# Patient Record
Sex: FEMALE | Race: BLACK OR AFRICAN AMERICAN | Employment: OTHER | URBAN - METROPOLITAN AREA
[De-identification: names, ages, dates, MRNs, and addresses within clinical notes are randomized per-mention and may not be internally consistent; named-entity substitution may affect disease eponyms.]

---

## 2017-01-19 ENCOUNTER — COMMUNICATION - HEALTHEAST (OUTPATIENT)
Dept: INTERNAL MEDICINE | Facility: CLINIC | Age: 77
End: 2017-01-19

## 2017-01-19 DIAGNOSIS — K21.9 GASTROESOPHAGEAL REFLUX DISEASE WITHOUT ESOPHAGITIS: ICD-10-CM

## 2017-01-26 ENCOUNTER — COMMUNICATION - HEALTHEAST (OUTPATIENT)
Dept: INTERNAL MEDICINE | Facility: CLINIC | Age: 77
End: 2017-01-26

## 2017-01-30 ENCOUNTER — OFFICE VISIT - HEALTHEAST (OUTPATIENT)
Dept: INTERNAL MEDICINE | Facility: CLINIC | Age: 77
End: 2017-01-30

## 2017-01-30 DIAGNOSIS — R10.9 ABDOMINAL PAIN: ICD-10-CM

## 2017-01-30 ASSESSMENT — MIFFLIN-ST. JEOR: SCORE: 1082.1

## 2017-02-09 ENCOUNTER — COMMUNICATION - HEALTHEAST (OUTPATIENT)
Dept: INTERNAL MEDICINE | Facility: CLINIC | Age: 77
End: 2017-02-09

## 2017-02-09 DIAGNOSIS — E78.5 HYPERLIPEMIA: ICD-10-CM

## 2017-02-16 ENCOUNTER — RECORDS - HEALTHEAST (OUTPATIENT)
Dept: ADMINISTRATIVE | Facility: OTHER | Age: 77
End: 2017-02-16

## 2017-03-01 ENCOUNTER — AMBULATORY - HEALTHEAST (OUTPATIENT)
Dept: NEUROLOGY | Facility: CLINIC | Age: 77
End: 2017-03-01

## 2017-03-01 DIAGNOSIS — G20.A1 PARKINSON DISEASE (H): ICD-10-CM

## 2017-03-16 ENCOUNTER — HOSPITAL ENCOUNTER (OUTPATIENT)
Dept: PHYSICAL THERAPY | Age: 77
Setting detail: THERAPIES SERIES
Discharge: STILL ADMITTED OR ADMINISTRATIVE | End: 2017-03-16
Attending: PHYSICAL THERAPIST

## 2017-03-16 DIAGNOSIS — R68.89 DECREASED ACTIVITY TOLERANCE: ICD-10-CM

## 2017-03-16 DIAGNOSIS — R25.8 BRADYKINESIA: ICD-10-CM

## 2017-03-16 DIAGNOSIS — R29.3 POSTURAL INSTABILITY: ICD-10-CM

## 2017-04-11 ENCOUNTER — HOSPITAL ENCOUNTER (OUTPATIENT)
Dept: PHYSICAL THERAPY | Age: 77
Setting detail: THERAPIES SERIES
Discharge: STILL ADMITTED OR ADMINISTRATIVE | End: 2017-04-11
Attending: PHYSICAL THERAPIST

## 2017-04-11 DIAGNOSIS — R29.3 POSTURAL INSTABILITY: ICD-10-CM

## 2017-04-11 DIAGNOSIS — R25.8 BRADYKINESIA: ICD-10-CM

## 2017-04-11 DIAGNOSIS — R68.89 DECREASED ACTIVITY TOLERANCE: ICD-10-CM

## 2017-04-18 ENCOUNTER — HOSPITAL ENCOUNTER (OUTPATIENT)
Dept: PHYSICAL THERAPY | Age: 77
Setting detail: THERAPIES SERIES
Discharge: STILL ADMITTED OR ADMINISTRATIVE | End: 2017-04-18
Attending: PHYSICAL THERAPIST

## 2017-04-18 DIAGNOSIS — R68.89 DECREASED ACTIVITY TOLERANCE: ICD-10-CM

## 2017-04-18 DIAGNOSIS — R25.8 BRADYKINESIA: ICD-10-CM

## 2017-04-18 DIAGNOSIS — R29.3 POSTURAL INSTABILITY: ICD-10-CM

## 2017-04-21 ENCOUNTER — HOSPITAL ENCOUNTER (OUTPATIENT)
Dept: PHYSICAL THERAPY | Age: 77
Setting detail: THERAPIES SERIES
Discharge: STILL ADMITTED OR ADMINISTRATIVE | End: 2017-04-21
Attending: PHYSICAL THERAPIST

## 2017-04-21 DIAGNOSIS — R25.8 BRADYKINESIA: ICD-10-CM

## 2017-04-21 DIAGNOSIS — R68.89 DECREASED ACTIVITY TOLERANCE: ICD-10-CM

## 2017-04-21 DIAGNOSIS — R29.3 POSTURAL INSTABILITY: ICD-10-CM

## 2017-04-25 ENCOUNTER — HOSPITAL ENCOUNTER (OUTPATIENT)
Dept: PHYSICAL THERAPY | Age: 77
Setting detail: THERAPIES SERIES
Discharge: STILL ADMITTED OR ADMINISTRATIVE | End: 2017-04-25
Attending: PHYSICAL THERAPIST

## 2017-04-25 DIAGNOSIS — R29.3 POSTURAL INSTABILITY: ICD-10-CM

## 2017-04-25 DIAGNOSIS — R68.89 DECREASED ACTIVITY TOLERANCE: ICD-10-CM

## 2017-04-25 DIAGNOSIS — R25.8 BRADYKINESIA: ICD-10-CM

## 2017-04-28 ENCOUNTER — HOSPITAL ENCOUNTER (OUTPATIENT)
Dept: PHYSICAL THERAPY | Age: 77
Setting detail: THERAPIES SERIES
Discharge: STILL ADMITTED OR ADMINISTRATIVE | End: 2017-04-28
Attending: PHYSICAL THERAPIST

## 2017-04-28 DIAGNOSIS — R25.8 BRADYKINESIA: ICD-10-CM

## 2017-04-28 DIAGNOSIS — R26.81 GAIT INSTABILITY: ICD-10-CM

## 2017-04-28 DIAGNOSIS — R68.89 DECREASED ACTIVITY TOLERANCE: ICD-10-CM

## 2017-04-28 DIAGNOSIS — R29.3 POSTURAL INSTABILITY: ICD-10-CM

## 2017-05-02 ENCOUNTER — HOSPITAL ENCOUNTER (OUTPATIENT)
Dept: PHYSICAL THERAPY | Age: 77
Setting detail: THERAPIES SERIES
Discharge: STILL ADMITTED OR ADMINISTRATIVE | End: 2017-05-02
Attending: PHYSICAL THERAPIST

## 2017-05-02 DIAGNOSIS — R26.81 GAIT INSTABILITY: ICD-10-CM

## 2017-05-02 DIAGNOSIS — R29.3 POSTURAL INSTABILITY: ICD-10-CM

## 2017-05-02 DIAGNOSIS — R68.89 DECREASED ACTIVITY TOLERANCE: ICD-10-CM

## 2017-05-02 DIAGNOSIS — R25.8 BRADYKINESIA: ICD-10-CM

## 2017-05-05 ENCOUNTER — HOSPITAL ENCOUNTER (OUTPATIENT)
Dept: PHYSICAL THERAPY | Age: 77
Setting detail: THERAPIES SERIES
Discharge: STILL ADMITTED OR ADMINISTRATIVE | End: 2017-05-05
Attending: PHYSICAL THERAPIST

## 2017-05-05 DIAGNOSIS — R29.3 POSTURAL INSTABILITY: ICD-10-CM

## 2017-05-05 DIAGNOSIS — R25.8 BRADYKINESIA: ICD-10-CM

## 2017-05-05 DIAGNOSIS — R26.81 GAIT INSTABILITY: ICD-10-CM

## 2017-05-08 ENCOUNTER — COMMUNICATION - HEALTHEAST (OUTPATIENT)
Dept: INTERNAL MEDICINE | Facility: CLINIC | Age: 77
End: 2017-05-08

## 2017-05-08 DIAGNOSIS — I10 UNSPECIFIED ESSENTIAL HYPERTENSION: ICD-10-CM

## 2017-05-09 ENCOUNTER — HOSPITAL ENCOUNTER (OUTPATIENT)
Dept: PHYSICAL THERAPY | Age: 77
Setting detail: THERAPIES SERIES
Discharge: STILL ADMITTED OR ADMINISTRATIVE | End: 2017-05-09
Attending: PHYSICAL THERAPIST

## 2017-05-09 DIAGNOSIS — R25.8 BRADYKINESIA: ICD-10-CM

## 2017-05-09 DIAGNOSIS — R26.81 GAIT INSTABILITY: ICD-10-CM

## 2017-05-09 DIAGNOSIS — R29.3 POSTURAL INSTABILITY: ICD-10-CM

## 2017-05-12 ENCOUNTER — HOSPITAL ENCOUNTER (OUTPATIENT)
Dept: PHYSICAL THERAPY | Age: 77
Setting detail: THERAPIES SERIES
Discharge: STILL ADMITTED OR ADMINISTRATIVE | End: 2017-05-12
Attending: PHYSICAL THERAPIST

## 2017-05-12 DIAGNOSIS — R25.8 BRADYKINESIA: ICD-10-CM

## 2017-05-12 DIAGNOSIS — R68.89 DECREASED ACTIVITY TOLERANCE: ICD-10-CM

## 2017-05-12 DIAGNOSIS — R26.81 GAIT INSTABILITY: ICD-10-CM

## 2017-05-12 DIAGNOSIS — R29.3 POSTURAL INSTABILITY: ICD-10-CM

## 2017-05-16 ENCOUNTER — HOSPITAL ENCOUNTER (OUTPATIENT)
Dept: PHYSICAL THERAPY | Age: 77
Setting detail: THERAPIES SERIES
Discharge: STILL ADMITTED OR ADMINISTRATIVE | End: 2017-05-16
Attending: PHYSICAL THERAPIST

## 2017-05-16 DIAGNOSIS — R25.8 BRADYKINESIA: ICD-10-CM

## 2017-05-16 DIAGNOSIS — R68.89 DECREASED ACTIVITY TOLERANCE: ICD-10-CM

## 2017-05-16 DIAGNOSIS — R29.3 POSTURAL INSTABILITY: ICD-10-CM

## 2017-05-16 DIAGNOSIS — R26.81 GAIT INSTABILITY: ICD-10-CM

## 2017-05-19 ENCOUNTER — HOSPITAL ENCOUNTER (OUTPATIENT)
Dept: PHYSICAL THERAPY | Age: 77
Setting detail: THERAPIES SERIES
Discharge: STILL ADMITTED OR ADMINISTRATIVE | End: 2017-05-19
Attending: PHYSICAL THERAPIST

## 2017-05-19 DIAGNOSIS — R25.8 BRADYKINESIA: ICD-10-CM

## 2017-05-19 DIAGNOSIS — R68.89 DECREASED ACTIVITY TOLERANCE: ICD-10-CM

## 2017-05-19 DIAGNOSIS — R29.3 POSTURAL INSTABILITY: ICD-10-CM

## 2017-05-19 DIAGNOSIS — R26.81 GAIT INSTABILITY: ICD-10-CM

## 2017-05-23 ENCOUNTER — HOSPITAL ENCOUNTER (OUTPATIENT)
Dept: PHYSICAL THERAPY | Age: 77
Setting detail: THERAPIES SERIES
Discharge: STILL ADMITTED OR ADMINISTRATIVE | End: 2017-05-23
Attending: PHYSICAL THERAPIST

## 2017-05-23 DIAGNOSIS — R26.81 GAIT INSTABILITY: ICD-10-CM

## 2017-05-23 DIAGNOSIS — R25.8 BRADYKINESIA: ICD-10-CM

## 2017-05-23 DIAGNOSIS — R68.89 DECREASED ACTIVITY TOLERANCE: ICD-10-CM

## 2017-05-23 DIAGNOSIS — R29.3 POSTURAL INSTABILITY: ICD-10-CM

## 2017-05-25 ENCOUNTER — HOSPITAL ENCOUNTER (OUTPATIENT)
Dept: PHYSICAL THERAPY | Age: 77
Setting detail: THERAPIES SERIES
Discharge: STILL ADMITTED OR ADMINISTRATIVE | End: 2017-05-25
Attending: PHYSICAL THERAPIST

## 2017-05-25 ENCOUNTER — AMBULATORY - HEALTHEAST (OUTPATIENT)
Dept: INTERNAL MEDICINE | Facility: CLINIC | Age: 77
End: 2017-05-25

## 2017-05-25 ENCOUNTER — RECORDS - HEALTHEAST (OUTPATIENT)
Dept: ADMINISTRATIVE | Facility: OTHER | Age: 77
End: 2017-05-25

## 2017-05-25 DIAGNOSIS — R25.8 BRADYKINESIA: ICD-10-CM

## 2017-05-25 DIAGNOSIS — R26.81 GAIT INSTABILITY: ICD-10-CM

## 2017-05-25 DIAGNOSIS — R68.89 DECREASED ACTIVITY TOLERANCE: ICD-10-CM

## 2017-05-25 DIAGNOSIS — R29.3 POSTURAL INSTABILITY: ICD-10-CM

## 2017-05-30 ENCOUNTER — RECORDS - HEALTHEAST (OUTPATIENT)
Dept: ADMINISTRATIVE | Facility: OTHER | Age: 77
End: 2017-05-30

## 2017-06-01 ENCOUNTER — RECORDS - HEALTHEAST (OUTPATIENT)
Dept: ADMINISTRATIVE | Facility: OTHER | Age: 77
End: 2017-06-01

## 2017-06-02 ENCOUNTER — HOSPITAL ENCOUNTER (OUTPATIENT)
Dept: PHYSICAL THERAPY | Age: 77
Setting detail: THERAPIES SERIES
Discharge: STILL ADMITTED OR ADMINISTRATIVE | End: 2017-06-02
Attending: PHYSICAL THERAPIST

## 2017-06-02 DIAGNOSIS — R26.81 GAIT INSTABILITY: ICD-10-CM

## 2017-06-02 DIAGNOSIS — R68.89 DECREASED ACTIVITY TOLERANCE: ICD-10-CM

## 2017-06-02 DIAGNOSIS — R29.3 POSTURAL INSTABILITY: ICD-10-CM

## 2017-06-02 DIAGNOSIS — R25.8 BRADYKINESIA: ICD-10-CM

## 2017-06-13 ENCOUNTER — OFFICE VISIT - HEALTHEAST (OUTPATIENT)
Dept: INTERNAL MEDICINE | Facility: CLINIC | Age: 77
End: 2017-06-13

## 2017-06-13 DIAGNOSIS — K21.9 GASTROESOPHAGEAL REFLUX DISEASE WITHOUT ESOPHAGITIS: ICD-10-CM

## 2017-06-13 DIAGNOSIS — E78.5 HLD (HYPERLIPIDEMIA): ICD-10-CM

## 2017-06-13 DIAGNOSIS — G20.A1 PARALYSIS AGITANS (H): ICD-10-CM

## 2017-06-13 DIAGNOSIS — I10 HTN (HYPERTENSION): ICD-10-CM

## 2017-06-16 ENCOUNTER — AMBULATORY - HEALTHEAST (OUTPATIENT)
Dept: LAB | Facility: CLINIC | Age: 77
End: 2017-06-16

## 2017-06-16 DIAGNOSIS — I10 HTN (HYPERTENSION): ICD-10-CM

## 2017-06-16 DIAGNOSIS — E78.5 HLD (HYPERLIPIDEMIA): ICD-10-CM

## 2017-06-16 LAB
ALT SERPL W P-5'-P-CCNC: <6 U/L (ref 0–45)
CHOLEST SERPL-MCNC: 168 MG/DL
FASTING STATUS PATIENT QL REPORTED: YES
HDLC SERPL-MCNC: 76 MG/DL
LDLC SERPL CALC-MCNC: 82 MG/DL
TRIGL SERPL-MCNC: 50 MG/DL

## 2017-06-26 ENCOUNTER — COMMUNICATION - HEALTHEAST (OUTPATIENT)
Dept: INTERNAL MEDICINE | Facility: CLINIC | Age: 77
End: 2017-06-26

## 2017-06-29 ENCOUNTER — COMMUNICATION - HEALTHEAST (OUTPATIENT)
Dept: INTERNAL MEDICINE | Facility: CLINIC | Age: 77
End: 2017-06-29

## 2017-08-10 ENCOUNTER — RECORDS - HEALTHEAST (OUTPATIENT)
Dept: ADMINISTRATIVE | Facility: OTHER | Age: 77
End: 2017-08-10

## 2017-08-10 ENCOUNTER — TRANSFERRED RECORDS (OUTPATIENT)
Dept: HEALTH INFORMATION MANAGEMENT | Facility: CLINIC | Age: 77
End: 2017-08-10

## 2017-10-22 ENCOUNTER — RECORDS - HEALTHEAST (OUTPATIENT)
Dept: ADMINISTRATIVE | Facility: OTHER | Age: 77
End: 2017-10-22

## 2017-10-30 ENCOUNTER — COMMUNICATION - HEALTHEAST (OUTPATIENT)
Dept: INTERNAL MEDICINE | Facility: CLINIC | Age: 77
End: 2017-10-30

## 2017-10-30 DIAGNOSIS — E78.5 HYPERLIPEMIA: ICD-10-CM

## 2017-12-01 ENCOUNTER — AMBULATORY - HEALTHEAST (OUTPATIENT)
Dept: NEUROLOGY | Facility: CLINIC | Age: 77
End: 2017-12-01

## 2017-12-01 DIAGNOSIS — G20.A1 PARKINSON DISEASE (H): ICD-10-CM

## 2017-12-04 ENCOUNTER — HOSPITAL ENCOUNTER (OUTPATIENT)
Dept: PHYSICAL THERAPY | Age: 77
Setting detail: THERAPIES SERIES
Discharge: STILL ADMITTED OR ADMINISTRATIVE | End: 2017-12-04
Attending: PHYSICAL THERAPIST

## 2017-12-04 DIAGNOSIS — R25.8 BRADYKINESIA: ICD-10-CM

## 2017-12-04 DIAGNOSIS — R29.3 POSTURAL INSTABILITY: ICD-10-CM

## 2017-12-05 ENCOUNTER — COMMUNICATION - HEALTHEAST (OUTPATIENT)
Dept: INTERNAL MEDICINE | Facility: CLINIC | Age: 77
End: 2017-12-05

## 2017-12-06 ENCOUNTER — AMBULATORY - HEALTHEAST (OUTPATIENT)
Dept: NEUROLOGY | Facility: CLINIC | Age: 77
End: 2017-12-06

## 2017-12-06 DIAGNOSIS — G20.A1 PARKINSON'S DISEASE (H): ICD-10-CM

## 2017-12-12 ENCOUNTER — HOSPITAL ENCOUNTER (OUTPATIENT)
Dept: PHYSICAL THERAPY | Age: 77
Setting detail: THERAPIES SERIES
Discharge: STILL ADMITTED OR ADMINISTRATIVE | End: 2017-12-12
Attending: PHYSICAL THERAPIST

## 2017-12-12 DIAGNOSIS — R26.81 GAIT INSTABILITY: ICD-10-CM

## 2017-12-12 DIAGNOSIS — R29.3 POSTURAL INSTABILITY: ICD-10-CM

## 2017-12-12 DIAGNOSIS — R25.8 BRADYKINESIA: ICD-10-CM

## 2017-12-15 ENCOUNTER — HOSPITAL ENCOUNTER (OUTPATIENT)
Dept: PHYSICAL THERAPY | Age: 77
Setting detail: THERAPIES SERIES
Discharge: STILL ADMITTED OR ADMINISTRATIVE | End: 2017-12-15
Attending: PHYSICAL THERAPIST

## 2017-12-15 DIAGNOSIS — R29.3 POSTURAL INSTABILITY: ICD-10-CM

## 2017-12-15 DIAGNOSIS — R25.8 BRADYKINESIA: ICD-10-CM

## 2017-12-19 ENCOUNTER — HOSPITAL ENCOUNTER (OUTPATIENT)
Dept: PHYSICAL THERAPY | Age: 77
Setting detail: THERAPIES SERIES
Discharge: STILL ADMITTED OR ADMINISTRATIVE | End: 2017-12-19
Attending: PHYSICAL THERAPIST

## 2017-12-19 DIAGNOSIS — R26.81 GAIT INSTABILITY: ICD-10-CM

## 2017-12-19 DIAGNOSIS — R25.8 BRADYKINESIA: ICD-10-CM

## 2017-12-19 DIAGNOSIS — R29.3 POSTURAL INSTABILITY: ICD-10-CM

## 2017-12-21 ENCOUNTER — HOSPITAL ENCOUNTER (OUTPATIENT)
Dept: PHYSICAL THERAPY | Age: 77
Setting detail: THERAPIES SERIES
Discharge: STILL ADMITTED OR ADMINISTRATIVE | End: 2017-12-21
Attending: PHYSICAL THERAPIST

## 2017-12-21 DIAGNOSIS — R25.8 BRADYKINESIA: ICD-10-CM

## 2017-12-21 DIAGNOSIS — R29.3 POSTURAL INSTABILITY: ICD-10-CM

## 2017-12-21 DIAGNOSIS — R26.81 GAIT INSTABILITY: ICD-10-CM

## 2017-12-26 ENCOUNTER — HOSPITAL ENCOUNTER (OUTPATIENT)
Dept: PHYSICAL THERAPY | Age: 77
Setting detail: THERAPIES SERIES
Discharge: STILL ADMITTED OR ADMINISTRATIVE | End: 2017-12-26
Attending: PHYSICAL THERAPIST

## 2017-12-26 DIAGNOSIS — R29.3 POSTURAL INSTABILITY: ICD-10-CM

## 2017-12-26 DIAGNOSIS — R25.8 BRADYKINESIA: ICD-10-CM

## 2017-12-26 DIAGNOSIS — R26.81 GAIT INSTABILITY: ICD-10-CM

## 2017-12-28 ENCOUNTER — HOSPITAL ENCOUNTER (OUTPATIENT)
Dept: PHYSICAL THERAPY | Age: 77
Setting detail: THERAPIES SERIES
Discharge: STILL ADMITTED OR ADMINISTRATIVE | End: 2017-12-28
Attending: PHYSICAL THERAPIST

## 2017-12-28 DIAGNOSIS — R29.3 POSTURAL INSTABILITY: ICD-10-CM

## 2017-12-28 DIAGNOSIS — R25.8 BRADYKINESIA: ICD-10-CM

## 2017-12-28 DIAGNOSIS — R26.81 GAIT INSTABILITY: ICD-10-CM

## 2018-01-01 ENCOUNTER — OFFICE VISIT (OUTPATIENT)
Dept: NEUROPSYCHOLOGY | Facility: CLINIC | Age: 78
End: 2018-01-01
Payer: COMMERCIAL

## 2018-01-01 ENCOUNTER — HOSPITAL ENCOUNTER (INPATIENT)
Facility: CLINIC | Age: 78
LOS: 1 days | Discharge: HOME | DRG: 027 | End: 2018-08-08
Attending: NEUROLOGICAL SURGERY | Admitting: NEUROLOGICAL SURGERY
Payer: MEDICARE

## 2018-01-01 ENCOUNTER — AMBULATORY - HEALTHEAST (OUTPATIENT)
Dept: INTERNAL MEDICINE | Facility: CLINIC | Age: 78
End: 2018-01-01

## 2018-01-01 ENCOUNTER — ALLIED HEALTH/NURSE VISIT (OUTPATIENT)
Dept: SURGERY | Facility: CLINIC | Age: 78
End: 2018-01-01
Payer: COMMERCIAL

## 2018-01-01 ENCOUNTER — APPOINTMENT (OUTPATIENT)
Dept: LAB | Facility: CLINIC | Age: 78
End: 2018-01-01

## 2018-01-01 ENCOUNTER — OFFICE VISIT (OUTPATIENT)
Dept: NEUROLOGY | Facility: CLINIC | Age: 78
End: 2018-01-01
Payer: COMMERCIAL

## 2018-01-01 ENCOUNTER — ANESTHESIA EVENT (OUTPATIENT)
Dept: SURGERY | Facility: CLINIC | Age: 78
End: 2018-01-01
Payer: MEDICARE

## 2018-01-01 ENCOUNTER — TELEPHONE (OUTPATIENT)
Dept: NEUROLOGY | Facility: CLINIC | Age: 78
End: 2018-01-01

## 2018-01-01 ENCOUNTER — ANESTHESIA EVENT (OUTPATIENT)
Dept: SURGERY | Facility: CLINIC | Age: 78
DRG: 026 | End: 2018-01-01
Payer: MEDICARE

## 2018-01-01 ENCOUNTER — COMMUNICATION - HEALTHEAST (OUTPATIENT)
Dept: INTERNAL MEDICINE | Facility: CLINIC | Age: 78
End: 2018-01-01

## 2018-01-01 ENCOUNTER — RECORDS - HEALTHEAST (OUTPATIENT)
Dept: ADMINISTRATIVE | Facility: OTHER | Age: 78
End: 2018-01-01

## 2018-01-01 ENCOUNTER — HOSPITAL ENCOUNTER (INPATIENT)
Facility: CLINIC | Age: 78
LOS: 1 days | Discharge: HOME | DRG: 026 | End: 2018-09-19
Attending: NEUROLOGICAL SURGERY | Admitting: NEUROLOGICAL SURGERY
Payer: MEDICARE

## 2018-01-01 ENCOUNTER — OFFICE VISIT (OUTPATIENT)
Dept: NEUROSURGERY | Facility: CLINIC | Age: 78
End: 2018-01-01
Payer: COMMERCIAL

## 2018-01-01 ENCOUNTER — OFFICE VISIT - HEALTHEAST (OUTPATIENT)
Dept: FAMILY MEDICINE | Facility: CLINIC | Age: 78
End: 2018-01-01

## 2018-01-01 ENCOUNTER — HOSPITAL ENCOUNTER (OUTPATIENT)
Dept: CT IMAGING | Facility: CLINIC | Age: 78
DRG: 026 | End: 2018-09-18
Attending: NEUROLOGICAL SURGERY | Admitting: NEUROLOGICAL SURGERY
Payer: MEDICARE

## 2018-01-01 ENCOUNTER — TELEPHONE (OUTPATIENT)
Dept: NEUROSURGERY | Facility: CLINIC | Age: 78
End: 2018-01-01

## 2018-01-01 ENCOUNTER — DOCUMENTATION ONLY (OUTPATIENT)
Dept: NEUROSURGERY | Facility: CLINIC | Age: 78
End: 2018-01-01

## 2018-01-01 ENCOUNTER — APPOINTMENT (OUTPATIENT)
Dept: ULTRASOUND IMAGING | Facility: CLINIC | Age: 78
DRG: 690 | End: 2018-01-01
Payer: MEDICARE

## 2018-01-01 ENCOUNTER — APPOINTMENT (OUTPATIENT)
Dept: CT IMAGING | Facility: CLINIC | Age: 78
DRG: 027 | End: 2018-01-01
Attending: STUDENT IN AN ORGANIZED HEALTH CARE EDUCATION/TRAINING PROGRAM
Payer: MEDICARE

## 2018-01-01 ENCOUNTER — HOSPITAL ENCOUNTER (OUTPATIENT)
Facility: CLINIC | Age: 78
Discharge: HOME | End: 2018-08-21
Attending: NEUROLOGICAL SURGERY | Admitting: NEUROLOGICAL SURGERY
Payer: MEDICARE

## 2018-01-01 ENCOUNTER — HOSPITAL ENCOUNTER (OUTPATIENT)
Dept: PHYSICAL THERAPY | Age: 78
Setting detail: THERAPIES SERIES
Discharge: STILL ADMITTED OR ADMINISTRATIVE | End: 2018-07-17
Attending: PSYCHIATRY & NEUROLOGY

## 2018-01-01 ENCOUNTER — CARE COORDINATION (OUTPATIENT)
Dept: NEUROSURGERY | Facility: CLINIC | Age: 78
End: 2018-01-01

## 2018-01-01 ENCOUNTER — RADIANT APPOINTMENT (OUTPATIENT)
Dept: CT IMAGING | Facility: CLINIC | Age: 78
End: 2018-01-01
Attending: NURSE PRACTITIONER
Payer: COMMERCIAL

## 2018-01-01 ENCOUNTER — OFFICE VISIT - HEALTHEAST (OUTPATIENT)
Dept: INTERNAL MEDICINE | Facility: CLINIC | Age: 78
End: 2018-01-01

## 2018-01-01 ENCOUNTER — ALLIED HEALTH/NURSE VISIT (OUTPATIENT)
Dept: NEUROLOGY | Facility: CLINIC | Age: 78
End: 2018-01-01
Payer: COMMERCIAL

## 2018-01-01 ENCOUNTER — DOCUMENTATION ONLY (OUTPATIENT)
Dept: NEUROLOGY | Facility: CLINIC | Age: 78
End: 2018-01-01

## 2018-01-01 ENCOUNTER — HOSPITAL ENCOUNTER (INPATIENT)
Facility: CLINIC | Age: 78
LOS: 2 days | Discharge: HOME HEALTH | DRG: 690 | End: 2018-10-04
Attending: EMERGENCY MEDICINE | Admitting: INTERNAL MEDICINE
Payer: MEDICARE

## 2018-01-01 ENCOUNTER — TRANSFERRED RECORDS (OUTPATIENT)
Dept: HEALTH INFORMATION MANAGEMENT | Facility: CLINIC | Age: 78
End: 2018-01-01

## 2018-01-01 ENCOUNTER — PRE VISIT (OUTPATIENT)
Dept: NEUROLOGY | Facility: CLINIC | Age: 78
End: 2018-01-01

## 2018-01-01 ENCOUNTER — ANESTHESIA (OUTPATIENT)
Dept: SURGERY | Facility: CLINIC | Age: 78
DRG: 026 | End: 2018-01-01
Payer: MEDICARE

## 2018-01-01 ENCOUNTER — APPOINTMENT (OUTPATIENT)
Dept: OCCUPATIONAL THERAPY | Facility: CLINIC | Age: 78
DRG: 690 | End: 2018-01-01
Payer: MEDICARE

## 2018-01-01 ENCOUNTER — APPOINTMENT (OUTPATIENT)
Dept: GENERAL RADIOLOGY | Facility: CLINIC | Age: 78
DRG: 027 | End: 2018-01-01
Attending: STUDENT IN AN ORGANIZED HEALTH CARE EDUCATION/TRAINING PROGRAM
Payer: MEDICARE

## 2018-01-01 ENCOUNTER — PATIENT OUTREACH (OUTPATIENT)
Dept: NEUROSURGERY | Facility: CLINIC | Age: 78
End: 2018-01-01

## 2018-01-01 ENCOUNTER — APPOINTMENT (OUTPATIENT)
Dept: INTERVENTIONAL RADIOLOGY/VASCULAR | Facility: CLINIC | Age: 78
DRG: 026 | End: 2018-01-01
Attending: NEUROLOGICAL SURGERY
Payer: MEDICARE

## 2018-01-01 ENCOUNTER — ANESTHESIA EVENT (OUTPATIENT)
Dept: SURGERY | Facility: CLINIC | Age: 78
DRG: 027 | End: 2018-01-01
Payer: MEDICARE

## 2018-01-01 ENCOUNTER — APPOINTMENT (OUTPATIENT)
Dept: GENERAL RADIOLOGY | Facility: CLINIC | Age: 78
DRG: 027 | End: 2018-01-01
Attending: NEUROLOGICAL SURGERY
Payer: MEDICARE

## 2018-01-01 ENCOUNTER — APPOINTMENT (OUTPATIENT)
Dept: PHYSICAL THERAPY | Facility: CLINIC | Age: 78
DRG: 690 | End: 2018-01-01
Payer: MEDICARE

## 2018-01-01 ENCOUNTER — APPOINTMENT (OUTPATIENT)
Dept: GENERAL RADIOLOGY | Facility: CLINIC | Age: 78
DRG: 026 | End: 2018-01-01
Attending: STUDENT IN AN ORGANIZED HEALTH CARE EDUCATION/TRAINING PROGRAM
Payer: MEDICARE

## 2018-01-01 ENCOUNTER — AMBULATORY - HEALTHEAST (OUTPATIENT)
Dept: NEUROLOGY | Facility: CLINIC | Age: 78
End: 2018-01-01

## 2018-01-01 ENCOUNTER — OFFICE VISIT (OUTPATIENT)
Dept: SURGERY | Facility: CLINIC | Age: 78
End: 2018-01-01
Payer: COMMERCIAL

## 2018-01-01 ENCOUNTER — HOSPITAL ENCOUNTER (OUTPATIENT)
Dept: PHYSICAL THERAPY | Age: 78
Setting detail: THERAPIES SERIES
Discharge: STILL ADMITTED OR ADMINISTRATIVE | End: 2018-01-16
Attending: PHYSICAL THERAPIST

## 2018-01-01 ENCOUNTER — HOSPITAL ENCOUNTER (OUTPATIENT)
Dept: CT IMAGING | Facility: CLINIC | Age: 78
DRG: 027 | End: 2018-08-07
Attending: NEUROLOGICAL SURGERY | Admitting: NEUROLOGICAL SURGERY
Payer: MEDICARE

## 2018-01-01 ENCOUNTER — APPOINTMENT (OUTPATIENT)
Dept: CT IMAGING | Facility: CLINIC | Age: 78
DRG: 026 | End: 2018-01-01
Attending: STUDENT IN AN ORGANIZED HEALTH CARE EDUCATION/TRAINING PROGRAM
Payer: MEDICARE

## 2018-01-01 ENCOUNTER — COMMUNICATION - HEALTHEAST (OUTPATIENT)
Dept: SCHEDULING | Facility: CLINIC | Age: 78
End: 2018-01-01

## 2018-01-01 ENCOUNTER — AMBULATORY - HEALTHEAST (OUTPATIENT)
Dept: FAMILY MEDICINE | Facility: CLINIC | Age: 78
End: 2018-01-01

## 2018-01-01 ENCOUNTER — MEDICAL CORRESPONDENCE (OUTPATIENT)
Dept: HEALTH INFORMATION MANAGEMENT | Facility: CLINIC | Age: 78
End: 2018-01-01

## 2018-01-01 ENCOUNTER — HOSPITAL ENCOUNTER (OUTPATIENT)
Dept: PHYSICAL THERAPY | Age: 78
Setting detail: THERAPIES SERIES
Discharge: STILL ADMITTED OR ADMINISTRATIVE | End: 2018-01-09
Attending: PHYSICAL THERAPIST

## 2018-01-01 ENCOUNTER — CARE COORDINATION (OUTPATIENT)
Dept: CARE COORDINATION | Facility: CLINIC | Age: 78
End: 2018-01-01

## 2018-01-01 ENCOUNTER — ANESTHESIA (OUTPATIENT)
Dept: SURGERY | Facility: CLINIC | Age: 78
DRG: 027 | End: 2018-01-01
Payer: MEDICARE

## 2018-01-01 ENCOUNTER — APPOINTMENT (OUTPATIENT)
Dept: SURGERY | Facility: CLINIC | Age: 78
End: 2018-01-01
Payer: COMMERCIAL

## 2018-01-01 ENCOUNTER — RADIANT APPOINTMENT (OUTPATIENT)
Dept: MRI IMAGING | Facility: CLINIC | Age: 78
End: 2018-01-01
Attending: PSYCHIATRY & NEUROLOGY
Payer: COMMERCIAL

## 2018-01-01 ENCOUNTER — ANESTHESIA (OUTPATIENT)
Dept: SURGERY | Facility: CLINIC | Age: 78
End: 2018-01-01
Payer: MEDICARE

## 2018-01-01 VITALS
RESPIRATION RATE: 18 BRPM | DIASTOLIC BLOOD PRESSURE: 86 MMHG | SYSTOLIC BLOOD PRESSURE: 146 MMHG | BODY MASS INDEX: 24.33 KG/M2 | OXYGEN SATURATION: 97 % | TEMPERATURE: 97.5 F | HEIGHT: 63 IN | WEIGHT: 137.3 LBS | HEART RATE: 70 BPM

## 2018-01-01 VITALS
OXYGEN SATURATION: 98 % | HEIGHT: 63 IN | HEART RATE: 71 BPM | BODY MASS INDEX: 23.77 KG/M2 | DIASTOLIC BLOOD PRESSURE: 75 MMHG | SYSTOLIC BLOOD PRESSURE: 130 MMHG

## 2018-01-01 VITALS
TEMPERATURE: 96.5 F | DIASTOLIC BLOOD PRESSURE: 88 MMHG | BODY MASS INDEX: 24.17 KG/M2 | RESPIRATION RATE: 16 BRPM | OXYGEN SATURATION: 99 % | SYSTOLIC BLOOD PRESSURE: 155 MMHG | HEIGHT: 63 IN

## 2018-01-01 VITALS
BODY MASS INDEX: 24.14 KG/M2 | SYSTOLIC BLOOD PRESSURE: 116 MMHG | DIASTOLIC BLOOD PRESSURE: 66 MMHG | WEIGHT: 136.24 LBS | TEMPERATURE: 98 F | HEIGHT: 63 IN | OXYGEN SATURATION: 96 % | RESPIRATION RATE: 18 BRPM

## 2018-01-01 VITALS
SYSTOLIC BLOOD PRESSURE: 114 MMHG | BODY MASS INDEX: 23.94 KG/M2 | HEIGHT: 63 IN | WEIGHT: 135.1 LBS | HEART RATE: 68 BPM | DIASTOLIC BLOOD PRESSURE: 81 MMHG

## 2018-01-01 VITALS
HEIGHT: 63 IN | HEART RATE: 57 BPM | DIASTOLIC BLOOD PRESSURE: 66 MMHG | SYSTOLIC BLOOD PRESSURE: 100 MMHG | OXYGEN SATURATION: 98 % | WEIGHT: 158.9 LBS | BODY MASS INDEX: 28.16 KG/M2

## 2018-01-01 VITALS
HEART RATE: 67 BPM | WEIGHT: 134.2 LBS | SYSTOLIC BLOOD PRESSURE: 136 MMHG | DIASTOLIC BLOOD PRESSURE: 81 MMHG | BODY MASS INDEX: 23.78 KG/M2 | HEIGHT: 63 IN

## 2018-01-01 VITALS
BODY MASS INDEX: 24.56 KG/M2 | SYSTOLIC BLOOD PRESSURE: 149 MMHG | HEART RATE: 66 BPM | DIASTOLIC BLOOD PRESSURE: 84 MMHG | HEIGHT: 63 IN | WEIGHT: 138.6 LBS

## 2018-01-01 VITALS
TEMPERATURE: 97.6 F | RESPIRATION RATE: 24 BRPM | BODY MASS INDEX: 27.82 KG/M2 | WEIGHT: 157 LBS | HEIGHT: 63 IN | OXYGEN SATURATION: 99 % | HEART RATE: 66 BPM

## 2018-01-01 VITALS
SYSTOLIC BLOOD PRESSURE: 134 MMHG | DIASTOLIC BLOOD PRESSURE: 72 MMHG | HEART RATE: 77 BPM | HEIGHT: 63 IN | TEMPERATURE: 97.6 F

## 2018-01-01 VITALS — DIASTOLIC BLOOD PRESSURE: 65 MMHG | OXYGEN SATURATION: 97 % | HEART RATE: 65 BPM | SYSTOLIC BLOOD PRESSURE: 114 MMHG

## 2018-01-01 VITALS
TEMPERATURE: 98.3 F | SYSTOLIC BLOOD PRESSURE: 125 MMHG | HEIGHT: 63 IN | WEIGHT: 136.47 LBS | HEART RATE: 59 BPM | RESPIRATION RATE: 16 BRPM | DIASTOLIC BLOOD PRESSURE: 76 MMHG | BODY MASS INDEX: 24.18 KG/M2 | OXYGEN SATURATION: 97 %

## 2018-01-01 VITALS
OXYGEN SATURATION: 98 % | DIASTOLIC BLOOD PRESSURE: 75 MMHG | TEMPERATURE: 97.6 F | BODY MASS INDEX: 24.54 KG/M2 | HEART RATE: 58 BPM | SYSTOLIC BLOOD PRESSURE: 132 MMHG | WEIGHT: 138.5 LBS | HEIGHT: 63 IN | RESPIRATION RATE: 20 BRPM

## 2018-01-01 VITALS
WEIGHT: 150.6 LBS | DIASTOLIC BLOOD PRESSURE: 69 MMHG | BODY MASS INDEX: 26.68 KG/M2 | HEART RATE: 62 BPM | HEIGHT: 63 IN | SYSTOLIC BLOOD PRESSURE: 114 MMHG

## 2018-01-01 VITALS
OXYGEN SATURATION: 99 % | RESPIRATION RATE: 24 BRPM | BODY MASS INDEX: 26.68 KG/M2 | WEIGHT: 150.6 LBS | SYSTOLIC BLOOD PRESSURE: 110 MMHG | TEMPERATURE: 98.2 F | HEART RATE: 62 BPM | HEIGHT: 63 IN | DIASTOLIC BLOOD PRESSURE: 51 MMHG

## 2018-01-01 VITALS
BODY MASS INDEX: 24.45 KG/M2 | HEART RATE: 58 BPM | HEIGHT: 63 IN | DIASTOLIC BLOOD PRESSURE: 75 MMHG | WEIGHT: 138 LBS | SYSTOLIC BLOOD PRESSURE: 132 MMHG | TEMPERATURE: 97.6 F

## 2018-01-01 VITALS
WEIGHT: 132.94 LBS | HEIGHT: 63 IN | TEMPERATURE: 98.1 F | DIASTOLIC BLOOD PRESSURE: 86 MMHG | OXYGEN SATURATION: 96 % | SYSTOLIC BLOOD PRESSURE: 124 MMHG | BODY MASS INDEX: 23.55 KG/M2 | RESPIRATION RATE: 16 BRPM

## 2018-01-01 DIAGNOSIS — M25.531 RIGHT WRIST PAIN: ICD-10-CM

## 2018-01-01 DIAGNOSIS — G20.A1 PARKINSON DISEASE (H): ICD-10-CM

## 2018-01-01 DIAGNOSIS — B97.89 VIRAL SINUSITIS: ICD-10-CM

## 2018-01-01 DIAGNOSIS — G20.A1 PARKINSON'S DISEASE (H): ICD-10-CM

## 2018-01-01 DIAGNOSIS — E78.5 HYPERLIPEMIA: ICD-10-CM

## 2018-01-01 DIAGNOSIS — G20.A1 PARKINSON'S DISEASE (H): Primary | ICD-10-CM

## 2018-01-01 DIAGNOSIS — R25.8 BRADYKINESIA: ICD-10-CM

## 2018-01-01 DIAGNOSIS — F32.A DEPRESSION, UNSPECIFIED DEPRESSION TYPE: Primary | ICD-10-CM

## 2018-01-01 DIAGNOSIS — E78.5 HYPERLIPIDEMIA LDL GOAL <100: ICD-10-CM

## 2018-01-01 DIAGNOSIS — I10 BENIGN ESSENTIAL HYPERTENSION: ICD-10-CM

## 2018-01-01 DIAGNOSIS — Z51.89 VISIT FOR WOUND CHECK: ICD-10-CM

## 2018-01-01 DIAGNOSIS — E78.5 HLD (HYPERLIPIDEMIA): ICD-10-CM

## 2018-01-01 DIAGNOSIS — G20.A1 PARKINSON DISEASE (H): Primary | ICD-10-CM

## 2018-01-01 DIAGNOSIS — G20.A1 PARALYSIS AGITANS (H): Primary | ICD-10-CM

## 2018-01-01 DIAGNOSIS — N39.3 FEMALE STRESS INCONTINENCE: ICD-10-CM

## 2018-01-01 DIAGNOSIS — F09 MENTAL DISORDER DUE TO GENERAL MEDICAL CONDITION: ICD-10-CM

## 2018-01-01 DIAGNOSIS — Z96.89 STATUS POST DEEP BRAIN STIMULATOR PLACEMENT: ICD-10-CM

## 2018-01-01 DIAGNOSIS — K21.9 ESOPHAGEAL REFLUX: ICD-10-CM

## 2018-01-01 DIAGNOSIS — G20.A1 PARALYSIS AGITANS (H): ICD-10-CM

## 2018-01-01 DIAGNOSIS — R11.0 NAUSEA: ICD-10-CM

## 2018-01-01 DIAGNOSIS — I10 ESSENTIAL HYPERTENSION: ICD-10-CM

## 2018-01-01 DIAGNOSIS — K21.00 GASTROESOPHAGEAL REFLUX DISEASE WITH ESOPHAGITIS: ICD-10-CM

## 2018-01-01 DIAGNOSIS — N30.00 ACUTE CYSTITIS WITHOUT HEMATURIA: ICD-10-CM

## 2018-01-01 DIAGNOSIS — R29.3 POSTURAL INSTABILITY: ICD-10-CM

## 2018-01-01 DIAGNOSIS — N34.2 INFECTIVE URETHRITIS: ICD-10-CM

## 2018-01-01 DIAGNOSIS — R30.0 DYSURIA: ICD-10-CM

## 2018-01-01 DIAGNOSIS — H69.93 EUSTACHIAN TUBE DISORDER, BILATERAL: ICD-10-CM

## 2018-01-01 DIAGNOSIS — F32.A DEPRESSION: ICD-10-CM

## 2018-01-01 DIAGNOSIS — M19.039 ARTHRITIS OF SCAPHO-TRAPEZIUM-TRAPEZOID JOINT: ICD-10-CM

## 2018-01-01 DIAGNOSIS — R51.9 FRONTAL HEADACHE: ICD-10-CM

## 2018-01-01 DIAGNOSIS — Z09 HOSPITAL DISCHARGE FOLLOW-UP: ICD-10-CM

## 2018-01-01 DIAGNOSIS — N12 PYELONEPHRITIS: ICD-10-CM

## 2018-01-01 DIAGNOSIS — E55.9 HYPOVITAMINOSIS D: ICD-10-CM

## 2018-01-01 DIAGNOSIS — R82.998 DARK BROWN URINE: ICD-10-CM

## 2018-01-01 DIAGNOSIS — Z96.89 S/P DEEP BRAIN STIMULATOR PLACEMENT: ICD-10-CM

## 2018-01-01 DIAGNOSIS — I10 HTN (HYPERTENSION): ICD-10-CM

## 2018-01-01 DIAGNOSIS — K59.00 CONSTIPATION, UNSPECIFIED CONSTIPATION TYPE: ICD-10-CM

## 2018-01-01 DIAGNOSIS — J32.9 VIRAL SINUSITIS: ICD-10-CM

## 2018-01-01 DIAGNOSIS — Z01.818 PRE-OP EXAM: ICD-10-CM

## 2018-01-01 DIAGNOSIS — R26.81 GAIT INSTABILITY: ICD-10-CM

## 2018-01-01 DIAGNOSIS — Z12.4 CERVICAL CANCER SCREENING: ICD-10-CM

## 2018-01-01 DIAGNOSIS — R35.0 URINARY FREQUENCY: ICD-10-CM

## 2018-01-01 DIAGNOSIS — R30.0 BURNING WITH URINATION: ICD-10-CM

## 2018-01-01 DIAGNOSIS — Z96.89 S/P DEEP BRAIN STIMULATOR PLACEMENT: Primary | ICD-10-CM

## 2018-01-01 DIAGNOSIS — M79.673 FOOT PAIN: ICD-10-CM

## 2018-01-01 DIAGNOSIS — R52 PAIN AND TENDERNESS: Primary | ICD-10-CM

## 2018-01-01 DIAGNOSIS — R51.9 RIGHT SIDED FACIAL PAIN: ICD-10-CM

## 2018-01-01 DIAGNOSIS — R53.1 WEAKNESS: ICD-10-CM

## 2018-01-01 DIAGNOSIS — Z87.440 HISTORY OF UTI: ICD-10-CM

## 2018-01-01 LAB
ABO + RH BLD: NORMAL
ALBUMIN SERPL-MCNC: 2.7 G/DL (ref 3.4–5)
ALBUMIN SERPL-MCNC: 2.8 G/DL (ref 3.4–5)
ALBUMIN SERPL-MCNC: 3.8 G/DL (ref 3.5–5)
ALBUMIN SERPL-MCNC: 4.1 G/DL (ref 3.5–5)
ALBUMIN SERPL-MCNC: 4.1 G/DL (ref 3.5–5)
ALBUMIN UR-MCNC: 30 MG/DL
ALBUMIN UR-MCNC: ABNORMAL MG/DL
ALBUMIN UR-MCNC: NEGATIVE MG/DL
ALP SERPL-CCNC: 118 U/L (ref 40–150)
ALP SERPL-CCNC: 76 U/L (ref 40–150)
ALP SERPL-CCNC: 79 U/L (ref 45–120)
ALP SERPL-CCNC: 86 U/L (ref 45–120)
ALP SERPL-CCNC: 95 U/L (ref 45–120)
ALT SERPL W P-5'-P-CCNC: 15 U/L (ref 0–50)
ALT SERPL W P-5'-P-CCNC: 7 U/L (ref 0–50)
ALT SERPL W P-5'-P-CCNC: <9 U/L (ref 0–45)
AMORPH CRY #/AREA URNS HPF: ABNORMAL /HPF
ANION GAP SERPL CALCULATED.3IONS-SCNC: 10 MMOL/L (ref 5–18)
ANION GAP SERPL CALCULATED.3IONS-SCNC: 10 MMOL/L (ref 5–18)
ANION GAP SERPL CALCULATED.3IONS-SCNC: 12 MMOL/L (ref 5–18)
ANION GAP SERPL CALCULATED.3IONS-SCNC: 13 MMOL/L (ref 5–18)
ANION GAP SERPL CALCULATED.3IONS-SCNC: 5 MMOL/L (ref 3–14)
ANION GAP SERPL CALCULATED.3IONS-SCNC: 6 MMOL/L (ref 3–14)
ANION GAP SERPL CALCULATED.3IONS-SCNC: 7 MMOL/L (ref 3–14)
ANION GAP SERPL CALCULATED.3IONS-SCNC: 7 MMOL/L (ref 3–14)
ANION GAP SERPL CALCULATED.3IONS-SCNC: 8 MMOL/L (ref 3–14)
ANION GAP SERPL CALCULATED.3IONS-SCNC: 8 MMOL/L (ref 3–14)
APPEARANCE UR: ABNORMAL
APPEARANCE UR: CLEAR
APTT PPP: 31 SEC (ref 22–37)
AST SERPL W P-5'-P-CCNC: 13 U/L (ref 0–40)
AST SERPL W P-5'-P-CCNC: 14 U/L (ref 0–40)
AST SERPL W P-5'-P-CCNC: 16 U/L (ref 0–45)
AST SERPL W P-5'-P-CCNC: 26 U/L (ref 0–40)
AST SERPL W P-5'-P-CCNC: 34 U/L (ref 0–45)
ATRIAL RATE - MUSE: 65 BPM
BACTERIA #/AREA URNS HPF: ABNORMAL /HPF
BACTERIA #/AREA URNS HPF: ABNORMAL HPF
BACTERIA SPEC CULT: ABNORMAL
BACTERIA SPEC CULT: NO GROWTH
BACTERIA SPEC CULT: NORMAL
BASOPHILS # BLD AUTO: 0 10E9/L (ref 0–0.2)
BASOPHILS # BLD AUTO: 0 THOU/UL (ref 0–0.2)
BASOPHILS # BLD AUTO: 0.1 THOU/UL (ref 0–0.2)
BASOPHILS NFR BLD AUTO: 0 % (ref 0–2)
BASOPHILS NFR BLD AUTO: 0.1 %
BASOPHILS NFR BLD AUTO: 1 % (ref 0–2)
BILIRUB SERPL-MCNC: 0.7 MG/DL (ref 0.2–1.3)
BILIRUB SERPL-MCNC: 0.8 MG/DL (ref 0–1)
BILIRUB SERPL-MCNC: 0.9 MG/DL (ref 0–1)
BILIRUB SERPL-MCNC: 1.1 MG/DL (ref 0–1)
BILIRUB SERPL-MCNC: 1.3 MG/DL (ref 0.2–1.3)
BILIRUB UR QL STRIP: ABNORMAL
BILIRUB UR QL STRIP: NEGATIVE
BKR LAB AP ABNORMAL BLEEDING: NO
BKR LAB AP BIRTH CONTROL/HORMONES: NORMAL
BKR LAB AP CERVICAL APPEARANCE: NORMAL
BKR LAB AP GYN ADEQUACY: NORMAL
BKR LAB AP GYN INTERPRETATION: NORMAL
BKR LAB AP HPV REFLEX: NORMAL
BKR LAB AP LMP: NORMAL
BKR LAB AP PATIENT STATUS: NORMAL
BKR LAB AP PREVIOUS ABNORMAL: NORMAL
BKR LAB AP PREVIOUS NORMAL: NORMAL
BLD GP AB SCN SERPL QL: NORMAL
BLD GP AB SCN SERPL QL: NORMAL
BLOOD BANK CMNT PATIENT-IMP: NORMAL
BUN SERPL-MCNC: 10 MG/DL (ref 8–28)
BUN SERPL-MCNC: 12 MG/DL (ref 7–30)
BUN SERPL-MCNC: 13 MG/DL (ref 7–30)
BUN SERPL-MCNC: 13 MG/DL (ref 8–28)
BUN SERPL-MCNC: 14 MG/DL (ref 7–30)
BUN SERPL-MCNC: 15 MG/DL (ref 7–30)
BUN SERPL-MCNC: 15 MG/DL (ref 8–28)
BUN SERPL-MCNC: 17 MG/DL (ref 8–28)
BUN SERPL-MCNC: 18 MG/DL (ref 7–30)
BUN SERPL-MCNC: 8 MG/DL (ref 7–30)
C REACTIVE PROTEIN LHE: 0.5 MG/DL (ref 0–0.8)
CALCIUM SERPL-MCNC: 8.3 MG/DL (ref 8.5–10.1)
CALCIUM SERPL-MCNC: 8.3 MG/DL (ref 8.5–10.1)
CALCIUM SERPL-MCNC: 8.6 MG/DL (ref 8.5–10.1)
CALCIUM SERPL-MCNC: 8.8 MG/DL (ref 8.5–10.1)
CALCIUM SERPL-MCNC: 8.8 MG/DL (ref 8.5–10.1)
CALCIUM SERPL-MCNC: 9 MG/DL (ref 8.5–10.1)
CALCIUM SERPL-MCNC: 9.2 MG/DL (ref 8.5–10.5)
CALCIUM SERPL-MCNC: 9.3 MG/DL (ref 8.5–10.5)
CALCIUM SERPL-MCNC: 9.4 MG/DL (ref 8.5–10.5)
CALCIUM SERPL-MCNC: 9.6 MG/DL (ref 8.5–10.5)
CHLORIDE BLD-SCNC: 100 MMOL/L (ref 98–107)
CHLORIDE BLD-SCNC: 102 MMOL/L (ref 98–107)
CHLORIDE BLD-SCNC: 104 MMOL/L (ref 98–107)
CHLORIDE BLD-SCNC: 105 MMOL/L (ref 98–107)
CHLORIDE SERPL-SCNC: 101 MMOL/L (ref 94–109)
CHLORIDE SERPL-SCNC: 103 MMOL/L (ref 94–109)
CHLORIDE SERPL-SCNC: 105 MMOL/L (ref 94–109)
CHLORIDE SERPL-SCNC: 105 MMOL/L (ref 94–109)
CHLORIDE SERPL-SCNC: 106 MMOL/L (ref 94–109)
CHLORIDE SERPL-SCNC: 106 MMOL/L (ref 94–109)
CHOLEST SERPL-MCNC: 168 MG/DL
CK SERPL-CCNC: 218 U/L (ref 30–225)
CO2 SERPL-SCNC: 26 MMOL/L (ref 20–32)
CO2 SERPL-SCNC: 26 MMOL/L (ref 20–32)
CO2 SERPL-SCNC: 26 MMOL/L (ref 22–31)
CO2 SERPL-SCNC: 26 MMOL/L (ref 22–31)
CO2 SERPL-SCNC: 27 MMOL/L (ref 22–31)
CO2 SERPL-SCNC: 27 MMOL/L (ref 22–31)
CO2 SERPL-SCNC: 28 MMOL/L (ref 20–32)
CO2 SERPL-SCNC: 29 MMOL/L (ref 20–32)
CO2 SERPL-SCNC: 29 MMOL/L (ref 20–32)
CO2 SERPL-SCNC: 31 MMOL/L (ref 20–32)
COLOR UR AUTO: YELLOW
CREAT SERPL-MCNC: 0.63 MG/DL (ref 0.52–1.04)
CREAT SERPL-MCNC: 0.64 MG/DL (ref 0.52–1.04)
CREAT SERPL-MCNC: 0.71 MG/DL (ref 0.52–1.04)
CREAT SERPL-MCNC: 0.8 MG/DL (ref 0.6–1.1)
CREAT SERPL-MCNC: 0.87 MG/DL (ref 0.52–1.04)
CREAT SERPL-MCNC: 0.88 MG/DL (ref 0.52–1.04)
CREAT SERPL-MCNC: 0.89 MG/DL (ref 0.6–1.1)
CREAT SERPL-MCNC: 0.91 MG/DL (ref 0.6–1.1)
CREAT SERPL-MCNC: 0.93 MG/DL (ref 0.6–1.1)
CREAT SERPL-MCNC: 1.01 MG/DL (ref 0.52–1.04)
CRP SERPL-MCNC: 180 MG/L (ref 0–8)
CRP SERPL-MCNC: 190 MG/L (ref 0–8)
DIASTOLIC BLOOD PRESSURE - MUSE: NORMAL MMHG
DIFFERENTIAL METHOD BLD: ABNORMAL
EOSINOPHIL # BLD AUTO: 0 10E9/L (ref 0–0.7)
EOSINOPHIL # BLD AUTO: 0.1 THOU/UL (ref 0–0.4)
EOSINOPHIL # BLD AUTO: 0.1 THOU/UL (ref 0–0.4)
EOSINOPHIL NFR BLD AUTO: 0 %
EOSINOPHIL NFR BLD AUTO: 1 % (ref 0–6)
EOSINOPHIL NFR BLD AUTO: 2 % (ref 0–6)
ERYTHROCYTE [DISTWIDTH] IN BLOOD BY AUTOMATED COUNT: 12 % (ref 11–14.5)
ERYTHROCYTE [DISTWIDTH] IN BLOOD BY AUTOMATED COUNT: 12.9 % (ref 11–14.5)
ERYTHROCYTE [DISTWIDTH] IN BLOOD BY AUTOMATED COUNT: 13.6 % (ref 10–15)
ERYTHROCYTE [DISTWIDTH] IN BLOOD BY AUTOMATED COUNT: 14 % (ref 10–15)
ERYTHROCYTE [DISTWIDTH] IN BLOOD BY AUTOMATED COUNT: 14 % (ref 10–15)
ERYTHROCYTE [DISTWIDTH] IN BLOOD BY AUTOMATED COUNT: 14.1 % (ref 10–15)
ERYTHROCYTE [DISTWIDTH] IN BLOOD BY AUTOMATED COUNT: 14.2 % (ref 10–15)
ERYTHROCYTE [DISTWIDTH] IN BLOOD BY AUTOMATED COUNT: 14.2 % (ref 10–15)
ERYTHROCYTE [DISTWIDTH] IN BLOOD BY AUTOMATED COUNT: 14.4 % (ref 10–15)
ERYTHROCYTE [SEDIMENTATION RATE] IN BLOOD BY WESTERGREN METHOD: 19 MM/H (ref 0–30)
ERYTHROCYTE [SEDIMENTATION RATE] IN BLOOD BY WESTERGREN METHOD: 7 MM/HR (ref 0–20)
FASTING STATUS PATIENT QL REPORTED: NO
GFR SERPL CREATININE-BSD FRML MDRD: 53 ML/MIN/1.7M2
GFR SERPL CREATININE-BSD FRML MDRD: 58 ML/MIN/1.73M2
GFR SERPL CREATININE-BSD FRML MDRD: 60 ML/MIN/1.73M2
GFR SERPL CREATININE-BSD FRML MDRD: 62 ML/MIN/1.7M2
GFR SERPL CREATININE-BSD FRML MDRD: 63 ML/MIN/1.7M2
GFR SERPL CREATININE-BSD FRML MDRD: 80 ML/MIN/1.7M2
GFR SERPL CREATININE-BSD FRML MDRD: 89 ML/MIN/1.7M2
GFR SERPL CREATININE-BSD FRML MDRD: >60 ML/MIN/1.73M2
GFR SERPL CREATININE-BSD FRML MDRD: >60 ML/MIN/1.73M2
GFR SERPL CREATININE-BSD FRML MDRD: >90 ML/MIN/1.7M2
GLUCOSE BLD-MCNC: 76 MG/DL (ref 70–125)
GLUCOSE BLD-MCNC: 78 MG/DL (ref 70–125)
GLUCOSE BLD-MCNC: 81 MG/DL (ref 70–125)
GLUCOSE BLD-MCNC: 89 MG/DL (ref 70–125)
GLUCOSE BLDC GLUCOMTR-MCNC: 83 MG/DL (ref 70–99)
GLUCOSE BLDC GLUCOMTR-MCNC: 85 MG/DL (ref 70–99)
GLUCOSE BLDC GLUCOMTR-MCNC: 90 MG/DL (ref 70–99)
GLUCOSE SERPL-MCNC: 100 MG/DL (ref 70–99)
GLUCOSE SERPL-MCNC: 102 MG/DL (ref 70–99)
GLUCOSE SERPL-MCNC: 105 MG/DL (ref 70–99)
GLUCOSE SERPL-MCNC: 108 MG/DL (ref 70–99)
GLUCOSE SERPL-MCNC: 111 MG/DL (ref 70–99)
GLUCOSE SERPL-MCNC: 93 MG/DL (ref 70–99)
GLUCOSE UR STRIP-MCNC: NEGATIVE MG/DL
HCT VFR BLD AUTO: 37.1 % (ref 35–47)
HCT VFR BLD AUTO: 37.3 % (ref 35–47)
HCT VFR BLD AUTO: 37.5 % (ref 35–47)
HCT VFR BLD AUTO: 39.4 % (ref 35–47)
HCT VFR BLD AUTO: 39.7 % (ref 35–47)
HCT VFR BLD AUTO: 39.8 % (ref 35–47)
HCT VFR BLD AUTO: 40.5 % (ref 35–47)
HCT VFR BLD AUTO: 42.3 % (ref 35–47)
HCT VFR BLD AUTO: 44.3 % (ref 35–47)
HDLC SERPL-MCNC: 74 MG/DL
HGB BLD-MCNC: 11.9 G/DL (ref 11.7–15.7)
HGB BLD-MCNC: 12 G/DL (ref 11.7–15.7)
HGB BLD-MCNC: 12.7 G/DL (ref 11.7–15.7)
HGB BLD-MCNC: 12.7 G/DL (ref 11.7–15.7)
HGB BLD-MCNC: 12.8 G/DL (ref 12–16)
HGB BLD-MCNC: 13.1 G/DL (ref 11.7–15.7)
HGB BLD-MCNC: 13.5 G/DL (ref 11.7–15.7)
HGB BLD-MCNC: 13.6 G/DL (ref 12–16)
HGB BLD-MCNC: 13.7 G/DL (ref 12–16)
HGB BLD-MCNC: 14.2 G/DL (ref 11.7–15.7)
HGB UR QL STRIP: ABNORMAL
HGB UR QL STRIP: NEGATIVE
HIGH RISK?: NO
HPV SOURCE: NORMAL
HUMAN PAPILLOMA VIRUS 16 DNA: NEGATIVE
HUMAN PAPILLOMA VIRUS 18 DNA: NEGATIVE
HUMAN PAPILLOMA VIRUS FINAL DIAGNOSIS: NORMAL
HUMAN PAPILLOMA VIRUS OTHER HR: NEGATIVE
HYALINE CASTS #/AREA URNS LPF: ABNORMAL LPF
HYALINE CASTS #/AREA URNS LPF: ABNORMAL LPF
IMM GRANULOCYTES # BLD: 0 10E9/L (ref 0–0.4)
IMM GRANULOCYTES NFR BLD: 0.3 %
INR PPP: 1 (ref 0.86–1.14)
INR PPP: 1.03 (ref 0.86–1.14)
INR PPP: 1.21 (ref 0.86–1.14)
INTERPRETATION ECG - MUSE: NORMAL
KETONES UR STRIP-MCNC: 40 MG/DL
KETONES UR STRIP-MCNC: ABNORMAL MG/DL
KETONES UR STRIP-MCNC: NEGATIVE MG/DL
LACTATE BLD-SCNC: 0.8 MMOL/L (ref 0.7–2)
LACTATE BLD-SCNC: 1 MMOL/L (ref 0.7–2)
LDLC SERPL CALC-MCNC: 74 MG/DL
LEUKOCYTE ESTERASE UR QL STRIP: ABNORMAL
LEUKOCYTE ESTERASE UR QL STRIP: NEGATIVE
LYMPHOCYTES # BLD AUTO: 0.7 10E9/L (ref 0.8–5.3)
LYMPHOCYTES # BLD AUTO: 1.5 THOU/UL (ref 0.8–4.4)
LYMPHOCYTES # BLD AUTO: 1.8 THOU/UL (ref 0.8–4.4)
LYMPHOCYTES NFR BLD AUTO: 29 % (ref 20–40)
LYMPHOCYTES NFR BLD AUTO: 32 % (ref 20–40)
LYMPHOCYTES NFR BLD AUTO: 5.7 %
Lab: ABNORMAL
Lab: ABNORMAL
Lab: NORMAL
MAGNESIUM SERPL-MCNC: 2.1 MG/DL (ref 1.6–2.3)
MAGNESIUM SERPL-MCNC: 2.3 MG/DL (ref 1.6–2.3)
MCH RBC QN AUTO: 30 PG (ref 26.5–33)
MCH RBC QN AUTO: 30.5 PG (ref 26.5–33)
MCH RBC QN AUTO: 30.6 PG (ref 26.5–33)
MCH RBC QN AUTO: 30.6 PG (ref 26.5–33)
MCH RBC QN AUTO: 30.7 PG (ref 26.5–33)
MCH RBC QN AUTO: 30.7 PG (ref 26.5–33)
MCH RBC QN AUTO: 30.7 PG (ref 27–34)
MCH RBC QN AUTO: 30.8 PG (ref 26.5–33)
MCH RBC QN AUTO: 30.9 PG (ref 27–34)
MCHC RBC AUTO-ENTMCNC: 31.7 G/DL (ref 31.5–36.5)
MCHC RBC AUTO-ENTMCNC: 31.9 G/DL (ref 31.5–36.5)
MCHC RBC AUTO-ENTMCNC: 31.9 G/DL (ref 31.5–36.5)
MCHC RBC AUTO-ENTMCNC: 32.1 G/DL (ref 31.5–36.5)
MCHC RBC AUTO-ENTMCNC: 32.2 G/DL (ref 31.5–36.5)
MCHC RBC AUTO-ENTMCNC: 32.2 G/DL (ref 31.5–36.5)
MCHC RBC AUTO-ENTMCNC: 32.3 G/DL (ref 31.5–36.5)
MCHC RBC AUTO-ENTMCNC: 34.4 G/DL (ref 32–36)
MCHC RBC AUTO-ENTMCNC: 34.5 G/DL (ref 32–36)
MCV RBC AUTO: 89 FL (ref 80–100)
MCV RBC AUTO: 90 FL (ref 80–100)
MCV RBC AUTO: 93 FL (ref 78–100)
MCV RBC AUTO: 95 FL (ref 78–100)
MCV RBC AUTO: 95 FL (ref 78–100)
MCV RBC AUTO: 96 FL (ref 78–100)
MONOCYTES # BLD AUTO: 0.4 THOU/UL (ref 0–0.9)
MONOCYTES # BLD AUTO: 0.6 THOU/UL (ref 0–0.9)
MONOCYTES # BLD AUTO: 0.8 10E9/L (ref 0–1.3)
MONOCYTES NFR BLD AUTO: 5.9 %
MONOCYTES NFR BLD AUTO: 9 % (ref 2–10)
MONOCYTES NFR BLD AUTO: 9 % (ref 2–10)
MRSA DNA SPEC QL NAA+PROBE: NEGATIVE
MUCOUS THREADS #/AREA URNS LPF: ABNORMAL LPF
MUCOUS THREADS #/AREA URNS LPF: PRESENT /LPF
NEUTROPHILS # BLD AUTO: 11.4 10E9/L (ref 1.6–8.3)
NEUTROPHILS # BLD AUTO: 2.7 THOU/UL (ref 2–7.7)
NEUTROPHILS # BLD AUTO: 3.8 THOU/UL (ref 2–7.7)
NEUTROPHILS NFR BLD AUTO: 58 % (ref 50–70)
NEUTROPHILS NFR BLD AUTO: 60 % (ref 50–70)
NEUTROPHILS NFR BLD AUTO: 88 %
NITRATE UR QL: NEGATIVE
NITRATE UR QL: POSITIVE
NRBC # BLD AUTO: 0 10*3/UL
NRBC BLD AUTO-RTO: 0 /100
P AXIS - MUSE: 52 DEGREES
PATH REPORT.COMMENTS IMP SPEC: NORMAL
PH UR STRIP: 6 PH (ref 5–7)
PH UR STRIP: 6 PH (ref 5–7)
PH UR STRIP: 6 [PH] (ref 5–8)
PH UR STRIP: 6.5 [PH] (ref 5–8)
PH UR STRIP: 6.5 [PH] (ref 5–8)
PH UR STRIP: 7 [PH] (ref 5–8)
PHOSPHATE SERPL-MCNC: 2.8 MG/DL (ref 2.5–4.5)
PHOSPHATE SERPL-MCNC: 2.8 MG/DL (ref 2.5–4.5)
PLATELET # BLD AUTO: 116 10E9/L (ref 150–450)
PLATELET # BLD AUTO: 120 10E9/L (ref 150–450)
PLATELET # BLD AUTO: 120 THOU/UL (ref 140–440)
PLATELET # BLD AUTO: 121 10E9/L (ref 150–450)
PLATELET # BLD AUTO: 121 10E9/L (ref 150–450)
PLATELET # BLD AUTO: 127 10E9/L (ref 150–450)
PLATELET # BLD AUTO: 129 10E9/L (ref 150–450)
PLATELET # BLD AUTO: 140 10E9/L (ref 150–450)
PLATELET # BLD AUTO: 150 THOU/UL (ref 140–440)
PMV BLD AUTO: 10.5 FL (ref 7–10)
PMV BLD AUTO: 9.5 FL (ref 7–10)
POTASSIUM BLD-SCNC: 3.7 MMOL/L (ref 3.5–5)
POTASSIUM BLD-SCNC: 3.8 MMOL/L (ref 3.5–5)
POTASSIUM BLD-SCNC: 4.2 MMOL/L (ref 3.5–5)
POTASSIUM BLD-SCNC: 4.4 MMOL/L (ref 3.5–5)
POTASSIUM SERPL-SCNC: 3.3 MMOL/L (ref 3.4–5.3)
POTASSIUM SERPL-SCNC: 3.4 MMOL/L (ref 3.4–5.3)
POTASSIUM SERPL-SCNC: 3.5 MMOL/L (ref 3.4–5.3)
POTASSIUM SERPL-SCNC: 3.6 MMOL/L (ref 3.4–5.3)
POTASSIUM SERPL-SCNC: 3.7 MMOL/L (ref 3.4–5.3)
POTASSIUM SERPL-SCNC: 4.3 MMOL/L (ref 3.4–5.3)
PR INTERVAL - MUSE: 140 MS
PROT SERPL-MCNC: 6.3 G/DL (ref 6.8–8.8)
PROT SERPL-MCNC: 6.3 G/DL (ref 6–8)
PROT SERPL-MCNC: 6.4 G/DL (ref 6.8–8.8)
PROT SERPL-MCNC: 6.4 G/DL (ref 6–8)
PROT SERPL-MCNC: 6.5 G/DL (ref 6–8)
QRS DURATION - MUSE: 80 MS
QT - MUSE: 422 MS
QTC - MUSE: 438 MS
R AXIS - MUSE: 66 DEGREES
RBC # BLD AUTO: 3.9 10E12/L (ref 3.8–5.2)
RBC # BLD AUTO: 4 10E12/L (ref 3.8–5.2)
RBC # BLD AUTO: 4.14 10E12/L (ref 3.8–5.2)
RBC # BLD AUTO: 4.15 10E12/L (ref 3.8–5.2)
RBC # BLD AUTO: 4.16 MILL/UL (ref 3.8–5.4)
RBC # BLD AUTO: 4.28 10E12/L (ref 3.8–5.2)
RBC # BLD AUTO: 4.4 10E12/L (ref 3.8–5.2)
RBC # BLD AUTO: 4.42 MILL/UL (ref 3.8–5.4)
RBC # BLD AUTO: 4.61 10E12/L (ref 3.8–5.2)
RBC #/AREA URNS AUTO: 4 /HPF (ref 0–2)
RBC #/AREA URNS AUTO: 6 /HPF (ref 0–2)
RBC #/AREA URNS AUTO: ABNORMAL HPF
RESULT FLAG (HE HISTORICAL CONVERSION): NORMAL
SODIUM SERPL-SCNC: 137 MMOL/L (ref 133–144)
SODIUM SERPL-SCNC: 138 MMOL/L (ref 133–144)
SODIUM SERPL-SCNC: 139 MMOL/L (ref 133–144)
SODIUM SERPL-SCNC: 139 MMOL/L (ref 136–145)
SODIUM SERPL-SCNC: 141 MMOL/L (ref 133–144)
SODIUM SERPL-SCNC: 141 MMOL/L (ref 133–144)
SODIUM SERPL-SCNC: 141 MMOL/L (ref 136–145)
SODIUM SERPL-SCNC: 142 MMOL/L (ref 133–144)
SOURCE: ABNORMAL
SOURCE: ABNORMAL
SP GR UR STRIP: 1.01 (ref 1–1.03)
SP GR UR STRIP: 1.02 (ref 1–1.03)
SP GR UR STRIP: 1.02 (ref 1–1.03)
SPECIMEN DESCRIPTION: NORMAL
SPECIMEN EXP DATE BLD: NORMAL
SPECIMEN EXP DATE BLD: NORMAL
SPECIMEN SOURCE: ABNORMAL
SPECIMEN SOURCE: ABNORMAL
SPECIMEN SOURCE: NORMAL
SQUAMOUS #/AREA URNS AUTO: ABNORMAL LPF
SYSTOLIC BLOOD PRESSURE - MUSE: NORMAL MMHG
T AXIS - MUSE: 87 DEGREES
TRIGL SERPL-MCNC: 101 MG/DL
UROBILINOGEN UR STRIP-ACNC: ABNORMAL
UROBILINOGEN UR STRIP-MCNC: NORMAL MG/DL (ref 0–2)
UROBILINOGEN UR STRIP-MCNC: NORMAL MG/DL (ref 0–2)
VENTRICULAR RATE- MUSE: 65 BPM
WBC # BLD AUTO: 13 10E9/L (ref 4–11)
WBC # BLD AUTO: 4.8 10E9/L (ref 4–11)
WBC # BLD AUTO: 5.1 10E9/L (ref 4–11)
WBC # BLD AUTO: 5.3 10E9/L (ref 4–11)
WBC # BLD AUTO: 6.8 10E9/L (ref 4–11)
WBC # BLD AUTO: 7.3 10E9/L (ref 4–11)
WBC # BLD AUTO: 8.6 10E9/L (ref 4–11)
WBC #/AREA URNS AUTO: 98 /HPF (ref 0–5)
WBC #/AREA URNS AUTO: ABNORMAL /HPF (ref 0–5)
WBC #/AREA URNS AUTO: ABNORMAL HPF
WBC CLUMPS #/AREA URNS HPF: PRESENT /HPF
WBC: 4.6 THOU/UL (ref 4–11)
WBC: 6.3 THOU/UL (ref 4–11)

## 2018-01-01 PROCEDURE — 84100 ASSAY OF PHOSPHORUS: CPT | Performed by: INTERNAL MEDICINE

## 2018-01-01 PROCEDURE — 80053 COMPREHEN METABOLIC PANEL: CPT | Performed by: EMERGENCY MEDICINE

## 2018-01-01 PROCEDURE — A9270 NON-COVERED ITEM OR SERVICE: HCPCS | Mod: GY | Performed by: STUDENT IN AN ORGANIZED HEALTH CARE EDUCATION/TRAINING PROGRAM

## 2018-01-01 PROCEDURE — 25000132 ZZH RX MED GY IP 250 OP 250 PS 637: Mod: GY | Performed by: NURSE ANESTHETIST, CERTIFIED REGISTERED

## 2018-01-01 PROCEDURE — 27810169 ZZH OR IMPLANT GENERAL: Performed by: NEUROLOGICAL SURGERY

## 2018-01-01 PROCEDURE — 82962 GLUCOSE BLOOD TEST: CPT

## 2018-01-01 PROCEDURE — 25000128 H RX IP 250 OP 636: Performed by: NURSE ANESTHETIST, CERTIFIED REGISTERED

## 2018-01-01 PROCEDURE — 12000008 ZZH R&B INTERMEDIATE UMMC

## 2018-01-01 PROCEDURE — 40000170 ZZH STATISTIC PRE-PROCEDURE ASSESSMENT II: Performed by: NEUROLOGICAL SURGERY

## 2018-01-01 PROCEDURE — 27210794 ZZH OR GENERAL SUPPLY STERILE: Performed by: NEUROLOGICAL SURGERY

## 2018-01-01 PROCEDURE — 25000125 ZZHC RX 250: Performed by: NEUROLOGICAL SURGERY

## 2018-01-01 PROCEDURE — 25000128 H RX IP 250 OP 636: Performed by: INTERNAL MEDICINE

## 2018-01-01 PROCEDURE — 40000193 ZZH STATISTIC PT WARD VISIT: Performed by: PHYSICAL THERAPIST

## 2018-01-01 PROCEDURE — C1778 LEAD, NEUROSTIMULATOR: HCPCS | Performed by: NEUROLOGICAL SURGERY

## 2018-01-01 PROCEDURE — 25000128 H RX IP 250 OP 636: Performed by: NEUROLOGICAL SURGERY

## 2018-01-01 PROCEDURE — 71000015 ZZH RECOVERY PHASE 1 LEVEL 2 EA ADDTL HR: Performed by: NEUROLOGICAL SURGERY

## 2018-01-01 PROCEDURE — 87800 DETECT AGNT MULT DNA DIREC: CPT | Performed by: EMERGENCY MEDICINE

## 2018-01-01 PROCEDURE — 70450 CT HEAD/BRAIN W/O DYE: CPT

## 2018-01-01 PROCEDURE — 36415 COLL VENOUS BLD VENIPUNCTURE: CPT | Performed by: INTERNAL MEDICINE

## 2018-01-01 PROCEDURE — 25000128 H RX IP 250 OP 636: Performed by: ANESTHESIOLOGY

## 2018-01-01 PROCEDURE — A9270 NON-COVERED ITEM OR SERVICE: HCPCS | Mod: GY | Performed by: INTERNAL MEDICINE

## 2018-01-01 PROCEDURE — 25000128 H RX IP 250 OP 636: Performed by: STUDENT IN AN ORGANIZED HEALTH CARE EDUCATION/TRAINING PROGRAM

## 2018-01-01 PROCEDURE — 37000008 ZZH ANESTHESIA TECHNICAL FEE, 1ST 30 MIN: Performed by: NEUROLOGICAL SURGERY

## 2018-01-01 PROCEDURE — 40000277 XR SURGERY CARM FLUORO LESS THAN 5 MIN W STILLS: Mod: TC

## 2018-01-01 PROCEDURE — 97530 THERAPEUTIC ACTIVITIES: CPT | Mod: GP | Performed by: PHYSICAL THERAPIST

## 2018-01-01 PROCEDURE — 99239 HOSP IP/OBS DSCHRG MGMT >30: CPT | Mod: GC | Performed by: INTERNAL MEDICINE

## 2018-01-01 PROCEDURE — 36415 COLL VENOUS BLD VENIPUNCTURE: CPT | Performed by: STUDENT IN AN ORGANIZED HEALTH CARE EDUCATION/TRAINING PROGRAM

## 2018-01-01 PROCEDURE — 36000076 ZZH SURGERY LEVEL 6 EA 15 ADDTL MIN - UMMC: Performed by: NEUROLOGICAL SURGERY

## 2018-01-01 PROCEDURE — 71000014 ZZH RECOVERY PHASE 1 LEVEL 2 FIRST HR: Performed by: NEUROLOGICAL SURGERY

## 2018-01-01 PROCEDURE — 97116 GAIT TRAINING THERAPY: CPT | Mod: GP | Performed by: PHYSICAL THERAPIST

## 2018-01-01 PROCEDURE — 80053 COMPREHEN METABOLIC PANEL: CPT | Performed by: INTERNAL MEDICINE

## 2018-01-01 PROCEDURE — 25000565 ZZH ISOFLURANE, EA 15 MIN: Performed by: NEUROLOGICAL SURGERY

## 2018-01-01 PROCEDURE — 25000132 ZZH RX MED GY IP 250 OP 250 PS 637: Mod: GY | Performed by: NEUROLOGICAL SURGERY

## 2018-01-01 PROCEDURE — 25000132 ZZH RX MED GY IP 250 OP 250 PS 637: Mod: GY | Performed by: STUDENT IN AN ORGANIZED HEALTH CARE EDUCATION/TRAINING PROGRAM

## 2018-01-01 PROCEDURE — 85027 COMPLETE CBC AUTOMATED: CPT | Performed by: INTERNAL MEDICINE

## 2018-01-01 PROCEDURE — 25000125 ZZHC RX 250: Performed by: NURSE ANESTHETIST, CERTIFIED REGISTERED

## 2018-01-01 PROCEDURE — 85027 COMPLETE CBC AUTOMATED: CPT | Performed by: STUDENT IN AN ORGANIZED HEALTH CARE EDUCATION/TRAINING PROGRAM

## 2018-01-01 PROCEDURE — 97162 PT EVAL MOD COMPLEX 30 MIN: CPT | Mod: GP | Performed by: PHYSICAL THERAPIST

## 2018-01-01 PROCEDURE — 87186 SC STD MICRODIL/AGAR DIL: CPT | Performed by: EMERGENCY MEDICINE

## 2018-01-01 PROCEDURE — 25000132 ZZH RX MED GY IP 250 OP 250 PS 637: Mod: GY | Performed by: INTERNAL MEDICINE

## 2018-01-01 PROCEDURE — 36000074 ZZH SURGERY LEVEL 6 1ST 30 MIN - UMMC: Performed by: NEUROLOGICAL SURGERY

## 2018-01-01 PROCEDURE — 36415 COLL VENOUS BLD VENIPUNCTURE: CPT | Performed by: NEUROLOGICAL SURGERY

## 2018-01-01 PROCEDURE — 84132 ASSAY OF SERUM POTASSIUM: CPT | Performed by: INTERNAL MEDICINE

## 2018-01-01 PROCEDURE — 86140 C-REACTIVE PROTEIN: CPT | Performed by: INTERNAL MEDICINE

## 2018-01-01 PROCEDURE — 40000986 XR SKULL PORT 1/3 VW

## 2018-01-01 PROCEDURE — 82550 ASSAY OF CK (CPK): CPT | Performed by: EMERGENCY MEDICINE

## 2018-01-01 PROCEDURE — 84132 ASSAY OF SERUM POTASSIUM: CPT | Performed by: NEUROLOGICAL SURGERY

## 2018-01-01 PROCEDURE — 99285 EMERGENCY DEPT VISIT HI MDM: CPT | Performed by: EMERGENCY MEDICINE

## 2018-01-01 PROCEDURE — A9270 NON-COVERED ITEM OR SERVICE: HCPCS | Mod: GY | Performed by: EMERGENCY MEDICINE

## 2018-01-01 PROCEDURE — 40000556 ZZH STATISTIC PERIPHERAL IV START W US GUIDANCE

## 2018-01-01 PROCEDURE — 87088 URINE BACTERIA CULTURE: CPT | Performed by: NURSE PRACTITIONER

## 2018-01-01 PROCEDURE — 85652 RBC SED RATE AUTOMATED: CPT | Performed by: EMERGENCY MEDICINE

## 2018-01-01 PROCEDURE — 84100 ASSAY OF PHOSPHORUS: CPT | Performed by: STUDENT IN AN ORGANIZED HEALTH CARE EDUCATION/TRAINING PROGRAM

## 2018-01-01 PROCEDURE — 00000146 ZZHCL STATISTIC GLUCOSE BY METER IP

## 2018-01-01 PROCEDURE — 86850 RBC ANTIBODY SCREEN: CPT | Performed by: ANESTHESIOLOGY

## 2018-01-01 PROCEDURE — 71000016 ZZH RECOVERY PHASE 1 LEVEL 3 FIRST HR: Performed by: NEUROLOGICAL SURGERY

## 2018-01-01 PROCEDURE — 40000141 ZZH STATISTIC PERIPHERAL IV START W/O US GUIDANCE

## 2018-01-01 PROCEDURE — 25000128 H RX IP 250 OP 636

## 2018-01-01 PROCEDURE — 87040 BLOOD CULTURE FOR BACTERIA: CPT | Performed by: INTERNAL MEDICINE

## 2018-01-01 PROCEDURE — 27210995 ZZH RX 272: Performed by: NEUROLOGICAL SURGERY

## 2018-01-01 PROCEDURE — 70450 CT HEAD/BRAIN W/O DYE: CPT | Mod: 77

## 2018-01-01 PROCEDURE — 85610 PROTHROMBIN TIME: CPT | Performed by: EMERGENCY MEDICINE

## 2018-01-01 PROCEDURE — 70450 CT HEAD/BRAIN W/O DYE: CPT | Mod: 76

## 2018-01-01 PROCEDURE — 80048 BASIC METABOLIC PNL TOTAL CA: CPT | Performed by: STUDENT IN AN ORGANIZED HEALTH CARE EDUCATION/TRAINING PROGRAM

## 2018-01-01 PROCEDURE — 00H03MZ INSERTION OF NEUROSTIMULATOR LEAD INTO BRAIN, PERCUTANEOUS APPROACH: ICD-10-PCS | Performed by: NEUROLOGICAL SURGERY

## 2018-01-01 PROCEDURE — 99233 SBSQ HOSP IP/OBS HIGH 50: CPT | Mod: GC | Performed by: INTERNAL MEDICINE

## 2018-01-01 PROCEDURE — 25000132 ZZH RX MED GY IP 250 OP 250 PS 637: Mod: GY | Performed by: EMERGENCY MEDICINE

## 2018-01-01 PROCEDURE — 81001 URINALYSIS AUTO W/SCOPE: CPT | Performed by: NURSE PRACTITIONER

## 2018-01-01 PROCEDURE — 8E09XBG COMPUTER ASSISTED PROCEDURE OF HEAD AND NECK REGION, WITH COMPUTERIZED TOMOGRAPHY: ICD-10-PCS | Performed by: NEUROLOGICAL SURGERY

## 2018-01-01 PROCEDURE — A9270 NON-COVERED ITEM OR SERVICE: HCPCS | Mod: GY | Performed by: NEUROLOGICAL SURGERY

## 2018-01-01 PROCEDURE — 87641 MR-STAPH DNA AMP PROBE: CPT | Performed by: NEUROLOGICAL SURGERY

## 2018-01-01 PROCEDURE — 83605 ASSAY OF LACTIC ACID: CPT | Performed by: INTERNAL MEDICINE

## 2018-01-01 PROCEDURE — C1767 GENERATOR, NEURO NON-RECHARG: HCPCS | Performed by: NEUROLOGICAL SURGERY

## 2018-01-01 PROCEDURE — 36000057 ZZH SURGERY LEVEL 3 1ST 30 MIN - UMMC: Performed by: NEUROLOGICAL SURGERY

## 2018-01-01 PROCEDURE — 87040 BLOOD CULTURE FOR BACTERIA: CPT | Performed by: EMERGENCY MEDICINE

## 2018-01-01 PROCEDURE — 87086 URINE CULTURE/COLONY COUNT: CPT | Performed by: EMERGENCY MEDICINE

## 2018-01-01 PROCEDURE — 86900 BLOOD TYPING SEROLOGIC ABO: CPT | Performed by: ANESTHESIOLOGY

## 2018-01-01 PROCEDURE — 20000004 ZZH R&B ICU UMMC

## 2018-01-01 PROCEDURE — 76770 US EXAM ABDO BACK WALL COMP: CPT

## 2018-01-01 PROCEDURE — 83735 ASSAY OF MAGNESIUM: CPT | Performed by: STUDENT IN AN ORGANIZED HEALTH CARE EDUCATION/TRAINING PROGRAM

## 2018-01-01 PROCEDURE — 83735 ASSAY OF MAGNESIUM: CPT | Performed by: INTERNAL MEDICINE

## 2018-01-01 PROCEDURE — 85610 PROTHROMBIN TIME: CPT | Performed by: NEUROLOGICAL SURGERY

## 2018-01-01 PROCEDURE — 71000027 ZZH RECOVERY PHASE 2 EACH 15 MINS: Performed by: NEUROLOGICAL SURGERY

## 2018-01-01 PROCEDURE — 40000277 XR SURGERY CARM FLUORO LESS THAN 5 MIN W STILLS

## 2018-01-01 PROCEDURE — 37000009 ZZH ANESTHESIA TECHNICAL FEE, EACH ADDTL 15 MIN: Performed by: NEUROLOGICAL SURGERY

## 2018-01-01 PROCEDURE — 40000133 ZZH STATISTIC OT WARD VISIT: Performed by: OCCUPATIONAL THERAPIST

## 2018-01-01 PROCEDURE — 27211024 ZZHC OR SUPPLY OTHER OPNP: Performed by: NEUROLOGICAL SURGERY

## 2018-01-01 PROCEDURE — 90662 IIV NO PRSV INCREASED AG IM: CPT | Performed by: NEUROLOGICAL SURGERY

## 2018-01-01 PROCEDURE — 86901 BLOOD TYPING SEROLOGIC RH(D): CPT | Performed by: ANESTHESIOLOGY

## 2018-01-01 PROCEDURE — 25000125 ZZHC RX 250: Performed by: STUDENT IN AN ORGANIZED HEALTH CARE EDUCATION/TRAINING PROGRAM

## 2018-01-01 PROCEDURE — 85730 THROMBOPLASTIN TIME PARTIAL: CPT | Performed by: NEUROLOGICAL SURGERY

## 2018-01-01 PROCEDURE — 86140 C-REACTIVE PROTEIN: CPT | Performed by: EMERGENCY MEDICINE

## 2018-01-01 PROCEDURE — 97535 SELF CARE MNGMENT TRAINING: CPT | Mod: GO | Performed by: OCCUPATIONAL THERAPIST

## 2018-01-01 PROCEDURE — 97110 THERAPEUTIC EXERCISES: CPT | Mod: GP | Performed by: PHYSICAL THERAPIST

## 2018-01-01 PROCEDURE — C1883 ADAPT/EXT, PACING/NEURO LEAD: HCPCS | Performed by: NEUROLOGICAL SURGERY

## 2018-01-01 PROCEDURE — 97165 OT EVAL LOW COMPLEX 30 MIN: CPT | Mod: GO | Performed by: OCCUPATIONAL THERAPIST

## 2018-01-01 PROCEDURE — 87186 SC STD MICRODIL/AGAR DIL: CPT | Performed by: NURSE PRACTITIONER

## 2018-01-01 PROCEDURE — 80048 BASIC METABOLIC PNL TOTAL CA: CPT | Performed by: NEUROLOGICAL SURGERY

## 2018-01-01 PROCEDURE — A9270 NON-COVERED ITEM OR SERVICE: HCPCS | Mod: GY | Performed by: NURSE ANESTHETIST, CERTIFIED REGISTERED

## 2018-01-01 PROCEDURE — 99285 EMERGENCY DEPT VISIT HI MDM: CPT | Mod: 25 | Performed by: EMERGENCY MEDICINE

## 2018-01-01 PROCEDURE — 70250 X-RAY EXAM OF SKULL: CPT

## 2018-01-01 PROCEDURE — 71000017 ZZH RECOVERY PHASE 1 LEVEL 3 EA ADDTL HR: Performed by: NEUROLOGICAL SURGERY

## 2018-01-01 PROCEDURE — 99223 1ST HOSP IP/OBS HIGH 75: CPT | Mod: AI | Performed by: INTERNAL MEDICINE

## 2018-01-01 PROCEDURE — 85025 COMPLETE CBC W/AUTO DIFF WBC: CPT | Performed by: EMERGENCY MEDICINE

## 2018-01-01 PROCEDURE — C9399 UNCLASSIFIED DRUGS OR BIOLOG: HCPCS | Performed by: NURSE ANESTHETIST, CERTIFIED REGISTERED

## 2018-01-01 PROCEDURE — 81001 URINALYSIS AUTO W/SCOPE: CPT | Performed by: EMERGENCY MEDICINE

## 2018-01-01 PROCEDURE — 80048 BASIC METABOLIC PNL TOTAL CA: CPT | Performed by: INTERNAL MEDICINE

## 2018-01-01 PROCEDURE — 36000059 ZZH SURGERY LEVEL 3 EA 15 ADDTL MIN UMMC: Performed by: NEUROLOGICAL SURGERY

## 2018-01-01 PROCEDURE — 87086 URINE CULTURE/COLONY COUNT: CPT | Performed by: NURSE PRACTITIONER

## 2018-01-01 PROCEDURE — 87640 STAPH A DNA AMP PROBE: CPT | Performed by: NEUROLOGICAL SURGERY

## 2018-01-01 PROCEDURE — 83605 ASSAY OF LACTIC ACID: CPT | Performed by: EMERGENCY MEDICINE

## 2018-01-01 PROCEDURE — 85027 COMPLETE CBC AUTOMATED: CPT | Performed by: NEUROLOGICAL SURGERY

## 2018-01-01 PROCEDURE — 87077 CULTURE AEROBIC IDENTIFY: CPT | Performed by: EMERGENCY MEDICINE

## 2018-01-01 DEVICE — IMP BUR HOLE COVER GUARDIAN 6010ANS: Type: IMPLANTABLE DEVICE | Site: CRANIAL | Status: FUNCTIONAL

## 2018-01-01 DEVICE — KIT DBS LEAD DBS 8CH 40CM 1-3,3-1 SPACE 0.5 BLACK 6172ANS: Type: IMPLANTABLE DEVICE | Site: CRANIAL | Status: FUNCTIONAL

## 2018-01-01 DEVICE — GENERATOR DBS SYSTEM INFINITY 7 IPG 6662ANS: Type: IMPLANTABLE DEVICE | Site: CHEST  WALL | Status: FUNCTIONAL

## 2018-01-01 DEVICE — LEAD EXTENSION W/EXTEND TECHNOLOGY 50CM 6371ANS: Type: IMPLANTABLE DEVICE | Site: NECK | Status: FUNCTIONAL

## 2018-01-01 DEVICE — IMP BUR HOLE COVER GUARDIAN 6010ANS: Type: IMPLANTABLE DEVICE | Site: SKULL | Status: FUNCTIONAL

## 2018-01-01 RX ORDER — ONDANSETRON 2 MG/ML
4 INJECTION INTRAMUSCULAR; INTRAVENOUS EVERY 30 MIN PRN
Status: DISCONTINUED | OUTPATIENT
Start: 2018-01-01 | End: 2018-01-01 | Stop reason: HOSPADM

## 2018-01-01 RX ORDER — CARBIDOPA AND LEVODOPA 25; 100 MG/1; MG/1
TABLET ORAL
Qty: 360 TABLET | Refills: 3 | Status: SHIPPED | OUTPATIENT
Start: 2018-01-01 | End: 2018-01-01

## 2018-01-01 RX ORDER — ONDANSETRON 4 MG/1
4 TABLET, ORALLY DISINTEGRATING ORAL EVERY 6 HOURS PRN
Status: DISCONTINUED | OUTPATIENT
Start: 2018-01-01 | End: 2018-01-01 | Stop reason: HOSPADM

## 2018-01-01 RX ORDER — HYDROMORPHONE HYDROCHLORIDE 1 MG/ML
.3-.5 INJECTION, SOLUTION INTRAMUSCULAR; INTRAVENOUS; SUBCUTANEOUS EVERY 10 MIN PRN
Status: DISCONTINUED | OUTPATIENT
Start: 2018-01-01 | End: 2018-01-01 | Stop reason: HOSPADM

## 2018-01-01 RX ORDER — OXYCODONE HYDROCHLORIDE 5 MG/1
5-10 TABLET ORAL EVERY 4 HOURS PRN
Status: DISCONTINUED | OUTPATIENT
Start: 2018-01-01 | End: 2018-01-01 | Stop reason: HOSPADM

## 2018-01-01 RX ORDER — OXYCODONE HYDROCHLORIDE 5 MG/1
5-10 TABLET ORAL EVERY 4 HOURS PRN
Qty: 30 TABLET | Refills: 0 | Status: SHIPPED | OUTPATIENT
Start: 2018-01-01 | End: 2018-01-01

## 2018-01-01 RX ORDER — PROPOFOL 10 MG/ML
INJECTION, EMULSION INTRAVENOUS PRN
Status: DISCONTINUED | OUTPATIENT
Start: 2018-01-01 | End: 2018-01-01

## 2018-01-01 RX ORDER — CARBIDOPA AND LEVODOPA 25; 100 MG/1; MG/1
TABLET ORAL
Qty: 360 TABLET | Refills: 3 | Status: CANCELLED | OUTPATIENT
Start: 2018-01-01

## 2018-01-01 RX ORDER — HEPARIN SODIUM 5000 [USP'U]/.5ML
5000 INJECTION, SOLUTION INTRAVENOUS; SUBCUTANEOUS EVERY 12 HOURS
Status: CANCELLED | OUTPATIENT
Start: 2018-01-01

## 2018-01-01 RX ORDER — ESCITALOPRAM OXALATE 10 MG/1
10 TABLET ORAL EVERY MORNING
Status: DISCONTINUED | OUTPATIENT
Start: 2018-01-01 | End: 2018-01-01 | Stop reason: HOSPADM

## 2018-01-01 RX ORDER — CEFTRIAXONE 1 G/1
1 INJECTION, POWDER, FOR SOLUTION INTRAMUSCULAR; INTRAVENOUS EVERY 24 HOURS
Status: DISCONTINUED | OUTPATIENT
Start: 2018-01-01 | End: 2018-01-01

## 2018-01-01 RX ORDER — PANTOPRAZOLE SODIUM 20 MG/1
20 TABLET, DELAYED RELEASE ORAL EVERY MORNING
COMMUNITY
Start: 2018-01-01 | End: 2018-01-01

## 2018-01-01 RX ORDER — CARBIDOPA AND LEVODOPA 25; 100 MG/1; MG/1
TABLET ORAL
Qty: 100 TABLET | Refills: 3 | Status: SHIPPED | OUTPATIENT
Start: 2018-01-01 | End: 2018-01-01

## 2018-01-01 RX ORDER — ONDANSETRON 2 MG/ML
INJECTION INTRAMUSCULAR; INTRAVENOUS PRN
Status: DISCONTINUED | OUTPATIENT
Start: 2018-01-01 | End: 2018-01-01

## 2018-01-01 RX ORDER — SODIUM CHLORIDE 9 MG/ML
INJECTION, SOLUTION INTRAVENOUS CONTINUOUS
Status: ACTIVE | OUTPATIENT
Start: 2018-01-01 | End: 2018-01-01

## 2018-01-01 RX ORDER — AMLODIPINE BESYLATE 5 MG/1
5 TABLET ORAL EVERY MORNING
Status: DISCONTINUED | OUTPATIENT
Start: 2018-01-01 | End: 2018-01-01 | Stop reason: HOSPADM

## 2018-01-01 RX ORDER — AMOXICILLIN 250 MG
1 CAPSULE ORAL DAILY PRN
Status: DISCONTINUED | OUTPATIENT
Start: 2018-01-01 | End: 2018-01-01 | Stop reason: HOSPADM

## 2018-01-01 RX ORDER — HYDRALAZINE HYDROCHLORIDE 20 MG/ML
10-20 INJECTION INTRAMUSCULAR; INTRAVENOUS EVERY 30 MIN PRN
Status: DISCONTINUED | OUTPATIENT
Start: 2018-01-01 | End: 2018-01-01 | Stop reason: HOSPADM

## 2018-01-01 RX ORDER — ATORVASTATIN CALCIUM 20 MG/1
20 TABLET, FILM COATED ORAL EVERY MORNING
Status: DISCONTINUED | OUTPATIENT
Start: 2018-01-01 | End: 2018-01-01 | Stop reason: HOSPADM

## 2018-01-01 RX ORDER — CARBIDOPA AND LEVODOPA 25; 100 MG/1; MG/1
TABLET ORAL
Qty: 360 TABLET | Refills: 3 | Status: CANCELLED | DISCHARGE
Start: 2018-01-01

## 2018-01-01 RX ORDER — FENTANYL CITRATE 50 UG/ML
INJECTION, SOLUTION INTRAMUSCULAR; INTRAVENOUS PRN
Status: DISCONTINUED | OUTPATIENT
Start: 2018-01-01 | End: 2018-01-01

## 2018-01-01 RX ORDER — AMOXICILLIN 250 MG
1 CAPSULE ORAL
Status: DISCONTINUED | OUTPATIENT
Start: 2018-01-01 | End: 2018-01-01 | Stop reason: HOSPADM

## 2018-01-01 RX ORDER — HYDRALAZINE HYDROCHLORIDE 20 MG/ML
INJECTION INTRAMUSCULAR; INTRAVENOUS PRN
Status: DISCONTINUED | OUTPATIENT
Start: 2018-01-01 | End: 2018-01-01

## 2018-01-01 RX ORDER — LIDOCAINE HYDROCHLORIDE 20 MG/ML
INJECTION, SOLUTION INFILTRATION; PERINEURAL PRN
Status: DISCONTINUED | OUTPATIENT
Start: 2018-01-01 | End: 2018-01-01

## 2018-01-01 RX ORDER — HYDROMORPHONE HYDROCHLORIDE 1 MG/ML
INJECTION, SOLUTION INTRAMUSCULAR; INTRAVENOUS; SUBCUTANEOUS
Status: COMPLETED
Start: 2018-01-01 | End: 2018-01-01

## 2018-01-01 RX ORDER — ACETAMINOPHEN 325 MG/1
650 TABLET ORAL EVERY 4 HOURS PRN
Status: DISCONTINUED | OUTPATIENT
Start: 2018-01-01 | End: 2018-01-01 | Stop reason: HOSPADM

## 2018-01-01 RX ORDER — CIPROFLOXACIN 500 MG/1
500 TABLET, FILM COATED ORAL EVERY 12 HOURS
Qty: 9 TABLET | Refills: 0 | Status: SHIPPED | OUTPATIENT
Start: 2018-01-01 | End: 2018-01-01

## 2018-01-01 RX ORDER — CARBIDOPA AND LEVODOPA 25; 100 MG/1; MG/1
TABLET ORAL
Qty: 100 TABLET | Refills: 3 | COMMUNITY
Start: 2018-01-01 | End: 2018-01-01

## 2018-01-01 RX ORDER — AMLODIPINE BESYLATE 5 MG/1
5 TABLET ORAL EVERY MORNING
Qty: 30 TABLET | Status: CANCELLED | DISCHARGE
Start: 2018-01-01

## 2018-01-01 RX ORDER — LIDOCAINE HYDROCHLORIDE AND EPINEPHRINE 10; 10 MG/ML; UG/ML
INJECTION, SOLUTION INFILTRATION; PERINEURAL PRN
Status: DISCONTINUED | OUTPATIENT
Start: 2018-01-01 | End: 2018-01-01 | Stop reason: HOSPADM

## 2018-01-01 RX ORDER — PROCHLORPERAZINE MALEATE 5 MG
5 TABLET ORAL EVERY 6 HOURS PRN
Status: DISCONTINUED | OUTPATIENT
Start: 2018-01-01 | End: 2018-01-01 | Stop reason: HOSPADM

## 2018-01-01 RX ORDER — NALOXONE HYDROCHLORIDE 0.4 MG/ML
.1-.4 INJECTION, SOLUTION INTRAMUSCULAR; INTRAVENOUS; SUBCUTANEOUS
Status: DISCONTINUED | OUTPATIENT
Start: 2018-01-01 | End: 2018-01-01 | Stop reason: HOSPADM

## 2018-01-01 RX ORDER — SODIUM CHLORIDE 9 MG/ML
INJECTION, SOLUTION INTRAVENOUS
Status: COMPLETED
Start: 2018-01-01 | End: 2018-01-01

## 2018-01-01 RX ORDER — CLINDAMYCIN PHOSPHATE 900 MG/50ML
900 INJECTION, SOLUTION INTRAVENOUS
Status: COMPLETED | OUTPATIENT
Start: 2018-01-01 | End: 2018-01-01

## 2018-01-01 RX ORDER — AMOXICILLIN 250 MG
2 CAPSULE ORAL 2 TIMES DAILY
Status: DISCONTINUED | OUTPATIENT
Start: 2018-01-01 | End: 2018-01-01 | Stop reason: HOSPADM

## 2018-01-01 RX ORDER — POTASSIUM CHLORIDE 1.5 G/1.58G
20-40 POWDER, FOR SOLUTION ORAL
Status: DISCONTINUED | OUTPATIENT
Start: 2018-01-01 | End: 2018-01-01 | Stop reason: HOSPADM

## 2018-01-01 RX ORDER — MAGNESIUM HYDROXIDE 1200 MG/15ML
LIQUID ORAL PRN
Status: DISCONTINUED | OUTPATIENT
Start: 2018-01-01 | End: 2018-01-01 | Stop reason: HOSPADM

## 2018-01-01 RX ORDER — PANTOPRAZOLE SODIUM 20 MG/1
20 TABLET, DELAYED RELEASE ORAL EVERY MORNING
Status: DISCONTINUED | OUTPATIENT
Start: 2018-01-01 | End: 2018-01-01 | Stop reason: HOSPADM

## 2018-01-01 RX ORDER — PROPOFOL 10 MG/ML
INJECTION, EMULSION INTRAVENOUS CONTINUOUS PRN
Status: DISCONTINUED | OUTPATIENT
Start: 2018-01-01 | End: 2018-01-01

## 2018-01-01 RX ORDER — POTASSIUM CHLORIDE 750 MG/1
20-40 TABLET, EXTENDED RELEASE ORAL
Status: DISCONTINUED | OUTPATIENT
Start: 2018-01-01 | End: 2018-01-01 | Stop reason: HOSPADM

## 2018-01-01 RX ORDER — OXYCODONE HYDROCHLORIDE 5 MG/1
5 TABLET ORAL EVERY 6 HOURS PRN
Qty: 24 TABLET | Refills: 0 | Status: ON HOLD | OUTPATIENT
Start: 2018-01-01 | End: 2018-01-01

## 2018-01-01 RX ORDER — CARBIDOPA 25 MG/1
25 TABLET ORAL PRN
Qty: 30 TABLET | Refills: 3 | Status: SHIPPED | OUTPATIENT
Start: 2018-01-01 | End: 2018-01-01

## 2018-01-01 RX ORDER — ACETAMINOPHEN 325 MG/1
975 TABLET ORAL EVERY 8 HOURS
Status: DISCONTINUED | OUTPATIENT
Start: 2018-01-01 | End: 2018-01-01 | Stop reason: HOSPADM

## 2018-01-01 RX ORDER — ONDANSETRON 4 MG/1
4 TABLET, ORALLY DISINTEGRATING ORAL EVERY 30 MIN PRN
Status: DISCONTINUED | OUTPATIENT
Start: 2018-01-01 | End: 2018-01-01 | Stop reason: HOSPADM

## 2018-01-01 RX ORDER — ONDANSETRON 2 MG/ML
4 INJECTION INTRAMUSCULAR; INTRAVENOUS EVERY 6 HOURS PRN
Status: DISCONTINUED | OUTPATIENT
Start: 2018-01-01 | End: 2018-01-01 | Stop reason: HOSPADM

## 2018-01-01 RX ORDER — CLINDAMYCIN PHOSPHATE 900 MG/50ML
900 INJECTION, SOLUTION INTRAVENOUS EVERY 8 HOURS
Status: COMPLETED | OUTPATIENT
Start: 2018-01-01 | End: 2018-01-01

## 2018-01-01 RX ORDER — HYDRALAZINE HYDROCHLORIDE 20 MG/ML
2.5-5 INJECTION INTRAMUSCULAR; INTRAVENOUS EVERY 10 MIN PRN
Status: DISCONTINUED | OUTPATIENT
Start: 2018-01-01 | End: 2018-01-01 | Stop reason: HOSPADM

## 2018-01-01 RX ORDER — SODIUM CHLORIDE, SODIUM LACTATE, POTASSIUM CHLORIDE, CALCIUM CHLORIDE 600; 310; 30; 20 MG/100ML; MG/100ML; MG/100ML; MG/100ML
INJECTION, SOLUTION INTRAVENOUS CONTINUOUS
Status: DISCONTINUED | OUTPATIENT
Start: 2018-01-01 | End: 2018-01-01 | Stop reason: HOSPADM

## 2018-01-01 RX ORDER — PANTOPRAZOLE SODIUM 20 MG/1
20 TABLET, DELAYED RELEASE ORAL
COMMUNITY
Start: 2018-01-01

## 2018-01-01 RX ORDER — TRAMADOL HYDROCHLORIDE 50 MG/1
50 TABLET ORAL ONCE
Status: COMPLETED | OUTPATIENT
Start: 2018-01-01 | End: 2018-01-01

## 2018-01-01 RX ORDER — PANTOPRAZOLE SODIUM 40 MG/1
40 TABLET, DELAYED RELEASE ORAL DAILY
COMMUNITY
Start: 2017-05-25 | End: 2018-01-01 | Stop reason: ALTCHOICE

## 2018-01-01 RX ORDER — SULFAMETHOXAZOLE/TRIMETHOPRIM 800-160 MG
1 TABLET ORAL 2 TIMES DAILY
Status: DISCONTINUED | OUTPATIENT
Start: 2018-01-01 | End: 2018-01-01 | Stop reason: HOSPADM

## 2018-01-01 RX ORDER — CARBIDOPA AND LEVODOPA 25; 100 MG/1; MG/1
1 TABLET ORAL
Status: DISCONTINUED | OUTPATIENT
Start: 2018-01-01 | End: 2018-01-01 | Stop reason: HOSPADM

## 2018-01-01 RX ORDER — ONDANSETRON 4 MG/1
4-8 TABLET, ORALLY DISINTEGRATING ORAL EVERY 6 HOURS PRN
Status: DISCONTINUED | OUTPATIENT
Start: 2018-01-01 | End: 2018-01-01 | Stop reason: HOSPADM

## 2018-01-01 RX ORDER — CIPROFLOXACIN 500 MG/1
500 TABLET, FILM COATED ORAL EVERY 12 HOURS SCHEDULED
Status: DISCONTINUED | OUTPATIENT
Start: 2018-01-01 | End: 2018-01-01 | Stop reason: HOSPADM

## 2018-01-01 RX ORDER — AMOXICILLIN 250 MG
CAPSULE ORAL
Refills: 0 | COMMUNITY
Start: 2018-01-01

## 2018-01-01 RX ORDER — ATORVASTATIN CALCIUM 20 MG/1
20 TABLET, FILM COATED ORAL EVERY MORNING
Qty: 30 TABLET | Status: CANCELLED | DISCHARGE
Start: 2018-01-01

## 2018-01-01 RX ORDER — FENTANYL CITRATE 50 UG/ML
25-50 INJECTION, SOLUTION INTRAMUSCULAR; INTRAVENOUS EVERY 5 MIN PRN
Status: DISCONTINUED | OUTPATIENT
Start: 2018-01-01 | End: 2018-01-01 | Stop reason: HOSPADM

## 2018-01-01 RX ORDER — CARBIDOPA AND LEVODOPA 25; 100 MG/1; MG/1
1 TABLET ORAL
Qty: 360 TABLET | Refills: 3 | COMMUNITY
Start: 2018-01-01

## 2018-01-01 RX ORDER — HYDRALAZINE HYDROCHLORIDE 20 MG/ML
5 INJECTION INTRAMUSCULAR; INTRAVENOUS EVERY 10 MIN PRN
Status: DISCONTINUED | OUTPATIENT
Start: 2018-01-01 | End: 2018-01-01 | Stop reason: HOSPADM

## 2018-01-01 RX ORDER — CARBIDOPA AND LEVODOPA 25; 100 MG/1; MG/1
2 TABLET ORAL ONCE
Qty: 1.5 TABLET | Refills: 0 | Status: SHIPPED | OUTPATIENT
Start: 2018-01-01 | End: 2018-01-01

## 2018-01-01 RX ORDER — ATORVASTATIN CALCIUM 20 MG/1
20 TABLET, FILM COATED ORAL EVERY MORNING
COMMUNITY
Start: 2017-10-31

## 2018-01-01 RX ORDER — CARBIDOPA AND LEVODOPA 25; 100 MG/1; MG/1
TABLET ORAL
Qty: 360 TABLET | Refills: 11 | Status: SHIPPED | OUTPATIENT
Start: 2018-01-01 | End: 2018-01-01

## 2018-01-01 RX ORDER — FEXOFENADINE HCL 180 MG/1
180 TABLET ORAL PRN
COMMUNITY
Start: 2008-11-06 | End: 2018-01-01

## 2018-01-01 RX ORDER — MEPERIDINE HYDROCHLORIDE 50 MG/ML
12.5 INJECTION INTRAMUSCULAR; INTRAVENOUS; SUBCUTANEOUS
Status: DISCONTINUED | OUTPATIENT
Start: 2018-01-01 | End: 2018-01-01 | Stop reason: HOSPADM

## 2018-01-01 RX ORDER — LIDOCAINE 50 MG/G
1 PATCH TOPICAL EVERY 24 HOURS
Qty: 30 PATCH | Refills: 0 | Status: SHIPPED | OUTPATIENT
Start: 2018-01-01 | End: 2019-01-16

## 2018-01-01 RX ORDER — CLINDAMYCIN PHOSPHATE 900 MG/50ML
900 INJECTION, SOLUTION INTRAVENOUS SEE ADMIN INSTRUCTIONS
Status: DISCONTINUED | OUTPATIENT
Start: 2018-01-01 | End: 2018-01-01 | Stop reason: HOSPADM

## 2018-01-01 RX ORDER — LABETALOL HYDROCHLORIDE 5 MG/ML
10-40 INJECTION, SOLUTION INTRAVENOUS EVERY 10 MIN PRN
Status: DISCONTINUED | OUTPATIENT
Start: 2018-01-01 | End: 2018-01-01 | Stop reason: HOSPADM

## 2018-01-01 RX ORDER — LIDOCAINE 40 MG/G
CREAM TOPICAL
Status: DISCONTINUED | OUTPATIENT
Start: 2018-01-01 | End: 2018-01-01 | Stop reason: HOSPADM

## 2018-01-01 RX ORDER — ONDANSETRON 4 MG/1
TABLET, ORALLY DISINTEGRATING ORAL EVERY 8 HOURS PRN
COMMUNITY
End: 2018-01-01

## 2018-01-01 RX ORDER — LEVOFLOXACIN 750 MG/1
750 TABLET, FILM COATED ORAL
Status: DISCONTINUED | OUTPATIENT
Start: 2018-01-01 | End: 2018-01-01

## 2018-01-01 RX ORDER — POTASSIUM CHLORIDE 7.45 MG/ML
10 INJECTION INTRAVENOUS
Status: DISCONTINUED | OUTPATIENT
Start: 2018-01-01 | End: 2018-01-01 | Stop reason: HOSPADM

## 2018-01-01 RX ORDER — MAGNESIUM SULFATE HEPTAHYDRATE 40 MG/ML
4 INJECTION, SOLUTION INTRAVENOUS EVERY 4 HOURS PRN
Status: DISCONTINUED | OUTPATIENT
Start: 2018-01-01 | End: 2018-01-01 | Stop reason: HOSPADM

## 2018-01-01 RX ORDER — SULFAMETHOXAZOLE/TRIMETHOPRIM 800-160 MG
1 TABLET ORAL 2 TIMES DAILY
Qty: 6 TABLET | Refills: 0 | Status: SHIPPED | OUTPATIENT
Start: 2018-01-01 | End: 2018-01-01

## 2018-01-01 RX ORDER — BACITRACIN 500 [USP'U]/G
OINTMENT OPHTHALMIC PRN
Status: DISCONTINUED | OUTPATIENT
Start: 2018-01-01 | End: 2018-01-01 | Stop reason: HOSPADM

## 2018-01-01 RX ORDER — POTASSIUM CL/LIDO/0.9 % NACL 10MEQ/0.1L
10 INTRAVENOUS SOLUTION, PIGGYBACK (ML) INTRAVENOUS
Status: DISCONTINUED | OUTPATIENT
Start: 2018-01-01 | End: 2018-01-01 | Stop reason: RX

## 2018-01-01 RX ORDER — CARBIDOPA AND LEVODOPA 25; 100 MG/1; MG/1
1.25 TABLET ORAL
Status: DISCONTINUED | OUTPATIENT
Start: 2018-01-01 | End: 2018-01-01

## 2018-01-01 RX ORDER — FENTANYL CITRATE 50 UG/ML
25-50 INJECTION, SOLUTION INTRAMUSCULAR; INTRAVENOUS
Status: CANCELLED | OUTPATIENT
Start: 2018-01-01

## 2018-01-01 RX ORDER — POTASSIUM CHLORIDE 29.8 MG/ML
20 INJECTION INTRAVENOUS
Status: DISCONTINUED | OUTPATIENT
Start: 2018-01-01 | End: 2018-01-01 | Stop reason: HOSPADM

## 2018-01-01 RX ORDER — POLYETHYLENE GLYCOL 3350 17 G/17G
17 POWDER, FOR SOLUTION ORAL DAILY
Status: DISCONTINUED | OUTPATIENT
Start: 2018-01-01 | End: 2018-01-01 | Stop reason: HOSPADM

## 2018-01-01 RX ORDER — ONDANSETRON 2 MG/ML
4-8 INJECTION INTRAMUSCULAR; INTRAVENOUS EVERY 6 HOURS PRN
Status: DISCONTINUED | OUTPATIENT
Start: 2018-01-01 | End: 2018-01-01 | Stop reason: HOSPADM

## 2018-01-01 RX ORDER — EPHEDRINE SULFATE 50 MG/ML
INJECTION, SOLUTION INTRAMUSCULAR; INTRAVENOUS; SUBCUTANEOUS PRN
Status: DISCONTINUED | OUTPATIENT
Start: 2018-01-01 | End: 2018-01-01

## 2018-01-01 RX ORDER — ESCITALOPRAM OXALATE 10 MG/1
10 TABLET ORAL EVERY MORNING
COMMUNITY
Start: 2015-12-04 | End: 2018-01-01

## 2018-01-01 RX ORDER — CARBIDOPA AND LEVODOPA 50; 200 MG/1; MG/1
1 TABLET, EXTENDED RELEASE ORAL
COMMUNITY
End: 2018-01-01 | Stop reason: DRUGHIGH

## 2018-01-01 RX ORDER — OXYCODONE HYDROCHLORIDE 5 MG/1
5-10 TABLET ORAL EVERY 4 HOURS PRN
Qty: 30 TABLET | Refills: 0 | Status: ON HOLD | OUTPATIENT
Start: 2018-01-01 | End: 2018-01-01

## 2018-01-01 RX ORDER — CARBIDOPA AND LEVODOPA 25; 100 MG/1; MG/1
TABLET, EXTENDED RELEASE ORAL
Qty: 300 TABLET | Refills: 3 | Status: SHIPPED | OUTPATIENT
Start: 2018-01-01 | End: 2018-01-01 | Stop reason: DRUGHIGH

## 2018-01-01 RX ORDER — FENTANYL CITRATE 50 UG/ML
25-50 INJECTION, SOLUTION INTRAMUSCULAR; INTRAVENOUS
Status: DISCONTINUED | OUTPATIENT
Start: 2018-01-01 | End: 2018-01-01 | Stop reason: HOSPADM

## 2018-01-01 RX ORDER — HYDROMORPHONE HCL/0.9% NACL/PF 0.2MG/0.2
0.2 SYRINGE (ML) INTRAVENOUS
Status: ACTIVE | OUTPATIENT
Start: 2018-01-01 | End: 2018-01-01

## 2018-01-01 RX ORDER — HYDROMORPHONE HYDROCHLORIDE 1 MG/ML
0.3 INJECTION, SOLUTION INTRAMUSCULAR; INTRAVENOUS; SUBCUTANEOUS ONCE
Status: COMPLETED | OUTPATIENT
Start: 2018-01-01 | End: 2018-01-01

## 2018-01-01 RX ORDER — MAGNESIUM SULFATE HEPTAHYDRATE 40 MG/ML
2 INJECTION, SOLUTION INTRAVENOUS DAILY PRN
Status: DISCONTINUED | OUTPATIENT
Start: 2018-01-01 | End: 2018-01-01 | Stop reason: HOSPADM

## 2018-01-01 RX ORDER — SODIUM CHLORIDE, SODIUM LACTATE, POTASSIUM CHLORIDE, CALCIUM CHLORIDE 600; 310; 30; 20 MG/100ML; MG/100ML; MG/100ML; MG/100ML
INJECTION, SOLUTION INTRAVENOUS CONTINUOUS PRN
Status: DISCONTINUED | OUTPATIENT
Start: 2018-01-01 | End: 2018-01-01

## 2018-01-01 RX ORDER — SULFAMETHOXAZOLE/TRIMETHOPRIM 800-160 MG
1 TABLET ORAL
COMMUNITY
Start: 2018-01-01 | End: 2018-01-01

## 2018-01-01 RX ORDER — AMLODIPINE BESYLATE 5 MG/1
5 TABLET ORAL EVERY MORNING
COMMUNITY
Start: 2017-05-10

## 2018-01-01 RX ORDER — SULFAMETHOXAZOLE/TRIMETHOPRIM 800-160 MG
1 TABLET ORAL 2 TIMES DAILY
Qty: 6 TABLET | Refills: 0 | Status: ON HOLD | OUTPATIENT
Start: 2018-01-01 | End: 2018-01-01

## 2018-01-01 RX ORDER — LABETALOL HYDROCHLORIDE 5 MG/ML
5 INJECTION, SOLUTION INTRAVENOUS EVERY 10 MIN PRN
Status: DISCONTINUED | OUTPATIENT
Start: 2018-01-01 | End: 2018-01-01 | Stop reason: HOSPADM

## 2018-01-01 RX ORDER — AMOXICILLIN 250 MG
1 CAPSULE ORAL 2 TIMES DAILY
Status: DISCONTINUED | OUTPATIENT
Start: 2018-01-01 | End: 2018-01-01 | Stop reason: HOSPADM

## 2018-01-01 RX ORDER — PANTOPRAZOLE SODIUM 20 MG/1
20 TABLET, DELAYED RELEASE ORAL
Status: DISCONTINUED | OUTPATIENT
Start: 2018-01-01 | End: 2018-01-01 | Stop reason: HOSPADM

## 2018-01-01 RX ORDER — ESCITALOPRAM OXALATE 10 MG/1
10 TABLET ORAL EVERY MORNING
Qty: 30 TABLET | Status: CANCELLED | DISCHARGE
Start: 2018-01-01

## 2018-01-01 RX ORDER — LABETALOL HYDROCHLORIDE 5 MG/ML
10 INJECTION, SOLUTION INTRAVENOUS
Status: DISCONTINUED | OUTPATIENT
Start: 2018-01-01 | End: 2018-01-01 | Stop reason: HOSPADM

## 2018-01-01 RX ORDER — PANTOPRAZOLE SODIUM 20 MG/1
20 TABLET, DELAYED RELEASE ORAL DAILY
Qty: 30 TABLET | Status: CANCELLED | DISCHARGE
Start: 2018-01-01

## 2018-01-01 RX ORDER — DEXMEDETOMIDINE HYDROCHLORIDE 4 UG/ML
0.2-1.2 INJECTION, SOLUTION INTRAVENOUS CONTINUOUS
Status: DISCONTINUED | OUTPATIENT
Start: 2018-01-01 | End: 2018-01-01 | Stop reason: HOSPADM

## 2018-01-01 RX ORDER — CETIRIZINE HYDROCHLORIDE 10 MG/1
10 TABLET ORAL DAILY
COMMUNITY
End: 2018-01-01

## 2018-01-01 RX ORDER — ACETAMINOPHEN 650 MG/1
650 SUPPOSITORY RECTAL EVERY 4 HOURS PRN
Status: DISCONTINUED | OUTPATIENT
Start: 2018-01-01 | End: 2018-01-01 | Stop reason: HOSPADM

## 2018-01-01 RX ORDER — ESCITALOPRAM OXALATE 10 MG/1
15 TABLET ORAL EVERY MORNING
COMMUNITY
Start: 2018-01-01

## 2018-01-01 RX ORDER — POTASSIUM CL/LIDO/0.9 % NACL 10MEQ/0.1L
10 INTRAVENOUS SOLUTION, PIGGYBACK (ML) INTRAVENOUS
Status: DISCONTINUED | OUTPATIENT
Start: 2018-01-01 | End: 2018-01-01 | Stop reason: HOSPADM

## 2018-01-01 RX ORDER — AMOXICILLIN 250 MG
1-2 CAPSULE ORAL 2 TIMES DAILY PRN
Qty: 100 TABLET | Refills: 0 | Status: CANCELLED | DISCHARGE
Start: 2018-01-01

## 2018-01-01 RX ORDER — AMOXICILLIN 250 MG
2 CAPSULE ORAL 2 TIMES DAILY
Qty: 30 TABLET | Refills: 0 | Status: SHIPPED | OUTPATIENT
Start: 2018-01-01 | End: 2018-01-01

## 2018-01-01 RX ADMIN — PROPOFOL 100 MCG/KG/MIN: 10 INJECTION, EMULSION INTRAVENOUS at 08:29

## 2018-01-01 RX ADMIN — FENTANYL CITRATE 50 MCG: 50 INJECTION, SOLUTION INTRAMUSCULAR; INTRAVENOUS at 15:15

## 2018-01-01 RX ADMIN — FENTANYL CITRATE 50 MCG: 50 INJECTION, SOLUTION INTRAMUSCULAR; INTRAVENOUS at 15:29

## 2018-01-01 RX ADMIN — Medication 5 QUARTER-TAB: at 11:59

## 2018-01-01 RX ADMIN — POTASSIUM CHLORIDE 10 MEQ: 10 INJECTION, SOLUTION INTRAVENOUS at 08:19

## 2018-01-01 RX ADMIN — ACETAMINOPHEN 650 MG: 325 TABLET, FILM COATED ORAL at 11:36

## 2018-01-01 RX ADMIN — LIDOCAINE HYDROCHLORIDE 100 MG: 20 INJECTION, SOLUTION INFILTRATION; PERINEURAL at 18:03

## 2018-01-01 RX ADMIN — Medication 5 QUARTER-TAB: at 14:36

## 2018-01-01 RX ADMIN — POTASSIUM CHLORIDE 10 MEQ: 10 INJECTION, SOLUTION INTRAVENOUS at 07:17

## 2018-01-01 RX ADMIN — SODIUM CHLORIDE, POTASSIUM CHLORIDE, SODIUM LACTATE AND CALCIUM CHLORIDE 1000 ML: 600; 310; 30; 20 INJECTION, SOLUTION INTRAVENOUS at 10:01

## 2018-01-01 RX ADMIN — ESCITALOPRAM OXALATE 10 MG: 10 TABLET ORAL at 08:33

## 2018-01-01 RX ADMIN — CARBIDOPA AND LEVODOPA 1 TABLET: 25; 100 TABLET ORAL at 12:15

## 2018-01-01 RX ADMIN — INFLUENZA A VIRUS A/MICHIGAN/45/2015 X-275 (H1N1) ANTIGEN (FORMALDEHYDE INACTIVATED), INFLUENZA A VIRUS A/SINGAPORE/INFIMH-16-0019/2016 IVR-186 (H3N2) ANTIGEN (FORMALDEHYDE INACTIVATED), AND INFLUENZA B VIRUS B/MARYLAND/15/2016 BX-69A (A B/COLORADO/6/2017-LIKE VIRUS) ANTIGEN (FORMALDEHYDE INACTIVATED) 0.5 ML: 60; 60; 60 INJECTION, SUSPENSION INTRAMUSCULAR at 10:26

## 2018-01-01 RX ADMIN — PROPOFOL 100 MG: 10 INJECTION, EMULSION INTRAVENOUS at 18:03

## 2018-01-01 RX ADMIN — ACETAMINOPHEN 650 MG: 325 TABLET, FILM COATED ORAL at 20:14

## 2018-01-01 RX ADMIN — ACETAMINOPHEN 650 MG: 325 TABLET, FILM COATED ORAL at 04:02

## 2018-01-01 RX ADMIN — CLINDAMYCIN PHOSPHATE 900 MG: 18 INJECTION, SOLUTION INTRAVENOUS at 18:00

## 2018-01-01 RX ADMIN — PROPOFOL 20 MG: 10 INJECTION, EMULSION INTRAVENOUS at 18:52

## 2018-01-01 RX ADMIN — FENTANYL CITRATE 50 MCG: 50 INJECTION, SOLUTION INTRAMUSCULAR; INTRAVENOUS at 15:49

## 2018-01-01 RX ADMIN — ESCITALOPRAM OXALATE 10 MG: 10 TABLET ORAL at 08:54

## 2018-01-01 RX ADMIN — Medication 3 HALF-TAB: at 16:24

## 2018-01-01 RX ADMIN — PROPOFOL 10 MG: 10 INJECTION, EMULSION INTRAVENOUS at 08:30

## 2018-01-01 RX ADMIN — Medication 5 QUARTER-TAB: at 21:34

## 2018-01-01 RX ADMIN — HYDRALAZINE HYDROCHLORIDE 2.5 MG: 20 INJECTION INTRAMUSCULAR; INTRAVENOUS at 12:05

## 2018-01-01 RX ADMIN — PANTOPRAZOLE SODIUM 20 MG: 20 TABLET, DELAYED RELEASE ORAL at 08:44

## 2018-01-01 RX ADMIN — Medication 5 QUARTER-TAB: at 08:35

## 2018-01-01 RX ADMIN — POTASSIUM CHLORIDE 10 MEQ: 10 INJECTION, SOLUTION INTRAVENOUS at 06:27

## 2018-01-01 RX ADMIN — ACETAMINOPHEN 650 MG: 325 TABLET, FILM COATED ORAL at 04:49

## 2018-01-01 RX ADMIN — ATORVASTATIN CALCIUM 20 MG: 20 TABLET, FILM COATED ORAL at 07:39

## 2018-01-01 RX ADMIN — HYDRALAZINE HYDROCHLORIDE 2.5 MG: 20 INJECTION INTRAMUSCULAR; INTRAVENOUS at 12:58

## 2018-01-01 RX ADMIN — ACETAMINOPHEN 975 MG: 325 TABLET, FILM COATED ORAL at 19:46

## 2018-01-01 RX ADMIN — FENTANYL CITRATE 25 MCG: 50 INJECTION, SOLUTION INTRAMUSCULAR; INTRAVENOUS at 14:04

## 2018-01-01 RX ADMIN — PROPOFOL 20 MG: 10 INJECTION, EMULSION INTRAVENOUS at 08:40

## 2018-01-01 RX ADMIN — CLINDAMYCIN PHOSPHATE 900 MG: 18 INJECTION, SOLUTION INTRAVENOUS at 10:06

## 2018-01-01 RX ADMIN — Medication 5 QUARTER-TAB: at 17:21

## 2018-01-01 RX ADMIN — Medication 5 QUARTER-TAB: at 19:25

## 2018-01-01 RX ADMIN — Medication 1 QUARTER-TAB: at 21:57

## 2018-01-01 RX ADMIN — OXYCODONE HYDROCHLORIDE 10 MG: 5 TABLET ORAL at 04:23

## 2018-01-01 RX ADMIN — ESCITALOPRAM OXALATE 10 MG: 10 TABLET ORAL at 07:39

## 2018-01-01 RX ADMIN — POTASSIUM CHLORIDE 40 MEQ: 750 TABLET, EXTENDED RELEASE ORAL at 04:59

## 2018-01-01 RX ADMIN — PROPOFOL 10 MG: 10 INJECTION, EMULSION INTRAVENOUS at 10:37

## 2018-01-01 RX ADMIN — AMLODIPINE BESYLATE 5 MG: 5 TABLET ORAL at 07:39

## 2018-01-01 RX ADMIN — DEXMEDETOMIDINE HYDROCHLORIDE 0.5 MCG/KG/HR: 100 INJECTION, SOLUTION INTRAVENOUS at 09:15

## 2018-01-01 RX ADMIN — ATORVASTATIN CALCIUM 20 MG: 20 TABLET, FILM COATED ORAL at 08:34

## 2018-01-01 RX ADMIN — Medication 1 QUARTER-TAB: at 10:27

## 2018-01-01 RX ADMIN — CARBIDOPA AND LEVODOPA 1 TABLET: 25; 100 TABLET ORAL at 22:16

## 2018-01-01 RX ADMIN — Medication 500 ML: at 06:03

## 2018-01-01 RX ADMIN — POTASSIUM CHLORIDE 40 MEQ: 1.5 POWDER, FOR SOLUTION ORAL at 10:00

## 2018-01-01 RX ADMIN — Medication 0.3 MG: at 20:13

## 2018-01-01 RX ADMIN — PROPOFOL 20 MG: 10 INJECTION, EMULSION INTRAVENOUS at 08:39

## 2018-01-01 RX ADMIN — PROPOFOL 10 MG: 10 INJECTION, EMULSION INTRAVENOUS at 10:39

## 2018-01-01 RX ADMIN — ONDANSETRON 4 MG: 2 INJECTION INTRAMUSCULAR; INTRAVENOUS at 18:52

## 2018-01-01 RX ADMIN — AMLODIPINE BESYLATE 5 MG: 5 TABLET ORAL at 08:54

## 2018-01-01 RX ADMIN — HYDRALAZINE HYDROCHLORIDE 10 MG: 20 INJECTION INTRAMUSCULAR; INTRAVENOUS at 17:46

## 2018-01-01 RX ADMIN — Medication 1 QUARTER-TAB: at 08:54

## 2018-01-01 RX ADMIN — ACETAMINOPHEN 650 MG: 325 TABLET, FILM COATED ORAL at 08:35

## 2018-01-01 RX ADMIN — FENTANYL CITRATE 25 MCG: 50 INJECTION, SOLUTION INTRAMUSCULAR; INTRAVENOUS at 14:00

## 2018-01-01 RX ADMIN — CIPROFLOXACIN HYDROCHLORIDE 500 MG: 500 TABLET, FILM COATED ORAL at 20:05

## 2018-01-01 RX ADMIN — ACETAMINOPHEN 975 MG: 325 TABLET, FILM COATED ORAL at 12:02

## 2018-01-01 RX ADMIN — CARBIDOPA AND LEVODOPA 1 TABLET: 25; 100 TABLET ORAL at 17:49

## 2018-01-01 RX ADMIN — CARBIDOPA AND LEVODOPA 1 TABLET: 25; 100 TABLET ORAL at 04:09

## 2018-01-01 RX ADMIN — PROPOFOL 10 MG: 10 INJECTION, EMULSION INTRAVENOUS at 08:39

## 2018-01-01 RX ADMIN — SODIUM CHLORIDE, POTASSIUM CHLORIDE, SODIUM LACTATE AND CALCIUM CHLORIDE: 600; 310; 30; 20 INJECTION, SOLUTION INTRAVENOUS at 17:50

## 2018-01-01 RX ADMIN — Medication 0.3 MG: at 18:51

## 2018-01-01 RX ADMIN — CEFTRIAXONE 1 G: 1 INJECTION, POWDER, FOR SOLUTION INTRAMUSCULAR; INTRAVENOUS at 06:41

## 2018-01-01 RX ADMIN — Medication 5 QUARTER-TAB: at 12:08

## 2018-01-01 RX ADMIN — Medication 1 QUARTER-TAB: at 06:28

## 2018-01-01 RX ADMIN — Medication 5 QUARTER-TAB: at 10:34

## 2018-01-01 RX ADMIN — ACETAMINOPHEN 975 MG: 325 TABLET, FILM COATED ORAL at 04:20

## 2018-01-01 RX ADMIN — ACETAMINOPHEN 975 MG: 325 TABLET, FILM COATED ORAL at 00:03

## 2018-01-01 RX ADMIN — CARBIDOPA AND LEVODOPA 1 TABLET: 25; 100 TABLET ORAL at 12:03

## 2018-01-01 RX ADMIN — HYDROMORPHONE HYDROCHLORIDE 0.3 MG: 1 INJECTION, SOLUTION INTRAMUSCULAR; INTRAVENOUS; SUBCUTANEOUS at 18:51

## 2018-01-01 RX ADMIN — Medication 5 QUARTER-TAB: at 14:18

## 2018-01-01 RX ADMIN — Medication 5 QUARTER-TAB: at 17:57

## 2018-01-01 RX ADMIN — PROPOFOL 20 MG: 10 INJECTION, EMULSION INTRAVENOUS at 08:42

## 2018-01-01 RX ADMIN — Medication 5 QUARTER-TAB: at 15:59

## 2018-01-01 RX ADMIN — POTASSIUM CHLORIDE 10 MEQ: 10 INJECTION, SOLUTION INTRAVENOUS at 05:07

## 2018-01-01 RX ADMIN — CLINDAMYCIN PHOSPHATE 900 MG: 18 INJECTION, SOLUTION INTRAVENOUS at 10:00

## 2018-01-01 RX ADMIN — ESCITALOPRAM OXALATE 10 MG: 10 TABLET ORAL at 08:35

## 2018-01-01 RX ADMIN — HYDRALAZINE HYDROCHLORIDE 20 MG: 20 INJECTION INTRAMUSCULAR; INTRAVENOUS at 00:05

## 2018-01-01 RX ADMIN — POLYETHYLENE GLYCOL 3350 17 G: 17 POWDER, FOR SOLUTION ORAL at 16:36

## 2018-01-01 RX ADMIN — PANTOPRAZOLE SODIUM 20 MG: 20 TABLET, DELAYED RELEASE ORAL at 08:33

## 2018-01-01 RX ADMIN — CLINDAMYCIN IN 5 PERCENT DEXTROSE 900 MG: 18 INJECTION, SOLUTION INTRAVENOUS at 01:25

## 2018-01-01 RX ADMIN — FENTANYL CITRATE 25 MCG: 50 INJECTION, SOLUTION INTRAMUSCULAR; INTRAVENOUS at 14:12

## 2018-01-01 RX ADMIN — FENTANYL CITRATE 25 MCG: 50 INJECTION, SOLUTION INTRAMUSCULAR; INTRAVENOUS at 18:52

## 2018-01-01 RX ADMIN — OXYCODONE HYDROCHLORIDE 5 MG: 5 TABLET ORAL at 22:16

## 2018-01-01 RX ADMIN — SODIUM CHLORIDE: 9 INJECTION, SOLUTION INTRAVENOUS at 15:50

## 2018-01-01 RX ADMIN — OXYCODONE HYDROCHLORIDE 5 MG: 5 TABLET ORAL at 01:55

## 2018-01-01 RX ADMIN — ACETAMINOPHEN 975 MG: 325 TABLET, FILM COATED ORAL at 07:39

## 2018-01-01 RX ADMIN — PROPOFOL 15 MG: 10 INJECTION, EMULSION INTRAVENOUS at 09:22

## 2018-01-01 RX ADMIN — CARBIDOPA AND LEVODOPA 1 TABLET: 25; 100 TABLET ORAL at 06:02

## 2018-01-01 RX ADMIN — POLYETHYLENE GLYCOL 3350 17 G: 17 POWDER, FOR SOLUTION ORAL at 07:40

## 2018-01-01 RX ADMIN — CARBIDOPA AND LEVODOPA 1 TABLET: 25; 100 TABLET ORAL at 13:56

## 2018-01-01 RX ADMIN — PANTOPRAZOLE SODIUM 20 MG: 20 TABLET, DELAYED RELEASE ORAL at 08:35

## 2018-01-01 RX ADMIN — Medication 5 MG: at 18:23

## 2018-01-01 RX ADMIN — FENTANYL CITRATE 25 MCG: 50 INJECTION, SOLUTION INTRAMUSCULAR; INTRAVENOUS at 17:22

## 2018-01-01 RX ADMIN — ATORVASTATIN CALCIUM 20 MG: 20 TABLET, FILM COATED ORAL at 08:33

## 2018-01-01 RX ADMIN — SODIUM CHLORIDE, POTASSIUM CHLORIDE, SODIUM LACTATE AND CALCIUM CHLORIDE: 600; 310; 30; 20 INJECTION, SOLUTION INTRAVENOUS at 08:24

## 2018-01-01 RX ADMIN — Medication 5 QUARTER-TAB: at 11:36

## 2018-01-01 RX ADMIN — ACETAMINOPHEN 975 MG: 325 TABLET, FILM COATED ORAL at 16:33

## 2018-01-01 RX ADMIN — CARBIDOPA AND LEVODOPA 1 TABLET: 25; 100 TABLET ORAL at 02:00

## 2018-01-01 RX ADMIN — CARBIDOPA AND LEVODOPA 1 TABLET: 25; 100 TABLET ORAL at 10:07

## 2018-01-01 RX ADMIN — CEFTRIAXONE 1 G: 1 INJECTION, POWDER, FOR SOLUTION INTRAMUSCULAR; INTRAVENOUS at 05:05

## 2018-01-01 RX ADMIN — CARBIDOPA AND LEVODOPA 1 TABLET: 25; 100 TABLET ORAL at 00:03

## 2018-01-01 RX ADMIN — FENTANYL CITRATE 25 MCG: 50 INJECTION INTRAMUSCULAR; INTRAVENOUS at 14:49

## 2018-01-01 RX ADMIN — POTASSIUM CHLORIDE 20 MEQ: 1.5 POWDER, FOR SOLUTION ORAL at 14:18

## 2018-01-01 RX ADMIN — PROPOFOL 20 MG: 10 INJECTION, EMULSION INTRAVENOUS at 09:26

## 2018-01-01 RX ADMIN — CARBIDOPA AND LEVODOPA 1 TABLET: 25; 100 TABLET ORAL at 20:56

## 2018-01-01 RX ADMIN — Medication 5 QUARTER-TAB: at 22:35

## 2018-01-01 RX ADMIN — HYDRALAZINE HYDROCHLORIDE 10 MG: 20 INJECTION INTRAMUSCULAR; INTRAVENOUS at 01:17

## 2018-01-01 RX ADMIN — PROPOFOL 20 MG: 10 INJECTION, EMULSION INTRAVENOUS at 08:36

## 2018-01-01 RX ADMIN — AMLODIPINE BESYLATE 5 MG: 5 TABLET ORAL at 08:44

## 2018-01-01 RX ADMIN — Medication 5 QUARTER-TAB: at 20:05

## 2018-01-01 RX ADMIN — ACETAMINOPHEN 650 MG: 325 TABLET, FILM COATED ORAL at 17:11

## 2018-01-01 RX ADMIN — ROCURONIUM BROMIDE 30 MG: 10 INJECTION INTRAVENOUS at 18:03

## 2018-01-01 RX ADMIN — ONDANSETRON 8 MG: 2 INJECTION INTRAMUSCULAR; INTRAVENOUS at 02:35

## 2018-01-01 RX ADMIN — OXYCODONE HYDROCHLORIDE 5 MG: 5 TABLET ORAL at 20:32

## 2018-01-01 RX ADMIN — SULFAMETHOXAZOLE AND TRIMETHOPRIM 1 TABLET: 800; 160 TABLET ORAL at 14:16

## 2018-01-01 RX ADMIN — ROCURONIUM BROMIDE 20 MG: 10 INJECTION INTRAVENOUS at 18:32

## 2018-01-01 RX ADMIN — ESCITALOPRAM OXALATE 10 MG: 10 TABLET ORAL at 08:44

## 2018-01-01 RX ADMIN — PROPOFOL 20 MG: 10 INJECTION, EMULSION INTRAVENOUS at 08:44

## 2018-01-01 RX ADMIN — Medication 5 QUARTER-TAB: at 16:28

## 2018-01-01 RX ADMIN — POTASSIUM CHLORIDE 20 MEQ: 750 TABLET, EXTENDED RELEASE ORAL at 07:02

## 2018-01-01 RX ADMIN — PROPOFOL 15 MG: 10 INJECTION, EMULSION INTRAVENOUS at 09:20

## 2018-01-01 RX ADMIN — FENTANYL CITRATE 25 MCG: 50 INJECTION, SOLUTION INTRAMUSCULAR; INTRAVENOUS at 15:56

## 2018-01-01 RX ADMIN — FENTANYL CITRATE 50 MCG: 50 INJECTION, SOLUTION INTRAMUSCULAR; INTRAVENOUS at 16:39

## 2018-01-01 RX ADMIN — ACETAMINOPHEN 650 MG: 325 TABLET, FILM COATED ORAL at 06:41

## 2018-01-01 RX ADMIN — POTASSIUM CHLORIDE 20 MEQ: 1.5 POWDER, FOR SOLUTION ORAL at 12:08

## 2018-01-01 RX ADMIN — Medication 5 QUARTER-TAB: at 17:54

## 2018-01-01 RX ADMIN — SUGAMMADEX 120 MG: 100 INJECTION, SOLUTION INTRAVENOUS at 19:35

## 2018-01-01 RX ADMIN — CIPROFLOXACIN HYDROCHLORIDE 500 MG: 500 TABLET, FILM COATED ORAL at 08:33

## 2018-01-01 RX ADMIN — FENTANYL CITRATE 50 MCG: 50 INJECTION, SOLUTION INTRAMUSCULAR; INTRAVENOUS at 18:03

## 2018-01-01 RX ADMIN — SODIUM CHLORIDE, POTASSIUM CHLORIDE, SODIUM LACTATE AND CALCIUM CHLORIDE: 600; 310; 30; 20 INJECTION, SOLUTION INTRAVENOUS at 14:35

## 2018-01-01 RX ADMIN — AMLODIPINE BESYLATE 5 MG: 5 TABLET ORAL at 08:34

## 2018-01-01 RX ADMIN — OXYCODONE HYDROCHLORIDE 5 MG: 5 TABLET ORAL at 19:45

## 2018-01-01 RX ADMIN — HYDRALAZINE HYDROCHLORIDE 5 MG: 20 INJECTION INTRAMUSCULAR; INTRAVENOUS at 15:35

## 2018-01-01 RX ADMIN — TRAMADOL HYDROCHLORIDE 50 MG: 50 TABLET, COATED ORAL at 21:34

## 2018-01-01 RX ADMIN — FENTANYL CITRATE 25 MCG: 50 INJECTION, SOLUTION INTRAMUSCULAR; INTRAVENOUS at 19:16

## 2018-01-01 RX ADMIN — PANTOPRAZOLE SODIUM 20 MG: 20 TABLET, DELAYED RELEASE ORAL at 08:53

## 2018-01-01 RX ADMIN — Medication 1 QUARTER-TAB: at 12:02

## 2018-01-01 RX ADMIN — SENNOSIDES AND DOCUSATE SODIUM 1 TABLET: 8.6; 5 TABLET ORAL at 08:54

## 2018-01-01 RX ADMIN — DEXMEDETOMIDINE HYDROCHLORIDE 0.5 MCG/KG/HR: 4 INJECTION, SOLUTION INTRAVENOUS at 09:34

## 2018-01-01 RX ADMIN — Medication 5 QUARTER-TAB: at 08:29

## 2018-01-01 RX ADMIN — SENNOSIDES AND DOCUSATE SODIUM 1 TABLET: 8.6; 5 TABLET ORAL at 19:45

## 2018-01-01 RX ADMIN — PROPOFOL 30 MG: 10 INJECTION, EMULSION INTRAVENOUS at 09:19

## 2018-01-01 RX ADMIN — CLINDAMYCIN IN 5 PERCENT DEXTROSE 900 MG: 18 INJECTION, SOLUTION INTRAVENOUS at 09:35

## 2018-01-01 RX ADMIN — ATORVASTATIN CALCIUM 20 MG: 20 TABLET, FILM COATED ORAL at 08:44

## 2018-01-01 RX ADMIN — ATORVASTATIN CALCIUM 20 MG: 20 TABLET, FILM COATED ORAL at 08:54

## 2018-01-01 RX ADMIN — CARBIDOPA AND LEVODOPA 1 TABLET: 25; 100 TABLET ORAL at 16:32

## 2018-01-01 RX ADMIN — CARBIDOPA AND LEVODOPA 1 TABLET: 25; 100 TABLET ORAL at 06:28

## 2018-01-01 RX ADMIN — Medication: at 06:07

## 2018-01-01 RX ADMIN — Medication 1 QUARTER-TAB: at 13:56

## 2018-01-01 RX ADMIN — CARBIDOPA AND LEVODOPA 1 TABLET: 25; 100 TABLET ORAL at 20:32

## 2018-01-01 RX ADMIN — ONDANSETRON 4 MG: 2 INJECTION INTRAMUSCULAR; INTRAVENOUS at 08:44

## 2018-01-01 RX ADMIN — CARBIDOPA AND LEVODOPA 1 TABLET: 25; 100 TABLET ORAL at 08:54

## 2018-01-01 RX ADMIN — PROPOFOL 20 MG: 10 INJECTION, EMULSION INTRAVENOUS at 08:33

## 2018-01-01 RX ADMIN — CARBIDOPA AND LEVODOPA 1 TABLET: 25; 100 TABLET ORAL at 07:39

## 2018-01-01 RX ADMIN — SENNOSIDES AND DOCUSATE SODIUM 2 TABLET: 8.6; 5 TABLET ORAL at 07:40

## 2018-01-01 RX ADMIN — PANTOPRAZOLE SODIUM 20 MG: 20 TABLET, DELAYED RELEASE ORAL at 07:39

## 2018-01-01 RX ADMIN — CLINDAMYCIN IN 5 PERCENT DEXTROSE 900 MG: 18 INJECTION, SOLUTION INTRAVENOUS at 16:25

## 2018-01-01 RX ADMIN — PROPOFOL 30 MG: 10 INJECTION, EMULSION INTRAVENOUS at 08:38

## 2018-01-01 RX ADMIN — Medication 5 QUARTER-TAB: at 13:33

## 2018-01-01 RX ADMIN — CARBIDOPA AND LEVODOPA 1 TABLET: 25; 100 TABLET ORAL at 10:27

## 2018-01-01 RX ADMIN — VITAMIN D, TAB 1000IU (100/BT) 2000 UNITS: 25 TAB at 08:54

## 2018-01-01 RX ADMIN — SODIUM CHLORIDE 500 ML: 9 INJECTION, SOLUTION INTRAVENOUS at 06:03

## 2018-01-01 RX ADMIN — SODIUM CHLORIDE, POTASSIUM CHLORIDE, SODIUM LACTATE AND CALCIUM CHLORIDE: 600; 310; 30; 20 INJECTION, SOLUTION INTRAVENOUS at 08:20

## 2018-01-01 RX ADMIN — AMLODIPINE BESYLATE 5 MG: 5 TABLET ORAL at 08:33

## 2018-01-01 RX ADMIN — SODIUM CHLORIDE: 9 INJECTION, SOLUTION INTRAVENOUS at 14:51

## 2018-01-01 RX ADMIN — LIDOCAINE HYDROCHLORIDE 20 MG: 20 INJECTION, SOLUTION INFILTRATION; PERINEURAL at 08:30

## 2018-01-01 RX ADMIN — Medication 5 QUARTER-TAB: at 08:44

## 2018-01-01 ASSESSMENT — UNIFIED PARKINSONS DISEASE RATING SCALE (UPDRS)
AMPLITUDE_RUE: MILD > 1 CM BUT < 3 CM IN MAXIMAL AMPLITUDE.
TOETAPPING_RIGHT: MILD: ANY OF THE FOLLOWING: A) 3 TO 5 INTERRUPTIONS DURING TAPPING B) MILD SLOWING C) THE AMPLITUDE DECREMENTS MIDWAY IN THE 10-MOVEMENT SEQUENCE
PARKINSONS_MEDS: OFF
POSTURE: 2 MILD.  DEFINITE FLEXION, SCOLIOSIS OR LEANING OT ONE SIDE, BUT CAN CORRECT POSTURE TO NORMAL WHEN ASKED.
AMPLITUDE_LLE: NORMAL: NO TREMOR.
AMPLITUDE_RLE: SLIGHT: < 1 CM IN MAXIMAL AMPLITUDE.
TOTAL_SCORE_LEFT: 22
CONSTANCY_TREMOR_ATREST: SLIGHT: TREMOR AT REST IS PRESENT  25% OF THE ENTIRE EXAMINATION PERIOD.
RIGIDITY_NECK: SLIGHT: RIGIDITY ONLY DETECTED WITH ACTIVATION MANEUVER.
TOTAL_SCORE: 74
FREEZING_GAIT: NORMAL
TOETAPPING_LEFT: SLIGHT: ANY OF THE FOLLOWING: A) THE REGULAR RHYTHM IS BROKEN WITH ONE WITH ONE OR TWO INTERRUPTIONS OR HESITATIONS OF THE MOVEMENT B) SLIGHT SLOWING C) THE AMPLITUDE DECREMENTS NEAR THE END OF THE 10 MOVEMENTS.
HANDMOVEMENTS_RIGHT: SLIGHT: ANY OF THE FOLLOWING: A) THE REGULAR RHYTHM IS BROKEN WITH ONE WITH ONE OR TWO INTERRUPTIONS OR HESITATIONS OF THE MOVEMENT B) SLIGHT SLOWING C) THE AMPLITUDE DECREMENTS NEAR THE END OF THE 10 MOVEMENTS.
AXIAL_SCORE: 12
TOETAPPING_LEFT: SLIGHT: ANY OF THE FOLLOWING: A) THE REGULAR RHYTHM IS BROKEN WITH ONE WITH ONE OR TWO INTERRUPTIONS OR HESITATIONS OF THE MOVEMENT B) SLIGHT SLOWING C) THE AMPLITUDE DECREMENTS NEAR THE END OF THE 10 MOVEMENTS.
LEG_AGILITY_RIGHT: SLIGHT: ANY OF THE FOLLOWING: A) THE REGULAR RHYTHM IS BROKEN WITH ONE WITH ONE OR TWO INTERRUPTIONS OR HESITATIONS OF THE MOVEMENT B) SLIGHT SLOWING C) THE AMPLITUDE DECREMENTS NEAR THE END OF THE 10 MOVEMENTS.
GAIT: MILD: INDEPENDENT WALKING BUT WITH SUBSTANTIAL GAIT IMPAIRMENT.
AMPLITUDE_RLE: NORMAL: NO TREMOR.
FINGER_TAPPING_RIGHT: MILD: ANY OF THE FOLLOWING: A) 3 TO 5 INTERRUPTIONS DURING TAPPING B) MILD SLOWING C) THE AMPLITUDE DECREMENTS MIDWAY IN THE 10-MOVEMENT SEQUENCE
GAIT: NORMAL
RIGIDITY_RUE: NORMAL
RIGIDITY_LUE: SLIGHT: RIGIDITY ONLY DETECTED WITH ACTIVATION MANEUVER.
SPONTANEITY_OF_MOVEMENT: 0: NORMAL.  NO PROBLEMS.
PRONATION_SUPINATION_LEFT: SLIGHT: ANY OF THE FOLLOWING: A) THE REGULAR RHYTHM IS BROKEN WITH ONE WITH ONE OR TWO INTERRUPTIONS OR HESITATIONS OF THE MOVEMENT B) SLIGHT SLOWING C) THE AMPLITUDE DECREMENTS NEAR THE END OF THE 10 MOVEMENTS.
AMPLITUDE_LIP_JAW: SLIGHT: < 1 CM IN MAXIMAL AMPLITUDE.
LEG_AGILITY_RIGHT: NORMAL
RIGIDITY_LLE: NORMAL
LEG_AGILITY_RIGHT: NORMAL
AMPLITUDE_LUE: MILD > 1 CM BUT < 3 CM IN MAXIMAL AMPLITUDE.
FREEZING_GAIT: NORMAL
FACIAL_EXPRESSION: MILD: IN ADDITION TO DECREASED EYE-BLINK FREQUENCY, MASKED FACIES PRESENT IN THE LOWER FACE AS WELL, NAMELY FEWER MOVEMENTS AROUND THE MOUTH, SUCH AS LESS SPONTANEOUS SMILING, BUT LIPS NOT PARTED.
TOTAL_SCORE_LEFT: 13
RIGIDITY_NECK: SLIGHT: RIGIDITY ONLY DETECTED WITH ACTIVATION MANEUVER.
SPEECH: SLIGHT: LOSS OF MODULATION, DICTION OR VOLUME, BUT STILL ALL WORDS EASY TO UNDERSTAND.
FINGER_TAPPING_RIGHT: SLIGHT: ANY OF THE FOLLOWING: A) THE REGULAR RHYTHM IS BROKEN WITH ONE WITH ONE OR TWO INTERRUPTIONS OR HESITATIONS OF THE MOVEMENT B) SLIGHT SLOWING C) THE AMPLITUDE DECREMENTS NEAR THE END OF THE 10 MOVEMENTS.
ARISING_CHAIR: SLIGHT: ARISING IS SLOWER THAN NORMAL, OR MAY NEED MORE THAN ONE ATTEMPT, OR MAY NEED TO MOVE FORWARD IN THE CHAIR TO ARISE.  NO NEED TO USE THE ARMS OF THE CHAIR.
LEG_AGILITY_RIGHT: MILD: ANY OF THE FOLLOWING: A) 3 TO 5 INTERRUPTIONS DURING TAPPING B) MILD SLOWING C) THE AMPLITUDE DECREMENTS MIDWAY IN THE 10-MOVEMENT SEQUENCE
AMPLITUDE_RUE: MILD > 1 CM BUT < 3 CM IN MAXIMAL AMPLITUDE.
RIGIDITY_RUE: NORMAL
HANDMOVEMENTS_RIGHT: MILD: ANY OF THE FOLLOWING: A) 3 TO 5 INTERRUPTIONS DURING TAPPING B) MILD SLOWING C) THE AMPLITUDE DECREMENTS MIDWAY IN THE 10-MOVEMENT SEQUENCE
FINGER_TAPPING_RIGHT: MILD: ANY OF THE FOLLOWING: A) 3 TO 5 INTERRUPTIONS DURING TAPPING B) MILD SLOWING C) THE AMPLITUDE DECREMENTS MIDWAY IN THE 10-MOVEMENT SEQUENCE
CONSTANCY_TREMOR_ATREST: SLIGHT: TREMOR AT REST IS PRESENT  25% OF THE ENTIRE EXAMINATION PERIOD.
HANDMOVEMENTS_LEFT: MODERATE: ANY OF THE FOLLOWING:  A) MORE THAN 5 INTERRUPTIONS  OR AT LEAST ONE LONGER ARREST (FREEZE) IN ONGOING MOVEMENT  B) MODERATE SLOWING C) THE AMPLITUDE DECREMENTS STARTING AFTER THE FIRST MOVEMENT.
AMPLITUDE_LUE: NORMAL: NO TREMOR.
GAIT: MODERATE: REQUIRES AN ASSISTANCE DEVICE FOR SAFE WALKING (WALKING STICK, WALKER) BUT NOT A PERSON.
SPEECH: MILD: LOSS OF MODULATION, DICTION OR VOLUME, WITH A FEW WORDS UNCLEAR, BUT THE OVERALL SENTENCES EASY TO FOLLOW.
LEG_AGILITY_RIGHT: NORMAL
TOTAL_SCORE: 24
FACIAL_EXPRESSION: MILD: IN ADDITION TO DECREASED EYE-BLINK FREQUENCY, MASKED FACIES PRESENT IN THE LOWER FACE AS WELL, NAMELY FEWER MOVEMENTS AROUND THE MOUTH, SUCH AS LESS SPONTANEOUS SMILING, BUT LIPS NOT PARTED.
LEG_AGILITY_RIGHT: NORMAL
GAIT: MODERATE: REQUIRES AN ASSISTANCE DEVICE FOR SAFE WALKING (WALKING STICK, WALKER) BUT NOT A PERSON.
AMPLITUDE_RUE: MODERATE: 3 - 10 CM IN MAXIMAL AMPLITUDE.
HANDMOVEMENTS_RIGHT: MILD: ANY OF THE FOLLOWING: A) 3 TO 5 INTERRUPTIONS DURING TAPPING B) MILD SLOWING C) THE AMPLITUDE DECREMENTS MIDWAY IN THE 10-MOVEMENT SEQUENCE
LEG_AGILITY_RIGHT: NORMAL
TOETAPPING_RIGHT: NORMAL
RIGIDITY_NECK: SLIGHT: RIGIDITY ONLY DETECTED WITH ACTIVATION MANEUVER.
FACIAL_EXPRESSION: SLIGHT: MINIMAL MASKED FACIES MANIFESTED ONLY BY DECREASED FREQUENCY OF BLINKING.
PRONATION_SUPINATION_RIGHT: SLIGHT: ANY OF THE FOLLOWING: A) THE REGULAR RHYTHM IS BROKEN WITH ONE WITH ONE OR TWO INTERRUPTIONS OR HESITATIONS OF THE MOVEMENT B) SLIGHT SLOWING C) THE AMPLITUDE DECREMENTS NEAR THE END OF THE 10 MOVEMENTS.
TOETAPPING_LEFT: SLIGHT: ANY OF THE FOLLOWING: A) THE REGULAR RHYTHM IS BROKEN WITH ONE WITH ONE OR TWO INTERRUPTIONS OR HESITATIONS OF THE MOVEMENT B) SLIGHT SLOWING C) THE AMPLITUDE DECREMENTS NEAR THE END OF THE 10 MOVEMENTS.
PARKINSONS_MEDS: OFF
CONSTANCY_TREMOR_ATREST: SEVERE: TREMOR AT REST IS PRESENT > 75% OF THE ENTIRE EXAMINATION PERIOD.
SPEECH: SLIGHT: LOSS OF MODULATION, DICTION OR VOLUME, BUT STILL ALL WORDS EASY TO UNDERSTAND.
AMPLITUDE_RUE: NORMAL: NO TREMOR.
AMPLITUDE_RUE: NORMAL: NO TREMOR.
TOTAL_SCORE: 10
FREEZING_GAIT: SLIGHT:  FREEZES ON STARTING, TURNING, OR WALKING THROUGH DOOWAY WITH A SINGLE HALT DURING ANY OF THESE EVENTS, BUT THEN CONTINUES SMOOTHLY WITHOUT FREEZING DURING STRAIGHT WALKING.
FINGER_TAPPING_LEFT: MODERATE: ANY OF THE FOLLOWING:  A) MORE THAN 5 INTERRUPTIONS  OR AT LEAST ONE LONGER ARREST (FREEZE) IN ONGOING MOVEMENT  B) MODERATE SLOWING C) THE AMPLITUDE DECREMENTS STARTING AFTER THE FIRST MOVEMENT.
RIGIDITY_RLE: NORMAL
FACIAL_EXPRESSION: SLIGHT: MINIMAL MASKED FACIES MANIFESTED ONLY BY DECREASED FREQUENCY OF BLINKING.
HANDMOVEMENTS_RIGHT: SLIGHT: ANY OF THE FOLLOWING: A) THE REGULAR RHYTHM IS BROKEN WITH ONE WITH ONE OR TWO INTERRUPTIONS OR HESITATIONS OF THE MOVEMENT B) SLIGHT SLOWING C) THE AMPLITUDE DECREMENTS NEAR THE END OF THE 10 MOVEMENTS.
ARISING_CHAIR: SLIGHT: ARISING IS SLOWER THAN NORMAL, OR MAY NEED MORE THAN ONE ATTEMPT, OR MAY NEED TO MOVE FORWARD IN THE CHAIR TO ARISE.  NO NEED TO USE THE ARMS OF THE CHAIR.
AMPLITUDE_RLE: NORMAL: NO TREMOR.
RIGIDITY_RUE: MODERATE: RIGIDITY DETECTED WITHOUT THE ACTIVATION MANEUVER. FULL RANGE OF MOTION IS ACHIEVED WITH EFFORT.
TOTAL_SCORE_LEFT: 26
POSTURE: 2 MILD.  DEFINITE FLEXION, SCOLIOSIS OR LEANING OT ONE SIDE, BUT CAN CORRECT POSTURE TO NORMAL WHEN ASKED.
PRONATION_SUPINATION_LEFT: NORMAL
RIGIDITY_NECK: SLIGHT: RIGIDITY ONLY DETECTED WITH ACTIVATION MANEUVER.
SPEECH: MILD: LOSS OF MODULATION, DICTION OR VOLUME, WITH A FEW WORDS UNCLEAR, BUT THE OVERALL SENTENCES EASY TO FOLLOW.
FACIAL_EXPRESSION: MILD: IN ADDITION TO DECREASED EYE-BLINK FREQUENCY, MASKED FACIES PRESENT IN THE LOWER FACE AS WELL, NAMELY FEWER MOVEMENTS AROUND THE MOUTH, SUCH AS LESS SPONTANEOUS SMILING, BUT LIPS NOT PARTED.
RIGIDITY_RUE: NORMAL
RIGIDITY_RLE: NORMAL
AMPLITUDE_RUE: SLIGHT: < 1 CM IN MAXIMAL AMPLITUDE.
PRONATION_SUPINATION_RIGHT: NORMAL
POSTURAL_STABILITY: MODERATE: STANDS SAFELY, BUT WITH ABSENCE OF POSTURAL RESPONSE,  FALLS IF NOT CAUGHT BY EXAMINER.
TOETAPPING_RIGHT: SLIGHT: ANY OF THE FOLLOWING: A) THE REGULAR RHYTHM IS BROKEN WITH ONE WITH ONE OR TWO INTERRUPTIONS OR HESITATIONS OF THE MOVEMENT B) SLIGHT SLOWING C) THE AMPLITUDE DECREMENTS NEAR THE END OF THE 10 MOVEMENTS.
FREEZING_GAIT: NORMAL
FACIAL_EXPRESSION: MILD: IN ADDITION TO DECREASED EYE-BLINK FREQUENCY, MASKED FACIES PRESENT IN THE LOWER FACE AS WELL, NAMELY FEWER MOVEMENTS AROUND THE MOUTH, SUCH AS LESS SPONTANEOUS SMILING, BUT LIPS NOT PARTED.
PRONATION_SUPINATION_LEFT: NORMAL
HANDMOVEMENTS_LEFT: SLIGHT: ANY OF THE FOLLOWING: A) THE REGULAR RHYTHM IS BROKEN WITH ONE WITH ONE OR TWO INTERRUPTIONS OR HESITATIONS OF THE MOVEMENT B) SLIGHT SLOWING C) THE AMPLITUDE DECREMENTS NEAR THE END OF THE 10 MOVEMENTS.
SPONTANEITY_OF_MOVEMENT: 1: SLIGHT: SLIGHT GLOBAL SLOWNESS AND POVERTY OF SPONTANEOUS MOVEMENTS.
LEG_AGILITY_LEFT: MODERATE: ANY OF THE FOLLOWING:  A) MORE THAN 5 INTERRUPTIONS  OR AT LEAST ONE LONGER ARREST (FREEZE) IN ONGOING MOVEMENT  B) MODERATE SLOWING C) THE AMPLITUDE DECREMENTS STARTING AFTER THE FIRST MOVEMENT.
AMPLITUDE_RUE: MILD > 1 CM BUT < 3 CM IN MAXIMAL AMPLITUDE.
GAIT: NORMAL
PRONATION_SUPINATION_LEFT: SLIGHT: ANY OF THE FOLLOWING: A) THE REGULAR RHYTHM IS BROKEN WITH ONE WITH ONE OR TWO INTERRUPTIONS OR HESITATIONS OF THE MOVEMENT B) SLIGHT SLOWING C) THE AMPLITUDE DECREMENTS NEAR THE END OF THE 10 MOVEMENTS.
PRONATION_SUPINATION_LEFT: MODERATE: ANY OF THE FOLLOWING:  A) MORE THAN 5 INTERRUPTIONS  OR AT LEAST ONE LONGER ARREST (FREEZE) IN ONGOING MOVEMENT  B) MODERATE SLOWING C) THE AMPLITUDE DECREMENTS STARTING AFTER THE FIRST MOVEMENT.
AMPLITUDE_LIP_JAW: NORMAL: NO TREMOR.
FREEZING_GAIT: SLIGHT:  FREEZES ON STARTING, TURNING, OR WALKING THROUGH DOOWAY WITH A SINGLE HALT DURING ANY OF THESE EVENTS, BUT THEN CONTINUES SMOOTHLY WITHOUT FREEZING DURING STRAIGHT WALKING.
RIGIDITY_RUE: NORMAL
TOETAPPING_LEFT: MILD: ANY OF THE FOLLOWING: A) 3 TO 5 INTERRUPTIONS DURING TAPPING B) MILD SLOWING C) THE AMPLITUDE DECREMENTS MIDWAY IN THE 10-MOVEMENT SEQUENCE
POSTURAL_STABILITY: MODERATE: STANDS SAFELY, BUT WITH ABSENCE OF POSTURAL RESPONSE,  FALLS IF NOT CAUGHT BY EXAMINER.
AMPLITUDE_RLE: SLIGHT: < 1 CM IN MAXIMAL AMPLITUDE.
RIGIDITY_NECK: SLIGHT: RIGIDITY ONLY DETECTED WITH ACTIVATION MANEUVER.
SPEECH: SLIGHT: LOSS OF MODULATION, DICTION OR VOLUME, BUT STILL ALL WORDS EASY TO UNDERSTAND.
LEG_AGILITY_LEFT: MILD: ANY OF THE FOLLOWING: A) 3 TO 5 INTERRUPTIONS DURING TAPPING B) MILD SLOWING C) THE AMPLITUDE DECREMENTS MIDWAY IN THE 10-MOVEMENT SEQUENCE
FINGER_TAPPING_RIGHT: SLIGHT: ANY OF THE FOLLOWING: A) THE REGULAR RHYTHM IS BROKEN WITH ONE WITH ONE OR TWO INTERRUPTIONS OR HESITATIONS OF THE MOVEMENT B) SLIGHT SLOWING C) THE AMPLITUDE DECREMENTS NEAR THE END OF THE 10 MOVEMENTS.
POSTURE: 2 MILD.  DEFINITE FLEXION, SCOLIOSIS OR LEANING OT ONE SIDE, BUT CAN CORRECT POSTURE TO NORMAL WHEN ASKED.
FACIAL_EXPRESSION: SLIGHT: MINIMAL MASKED FACIES MANIFESTED ONLY BY DECREASED FREQUENCY OF BLINKING.
PARKINSONS_MEDS: OFF
RIGIDITY_RUE: SLIGHT: RIGIDITY ONLY DETECTED WITH ACTIVATION MANEUVER.
ARISING_CHAIR: NORMAL: ABLE TO ARISE QUICKLY WITHOUT HESITATION.
RIGIDITY_RUE: NORMAL
AMPLITUDE_LUE: MODERATE: 3 - 10 CM IN MAXIMAL AMPLITUDE.
PRONATION_SUPINATION_LEFT: MODERATE: ANY OF THE FOLLOWING:  A) MORE THAN 5 INTERRUPTIONS  OR AT LEAST ONE LONGER ARREST (FREEZE) IN ONGOING MOVEMENT  B) MODERATE SLOWING C) THE AMPLITUDE DECREMENTS STARTING AFTER THE FIRST MOVEMENT.
LEG_AGILITY_LEFT: MILD: ANY OF THE FOLLOWING: A) 3 TO 5 INTERRUPTIONS DURING TAPPING B) MILD SLOWING C) THE AMPLITUDE DECREMENTS MIDWAY IN THE 10-MOVEMENT SEQUENCE
RIGIDITY_RLE: NORMAL
PRONATION_SUPINATION_LEFT: NORMAL
TOTAL_SCORE: 61
TOTAL_SCORE_LEFT: 3
FREEZING_GAIT: MILD: FREEZES ON STARTING, TURNING, OR WALKING THROUGH DOORWAY WITH MORE THAN ONE HALT DURING ANY OF THESE ACTIVITIES, BUT CONTINUES SMOOTHLY WITHOUT FREEZING DURING STRAIGHT WALKING.
LEG_AGILITY_RIGHT: NORMAL
RIGIDITY_LLE: MILD: RIGIDITY DETECTED WITHOUT THE ACTIVATION MANEUVER.  FULL RANGE OF MOTION IS EASILY ACHIEVED.
TOTAL_SCORE_LEFT: 12
CONSTANCY_TREMOR_ATREST: SLIGHT: TREMOR AT REST IS PRESENT  25% OF THE ENTIRE EXAMINATION PERIOD.
FINGER_TAPPING_RIGHT: SLIGHT: ANY OF THE FOLLOWING: A) THE REGULAR RHYTHM IS BROKEN WITH ONE WITH ONE OR TWO INTERRUPTIONS OR HESITATIONS OF THE MOVEMENT B) SLIGHT SLOWING C) THE AMPLITUDE DECREMENTS NEAR THE END OF THE 10 MOVEMENTS.
TOETAPPING_LEFT: MILD: ANY OF THE FOLLOWING: A) 3 TO 5 INTERRUPTIONS DURING TAPPING B) MILD SLOWING C) THE AMPLITUDE DECREMENTS MIDWAY IN THE 10-MOVEMENT SEQUENCE
RIGIDITY_RLE: NORMAL
TOTAL_SCORE: 32
AMPLITUDE_LLE: NORMAL: NO TREMOR.
PRONATION_SUPINATION_RIGHT: SLIGHT: ANY OF THE FOLLOWING: A) THE REGULAR RHYTHM IS BROKEN WITH ONE WITH ONE OR TWO INTERRUPTIONS OR HESITATIONS OF THE MOVEMENT B) SLIGHT SLOWING C) THE AMPLITUDE DECREMENTS NEAR THE END OF THE 10 MOVEMENTS.
TOTAL_SCORE: 6
FINGER_TAPPING_LEFT: MODERATE: ANY OF THE FOLLOWING:  A) MORE THAN 5 INTERRUPTIONS  OR AT LEAST ONE LONGER ARREST (FREEZE) IN ONGOING MOVEMENT  B) MODERATE SLOWING C) THE AMPLITUDE DECREMENTS STARTING AFTER THE FIRST MOVEMENT.
SPONTANEITY_OF_MOVEMENT: 2: MILD: MILD GLOBAL SLOWNESS AND POVERTY OF SPONTANEOUS MOVEMENTS.
TOETAPPING_LEFT: NORMAL
LEG_AGILITY_LEFT: MODERATE: ANY OF THE FOLLOWING:  A) MORE THAN 5 INTERRUPTIONS  OR AT LEAST ONE LONGER ARREST (FREEZE) IN ONGOING MOVEMENT  B) MODERATE SLOWING C) THE AMPLITUDE DECREMENTS STARTING AFTER THE FIRST MOVEMENT.
POSTURE: 3 MODERATE.  STOOPED POSTURE, SCOLIOSIS, OR LEANING TO ONE SIDE THAT CANNOT BE CORRECTED VOLITIONALLY TO A NORMAL POSTURE BY THE PATIENT.
LEG_AGILITY_LEFT: SLIGHT: ANY OF THE FOLLOWING: A) THE REGULAR RHYTHM IS BROKEN WITH ONE WITH ONE OR TWO INTERRUPTIONS OR HESITATIONS OF THE MOVEMENT B) SLIGHT SLOWING C) THE AMPLITUDE DECREMENTS NEAR THE END OF THE 10 MOVEMENTS.
ARISING_CHAIR: NORMAL: ABLE TO ARISE QUICKLY WITHOUT HESITATION.
POSTURE: 2 MILD.  DEFINITE FLEXION, SCOLIOSIS OR LEANING OT ONE SIDE, BUT CAN CORRECT POSTURE TO NORMAL WHEN ASKED.
RIGIDITY_NECK: MILD: RIGIDITY DETECTED WITHOUT THE ACTIVATION MANEUVER.  FULL RANGE OF MOTION IS EASILY ACHIEVED.
AMPLITUDE_LUE: SLIGHT: < 1 CM IN MAXIMAL AMPLITUDE.
RIGIDITY_LLE: SLIGHT: RIGIDITY ONLY DETECTED WITH ACTIVATION MANEUVER.
LEG_AGILITY_LEFT: NORMAL
AMPLITUDE_RLE: SLIGHT: < 1 CM IN MAXIMAL AMPLITUDE.
TOETAPPING_LEFT: NORMAL
CONSTANCY_TREMOR_ATREST: SEVERE: TREMOR AT REST IS PRESENT > 75% OF THE ENTIRE EXAMINATION PERIOD.
TOTAL_SCORE: 7
SPONTANEITY_OF_MOVEMENT: 2: MILD: MILD GLOBAL SLOWNESS AND POVERTY OF SPONTANEOUS MOVEMENTS.
TOETAPPING_LEFT: MILD: ANY OF THE FOLLOWING: A) 3 TO 5 INTERRUPTIONS DURING TAPPING B) MILD SLOWING C) THE AMPLITUDE DECREMENTS MIDWAY IN THE 10-MOVEMENT SEQUENCE
FREEZING_GAIT: NORMAL
RIGIDITY_NECK: MILD: RIGIDITY DETECTED WITHOUT THE ACTIVATION MANEUVER.  FULL RANGE OF MOTION IS EASILY ACHIEVED.
FREEZING_GAIT: SLIGHT:  FREEZES ON STARTING, TURNING, OR WALKING THROUGH DOOWAY WITH A SINGLE HALT DURING ANY OF THESE EVENTS, BUT THEN CONTINUES SMOOTHLY WITHOUT FREEZING DURING STRAIGHT WALKING.
FACIAL_EXPRESSION: SLIGHT: MINIMAL MASKED FACIES MANIFESTED ONLY BY DECREASED FREQUENCY OF BLINKING.
RIGIDITY_LUE: MODERATE: RIGIDITY DETECTED WITHOUT THE ACTIVATION MANEUVER. FULL RANGE OF MOTION IS ACHIEVED WITH EFFORT.
PRONATION_SUPINATION_RIGHT: NORMAL
POSTURAL_STABILITY: MODERATE: STANDS SAFELY, BUT WITH ABSENCE OF POSTURAL RESPONSE,  FALLS IF NOT CAUGHT BY EXAMINER.
RIGIDITY_LLE: NORMAL
HANDMOVEMENTS_LEFT: MILD: ANY OF THE FOLLOWING: A) 3 TO 5 INTERRUPTIONS DURING TAPPING B) MILD SLOWING C) THE AMPLITUDE DECREMENTS MIDWAY IN THE 10-MOVEMENT SEQUENCE
HANDMOVEMENTS_LEFT: SEVERE: CANNOT OR CAN ONLY BARELY PERFORM THE TASK BECAUSE OF SLOWING, INTERRUPTIONS, OR DECREMENTS.
RIGIDITY_RLE: NORMAL
FINGER_TAPPING_RIGHT: SLIGHT: ANY OF THE FOLLOWING: A) THE REGULAR RHYTHM IS BROKEN WITH ONE WITH ONE OR TWO INTERRUPTIONS OR HESITATIONS OF THE MOVEMENT B) SLIGHT SLOWING C) THE AMPLITUDE DECREMENTS NEAR THE END OF THE 10 MOVEMENTS.
RIGIDITY_LUE: MILD: RIGIDITY DETECTED WITHOUT THE ACTIVATION MANEUVER.  FULL RANGE OF MOTION IS EASILY ACHIEVED.
TOTAL_SCORE_LEFT: 24
FACIAL_EXPRESSION: NORMAL.
SPEECH: SLIGHT: LOSS OF MODULATION, DICTION OR VOLUME, BUT STILL ALL WORDS EASY TO UNDERSTAND.
CONSTANCY_TREMOR_ATREST: SEVERE: TREMOR AT REST IS PRESENT > 75% OF THE ENTIRE EXAMINATION PERIOD.
AMPLITUDE_LIP_JAW: NORMAL: NO TREMOR.
AXIAL_SCORE: 19
PRONATION_SUPINATION_LEFT: SLIGHT: ANY OF THE FOLLOWING: A) THE REGULAR RHYTHM IS BROKEN WITH ONE WITH ONE OR TWO INTERRUPTIONS OR HESITATIONS OF THE MOVEMENT B) SLIGHT SLOWING C) THE AMPLITUDE DECREMENTS NEAR THE END OF THE 10 MOVEMENTS.
PRONATION_SUPINATION_RIGHT: SLIGHT: ANY OF THE FOLLOWING: A) THE REGULAR RHYTHM IS BROKEN WITH ONE WITH ONE OR TWO INTERRUPTIONS OR HESITATIONS OF THE MOVEMENT B) SLIGHT SLOWING C) THE AMPLITUDE DECREMENTS NEAR THE END OF THE 10 MOVEMENTS.
AMPLITUDE_RLE: NORMAL: NO TREMOR.
CONSTANCY_TREMOR_ATREST: NORMAL: NO TREMOR.
AMPLITUDE_LLE: SLIGHT: < 1 CM IN MAXIMAL AMPLITUDE.
FINGER_TAPPING_LEFT: MILD: ANY OF THE FOLLOWING: A) 3 TO 5 INTERRUPTIONS DURING TAPPING B) MILD SLOWING C) THE AMPLITUDE DECREMENTS MIDWAY IN THE 10-MOVEMENT SEQUENCE
AMPLITUDE_LUE: SLIGHT: < 1 CM IN MAXIMAL AMPLITUDE.
AXIAL_SCORE: 7
POSTURE: 0 NORMAL, NO PROBLEMS
PRONATION_SUPINATION_RIGHT: SLIGHT: ANY OF THE FOLLOWING: A) THE REGULAR RHYTHM IS BROKEN WITH ONE WITH ONE OR TWO INTERRUPTIONS OR HESITATIONS OF THE MOVEMENT B) SLIGHT SLOWING C) THE AMPLITUDE DECREMENTS NEAR THE END OF THE 10 MOVEMENTS.
LEG_AGILITY_RIGHT: NORMAL
PARKINSONS_MEDS: ON
PRONATION_SUPINATION_RIGHT: SLIGHT: ANY OF THE FOLLOWING: A) THE REGULAR RHYTHM IS BROKEN WITH ONE WITH ONE OR TWO INTERRUPTIONS OR HESITATIONS OF THE MOVEMENT B) SLIGHT SLOWING C) THE AMPLITUDE DECREMENTS NEAR THE END OF THE 10 MOVEMENTS.
TOTAL_SCORE: 4
AMPLITUDE_LIP_JAW: NORMAL: NO TREMOR.
ARISING_CHAIR: MODERATE: NEEDS TO PUSH OFF, BUT TENDS TO FALL BACK, OR MAY HAVE TO TRY MORE THAN ONE TIME USING ARMS OF CHAIR, BUT CAN GET UP WITHOUT HELP.
POSTURAL_STABILITY: MODERATE: STANDS SAFELY, BUT WITH ABSENCE OF POSTURAL RESPONSE,  FALLS IF NOT CAUGHT BY EXAMINER.
CONSTANCY_TREMOR_ATREST: MODERATE: TREMOR AT REST IS PRESENT 51-75% OF THE ENTIRE EXAMINATION PERIOD.
GAIT: MILD: INDEPENDENT WALKING BUT WITH SUBSTANTIAL GAIT IMPAIRMENT.
FINGER_TAPPING_LEFT: MODERATE: ANY OF THE FOLLOWING:  A) MORE THAN 5 INTERRUPTIONS  OR AT LEAST ONE LONGER ARREST (FREEZE) IN ONGOING MOVEMENT  B) MODERATE SLOWING C) THE AMPLITUDE DECREMENTS STARTING AFTER THE FIRST MOVEMENT.
LEG_AGILITY_LEFT: SLIGHT: ANY OF THE FOLLOWING: A) THE REGULAR RHYTHM IS BROKEN WITH ONE WITH ONE OR TWO INTERRUPTIONS OR HESITATIONS OF THE MOVEMENT B) SLIGHT SLOWING C) THE AMPLITUDE DECREMENTS NEAR THE END OF THE 10 MOVEMENTS.
HANDMOVEMENTS_LEFT: SLIGHT: ANY OF THE FOLLOWING: A) THE REGULAR RHYTHM IS BROKEN WITH ONE WITH ONE OR TWO INTERRUPTIONS OR HESITATIONS OF THE MOVEMENT B) SLIGHT SLOWING C) THE AMPLITUDE DECREMENTS NEAR THE END OF THE 10 MOVEMENTS.
RIGIDITY_RLE: NORMAL
AXIAL_SCORE: 1
RIGIDITY_NECK: NORMAL
POSTURAL_STABILITY: MODERATE: STANDS SAFELY, BUT WITH ABSENCE OF POSTURAL RESPONSE,  FALLS IF NOT CAUGHT BY EXAMINER.
PARKINSONS_MEDS: OFF
LEG_AGILITY_LEFT: NORMAL
CONSTANCY_TREMOR_ATREST: SLIGHT: TREMOR AT REST IS PRESENT  25% OF THE ENTIRE EXAMINATION PERIOD.
AMPLITUDE_LLE: NORMAL: NO TREMOR.
AMPLITUDE_LIP_JAW: NORMAL: NO TREMOR.
TOTAL_SCORE_LEFT: 20
RIGIDITY_LUE: NORMAL
AMPLITUDE_RLE: SLIGHT: < 1 CM IN MAXIMAL AMPLITUDE.
ARISING_CHAIR: NORMAL: ABLE TO ARISE QUICKLY WITHOUT HESITATION.
LEG_AGILITY_LEFT: NORMAL
SPONTANEITY_OF_MOVEMENT: 2: MILD: MILD GLOBAL SLOWNESS AND POVERTY OF SPONTANEOUS MOVEMENTS.
HANDMOVEMENTS_LEFT: MILD: ANY OF THE FOLLOWING: A) 3 TO 5 INTERRUPTIONS DURING TAPPING B) MILD SLOWING C) THE AMPLITUDE DECREMENTS MIDWAY IN THE 10-MOVEMENT SEQUENCE
AMPLITUDE_RUE: SLIGHT: < 1 CM IN MAXIMAL AMPLITUDE.
HANDMOVEMENTS_RIGHT: SLIGHT: ANY OF THE FOLLOWING: A) THE REGULAR RHYTHM IS BROKEN WITH ONE WITH ONE OR TWO INTERRUPTIONS OR HESITATIONS OF THE MOVEMENT B) SLIGHT SLOWING C) THE AMPLITUDE DECREMENTS NEAR THE END OF THE 10 MOVEMENTS.
AMPLITUDE_LIP_JAW: NORMAL: NO TREMOR.
FINGER_TAPPING_RIGHT: SLIGHT: ANY OF THE FOLLOWING: A) THE REGULAR RHYTHM IS BROKEN WITH ONE WITH ONE OR TWO INTERRUPTIONS OR HESITATIONS OF THE MOVEMENT B) SLIGHT SLOWING C) THE AMPLITUDE DECREMENTS NEAR THE END OF THE 10 MOVEMENTS.
AMPLITUDE_LLE: NORMAL: NO TREMOR.
POSTURAL_STABILITY: MODERATE: STANDS SAFELY, BUT WITH ABSENCE OF POSTURAL RESPONSE,  FALLS IF NOT CAUGHT BY EXAMINER.
HANDMOVEMENTS_RIGHT: SLIGHT: ANY OF THE FOLLOWING: A) THE REGULAR RHYTHM IS BROKEN WITH ONE WITH ONE OR TWO INTERRUPTIONS OR HESITATIONS OF THE MOVEMENT B) SLIGHT SLOWING C) THE AMPLITUDE DECREMENTS NEAR THE END OF THE 10 MOVEMENTS.
AXIAL_SCORE: 16
FINGER_TAPPING_LEFT: MILD: ANY OF THE FOLLOWING: A) 3 TO 5 INTERRUPTIONS DURING TAPPING B) MILD SLOWING C) THE AMPLITUDE DECREMENTS MIDWAY IN THE 10-MOVEMENT SEQUENCE
TOTAL_SCORE: 25
AMPLITUDE_LLE: NORMAL: NO TREMOR.
AMPLITUDE_LIP_JAW: NORMAL: NO TREMOR.
TOTAL_SCORE: 3
FACIAL_EXPRESSION: SLIGHT: MINIMAL MASKED FACIES MANIFESTED ONLY BY DECREASED FREQUENCY OF BLINKING.
GAIT: MILD: INDEPENDENT WALKING BUT WITH SUBSTANTIAL GAIT IMPAIRMENT.
AMPLITUDE_LLE: NORMAL: NO TREMOR.
RIGIDITY_LLE: NORMAL
AMPLITUDE_RUE: NORMAL: NO TREMOR.
FINGER_TAPPING_LEFT: MILD: ANY OF THE FOLLOWING: A) 3 TO 5 INTERRUPTIONS DURING TAPPING B) MILD SLOWING C) THE AMPLITUDE DECREMENTS MIDWAY IN THE 10-MOVEMENT SEQUENCE
CONSTANCY_TREMOR_ATREST: SEVERE: TREMOR AT REST IS PRESENT > 75% OF THE ENTIRE EXAMINATION PERIOD.
FINGER_TAPPING_RIGHT: SLIGHT: ANY OF THE FOLLOWING: A) THE REGULAR RHYTHM IS BROKEN WITH ONE WITH ONE OR TWO INTERRUPTIONS OR HESITATIONS OF THE MOVEMENT B) SLIGHT SLOWING C) THE AMPLITUDE DECREMENTS NEAR THE END OF THE 10 MOVEMENTS.
AXIAL_SCORE: 15
PRONATION_SUPINATION_LEFT: MILD: ANY OF THE FOLLOWING: A) 3 TO 5 INTERRUPTIONS DURING TAPPING B) MILD SLOWING C) THE AMPLITUDE DECREMENTS MIDWAY IN THE 10-MOVEMENT SEQUENCE
AMPLITUDE_RLE: SLIGHT: < 1 CM IN MAXIMAL AMPLITUDE.
SPEECH: MILD: LOSS OF MODULATION, DICTION OR VOLUME, WITH A FEW WORDS UNCLEAR, BUT THE OVERALL SENTENCES EASY TO FOLLOW.
HANDMOVEMENTS_RIGHT: SLIGHT: ANY OF THE FOLLOWING: A) THE REGULAR RHYTHM IS BROKEN WITH ONE WITH ONE OR TWO INTERRUPTIONS OR HESITATIONS OF THE MOVEMENT B) SLIGHT SLOWING C) THE AMPLITUDE DECREMENTS NEAR THE END OF THE 10 MOVEMENTS.
TOETAPPING_RIGHT: NORMAL
TOETAPPING_RIGHT: NORMAL
POSTURAL_STABILITY: MODERATE: STANDS SAFELY, BUT WITH ABSENCE OF POSTURAL RESPONSE,  FALLS IF NOT CAUGHT BY EXAMINER.
PARKINSONS_MEDS: OFF
SPONTANEITY_OF_MOVEMENT: 1: SLIGHT: SLIGHT GLOBAL SLOWNESS AND POVERTY OF SPONTANEOUS MOVEMENTS.
TOETAPPING_RIGHT: SLIGHT: ANY OF THE FOLLOWING: A) THE REGULAR RHYTHM IS BROKEN WITH ONE WITH ONE OR TWO INTERRUPTIONS OR HESITATIONS OF THE MOVEMENT B) SLIGHT SLOWING C) THE AMPLITUDE DECREMENTS NEAR THE END OF THE 10 MOVEMENTS.
PRONATION_SUPINATION_LEFT: SLIGHT: ANY OF THE FOLLOWING: A) THE REGULAR RHYTHM IS BROKEN WITH ONE WITH ONE OR TWO INTERRUPTIONS OR HESITATIONS OF THE MOVEMENT B) SLIGHT SLOWING C) THE AMPLITUDE DECREMENTS NEAR THE END OF THE 10 MOVEMENTS.
RIGIDITY_RUE: SLIGHT: RIGIDITY ONLY DETECTED WITH ACTIVATION MANEUVER.
FINGER_TAPPING_LEFT: SEVERE: CANNOT OR CAN ONLY BARELY PERFORM THE TASK BECAUSE OF SLOWING, INTERRUPTIONS, OR DECREMENTS.
TOETAPPING_RIGHT: MILD: ANY OF THE FOLLOWING: A) 3 TO 5 INTERRUPTIONS DURING TAPPING B) MILD SLOWING C) THE AMPLITUDE DECREMENTS MIDWAY IN THE 10-MOVEMENT SEQUENCE
POSTURAL_STABILITY: MODERATE: STANDS SAFELY, BUT WITH ABSENCE OF POSTURAL RESPONSE,  FALLS IF NOT CAUGHT BY EXAMINER.
FINGER_TAPPING_RIGHT: MODERATE: ANY OF THE FOLLOWING:  A) MORE THAN 5 INTERRUPTIONS OR AT LEAST ONE LONGER ARREST (FREEZE) IN ONGOING MOVEMENT  B) MODERATE SLOWING C) THE AMPLITUDE DECREMENTS STARTING AFTER THE FIRST MOVEMENT.
AMPLITUDE_LUE: MILD > 1 CM BUT < 3 CM IN MAXIMAL AMPLITUDE.
RIGIDITY_LUE: SLIGHT: RIGIDITY ONLY DETECTED WITH ACTIVATION MANEUVER.
LEG_AGILITY_LEFT: SLIGHT: ANY OF THE FOLLOWING: A) THE REGULAR RHYTHM IS BROKEN WITH ONE WITH ONE OR TWO INTERRUPTIONS OR HESITATIONS OF THE MOVEMENT B) SLIGHT SLOWING C) THE AMPLITUDE DECREMENTS NEAR THE END OF THE 10 MOVEMENTS.
FREEZING_GAIT: NORMAL
AMPLITUDE_LLE: SLIGHT: < 1 CM IN MAXIMAL AMPLITUDE.
TOTAL_SCORE_LEFT: 9
RIGIDITY_LUE: SLIGHT: RIGIDITY ONLY DETECTED WITH ACTIVATION MANEUVER.
HANDMOVEMENTS_LEFT: SLIGHT: ANY OF THE FOLLOWING: A) THE REGULAR RHYTHM IS BROKEN WITH ONE WITH ONE OR TWO INTERRUPTIONS OR HESITATIONS OF THE MOVEMENT B) SLIGHT SLOWING C) THE AMPLITUDE DECREMENTS NEAR THE END OF THE 10 MOVEMENTS.
TOTAL_SCORE: 3
TOTAL_SCORE_LEFT: 5
HANDMOVEMENTS_LEFT: MODERATE: ANY OF THE FOLLOWING:  A) MORE THAN 5 INTERRUPTIONS  OR AT LEAST ONE LONGER ARREST (FREEZE) IN ONGOING MOVEMENT  B) MODERATE SLOWING C) THE AMPLITUDE DECREMENTS STARTING AFTER THE FIRST MOVEMENT.
TOTAL_SCORE: 48
RIGIDITY_NECK: MILD: RIGIDITY DETECTED WITHOUT THE ACTIVATION MANEUVER.  FULL RANGE OF MOTION IS EASILY ACHIEVED.
RIGIDITY_NECK: NORMAL
ARISING_CHAIR: SLIGHT: ARISING IS SLOWER THAN NORMAL, OR MAY NEED MORE THAN ONE ATTEMPT, OR MAY NEED TO MOVE FORWARD IN THE CHAIR TO ARISE.  NO NEED TO USE THE ARMS OF THE CHAIR.
AMPLITUDE_LUE: MILD > 1 CM BUT < 3 CM IN MAXIMAL AMPLITUDE.
AXIAL_SCORE: 18
RIGIDITY_LLE: NORMAL
TOETAPPING_RIGHT: MILD: ANY OF THE FOLLOWING: A) 3 TO 5 INTERRUPTIONS DURING TAPPING B) MILD SLOWING C) THE AMPLITUDE DECREMENTS MIDWAY IN THE 10-MOVEMENT SEQUENCE
PRONATION_SUPINATION_LEFT: MODERATE: ANY OF THE FOLLOWING:  A) MORE THAN 5 INTERRUPTIONS  OR AT LEAST ONE LONGER ARREST (FREEZE) IN ONGOING MOVEMENT  B) MODERATE SLOWING C) THE AMPLITUDE DECREMENTS STARTING AFTER THE FIRST MOVEMENT.
GAIT: MILD: INDEPENDENT WALKING BUT WITH SUBSTANTIAL GAIT IMPAIRMENT.
POSTURE: 2 MILD.  DEFINITE FLEXION, SCOLIOSIS OR LEANING OT ONE SIDE, BUT CAN CORRECT POSTURE TO NORMAL WHEN ASKED.
TOETAPPING_LEFT: MILD: ANY OF THE FOLLOWING: A) 3 TO 5 INTERRUPTIONS DURING TAPPING B) MILD SLOWING C) THE AMPLITUDE DECREMENTS MIDWAY IN THE 10-MOVEMENT SEQUENCE
TOETAPPING_RIGHT: MILD: ANY OF THE FOLLOWING: A) 3 TO 5 INTERRUPTIONS DURING TAPPING B) MILD SLOWING C) THE AMPLITUDE DECREMENTS MIDWAY IN THE 10-MOVEMENT SEQUENCE
CONSTANCY_TREMOR_ATREST: MILD: TREMOR AT REST IS PRESENT 25-50% OF THE ENTIRE EXAMINATION PERIOD.
PRONATION_SUPINATION_RIGHT: MODERATE: ANY OF THE FOLLOWING:  A) MORE THAN 5 INTERRUPTIONS  OR AT LEAST ONE LONGER ARREST (FREEZE) IN ONGOING MOVEMENT  B) MODERATE SLOWING C) THE AMPLITUDE DECREMENTS STARTING AFTER THE FIRST MOVEMENT.
HANDMOVEMENTS_LEFT: MILD: ANY OF THE FOLLOWING: A) 3 TO 5 INTERRUPTIONS DURING TAPPING B) MILD SLOWING C) THE AMPLITUDE DECREMENTS MIDWAY IN THE 10-MOVEMENT SEQUENCE
RIGIDITY_RUE: SLIGHT: RIGIDITY ONLY DETECTED WITH ACTIVATION MANEUVER.
SPONTANEITY_OF_MOVEMENT: 1: SLIGHT: SLIGHT GLOBAL SLOWNESS AND POVERTY OF SPONTANEOUS MOVEMENTS.
AXIAL_SCORE: 12
RIGIDITY_RLE: MILD: RIGIDITY DETECTED WITHOUT THE ACTIVATION MANEUVER.  FULL RANGE OF MOTION IS EASILY ACHIEVED.
FREEZING_GAIT: NORMAL
ARISING_CHAIR: SLIGHT: ARISING IS SLOWER THAN NORMAL, OR MAY NEED MORE THAN ONE ATTEMPT, OR MAY NEED TO MOVE FORWARD IN THE CHAIR TO ARISE.  NO NEED TO USE THE ARMS OF THE CHAIR.
AMPLITUDE_LIP_JAW: NORMAL: NO TREMOR.
SPEECH: NORMAL
TOTAL_SCORE: 10
ARISING_CHAIR: SLIGHT: ARISING IS SLOWER THAN NORMAL, OR MAY NEED MORE THAN ONE ATTEMPT, OR MAY NEED TO MOVE FORWARD IN THE CHAIR TO ARISE.  NO NEED TO USE THE ARMS OF THE CHAIR.
SPONTANEITY_OF_MOVEMENT: 2: MILD: MILD GLOBAL SLOWNESS AND POVERTY OF SPONTANEOUS MOVEMENTS.
TOTAL_SCORE: 15
PRONATION_SUPINATION_RIGHT: SLIGHT: ANY OF THE FOLLOWING: A) THE REGULAR RHYTHM IS BROKEN WITH ONE WITH ONE OR TWO INTERRUPTIONS OR HESITATIONS OF THE MOVEMENT B) SLIGHT SLOWING C) THE AMPLITUDE DECREMENTS NEAR THE END OF THE 10 MOVEMENTS.
AMPLITUDE_LLE: NORMAL: NO TREMOR.
PARKINSONS_MEDS: ON
LEG_AGILITY_RIGHT: NORMAL
TOTAL_SCORE_LEFT: 11
FACIAL_EXPRESSION: SLIGHT: MINIMAL MASKED FACIES MANIFESTED ONLY BY DECREASED FREQUENCY OF BLINKING.
HANDMOVEMENTS_LEFT: SLIGHT: ANY OF THE FOLLOWING: A) THE REGULAR RHYTHM IS BROKEN WITH ONE WITH ONE OR TWO INTERRUPTIONS OR HESITATIONS OF THE MOVEMENT B) SLIGHT SLOWING C) THE AMPLITUDE DECREMENTS NEAR THE END OF THE 10 MOVEMENTS.
POSTURAL_STABILITY: MILD:  MORE THAN 5 STEPS, BUT SUBJECT RECOVERS UNAIDED.
FACIAL_EXPRESSION: MILD: IN ADDITION TO DECREASED EYE-BLINK FREQUENCY, MASKED FACIES PRESENT IN THE LOWER FACE AS WELL, NAMELY FEWER MOVEMENTS AROUND THE MOUTH, SUCH AS LESS SPONTANEOUS SMILING, BUT LIPS NOT PARTED.
RIGIDITY_LUE: SLIGHT: RIGIDITY ONLY DETECTED WITH ACTIVATION MANEUVER.
AMPLITUDE_RLE: NORMAL: NO TREMOR.
AMPLITUDE_LUE: MILD > 1 CM BUT < 3 CM IN MAXIMAL AMPLITUDE.
AMPLITUDE_RUE: MILD > 1 CM BUT < 3 CM IN MAXIMAL AMPLITUDE.
PARKINSONS_MEDS: ON
TOTAL_SCORE: 42
PRONATION_SUPINATION_RIGHT: NORMAL
HANDMOVEMENTS_RIGHT: MODERATE: ANY OF THE FOLLOWING:  A) MORE THAN 5 INTERRUPTIONS  OR AT LEAST ONE LONGER ARREST (FREEZE) IN ONGOING MOVEMENT  B) MODERATE SLOWING C) THE AMPLITUDE DECREMENTS STARTING AFTER THE FIRST MOVEMENT.
HANDMOVEMENTS_LEFT: MODERATE: ANY OF THE FOLLOWING:  A) MORE THAN 5 INTERRUPTIONS  OR AT LEAST ONE LONGER ARREST (FREEZE) IN ONGOING MOVEMENT  B) MODERATE SLOWING C) THE AMPLITUDE DECREMENTS STARTING AFTER THE FIRST MOVEMENT.
AMPLITUDE_LUE: MILD > 1 CM BUT < 3 CM IN MAXIMAL AMPLITUDE.
POSTURAL_STABILITY: NORMAL:  RECOVERS WITH ONE OR TWO STEPS.
HANDMOVEMENTS_LEFT: SLIGHT: ANY OF THE FOLLOWING: A) THE REGULAR RHYTHM IS BROKEN WITH ONE WITH ONE OR TWO INTERRUPTIONS OR HESITATIONS OF THE MOVEMENT B) SLIGHT SLOWING C) THE AMPLITUDE DECREMENTS NEAR THE END OF THE 10 MOVEMENTS.
PARKINSONS_MEDS: OFF
AXIAL_SCORE: 14
RIGIDITY_LLE: MILD: RIGIDITY DETECTED WITHOUT THE ACTIVATION MANEUVER.  FULL RANGE OF MOTION IS EASILY ACHIEVED.
RIGIDITY_RLE: NORMAL
RIGIDITY_RLE: SLIGHT: RIGIDITY ONLY DETECTED WITH ACTIVATION MANEUVER.
FINGER_TAPPING_LEFT: MILD: ANY OF THE FOLLOWING: A) 3 TO 5 INTERRUPTIONS DURING TAPPING B) MILD SLOWING C) THE AMPLITUDE DECREMENTS MIDWAY IN THE 10-MOVEMENT SEQUENCE
SPONTANEITY_OF_MOVEMENT: 1: SLIGHT: SLIGHT GLOBAL SLOWNESS AND POVERTY OF SPONTANEOUS MOVEMENTS.
AMPLITUDE_LLE: NORMAL: NO TREMOR.
AMPLITUDE_RLE: SLIGHT: < 1 CM IN MAXIMAL AMPLITUDE.
RIGIDITY_LLE: NORMAL
ARISING_CHAIR: SLIGHT: ARISING IS SLOWER THAN NORMAL, OR MAY NEED MORE THAN ONE ATTEMPT, OR MAY NEED TO MOVE FORWARD IN THE CHAIR TO ARISE.  NO NEED TO USE THE ARMS OF THE CHAIR.
SPEECH: MILD: LOSS OF MODULATION, DICTION OR VOLUME, WITH A FEW WORDS UNCLEAR, BUT THE OVERALL SENTENCES EASY TO FOLLOW.
RIGIDITY_LUE: MILD: RIGIDITY DETECTED WITHOUT THE ACTIVATION MANEUVER.  FULL RANGE OF MOTION IS EASILY ACHIEVED.
RIGIDITY_NECK: SLIGHT: RIGIDITY ONLY DETECTED WITH ACTIVATION MANEUVER.
RIGIDITY_LUE: NORMAL
HANDMOVEMENTS_RIGHT: SLIGHT: ANY OF THE FOLLOWING: A) THE REGULAR RHYTHM IS BROKEN WITH ONE WITH ONE OR TWO INTERRUPTIONS OR HESITATIONS OF THE MOVEMENT B) SLIGHT SLOWING C) THE AMPLITUDE DECREMENTS NEAR THE END OF THE 10 MOVEMENTS.
RIGIDITY_RLE: NORMAL
PARKINSONS_MEDS: OFF
TOETAPPING_RIGHT: MILD: ANY OF THE FOLLOWING: A) 3 TO 5 INTERRUPTIONS DURING TAPPING B) MILD SLOWING C) THE AMPLITUDE DECREMENTS MIDWAY IN THE 10-MOVEMENT SEQUENCE
POSTURAL_STABILITY: MODERATE: STANDS SAFELY, BUT WITH ABSENCE OF POSTURAL RESPONSE,  FALLS IF NOT CAUGHT BY EXAMINER.
ARISING_CHAIR: SLIGHT: ARISING IS SLOWER THAN NORMAL, OR MAY NEED MORE THAN ONE ATTEMPT, OR MAY NEED TO MOVE FORWARD IN THE CHAIR TO ARISE.  NO NEED TO USE THE ARMS OF THE CHAIR.
TOETAPPING_RIGHT: SLIGHT: ANY OF THE FOLLOWING: A) THE REGULAR RHYTHM IS BROKEN WITH ONE WITH ONE OR TWO INTERRUPTIONS OR HESITATIONS OF THE MOVEMENT B) SLIGHT SLOWING C) THE AMPLITUDE DECREMENTS NEAR THE END OF THE 10 MOVEMENTS.
AXIAL_SCORE: 15
POSTURE: 2 MILD.  DEFINITE FLEXION, SCOLIOSIS OR LEANING OT ONE SIDE, BUT CAN CORRECT POSTURE TO NORMAL WHEN ASKED.
POSTURE: 2 MILD.  DEFINITE FLEXION, SCOLIOSIS OR LEANING OT ONE SIDE, BUT CAN CORRECT POSTURE TO NORMAL WHEN ASKED.
AMPLITUDE_LIP_JAW: NORMAL: NO TREMOR.
HANDMOVEMENTS_RIGHT: SLIGHT: ANY OF THE FOLLOWING: A) THE REGULAR RHYTHM IS BROKEN WITH ONE WITH ONE OR TWO INTERRUPTIONS OR HESITATIONS OF THE MOVEMENT B) SLIGHT SLOWING C) THE AMPLITUDE DECREMENTS NEAR THE END OF THE 10 MOVEMENTS.
PARKINSONS_MEDS: ON
GAIT: MODERATE: REQUIRES AN ASSISTANCE DEVICE FOR SAFE WALKING (WALKING STICK, WALKER) BUT NOT A PERSON.
AMPLITUDE_LIP_JAW: NORMAL: NO TREMOR.
TOTAL_SCORE: 8
PRONATION_SUPINATION_LEFT: MODERATE: ANY OF THE FOLLOWING:  A) MORE THAN 5 INTERRUPTIONS  OR AT LEAST ONE LONGER ARREST (FREEZE) IN ONGOING MOVEMENT  B) MODERATE SLOWING C) THE AMPLITUDE DECREMENTS STARTING AFTER THE FIRST MOVEMENT.
RIGIDITY_RUE: NORMAL
AMPLITUDE_RLE: NORMAL: NO TREMOR.
PRONATION_SUPINATION_RIGHT: MILD: ANY OF THE FOLLOWING: A) 3 TO 5 INTERRUPTIONS DURING TAPPING B) MILD SLOWING C) THE AMPLITUDE DECREMENTS MIDWAY IN THE 10-MOVEMENT SEQUENCE
RIGIDITY_LLE: SLIGHT: RIGIDITY ONLY DETECTED WITH ACTIVATION MANEUVER.
CONSTANCY_TREMOR_ATREST: NORMAL: NO TREMOR.
AMPLITUDE_LIP_JAW: SLIGHT: < 1 CM IN MAXIMAL AMPLITUDE.
TOETAPPING_LEFT: NORMAL
LEG_AGILITY_RIGHT: NORMAL
AMPLITUDE_RUE: MILD > 1 CM BUT < 3 CM IN MAXIMAL AMPLITUDE.
FINGER_TAPPING_LEFT: MILD: ANY OF THE FOLLOWING: A) 3 TO 5 INTERRUPTIONS DURING TAPPING B) MILD SLOWING C) THE AMPLITUDE DECREMENTS MIDWAY IN THE 10-MOVEMENT SEQUENCE
AMPLITUDE_LIP_JAW: NORMAL: NO TREMOR.
TOTAL_SCORE_LEFT: 16
GAIT: MILD: INDEPENDENT WALKING BUT WITH SUBSTANTIAL GAIT IMPAIRMENT.
LEG_AGILITY_LEFT: MODERATE: ANY OF THE FOLLOWING:  A) MORE THAN 5 INTERRUPTIONS  OR AT LEAST ONE LONGER ARREST (FREEZE) IN ONGOING MOVEMENT  B) MODERATE SLOWING C) THE AMPLITUDE DECREMENTS STARTING AFTER THE FIRST MOVEMENT.
CONSTANCY_TREMOR_ATREST: MODERATE: TREMOR AT REST IS PRESENT 51-75% OF THE ENTIRE EXAMINATION PERIOD.
RIGIDITY_LUE: NORMAL
SPONTANEITY_OF_MOVEMENT: 1: SLIGHT: SLIGHT GLOBAL SLOWNESS AND POVERTY OF SPONTANEOUS MOVEMENTS.
TOTAL_SCORE: 11
POSTURAL_STABILITY: MODERATE: STANDS SAFELY, BUT WITH ABSENCE OF POSTURAL RESPONSE,  FALLS IF NOT CAUGHT BY EXAMINER.
SPEECH: MILD: LOSS OF MODULATION, DICTION OR VOLUME, WITH A FEW WORDS UNCLEAR, BUT THE OVERALL SENTENCES EASY TO FOLLOW.
TOTAL_SCORE: 14
RIGIDITY_NECK: SLIGHT: RIGIDITY ONLY DETECTED WITH ACTIVATION MANEUVER.
AMPLITUDE_LLE: NORMAL: NO TREMOR.
TOTAL_SCORE: 47
FINGER_TAPPING_LEFT: MILD: ANY OF THE FOLLOWING: A) 3 TO 5 INTERRUPTIONS DURING TAPPING B) MILD SLOWING C) THE AMPLITUDE DECREMENTS MIDWAY IN THE 10-MOVEMENT SEQUENCE
POSTURAL_STABILITY: MODERATE: STANDS SAFELY, BUT WITH ABSENCE OF POSTURAL RESPONSE,  FALLS IF NOT CAUGHT BY EXAMINER.
TOTAL_SCORE: 31
RIGIDITY_LUE: SLIGHT: RIGIDITY ONLY DETECTED WITH ACTIVATION MANEUVER.
PRONATION_SUPINATION_RIGHT: MILD: ANY OF THE FOLLOWING: A) 3 TO 5 INTERRUPTIONS DURING TAPPING B) MILD SLOWING C) THE AMPLITUDE DECREMENTS MIDWAY IN THE 10-MOVEMENT SEQUENCE
LEG_AGILITY_LEFT: MODERATE: ANY OF THE FOLLOWING:  A) MORE THAN 5 INTERRUPTIONS  OR AT LEAST ONE LONGER ARREST (FREEZE) IN ONGOING MOVEMENT  B) MODERATE SLOWING C) THE AMPLITUDE DECREMENTS STARTING AFTER THE FIRST MOVEMENT.
TOTAL_SCORE: 32
POSTURE: 2 MILD.  DEFINITE FLEXION, SCOLIOSIS OR LEANING OT ONE SIDE, BUT CAN CORRECT POSTURE TO NORMAL WHEN ASKED.
FINGER_TAPPING_RIGHT: MILD: ANY OF THE FOLLOWING: A) 3 TO 5 INTERRUPTIONS DURING TAPPING B) MILD SLOWING C) THE AMPLITUDE DECREMENTS MIDWAY IN THE 10-MOVEMENT SEQUENCE
HANDMOVEMENTS_RIGHT: SLIGHT: ANY OF THE FOLLOWING: A) THE REGULAR RHYTHM IS BROKEN WITH ONE WITH ONE OR TWO INTERRUPTIONS OR HESITATIONS OF THE MOVEMENT B) SLIGHT SLOWING C) THE AMPLITUDE DECREMENTS NEAR THE END OF THE 10 MOVEMENTS.
HANDMOVEMENTS_LEFT: MODERATE: ANY OF THE FOLLOWING:  A) MORE THAN 5 INTERRUPTIONS  OR AT LEAST ONE LONGER ARREST (FREEZE) IN ONGOING MOVEMENT  B) MODERATE SLOWING C) THE AMPLITUDE DECREMENTS STARTING AFTER THE FIRST MOVEMENT.
SPONTANEITY_OF_MOVEMENT: 1: SLIGHT: SLIGHT GLOBAL SLOWNESS AND POVERTY OF SPONTANEOUS MOVEMENTS.
HANDMOVEMENTS_RIGHT: MILD: ANY OF THE FOLLOWING: A) 3 TO 5 INTERRUPTIONS DURING TAPPING B) MILD SLOWING C) THE AMPLITUDE DECREMENTS MIDWAY IN THE 10-MOVEMENT SEQUENCE
FINGER_TAPPING_RIGHT: SLIGHT: ANY OF THE FOLLOWING: A) THE REGULAR RHYTHM IS BROKEN WITH ONE WITH ONE OR TWO INTERRUPTIONS OR HESITATIONS OF THE MOVEMENT B) SLIGHT SLOWING C) THE AMPLITUDE DECREMENTS NEAR THE END OF THE 10 MOVEMENTS.
RIGIDITY_LLE: NORMAL
LEG_AGILITY_RIGHT: NORMAL
POSTURE: 2 MILD.  DEFINITE FLEXION, SCOLIOSIS OR LEANING OT ONE SIDE, BUT CAN CORRECT POSTURE TO NORMAL WHEN ASKED.
RIGIDITY_NECK: MILD: RIGIDITY DETECTED WITHOUT THE ACTIVATION MANEUVER.  FULL RANGE OF MOTION IS EASILY ACHIEVED.
RIGIDITY_LLE: NORMAL
SPONTANEITY_OF_MOVEMENT: 1: SLIGHT: SLIGHT GLOBAL SLOWNESS AND POVERTY OF SPONTANEOUS MOVEMENTS.
AMPLITUDE_LIP_JAW: NORMAL: NO TREMOR.
AMPLITUDE_LUE: SLIGHT: < 1 CM IN MAXIMAL AMPLITUDE.
HANDMOVEMENTS_RIGHT: NORMAL
POSTURE: 2 MILD.  DEFINITE FLEXION, SCOLIOSIS OR LEANING OT ONE SIDE, BUT CAN CORRECT POSTURE TO NORMAL WHEN ASKED.
AXIAL_SCORE: 10
FINGER_TAPPING_RIGHT: MILD: ANY OF THE FOLLOWING: A) 3 TO 5 INTERRUPTIONS DURING TAPPING B) MILD SLOWING C) THE AMPLITUDE DECREMENTS MIDWAY IN THE 10-MOVEMENT SEQUENCE
TOETAPPING_LEFT: MODERATE: ANY OF THE FOLLOWING:  A) MORE THAN 5 INTERRUPTIONS OR AT LEAST ONE LONGER ARREST (FREEZE) IN ONGOING MOVEMENT  B) MODERATE SLOWING C) THE AMPLITUDE DECREMENTS STARTING AFTER THE FIRST MOVEMENT.
POSTURE: 2 MILD.  DEFINITE FLEXION, SCOLIOSIS OR LEANING OT ONE SIDE, BUT CAN CORRECT POSTURE TO NORMAL WHEN ASKED.
PARKINSONS_MEDS: ON
TOTAL_SCORE: 40
RIGIDITY_LUE: NORMAL
TOTAL_SCORE_LEFT: 8
TOTAL_SCORE: 7
ARISING_CHAIR: SLIGHT: ARISING IS SLOWER THAN NORMAL, OR MAY NEED MORE THAN ONE ATTEMPT, OR MAY NEED TO MOVE FORWARD IN THE CHAIR TO ARISE.  NO NEED TO USE THE ARMS OF THE CHAIR.
AMPLITUDE_RLE: NORMAL: NO TREMOR.
RIGIDITY_LUE: MILD: RIGIDITY DETECTED WITHOUT THE ACTIVATION MANEUVER.  FULL RANGE OF MOTION IS EASILY ACHIEVED.
AMPLITUDE_LLE: NORMAL: NO TREMOR.
TOTAL_SCORE: 40
PRONATION_SUPINATION_LEFT: MODERATE: ANY OF THE FOLLOWING:  A) MORE THAN 5 INTERRUPTIONS  OR AT LEAST ONE LONGER ARREST (FREEZE) IN ONGOING MOVEMENT  B) MODERATE SLOWING C) THE AMPLITUDE DECREMENTS STARTING AFTER THE FIRST MOVEMENT.
TOTAL_SCORE: 11
FINGER_TAPPING_LEFT: SLIGHT: ANY OF THE FOLLOWING: A) THE REGULAR RHYTHM IS BROKEN WITH ONE WITH ONE OR TWO INTERRUPTIONS OR HESITATIONS OF THE MOVEMENT B) SLIGHT SLOWING C) THE AMPLITUDE DECREMENTS NEAR THE END OF THE 10 MOVEMENTS.
AMPLITUDE_LUE: MODERATE: 3 - 10 CM IN MAXIMAL AMPLITUDE.
RIGIDITY_RLE: NORMAL
LEG_AGILITY_LEFT: NORMAL
TOETAPPING_LEFT: MILD: ANY OF THE FOLLOWING: A) 3 TO 5 INTERRUPTIONS DURING TAPPING B) MILD SLOWING C) THE AMPLITUDE DECREMENTS MIDWAY IN THE 10-MOVEMENT SEQUENCE
AMPLITUDE_LUE: MILD > 1 CM BUT < 3 CM IN MAXIMAL AMPLITUDE.
GAIT: MODERATE: REQUIRES AN ASSISTANCE DEVICE FOR SAFE WALKING (WALKING STICK, WALKER) BUT NOT A PERSON.
FINGER_TAPPING_LEFT: MILD: ANY OF THE FOLLOWING: A) 3 TO 5 INTERRUPTIONS DURING TAPPING B) MILD SLOWING C) THE AMPLITUDE DECREMENTS MIDWAY IN THE 10-MOVEMENT SEQUENCE
LEG_AGILITY_RIGHT: NORMAL
AMPLITUDE_LLE: NORMAL: NO TREMOR.
RIGIDITY_RLE: NORMAL
TOETAPPING_LEFT: MILD: ANY OF THE FOLLOWING: A) 3 TO 5 INTERRUPTIONS DURING TAPPING B) MILD SLOWING C) THE AMPLITUDE DECREMENTS MIDWAY IN THE 10-MOVEMENT SEQUENCE
FINGER_TAPPING_LEFT: MODERATE: ANY OF THE FOLLOWING:  A) MORE THAN 5 INTERRUPTIONS  OR AT LEAST ONE LONGER ARREST (FREEZE) IN ONGOING MOVEMENT  B) MODERATE SLOWING C) THE AMPLITUDE DECREMENTS STARTING AFTER THE FIRST MOVEMENT.
ARISING_CHAIR: NORMAL: ABLE TO ARISE QUICKLY WITHOUT HESITATION.
RIGIDITY_RUE: NORMAL
FREEZING_GAIT: NORMAL
FACIAL_EXPRESSION: MILD: IN ADDITION TO DECREASED EYE-BLINK FREQUENCY, MASKED FACIES PRESENT IN THE LOWER FACE AS WELL, NAMELY FEWER MOVEMENTS AROUND THE MOUTH, SUCH AS LESS SPONTANEOUS SMILING, BUT LIPS NOT PARTED.
FREEZING_GAIT: NORMAL
TOETAPPING_RIGHT: MILD: ANY OF THE FOLLOWING: A) 3 TO 5 INTERRUPTIONS DURING TAPPING B) MILD SLOWING C) THE AMPLITUDE DECREMENTS MIDWAY IN THE 10-MOVEMENT SEQUENCE
AXIAL_SCORE: 13
TOTAL_SCORE: 7
GAIT: SLIGHT: INDEPENDENT WALKING WITH MINOR GAIT IMPAIRMENT.
AMPLITUDE_RLE: NORMAL: NO TREMOR.
SPEECH: MILD: LOSS OF MODULATION, DICTION OR VOLUME, WITH A FEW WORDS UNCLEAR, BUT THE OVERALL SENTENCES EASY TO FOLLOW.
RIGIDITY_LLE: NORMAL
AMPLITUDE_RUE: MILD > 1 CM BUT < 3 CM IN MAXIMAL AMPLITUDE.
RIGIDITY_LLE: NORMAL
SPEECH: SLIGHT: LOSS OF MODULATION, DICTION OR VOLUME, BUT STILL ALL WORDS EASY TO UNDERSTAND.
AMPLITUDE_RUE: MODERATE: 3 - 10 CM IN MAXIMAL AMPLITUDE.
TOTAL_SCORE_LEFT: 9
PARKINSONS_MEDS: OFF
RIGIDITY_RUE: MILD: RIGIDITY DETECTED WITHOUT THE ACTIVATION MANEUVER.  FULL RANGE OF MOTION IS EASILY ACHIEVED.
SPONTANEITY_OF_MOVEMENT: 2: MILD: MILD GLOBAL SLOWNESS AND POVERTY OF SPONTANEOUS MOVEMENTS.
GAIT: SLIGHT: INDEPENDENT WALKING WITH MINOR GAIT IMPAIRMENT.
TOETAPPING_RIGHT: NORMAL
RIGIDITY_RLE: SLIGHT: RIGIDITY ONLY DETECTED WITH ACTIVATION MANEUVER.
FINGER_TAPPING_RIGHT: SLIGHT: ANY OF THE FOLLOWING: A) THE REGULAR RHYTHM IS BROKEN WITH ONE WITH ONE OR TWO INTERRUPTIONS OR HESITATIONS OF THE MOVEMENT B) SLIGHT SLOWING C) THE AMPLITUDE DECREMENTS NEAR THE END OF THE 10 MOVEMENTS.
FREEZING_GAIT: SLIGHT:  FREEZES ON STARTING, TURNING, OR WALKING THROUGH DOOWAY WITH A SINGLE HALT DURING ANY OF THESE EVENTS, BUT THEN CONTINUES SMOOTHLY WITHOUT FREEZING DURING STRAIGHT WALKING.
PRONATION_SUPINATION_RIGHT: SLIGHT: ANY OF THE FOLLOWING: A) THE REGULAR RHYTHM IS BROKEN WITH ONE WITH ONE OR TWO INTERRUPTIONS OR HESITATIONS OF THE MOVEMENT B) SLIGHT SLOWING C) THE AMPLITUDE DECREMENTS NEAR THE END OF THE 10 MOVEMENTS.
AMPLITUDE_LUE: NORMAL: NO TREMOR.
RIGIDITY_RUE: NORMAL
AXIAL_SCORE: 16
SPEECH: NORMAL

## 2018-01-01 ASSESSMENT — ACTIVITIES OF DAILY LIVING (ADL)
ADLS_ACUITY_SCORE: 12
ADLS_ACUITY_SCORE: 14
ADLS_ACUITY_SCORE: 12
ADLS_ACUITY_SCORE: 17
ADLS_ACUITY_SCORE: 12
NUMBER_OF_TIMES_PATIENT_HAS_FALLEN_WITHIN_LAST_SIX_MONTHS: 1
ADLS_ACUITY_SCORE: 12
ADLS_ACUITY_SCORE: 15
ADLS_ACUITY_SCORE: 15
ADLS_ACUITY_SCORE: 14
ADLS_ACUITY_SCORE: 17
ADLS_ACUITY_SCORE: 12
ADLS_ACUITY_SCORE: 12
ADLS_ACUITY_SCORE: 14
ADLS_ACUITY_SCORE: 15
ADLS_ACUITY_SCORE: 17
ADLS_ACUITY_SCORE: 12
ADLS_ACUITY_SCORE: 15
FALL_HISTORY_WITHIN_LAST_SIX_MONTHS: YES
ADLS_ACUITY_SCORE: 12
ADLS_ACUITY_SCORE: 13
ADLS_ACUITY_SCORE: 15
ADLS_ACUITY_SCORE: 15
COGNITION: 1 - ATTENTION OR MEMORY DEFICITS
ADLS_ACUITY_SCORE: 13
ADLS_ACUITY_SCORE: 17
ADLS_ACUITY_SCORE: 12
ADLS_ACUITY_SCORE: 12
ADLS_ACUITY_SCORE: 14
ADLS_ACUITY_SCORE: 13

## 2018-01-01 ASSESSMENT — ENCOUNTER SYMPTOMS
TROUBLE SWALLOWING: 0
ABDOMINAL PAIN: 1
PALPITATIONS: 0
FEVER: 0
SHORTNESS OF BREATH: 0
BLOOD IN STOOL: 0
ADENOPATHY: 0
DYSURIA: 0
WEAKNESS: 0
COUGH: 0
VOICE CHANGE: 0
NAUSEA: 0
NUMBNESS: 0
DYSPHORIC MOOD: 0
POLYDIPSIA: 0
LIGHT-HEADEDNESS: 0
DIARRHEA: 0
DIAPHORESIS: 0
CHILLS: 0
VOMITING: 0
NECK PAIN: 0
BACK PAIN: 0
DIZZINESS: 0
SORE THROAT: 0
NERVOUS/ANXIOUS: 0
HEMATURIA: 0
FREQUENCY: 0
CONSTIPATION: 0
BRUISES/BLEEDS EASILY: 0
HEADACHES: 0
EYE PAIN: 0
COLOR CHANGE: 0

## 2018-01-01 ASSESSMENT — VISUAL ACUITY
OU: GLASSES

## 2018-01-01 ASSESSMENT — PAIN DESCRIPTION - DESCRIPTORS
DESCRIPTORS: ACHING
DESCRIPTORS: ACHING;DISCOMFORT
DESCRIPTORS: DISCOMFORT
DESCRIPTORS: SORE
DESCRIPTORS: ACHING;CONSTANT
DESCRIPTORS: ACHING
DESCRIPTORS: HEADACHE
DESCRIPTORS: ACHING
DESCRIPTORS: CRAMPING

## 2018-01-01 ASSESSMENT — MOVEMENT DISORDERS SOCIETY - UNIFIED PARKINSONS DISEASE RATING SCALE (MDS-UPDRS)
HYGIENE: SLIGHT:  I AM SLOW BUT I DO NOT NEED ANY HELP.
TURNING_IN_BED: SLIGHT: I HAVE A BIT OF TROUBLE TURNING, BUT I DO NOT NEED ANY HELP.
GETTING_OUT_OF_BED_CAR_DEEP_CHAIR: SLIGHT: I AM SLOW OR AWKWARD, BUT I USUALLY CAN DO IT ON MY FIRST TRY.
TREMOR: MODERATE: SHAKING OR TREMOR CAUSES PROBLEMS WITH MANY OF MY DAILY ACTIVITES.
TOTAL_SCORE: INCOMPLETE
SALIVA_AND_DROOLING: NORMAL: NOT AT ALL (NO PROBLEMS).
EATING_TASKS: SLIGHT: I AM SLOW, BUT I DO NOT NEED ANY HELP HANDLING MY FOOD AND HAVE NOT HAD FOOD SPILLS WHILE EATING.
CHEWING_AND_SWALLOWING: NORMAL: NO PROBLEMS.
HOBBIES_AND_OTHER_ACTIVITIES: NORMAL:  NOT AT ALL (NO PROBLEMS).
HANDWRITING: NORMAL: NOT AT ALL (NO PROBLEMS).
DRESSING: SLIGHT: I AM SLOW BUT I DO NOT NEED HELP.
SPEECH: NORMAL: NOT AT ALL (NO PROBLEMS).

## 2018-01-01 ASSESSMENT — PAIN SCALES - GENERAL
PAINLEVEL: NO PAIN (0)
PAINLEVEL: MODERATE PAIN (5)
PAINLEVEL: NO PAIN (0)
PAINLEVEL: NO PAIN (0)
PAINLEVEL: MODERATE PAIN (4)
PAINLEVEL: MODERATE PAIN (4)
PAINLEVEL: MODERATE PAIN (5)
PAINLEVEL: NO PAIN (0)

## 2018-01-01 ASSESSMENT — MIFFLIN-ST. JEOR
SCORE: 1041.27
SCORE: 1077.56

## 2018-01-02 ENCOUNTER — HOSPITAL ENCOUNTER (OUTPATIENT)
Dept: PHYSICAL THERAPY | Age: 78
Setting detail: THERAPIES SERIES
Discharge: STILL ADMITTED OR ADMINISTRATIVE | End: 2018-01-02
Attending: PHYSICAL THERAPIST

## 2018-01-02 DIAGNOSIS — R29.3 POSTURAL INSTABILITY: ICD-10-CM

## 2018-01-02 DIAGNOSIS — R26.81 GAIT INSTABILITY: ICD-10-CM

## 2018-01-02 DIAGNOSIS — R25.8 BRADYKINESIA: ICD-10-CM

## 2018-01-10 NOTE — TELEPHONE ENCOUNTER
APPT INFO    Date /Time: 3/16/18- 10:30 AM    Reason for Appt: Interested in Deep Brain Stimulation    Ref Provider/Clinic: Dr. Yovany Lester    Are there internal records? Yes/No?  IF YES, list clinic names: No   Are there outside records? Yes/No? Yes   Patient Contact (Y/N) & Call Details: Yes - transferred from . Spoke with patient on the phone. [facility] Tova Bryson.     Action: Mailed SOY's home to sign.   Faxed cover sheet to [facility].     Authorization required for [facility] Care Everywhere.      OUTSIDE RECORDS CHECKLIST     CLINIC NAME COMMENTS REC (x) IMG (x)   [facility]   X N/A   [facility]  X X   [facility]/  [facility]   X N/A

## 2018-01-11 NOTE — TELEPHONE ENCOUNTER
[facility] Re[facility]     Date/Exam/Location  (specify location if different)   Office Notes: 8/10/17, 2/16/17, 9/15/16, 8/18/16    Missing: MR Head 7/20/16

## 2018-01-31 NOTE — TELEPHONE ENCOUNTER
Phone Call:    Who did you talk to? (or) Who did you call? patient   Call Detail/Action: Called to f/u on SOY's.   Appt was RS to sooner date.

## 2018-01-31 NOTE — TELEPHONE ENCOUNTER
Records Received From: [facility]     Date/Exam/Location  (specify location if different)   Office Notes:  2013, 2014, 2015

## 2018-01-31 NOTE — TELEPHONE ENCOUNTER
ACTION    What did you do? SOY received for [facility]. Faxed cover sheet.

## 2018-01-31 NOTE — TELEPHONE ENCOUNTER
Received Imaging From: [facility]     Image Type (x): Disc:___  Pacs:_X__      Exam Date/Name: MRI Head 7/20/16 Comments:

## 2018-02-06 NOTE — NURSING NOTE
Chief Complaint   Patient presents with     Consult     P NEW - MOVEMENT DISORDER     Yesika Gilbert MA

## 2018-02-06 NOTE — PROGRESS NOTES
Department of Neurology  Movement Disorders Division   Initial Clinic Evaluation     Patient: Gabi Manley   MRN: [id number]   : 1940   Date of Visit: 2018     Referring Physician: Trevon    Reason for Referral: Parkinson's disease opinion for DBS    Impression:    #1 Idiopathic Parkinson's disease  #2 Motor fluctuations with wearing off effect and mild peak dose dyskinesia  #3 Gait freezing levodopa responsive by history    Recommendations:   #1 We will try to optimize the patient's fluctuations.  I prefer to do this with carbidopa/levodopa immediate release.  I suspect she has a marketed short duration response.  I have prescribed carbidopa/levodopa immediate release 25/100 tabs every 2-1/2 hours while awake.  While she is concerned that this is a high dose of levodopa, studies have shown that levodopa dosages do not correlate with the rate of neurodegeneration.  It is not unusual to use these doses and does frequencies and fluctuators.  #2 She will return in 2 weeks and we will continue to intensively optimize her medical therapy trying to minimize off time.  #3 We will obtain outside records from Dr. Lester to see the past medications she has been on.  I suspect she is already been on dopamine agonists.  It would be interested interesting to see if she has tried Rytary.  #4 We will begin the deep brain stimulation workup.  The fact that her gait freezing is levodopa responsive by history makes it hopeful that she would respond in a similar fashion to deep brain stimulation.  She is at the upper age limit for deep brain stimulation but she is otherwise healthy and is cognitively preserved by bedside exam.    Please call or write with questions or concerns,    Sincerely yours,    Farshad Webb MD      History of Present Illness  Ms. Manley is a 78 year old right-handed female who is a retired  from [employer].  She states that in 2009 she noticed that she could not step up  "into her daughter's house.  Shortly after that she developed tremor of both hands that was a resting tremor.  She saw Dr. Blake at [facility] in [place].  She was diagnosed as having Parkinson's disease and observed.  She then went and saw Dr. Tova Bryson at the [facility].  She was started in 2010 or thereabouts on carbidopa/levodopa.  She said initially the medicine was  \"fantastic\".  She felt normal.  In recent years she has had worsening of her Parkinson's and is noting motor fluctuations.  In looking through Dr. Bryson's note she was on carbidopa levodopa ER 5200 every 4 hours with booster doses of carbidopa levodopa immediate release every 2 hours or so.  She has seen Dr. Lester since that time.  She is currently on carbidopa/levodopa ER 5201 tablet 4 times a day.  This is 5 hours apart.  She feels she is having motor fluctuations still.  She notices that the medicine does not come on for 1 hour.  When she is on she has slight taste dyskinesia but feels almost normal.  When she goes off she has tremor and feels lethargic.  She feels \"drunk\".  She also will have gait freezing.  She freezes in the on state.  She says she does have a soft voice.  She does not drool.  Her handwriting is small when she is off.  She has difficulty turning in bed but can get right up out of a chair.  She is stooped and shuffling when she is off.  She festinate when she is off.    She is a very dynamic and active person.  When she is on she can do anything she says.  She is very active in her apartment and also calls bingo at nighttime.    She denies cognitive decline or orthostatic hypotension.  She does have incontinence and wears a pad.  She has been on antidepressants for 20 years but does not feel depressed.    There is no family history of Parkinson's disease nor is there history of severe head injury.  She has had no toxic exposures.  She is not on dopamine blocking agents.  [place]    After " discussion with Dr. Lester it was felt that she is exhausted medical management and it was [facility].    Past Medical History:   History reviewed. No pertinent past medical history.    Past Surgical History:   History reviewed. No pertinent surgical history.    Medications:  Current Outpatient Prescriptions   Medication Sig Dispense Refill     amLODIPine (NORVASC) 5 MG tablet Take 5 mg by mouth daily       atorvastatin (LIPITOR) 20 MG tablet Take 20 mg by mouth daily       carbidopa-levodopa (SINEMET CR)  MG per CR tablet Take 1 tablet by mouth       cholecalciferol (VITAMIN D-1000 MAX ST) 1000 UNITS TABS Take 2,000 Units by mouth daily        escitalopram (LEXAPRO) 10 MG tablet Take 10 mg by mouth daily       pantoprazole (PROTONIX) 20 MG EC tablet Take 20 mg by mouth daily       Multiple Vitamin (THERAPEUTIC MULTIVITAMIN PO) Take 1 tablet by mouth daily       ondansetron (ZOFRAN-ODT) 4 MG ODT tab Take by mouth every 8 hours as needed for nausea       cetirizine (ZYRTEC) 10 MG tablet Take 10 mg by mouth daily            Movement Disorder-related Medications                   6 AM 12 noon 5 PM 10 PM    Carbidopa/levodopa ER 50/200                                                1 1 1 1                                                                                Allergies: is allergic to no clinical screening - see comments; erythromycin; ioxaglate; paroxetine; penicillins; trazodone; and vancomycin.    Social History:   Social History     Social History     Marital status:      Spouse name: N/A     Number of children: N/A     Years of education: N/A     Social History Main Topics     Smoking status: Never Smoker     Smokeless tobacco: Never Used     Alcohol use None     Drug use: None     Sexual activity: Not Asked     Other Topics Concern     None     Social History Narrative     None       Family History:  History reviewed.  No pertinent family history.    ROS:  General:  Fever : no, Chills: no, Sweats: YES, Fatigue: no, ,Weight loss: no  Cardiovascular  Chest Pains: no, Palpitations: no, Edema: no, Shortness of breath: no  Respiratory  Cough: no  Gastrointestinal  Nausea: no, Vomiting: no, Diarrhea: no, Constipation: no  Genitourinary  Dysuria: no, Frequency: no, Urgency: no,  Nocturia: no, Incontinence: no Women:  Abnormal vaginal bleeding: no  Musculoskeletal  Back pain: no, Neck Pain: no  Joint pain, swelling, redness: no, Stiffness: no  Skin  Rash: no, Itching: no,  Suspicious lesions: no  Psychiatric  Depression: YES, Anxiety/Panic: no  Endocrine  Cold intolerance: no, Heat intolerance: no, Excessive thirst: no  Hematologic/LymphatiAbnormal bruising, bleeding: no ,Enlarged lymph nodes: {.:[id number]  Allergic/Immunologic  Hives (Urticaria): no, HIV exposure: no    Neurologic  Visual loss: YES, cataractsDouble vision: no, Headache: no, Loss of consciousness:  no, Seizure: no, Fainting (syncope): no, Dysarthria: YES, Dysphagia:  no, Vertigo:  no, Weakness: no, Atrophy: no, Twitches: no, Lhermitte's: no, Numbness or tingling: no, Handwriting change: no, Tremors: YES, Involuntary movements: YES, Imbalance: YES, Abnormal gait:  YES, Falls :no, Memory loss: no, RBD: YES, Sleep disorder: no, Hallucination: no, Loss of concentration: no,  Behavioral change: no,  Loss of motivation: no      Comprehensive Neurologic Exam    Mental Status Exam   The patient is alert, oriented and exhibits no difficulty with cognition or memory.  A formal short test of mental status was performed.     Neurovascular         Speech and Language   Right Left     Carotid Bruit Absent Absent  Speech is not normal.  Hypokinetic -1   Dorsalis Pedis Pulse Normal Normal  Description   Posterior Tibial Pulse Normal Normal  Aphasia is present     Cranial Nerve Exam                 Right Left   Right Left   II                                        Normal Normal  Hearing  (VIII) Normal Normal      III, IV, V!                   Normal Normal  Nystagmus (VIII) Normal Normal   EOM description                              Gag (IX, X) Normal Normal   Facial Sensation (V) Normal Normal  SCM (XI) Normal Normal   Muscles of Mastication (V) Normal Normal  Trapezius (XI) Normal Normal   Facial Strength (VII) Normal Normal  Tongue (XII) Normal Normal     Motor Exam  Strength (*/5 grading)  Muscle                  Right Left  Muscle Right Left   Frontalis                                           Normal Normal  Iliopsoas Normal    Normal          Orbicularis Oculi                     Normal Normal  Quadrideps Normal    Normal        Orbicularis Oris                         Normal Normal  Anterior Tibial Normal Normal      Deltoid Normal Normal  Gastrocnemius Normal    Normal   Biceps Normal Normal  Extensor Hallucis Longus Normal    Normal   Triceps Normal Normal  Toe Extensors Normal    Normal   EDC Normal Normal  Toe Flexor Normal    Normal   Finger Flexors Normal Normal  Other Normal Normal   First Dorsal Interosseous Normal Normal  Other Normal Normal   Hypothenar Normal Normal  Other Normal Normal   Thenar Normal Normal  Other Normal Normal     Reflexes      Tone   Right Left   Right Left   Biceps Normal Normal    Rigidity (R)     Triceps Normal Normal  Neck +2    Brachioradialis Normal Normal  Arm +2 +1   Quadriceps Normal Normal  Leg +1 +1   Ankle Normal Normal       Babkinski Flexor Flexor         AMR       Coordination   Right Left   Right Left   Fingers -3 -3  Finger nose finger Normal Normal   Hand -2 -2  Drift Normal Normal   Leg -1 -1  Heel Shin Normal Normal   Foot -2 -2  Other     Other               Involuntary Movements    Gait and Station  None            Right Left  Stand & Sit Normal   Dyskinesia leg +1 +1  Gait Normal   (Movement type) Normal Normal  Tandem Normal   (Movement type) Normal Normal  Romberg Absent       Sensory Exam          Right Left    Pin Normal Normal     Vibration Normal Normal    Joint position Normal Normal    Other             Coding statement:     Duration of  Services: face-to-face75min.   Greater than 50% of this visit was spent in counseling and coordination of care.    Medical decision making is high due to the following components:    Number of diagnoses:[id number]  ManagementDiagnostic tests orders or reviewed.  Medications were prescribed. Medications were adjusted.  Complexity of medical data:Outside records reviewed.  Radiology images reviewed by myself.   RiskOne or more chronic illnesses with severe exacerbation, progression, or side effects of treatment.  Acute or chronic illness or injury that poses a threat to life or bodily function.

## 2018-02-06 NOTE — MR AVS SNAPSHOT
After Visit Summary   2018    Gabi Manley    MRN: [id number]           Patient Information     Date Of Birth          1940        Visit Information        Provider Department      2018 8:00 AM Farshad Webb MD [facility]        Today's Diagnoses     Paralysis agitans (H)    -  1      Care Instructions    #1  See back in two weeks  #2  Change medication to carbidopa/levodopa regular 25/100 two tabs every 2.5 hours while awake  #3  Start DBS work up            Follow-ups after your visit        Follow-up notes from your care team     Return in about 2 weeks (around 2018).      Who to contact     Please call your clinic at [phone] to:    Ask questions about your health    Make or cancel appointments    Discuss your medicines    Learn about your test results    Speak to your doctor   If you have compliments or concerns about an experience at your clinic, or if you wish to file a complaint, please contact [facility] Patient Relations at [phone] or email us at [email or web address]         Additional Information About Your Visit        MyChart Information     [employer] is an electronic gateway that provides easy, online access to your medical records. With [employer], you can request a clinic appointment, read your test results, renew a prescription or communicate with your care team.     To sign up for [employer] visit the website at [email or web address]   You will be asked to enter the access code listed below, as well as some personal information. Please follow the directions to create your username and password.     Your access code is: [email or web address]  Expires: 2018  6:31 AM     Your access code will  in 90 days. If you need help or a new code, please contact your [facility] or call [phone] for assistance.        Care EveryWhere ID     This is your Care EveryWhere ID. This could be  "used by other organizations to access your [place] medical records  [id number]        Your Vitals Were     Pulse Height Pulse Oximetry BMI (Body Mass Index)          57 1.6 m (5' 3\") 98% 28.15 kg/m2         Blood Pressure from Last 3 Encounters:   02/06/18 100/66    Weight from Last 3 Encounters:   02/06/18 72.1 kg (158 lb 14.4 oz)              Today, you had the following     No orders found for display         Today's Medication Changes          These changes are accurate as of 2/6/18  9:37 AM.  If you have any questions, ask your nurse or doctor.               These medicines have changed or have updated prescriptions.        Dose/Directions    * carbidopa-levodopa  MG per CR tablet   Commonly known as:  SINEMET CR   This may have changed:  Another medication with the same name was added. Make sure you understand how and when to take each.   Changed by:  Farshad Webb MD        Dose:  1 tablet   Take 1 tablet by mouth   Refills:  0       * carbidopa-levodopa  MG per CR tablet   Commonly known as:  SINEMET CR   This may have changed:  You were already taking a medication with the same name, and this prescription was added. Make sure you understand how and when to take each.   Used for:  Paralysis agitans (H)   Changed by:  Farshad Webb MD        Take 2 tabs every 2.5 hours while awake   Quantity:  300 tablet   Refills:  3       pantoprazole 20 MG EC tablet   Commonly known as:  PROTONIX   This may have changed:  Another medication with the same name was removed. Continue taking this medication, and follow the directions you see here.   Changed by:  Farshad Webb MD        Dose:  20 mg   Take 20 mg by mouth daily   Refills:  0       * Notice:  This list has 2 medication(s) that are the same as other medications prescribed for you. Read the directions carefully, and ask your doctor or other care provider to review them with you.         Where to get your medicines      These " medications were sent to [id number] - [place] [id number]  [address], [facility] [address]    Hours:  24-hours Phone:  [phone]     carbidopa-levodopa  MG per CR tablet                Primary Care Provider Office Phone # Fax #    Tracey Abdi [phone]       [facility] [address] [place] [address]        Equal Access to Services     [facility]: Hadii aad ku hadasho Soomaali, waaxda luqadaha, qaybta kaalmada adeegyada, waxay idiin hayaan adeeg kharalaurie edwards . So [facility] [phone].    ATENCIÓN: Si habla español, tiene a concepcion disposición servicios gratuitos de asistencia lingüística. [facility] [phone].    We comply with applicable federal civil rights laws and [place] laws. We do not discriminate on the basis of race, color, national origin, age, disability, sex, sexual orientation, or gender identity.            Thank you!     Thank you for choosing [facility]UR[facility]  for your care. Our goal is always to provide you with excellent care. Hearing back from our patients is one way we can continue to improve our services. Please take a few minutes to complete the written survey that you may receive in the mail after your visit with us. Thank you!             Your Updated Medication List - Protect others around you: Learn how to safely use, store and throw away your medicines at www.disposemymeds.org.          This list is accurate as of 2/6/18  9:37 AM.  Always use your most recent med list.                   Brand Name Dispense Instructions for use Diagnosis    amLODIPine 5 MG tablet    NORVASC     Take 5 mg by mouth daily        atorvastatin 20 MG tablet    LIPITOR     Take 20 mg by mouth daily        * carbidopa-levodopa  MG per CR tablet    SINEMET CR     Take 1 tablet by mouth        * carbidopa-levodopa  MG per CR tablet    SINEMET CR    300 tablet    Take 2 tabs every 2.5  hours while awake    Paralysis agitans (H)       cetirizine 10 MG tablet    zyrTEC     Take 10 mg by mouth daily        escitalopram 10 MG tablet    LEXAPRO     Take 10 mg by mouth daily        ondansetron 4 MG ODT tab    ZOFRAN-ODT     Take by mouth every 8 hours as needed for nausea        pantoprazole 20 MG EC tablet    PROTONIX     Take 20 mg by mouth daily        THERAPEUTIC MULTIVITAMIN PO      Take 1 tablet by mouth daily        VITAMIN D-1000 MAX ST 1000 UNITS Tabs   Generic drug:  cholecalciferol      Take 2,000 Units by mouth daily        * Notice:  This list has 2 medication(s) that are the same as other medications prescribed for you. Read the directions carefully, and ask your doctor or other care provider to review them with you.

## 2018-02-06 NOTE — PATIENT INSTRUCTIONS
#1  See back in two weeks  #2  Change medication to carbidopa/levodopa regular 25/100 two tabs every 2.5 hours while awake  #3  Start DBS work up

## 2018-02-06 NOTE — LETTER
2018       RE: Gabi Manley  [address]   [facility] [address]     Dear Colleague,    Thank you for referring your patient, Gabi Manley, to the [facility] NEUROLOGY at [facility]. Please see a copy of my visit note below.    Department of Neurology  Movement Disorders Division   Initial Clinic Evaluation     Patient: Gabi Manley   MRN: [id number]   : 1940   Date of Visit: 2018     Referring Physician: Trevon    Reason for Referral: Parkinson's disease opinion for DBS    Impression:    #1 Idiopathic Parkinson's disease  #2 Motor fluctuations with wearing off effect and mild peak dose dyskinesia  #3 Gait freezing levodopa responsive by history    Recommendations:   #1 We will try to optimize the patient's fluctuations.  I prefer to do this with carbidopa/levodopa immediate release.  I suspect she has a marketed short duration response.  I have prescribed carbidopa/levodopa immediate release 25/100 tabs every 2-1/2 hours while awake.  While she is concerned that this is a high dose of levodopa, studies have shown that levodopa dosages do not correlate with the rate of neurodegeneration.  It is not unusual to use these doses and does frequencies and fluctuators.  #2 She will return in 2 weeks and we will continue to intensively optimize her medical therapy trying to minimize off time.  #3 We will obtain outside records from Dr. Lester to see the past medications she has been on.  I suspect she is already been on dopamine agonists.  It would be interested interesting to see if she has tried Rytary.  #4 We will begin the deep brain stimulation workup.  The fact that her gait freezing is levodopa responsive by history makes it hopeful that she would respond in a similar fashion to deep brain stimulation.  She is at the upper age limit for deep brain stimulation but she is otherwise healthy and is cognitively preserved by bedside exam.    Please call or write  "with questions or concerns,    Sincerely yours,    Farshad Webb MD      History of Present Illness  Ms. Manley is a 78 year old right-handed female who is a retired  from [employer].  She states that in August 2009 she noticed that she could not step up into her daughter's house.  Shortly after that she developed tremor of both hands that was a resting tremor.  She saw Dr. Blake at [facility] in [place].  She was diagnosed as having Parkinson's disease and observed.  She then went and saw Dr. Tova Bryson at the [facility].  She was started in 2010 or thereabouts on carbidopa/levodopa.  She said initially the medicine was  \"fantastic\".  She felt normal.  In recent years she has had worsening of her Parkinson's and is noting motor fluctuations.  In looking through Dr. Bryson's note she was on carbidopa levodopa ER 5200 every 4 hours with booster doses of carbidopa levodopa immediate release every 2 hours or so.  She has seen Dr. Lester since that time.  She is currently on carbidopa/levodopa ER 5201 tablet 4 times a day.  This is 5 hours apart.  She feels she is having motor fluctuations still.  She notices that the medicine does not come on for 1 hour.  When she is on she has slight taste dyskinesia but feels almost normal.  When she goes off she has tremor and feels lethargic.  She feels \"drunk\".  She also will have gait freezing.  She freezes in the on state.  She says she does have a soft voice.  She does not drool.  Her handwriting is small when she is off.  She has difficulty turning in bed but can get right up out of a chair.  She is stooped and shuffling when she is off.  She festinate when she is off.    She is a very dynamic and active person.  When she is on she can do anything she says.  She is very active in her apartment and also calls bingo at nighttime.    She denies cognitive decline or orthostatic hypotension.  She does have incontinence and wears a pad.  She has " been on antidepressants for 20 years but does not feel depressed.    There is no family history of Parkinson's disease nor is there history of severe head injury.  She has had no toxic exposures.  She is not on dopamine blocking agents.  [place]    After discussion with Dr. Lester it was felt that she is exhausted medical management and it was suggested that she come to the [facility] for consideration of deep brain stimulation.    Past Medical History:   History reviewed. No pertinent past medical history.    Past Surgical History:   History reviewed. No pertinent surgical history.    Medications:  Current Outpatient Prescriptions   Medication Sig Dispense Refill     amLODIPine (NORVASC) 5 MG tablet Take 5 mg by mouth daily       atorvastatin (LIPITOR) 20 MG tablet Take 20 mg by mouth daily       carbidopa-levodopa (SINEMET CR)  MG per CR tablet Take 1 tablet by mouth       cholecalciferol (VITAMIN D-1000 MAX ST) 1000 UNITS TABS Take 2,000 Units by mouth daily        escitalopram (LEXAPRO) 10 MG tablet Take 10 mg by mouth daily       pantoprazole (PROTONIX) 20 MG EC tablet Take 20 mg by mouth daily       Multiple Vitamin (THERAPEUTIC MULTIVITAMIN PO) Take 1 tablet by mouth daily       ondansetron (ZOFRAN-ODT) 4 MG ODT tab Take by mouth every 8 hours as needed for nausea       cetirizine (ZYRTEC) 10 MG tablet Take 10 mg by mouth daily            Movement Disorder-related Medications                   6 AM 12 noon 5 PM 10 PM    Carbidopa/levodopa ER 50/200                                                1 1 1 1                                                                                Allergies: is allergic to no clinical screening - see comments; erythromycin; ioxaglate; paroxetine; penicillins; trazodone; and vancomycin.    Social History:   Social History     Social History     Marital status:      Spouse name: N/A     Number of children: N/A     Years of education: N/A      Social History Main Topics     Smoking status: Never Smoker     Smokeless tobacco: Never Used     Alcohol use None     Drug use: None     Sexual activity: Not Asked     Other Topics Concern     None     Social History Narrative     None       Family History:  History reviewed. No pertinent family history.    ROS:  General:  Fever : no, Chills: no, Sweats: YES, Fatigue: no, ,Weight loss: no  Cardiovascular  Chest Pains: no, Palpitations: no, Edema: no, Shortness of breath: no  Respiratory  Cough: no  Gastrointestinal  Nausea: no, Vomiting: no, Diarrhea: no, Constipation: no  Genitourinary  Dysuria: no, Frequency: no, Urgency: no,  Nocturia: no, Incontinence: no Women:  Abnormal vaginal bleeding: no  Musculoskeletal  Back pain: no, Neck Pain: no  Joint pain, swelling, redness: no, Stiffness: no  Skin  Rash: no, Itching: no,  Suspicious lesions: no  Psychiatric  Depression: YES, Anxiety/Panic: no  Endocrine  Cold intolerance: no, Heat intolerance: no, Excessive thirst: no  Hematologic/LymphatiAbnormal bruising, bleeding: no ,Enlarged lymph nodes: {.:[id number]  Allergic/Immunologic  Hives (Urticaria): no, HIV exposure: no    Neurologic  Visual loss: YES, cataractsDouble vision: no, Headache: no, Loss of consciousness:  no, Seizure: no, Fainting (syncope): no, Dysarthria: YES, Dysphagia:  no, Vertigo:  no, Weakness: no, Atrophy: no, Twitches: no, Lhermitte's: no, Numbness or tingling: no, Handwriting change: no, Tremors: YES, Involuntary movements: YES, Imbalance: YES, Abnormal gait:  YES, Falls :no, Memory loss: no, RBD: YES, Sleep disorder: no, Hallucination: no, Loss of concentration: no,  Behavioral change: no,  Loss of motivation: no      Comprehensive Neurologic Exam    Mental Status Exam   The patient is alert, oriented and exhibits no difficulty with cognition or memory.  A formal short test of mental status was performed.     Neurovascular         Speech and Language   Right Left     Carotid Bruit Absent Absent   Speech is not normal.  Hypokinetic -1   Dorsalis Pedis Pulse Normal Normal  Description   Posterior Tibial Pulse Normal Normal  Aphasia is present     Cranial Nerve Exam                 Right Left   Right Left   II                                        Normal Normal  Hearing (VIII) Normal Normal      III, IV, V!                   Normal Normal  Nystagmus (VIII) Normal Normal   EOM description                              Gag (IX, X) Normal Normal   Facial Sensation (V) Normal Normal  SCM (XI) Normal Normal   Muscles of Mastication (V) Normal Normal  Trapezius (XI) Normal Normal   Facial Strength (VII) Normal Normal  Tongue (XII) Normal Normal     Motor Exam  Strength (*/5 grading)  Muscle                  Right Left  Muscle Right Left   Frontalis                                           Normal Normal  Iliopsoas Normal    Normal          Orbicularis Oculi                     Normal Normal  Quadrideps Normal    Normal        Orbicularis Oris                         Normal Normal  Anterior Tibial Normal Normal      Deltoid Normal Normal  Gastrocnemius Normal    Normal   Biceps Normal Normal  Extensor Hallucis Longus Normal    Normal   Triceps Normal Normal  Toe Extensors Normal    Normal   EDC Normal Normal  Toe Flexor Normal    Normal   Finger Flexors Normal Normal  Other Normal Normal   First Dorsal Interosseous Normal Normal  Other Normal Normal   Hypothenar Normal Normal  Other Normal Normal   Thenar Normal Normal  Other Normal Normal     Reflexes      Tone   Right Left   Right Left   Biceps Normal Normal    Rigidity (R)     Triceps Normal Normal  Neck +2    Brachioradialis Normal Normal  Arm +2 +1   Quadriceps Normal Normal  Leg +1 +1   Ankle Normal Normal       Babkinski Flexor Flexor         AMR       Coordination   Right Left   Right Left   Fingers -3 -3  Finger nose finger Normal Normal   Hand -2 -2  Drift Normal Normal   Leg -1 -1  Heel Shin Normal Normal   Foot -2 -2  Other     Other                Involuntary Movements    Gait and Station  None            Right Left  Stand & Sit Normal   Dyskinesia leg +1 +1  Gait Normal   (Movement type) Normal Normal  Tandem Normal   (Movement type) Normal Normal  Romberg Absent       Sensory Exam          Right Left    Pin Normal Normal    Vibration Normal Normal    Joint position Normal Normal    Other             Coding statement:     Duration of  Services: face-to-face75min.   Greater than 50% of this visit was spent in counseling and coordination of care.    Medical decision making is high due to the following components:    Number of diagnoses:[id number]  ManagementDiagnostic tests orders or reviewed.  Medications were prescribed. Medications were adjusted.  Complexity of medical data:Outside records reviewed.  Radiology images reviewed by myself.   RiskOne or more chronic illnesses with severe exacerbation, progression, or side effects of treatment.  Acute or chronic illness or injury that poses a threat to life or bodily function.      Farshad Webb MD

## 2018-02-08 NOTE — TELEPHONE ENCOUNTER
Received call from patient through the call center and she is very confused about her carbidopa. Per Dr. Webb's note from 2/6, he was prescribing carbidopa 25/100 every 2.5 hours while awake. Patient said she was told to take 1/2 tab at this dosing. However, the prescription sent to the patient's pharmacy on 2/6 states: Take 2 tabsl every 2.5 hours while awake  There was another prescription sent to the pharmacy today for carbidopa 25/100 stating: Take 1/2 tab TID, 1 hour before meals, then after 2 weeks, increase to 1 tab TID, then 2 weeks later may increase may increase to 1.5 tabs TID. Within that Rx there is a pharmacy note stating to take 2 tabs every 2.5 hours.    Patient is very confused as to what to take.   Yuliana or Dr. Webb, please clarify with the patient and please discontinue 2 of the 3 carbidopa orders in her chart to reflect only what she is taking.     Thank you,  Migdalia Gregg, RN Care Coordinator

## 2018-02-09 NOTE — TELEPHONE ENCOUNTER
Called Gabi to discuss. LEFT VOICE MAIL that Dr. Webb wants her to take:    carbidopa-levodopa (SINEMET)  MG per tablet 100 tablet 3 2/9/2018  --   Sig: Take two tablets every 2.5 hours while awake   Class: E-Prescribe   Notes to Pharmacy: Take 2 tablets every 2.5 hours while awake  (This is the correct instructions)   Order: [id number]   E-Prescribing Status: Receipt confirmed by pharmacy (2/9/2018  8:10 AM CST)     Reiterated that this is not an unusual dose and that dosing will not change or make faster the progression of her disease. It is merely to help her live at the highest quality of life now.    Asked her to call back with any further questions or concerns.

## 2018-02-09 NOTE — TELEPHONE ENCOUNTER
I really goofed this one up.  Have corrected the Rx in chart and with pharmacy.  Mrs. Manley is amused with my difficulty learning this new system!:)

## 2018-02-16 NOTE — TELEPHONE ENCOUNTER
----- Message from Yani Velarde sent at 2/16/2018  7:48 AM CST -----  Regarding: Medication issues pt in pain and thowing up due to Rx. carbidopa-levodopa (SINEMET)   Contact: [phone]  Pt calling having issues with Rx. carbidopa-levodopa (SINEMET). Per pt she is in pain and believes the medication is making her throw up. Per pt she is really weak and barely able to function. Per pt her muscles are sore. I asked how long this has been going on and per pt Monday was great then just got progressively worse as the week went on and today is terrible. Pt is asking for a call back for medical advice about medication issue.   Pt can be reached at [phone]    Thank You,  Yani    Please DO NOT send this message and/or reply back to sender.  Call Center Representatives DO NOT respond to messages.

## 2018-02-16 NOTE — TELEPHONE ENCOUNTER
Talked with patient.  The carbidopa levodopa immediate release is not working well.  As Yuliana Menezes RN as charted the first day she started it she felt well.  She now had intermittent nausea and definitely has dyskinesia.  The dyskinesia starts about a half hour after she takes her meds.  She has periods of severe weakness but we cannot really tell whether this is wearing off as she cannot tell the timing.    I recommended she try carbidopa levodopa was 25/100 immediate release 1-1/2 tablets every 2 hours.  We will see if this works better for her.  If this does not work then we will know that we should proceed with the DBS workup.  Mrs. Manley is in agreement with this plan.

## 2018-02-16 NOTE — TELEPHONE ENCOUNTER
From Dr. Webb's office visit note 2/6/18:    Recommendations:   #1 We will try to optimize the patient's fluctuations.  I prefer to do this with carbidopa/levodopa immediate release.  I suspect she has a marketed short duration response.  I have prescribed carbidopa/levodopa immediate release 25/100 tabs every 2-1/2 hours while awake.  While she is concerned that this is a high dose of levodopa, studies have shown that levodopa dosages do not correlate with the rate of neurodegeneration.  It is not unusual to use these doses and does frequencies and fluctuators.    carbidopa-levodopa (SINEMET)  MG per tablet 100 tablet 3 2/9/2018  --   Sig: Take two tablets every 2.5 hours while awake     Called patient to discuss.     Monday she began taking the medication at the dose above. She began 6 am, 8:30, etc. She felt like she was 51 years old again on Monday. No problems that day.     Tuesday she began feeling slightly less energetic. She noted a headache in the front of her head/forehead area 1/10 constant. She states her arms are flailing, and her trunk is rocking.    Wednesday the headache was stronger - 2/10 constant pressure, and still continues today. Sweating began.    Thursday, she had a sudden urge to vomit around 3 pm for 3 hours, it faded and hit again and lasted for 1 hour. It subsided when she was ready for bed. She had taken her carbidopa/levodopa at 1:30 pm the urge to vomit was at 3 pm. She took her next dose at 4 pm.    Today - no nausea. Slight headache 2/10.  Dyskinesias began on Tuesday and  getting stronger.   She continues to break out into sweats like hot flashes. Sweats are continuing.     This morning she did not think she could get up and out of bed. However, she slept all night. She reports she has never slept all night, stating she usually gets up every 2.5 hours to urinate. She has been sleeping great since Monday.    Previously she had been on carbidopa/levodopa for 7 years 1 tab   CR every 5 hours. No dyskinesias reported during that time, however she had break through tremor in her left hand.

## 2018-02-21 PROBLEM — E78.5 HLD (HYPERLIPIDEMIA): Status: ACTIVE | Noted: 2017-06-13

## 2018-02-21 PROBLEM — J30.9 ALLERGIC RHINITIS: Status: ACTIVE | Noted: 2018-01-01

## 2018-02-21 PROBLEM — E55.9 VITAMIN D DEFICIENCY: Status: ACTIVE | Noted: 2018-01-01

## 2018-02-21 PROBLEM — I10 HTN (HYPERTENSION): Status: ACTIVE | Noted: 2018-01-01

## 2018-02-21 PROBLEM — G20.A1 PARKINSON'S DISEASE (H): Status: ACTIVE | Noted: 2018-01-01

## 2018-02-21 PROBLEM — G56.02 CARPAL TUNNEL SYNDROME OF LEFT WRIST: Status: ACTIVE | Noted: 2018-01-01

## 2018-02-21 PROBLEM — K21.9 ESOPHAGEAL REFLUX: Status: ACTIVE | Noted: 2018-01-01

## 2018-02-21 PROBLEM — N39.3 FEMALE STRESS INCONTINENCE: Status: ACTIVE | Noted: 2018-01-01

## 2018-02-21 PROBLEM — J98.4 OTHER DISEASES OF LUNG, NOT ELSEWHERE CLASSIFIED: Status: ACTIVE | Noted: 2018-01-01

## 2018-02-21 NOTE — PATIENT INSTRUCTIONS
#1 Take Carbidopa/levodopa 25/100 1 tab every two hours while awake, can increase to 1 and 1/4 at each dose  #2 RTC 3-4 weeks  #3 Start DBS work-up

## 2018-02-21 NOTE — NURSING NOTE
Chief Complaint   Patient presents with     RECHECK     [facility]- MOVEMENT DISORDER, 2 WEEKS F/U         Ignacio Hernandez, CMA

## 2018-02-21 NOTE — MR AVS SNAPSHOT
After Visit Summary   2018    Gabi Manley    MRN: [id number]           Patient Information     Date Of Birth          1940        Visit Information        Provider Department      2018 12:30 PM Farshad Webb MD [facility] Neurology        Today's Diagnoses     Parkinson's disease (H)    -  1      Care Instructions    #1 Take Carbidopa/levodopa 25/100 1 tab every two hours while awake, can increase to 1 and 1/4 at each dose  #2 RTC 3-4 weeks  #3 Start DBS work-up          Follow-ups after your visit        Follow-up notes from your care team     Return in about 4 weeks (around 3/21/2018).      Your next 10 appointments already scheduled     Mar 21, 2018  2:00 PM CDT   (Arrive by 1:45 PM)   Return Movement Disorder with Farshad Webb MD   [facility] Neurology ([facility])    [address] [place] [id number] [phone]              Who to contact     Please call your clinic at [phone] to:    Ask questions about your health    Make or cancel appointments    Discuss your medicines    Learn about your test results    Speak to your doctor            Additional Information About Your Visit        MyChart Information     [employer] is an electronic gateway that provides easy, online access to your medical records. With [employer], you can request a clinic appointment, read your test results, renew a prescription or communicate with your care team.     To sign up for [employer] visit the website at [email or web address]   You will be asked to enter the access code listed below, as well as some personal information. Please follow the directions to create your username and password.     Your access code is: [email or web address]  Expires: 2018  6:31 AM     Your access code will  in 90 days. If you need help or a new code, please contact your [facility] or call [phone] for assistance.        Care  "EveryWhere ID     This is your Care EveryWhere ID. This could be used by other organizations to access your [facility] medical records  [id number]        Your Vitals Were     Pulse Temperature Respirations Height Pulse Oximetry Breastfeeding?    66 97.6  F (36.4  C) (Oral) 24 1.607 m (5' 3.25\") 99% No    BMI (Body Mass Index)                   27.59 kg/m2            Blood Pressure from Last 3 Encounters:   02/06/18 100/66    Weight from Last 3 Encounters:   02/21/18 71.2 kg (157 lb)   02/06/18 72.1 kg (158 lb 14.4 oz)              Today, you had the following     No orders found for display       Primary Care Provider Office Phone # Fax #    Tracey Abdi [phone]       [address] [address]        Equal Access to Services     JULIANNE 81st Medical GroupMAXIMINO : Hadii aad ku hadashjuanito Sohailey, waaxda luqadaha, qaybta kaalmada adeinesyada, nabil edwards . So [facility] [phone].    ATENCIÓN: Si habla español, tiene a concepcion disposición servicios gratuitos de asistencia lingüística. Anita al [phone].    We comply with applicable federal civil rights laws and [place] laws. We do not discriminate on the basis of race, color, national origin, age, disability, sex, sexual orientation, or gender identity.            Thank you!     Thank you for choosing [facility] NEUROLOGY  for your care. Our goal is always to provide you with excellent care. Hearing back from our patients is one way we can continue to improve our services. Please take a few minutes to complete the written survey that you may receive in the mail after your visit with us. Thank you!             Your Updated Medication List - Protect others around you: Learn how to safely use, store and throw away your medicines at www.disposemymeds.org.          This list is accurate as of 2/21/18  1:10 PM.  Always use your most recent med list.                   Brand Name Dispense Instructions for use Diagnosis    " amLODIPine 5 MG tablet    NORVASC     Take 5 mg by mouth daily        atorvastatin 20 MG tablet    LIPITOR     Take 20 mg by mouth daily        carbidopa-levodopa  MG per tablet    SINEMET    100 tablet    Take 1.5 tablets every 2 hours while awake    Parkinson disease (H)       cetirizine 10 MG tablet    zyrTEC     Take 10 mg by mouth daily        escitalopram 10 MG tablet    LEXAPRO     Take 10 mg by mouth daily        ondansetron 4 MG ODT tab    ZOFRAN-ODT     Take by mouth every 8 hours as needed for nausea        pantoprazole 20 MG EC tablet    PROTONIX     Take 20 mg by mouth daily        THERAPEUTIC MULTIVITAMIN PO      Take 1 tablet by mouth daily        VITAMIN D-1000 MAX ST 1000 UNITS Tabs   Generic drug:  cholecalciferol      Take 2,000 Units by mouth daily

## 2018-02-21 NOTE — LETTER
2/21/2018       RE: Gabi Manley  [address]   [place] [address]     Dear Colleague,    Thank you for referring your patient, Gabi Manley, to the [facility] NEUROLOGY at [facility]. Please see a copy of my visit note below.      Movement Disorder Clinic follow up note    Patient: Gabi Manley  Medical Record Number: [id number]  Encounter Date: February 21, 2018  PCP:Tracey Abdi    CC: Parkinson's disease    Impression:     #1  Idiopathic Parkinson's disease  #2  Motor fluctuations with wearing off and peak dose dyskinesia  #3  Levodopa side effects including headache and nausea    Recommendations:  #1  Long discussion with the patient and her son Mino.  We will continue to make medication adjustments.  At present I would recommend that she go to carbidopa levodopa 25/100 immediate release 1 tablet every 2 hours.  This should improve her dyskinesia.  If it is not enough however and she feels stiff and immobile she can go to 1-1/4 tablets every 2 hours.  She would do this by having a bottle of full tablets and a bottle of one half tablets.  She would take a full tablet and bite off half the half tablet  #2  After long discussion we decided to proceed with the DBS workup.  I will let Yris Cummings, RIVAS know about this.  She feels this will be helpful in answering the question as to whether DBS will be possible for her.  We discussed the pros and cons of DBS as I see them.  #3  I will see her back in 4 weeks.    Return to clinic:     4 weeks    Interval Hx:: Ms. Gabi Manley reports that she has been somewhat better on the immediate release carbidopa levodopa 25/100.  We had put her at 2 tablets every 2-1/2 hours but this was clearly too much.  She had nausea and dyskinesia.  Over the phone we had reduced her to 1-1/2 tablets every 2 hours.  She feels she is doing better on this.  She is thinking more clearly and can focus better.  Her movements are better.  However she  "continues to have dyskinesia.  She also has low-grade headache.  She is also perspiring more.  She is sleeping better however and says she has not slept as this well and many years.    She cannot tell when the medicine comes on and really cannot tell wearing off.  She has periods of superior fatigue which I expect I suspect are wearing off.    Current medications  Current Outpatient Prescriptions   Medication     carbidopa-levodopa (SINEMET)  MG per tablet     amLODIPine (NORVASC) 5 MG tablet     atorvastatin (LIPITOR) 20 MG tablet     cholecalciferol (VITAMIN D-1000 MAX ST) 1000 UNITS TABS     escitalopram (LEXAPRO) 10 MG tablet     pantoprazole (PROTONIX) 20 MG EC tablet     Multiple Vitamin (THERAPEUTIC MULTIVITAMIN PO)     ondansetron (ZOFRAN-ODT) 4 MG ODT tab     cetirizine (ZYRTEC) 10 MG tablet     No current facility-administered medications for this visit.        Examination:  Pulse 66  Temp 97.6  F (36.4  C) (Oral)  Resp 24  Ht 1.607 m (5' 3.25\")  Wt 71.2 kg (157 lb)  SpO2 99%  Breastfeeding? No  BMI 27.59 kg/m2  General- no distress, no rash, good pulses, no edema  Respiratory-auscultation over the anterior lung fields is clear  Cardiac- regular rate and rhythm    Neurologic  The patient has generalized dyskinesia rated at +2.  She has no rigidity.  She has -2 bradykinesia of fingers hands and feet.  She walks upright with reduced arm swing and a good stride length.  She has no resting tremor.      Mental status  Patient is alert, appropriate, speech is fluent and comprehension is intact  Detailed testing was not done    Cranial nerve testing  Pupils are equal and reactive to light, visual fields are full to confrontation bilaterally  Extraocular movements are full  Facial sensation is intact, face is symmetric with rest and activation  Palate rises symmetrically, tongue protrudes at midline with normal movements  Sterocleidomastoid and trapezius strength is normal and symmetric    Motor  Bulk " and tone are normal  Strength is 5/5 shoulder abduction, finger abduction, arm flexion and extension, hip flexion, plantar and dorsiflexion    Sensation  Intact to pin, vibration   Proprioception intact in great toes and fingers    Tendon reflexes  Testing at brachioradialis, biceps, triceps, patella and achilles bilaterally showed normal reflexes, symmetrically, without Babinski or Blanchard signs    Coordination  Finger nose finger testing: normalRapid alternating movements are normal.  Gait and Station: normal      Again, thank you for allowing me to participate in the care of your patient.      Sincerely,    Farshad Webb MD

## 2018-02-21 NOTE — PROGRESS NOTES
Movement Disorder Clinic follow up note    Patient: Gabi Manley  Medical Record Number: [id number]  Encounter Date: February 21, 2018  PCP:Tracey Abdi    CC: Parkinson's disease    Impression:     #1  Idiopathic Parkinson's disease  #2  Motor fluctuations with wearing off and peak dose dyskinesia  #3  Levodopa side effects including headache and nausea    Recommendations:  #1  Long discussion with the patient and her son Mino.  We will continue to make medication adjustments.  At present I would recommend that she go to carbidopa levodopa 25/100 immediate release 1 tablet every 2 hours.  This should improve her dyskinesia.  If it is not enough however and she feels stiff and immobile she can go to 1-1/4 tablets every 2 hours.  She would do this by having a bottle of full tablets and a bottle of one half tablets.  She would take a full tablet and bite off half the half tablet  #2  After long discussion we decided to proceed with the DBS workup.  I will let Yris Cummings RN know about this.  She feels this will be helpful in answering the question as to whether DBS will be possible for her.  We discussed the pros and cons of DBS as I see them.  #3  I will see her back in 4 weeks.    Return to clinic:     4 weeks    Interval Hx:: Ms. Gabi Manley reports that she has been somewhat better on the immediate release carbidopa levodopa 25/100.  We had put her at 2 tablets every 2-1/2 hours but this was clearly too much.  She had nausea and dyskinesia.  Over the phone we had reduced her to 1-1/2 tablets every 2 hours.  She feels she is doing better on this.  She is thinking more clearly and can focus better.  Her movements are better.  However she continues to have dyskinesia.  She also has low-grade headache.  She is also perspiring more.  She is sleeping better however and says she has not slept as this well and many years.    She cannot tell when the medicine comes on and really cannot tell wearing off.  She has  "periods of superior fatigue which I expect I suspect are wearing off.    Current medications  Current Outpatient Prescriptions   Medication     carbidopa-levodopa (SINEMET)  MG per tablet     amLODIPine (NORVASC) 5 MG tablet     atorvastatin (LIPITOR) 20 MG tablet     cholecalciferol (VITAMIN D-1000 MAX ST) 1000 UNITS TABS     escitalopram (LEXAPRO) 10 MG tablet     pantoprazole (PROTONIX) 20 MG EC tablet     Multiple Vitamin (THERAPEUTIC MULTIVITAMIN PO)     ondansetron (ZOFRAN-ODT) 4 MG ODT tab     cetirizine (ZYRTEC) 10 MG tablet     No current facility-administered medications for this visit.        Examination:  Pulse 66  Temp 97.6  F (36.4  C) (Oral)  Resp 24  Ht 1.607 m (5' 3.25\")  Wt 71.2 kg (157 lb)  SpO2 99%  Breastfeeding? No  BMI 27.59 kg/m2  General- no distress, no rash, good pulses, no edema  Respiratory-auscultation over the anterior lung fields is clear  Cardiac- regular rate and rhythm    Neurologic  The patient has generalized dyskinesia rated at +2.  She has no rigidity.  She has -2 bradykinesia of fingers hands and feet.  She walks upright with reduced arm swing and a good stride length.  She has no resting tremor.      Mental status  Patient is alert, appropriate, speech is fluent and comprehension is intact  Detailed testing was not done    Cranial nerve testing  Pupils are equal and reactive to light, visual fields are full to confrontation bilaterally  Extraocular movements are full  Facial sensation is intact, face is symmetric with rest and activation  Palate rises symmetrically, tongue protrudes at midline with normal movements  Sterocleidomastoid and trapezius strength is normal and symmetric    Motor  Bulk and tone are normal  Strength is 5/5 shoulder abduction, finger abduction, arm flexion and extension, hip flexion, plantar and dorsiflexion    Sensation  Intact to pin, vibration   Proprioception intact in great toes and fingers    Tendon reflexes  Testing at " brachioradialis, biceps, triceps, patella and achilles bilaterally showed normal reflexes, symmetrically, without Babinski or Blanchard signs    Coordination  Finger nose finger testing: normalRapid alternating movements are normal.  Gait and Station: normal

## 2018-03-06 NOTE — TELEPHONE ENCOUNTER
Discussed possible dates with pt for DBS workup. She will check with her son and let me know if they work for her. The following appts are on hold:    3/19 at 1:30 - Dr. Stearns  3/28 at 7:00 - on/off motor testing with Eileen Anton  3/28 at 11:00 - MRI (check in at 10:30)  3/29 at 8:00 - neuropsych eval with Dr. Guadarrama    Pt has my direct number. Will await her call.

## 2018-03-07 NOTE — TELEPHONE ENCOUNTER
The chart shows the patient is on:    carbidopa-levodopa (SINEMET)  MG per tablet 100 tablet 3 2/16/2018  No   Sig: Take 1.5 tablets every 2 hours while awake     Situation: She is taking 1 tab every 2 hours, not 1.5 tabs every 2 hours.    Background: She has been on carbidopa/levodopa for 8 years but dose changed from CR to IR when she saw Dr. Webb on 2/21- she is taking 1 tab every 2 hours. From Dr. Webb's office visit note on 2/21:     At present I would recommend that she go to carbidopa levodopa 25/100 immediate release 1 tablet every 2 hours.  This should improve her dyskinesia.  If it is not enough however and she feels stiff and immobile she can go to 1-1/4 tablets every 2 hours.    Assessment: Since around that time (2/21), she reports pain in her forehead - dull aching (3 out of 10) constant - tylenol helps- she takes the tylenol right away in the morning but 3-4 hours later it is back at a 3 out of 10. She reports shooting pains throughout her mouth and jaw. In fact, 1 month ago she went to the dentist due to the pain of 10/10 intermittent - about 3 times per day. No dental problems were found. Today it is a constant pain at 8/10.    She went to her primary care provider Monday 3/5 because she had the headache.  She was diagnosed with a sinus infection. No antibiotics prescribed. She believes the pain in her head may be a factor of the medication dose change carbidopa/levodopa. She reports increased sweating now as well.  Moving her head makes the pain in her jaw and mouth worse.     She is eating prunes and having a bowel movement each day. It takes a little while for a urine stream to start but other than that, no problem with urination. She denies nausea or any other infectious processes. Her dyskinesias are sporadic. They last about 30 minutes every 3 hours. Her hands flail and if quantified 1-10, 10 being the most active dyskinesias, she rates it at 4/10 and tolerable , her torso  movement is better though.    Recommendation:   1. Go to her primary care provider tomorrow to recheck the sinus infection if pain persists.  2. Mailed her a medication diary to keep for a couple of days so she can determine when she has dyskinesias and pain in relation to her carbidopa/levodopa doses.  3. Bring diary when she comes to clinic on 3/21 to see Dr. Webb.

## 2018-03-07 NOTE — TELEPHONE ENCOUNTER
----- Message from Mis Ball RN sent at 3/7/2018 12:48 PM CST -----  Regarding: FW: / PT CALL BACK REQUESTED  Contact: [phone]      ----- Message -----     From: Panfilo Aldana     Sent: 3/7/2018  12:43 PM       To: [facility]-Neuro[facility]Adult[facility]  Subject: / PT CALL BACK REQUESTED             Pt called and would like a call back to discuss their symptoms with the carbidopa-levodopa (SINEMET) medication.    Best call back: [phone]    Thanks,  DL    Please DO NOT send this message and/or reply back to sender.  Call Center Representatives DO NOT respond to messages.

## 2018-03-08 NOTE — TELEPHONE ENCOUNTER
Alex Bridges,    Levodopa could be causing the headache but as you note it should be related to her time of administration.  She really needs to keep a diary.    I agree she should see her local MD>  Also can we move her MRI head up to next week to be sure we aren't dealing with something else (chronic SDH etc.)    She is a mike lady.    Thanks,    Srini

## 2018-03-09 NOTE — TELEPHONE ENCOUNTER
Spoke to Gabi and got her 3T brain MRI rescheduled for Monday 3/12 at 1:00. She will contact [employer] today.

## 2018-03-09 NOTE — TELEPHONE ENCOUNTER
Patient is requesting a new 30 day prescription be sent to [facility] for:    Carbidopa/levodopa  mg 1.5 tabs every 2 hours while awake, total 360 tabs, 1 refill. The prescription she has will not last:    carbidopa-levodopa (SINEMET)  MG per tablet 100 tablet 3 2/16/2018  No   Sig: Take 1.5 tablets every 2 hours while awake

## 2018-03-13 NOTE — TELEPHONE ENCOUNTER
Spoke with patient.  I told her that her MRI of the scan of the brain is normal.  There is no signs of mass or subdural hematoma causing her severe headaches.  Headaches seem to be caused by the carbidopa levodopa.    When she returns we will likely switch her back to her old form of carbidopa levodopa ER.  She was not doing well on this but she did not have the side effects of headache and nausea.    The deep brain stimulation workup is continuing.

## 2018-03-19 NOTE — LETTER
3/19/2018       RE: Gabi Manley  [address]   [place] [address]     Dear Colleague,    Thank you for referring your patient, Gabi Manley, to the [facility] NEUROSURGERY at [facility]. Please see a copy of my visit note below.    HISTORY AND PHYSICAL EXAM    Chief Complaint   Patient presents with     RECHECK     Deep Brain Stimulation; Parkinson's disease       HISTORY OF PRESENT ILLNESS  We saw Ms. Gabi Manley today in Neurosurgery Clinic today as part of her work-up for Deep Brain Stimulation.  She is a 78 year old woman with a history of Idiopathic Parkinson s disease.  Her symptoms began in 2009 when she was unable to step up into her daughter s house.  She then developed resting tremor in her hands. She had good control of her symptoms for many years, but is now having increased side-effects.  She is now having motor fluctuations with wearing off and peak dose dyskinesia.  She also has freezing gait and cramps in her left leg.  She reports that her tremor is mostly on her right side.  Recently, Dr. Webb has changed her carbidopa/levodopa from long acting to immediate release every 2 hours.  She is telling me that she feels worse with this and she thinks that her dyskinesia is worse.        Past Medical History:   Diagnosis Date     Hypertension      Parkinson's disease (H)        History reviewed. No pertinent surgical history.    History reviewed. No pertinent family history.    Social History     Social History     Marital status:      Spouse name: N/A     Number of children: N/A     Years of education: N/A     Occupational History     Not on file.     Social History Main Topics     Smoking status: Never Smoker     Smokeless tobacco: Never Used     Alcohol use No     Drug use: No     Sexual activity: Not Currently     Other Topics Concern     Not on file     Social History Narrative          Allergies   Allergen Reactions     No Clinical  Screening - See Comments Shortness Of Breath     Erythromycin      Other reaction(s): Unknown     Ioxaglate Other (See Comments)     Angioedema     Paroxetine      Other reaction(s): Unknown     Penicillins      Facial swelling     Trazodone      Other reaction(s): Unknown     Vancomycin Hives       Current Outpatient Prescriptions   Medication     carbidopa-levodopa (SINEMET)  MG per tablet     amLODIPine (NORVASC) 5 MG tablet     atorvastatin (LIPITOR) 20 MG tablet     cholecalciferol (VITAMIN D-1000 MAX ST) 1000 UNITS TABS     escitalopram (LEXAPRO) 10 MG tablet     pantoprazole (PROTONIX) 20 MG EC tablet     Multiple Vitamin (THERAPEUTIC MULTIVITAMIN PO)     ondansetron (ZOFRAN-ODT) 4 MG ODT tab     cetirizine (ZYRTEC) 10 MG tablet     No current facility-administered medications for this visit.        REVIEW OF SYSTEMS:  General: POSITIVE for chills/sweats/fever, difficulty sleeping, headache, recent fatigue, or weight loss.  Eyes: POSITIVE for blurred vision. Negative for crossed eyes, double vision, recent eye infections, vision flashes, or vision halos.  Ears/Nose/Mouth/Throat: POSITIVE for earache and sinus problems. Negative for bleeding gums, difficulty swallowing, ear discharge, hearing loss, hoarseness, nosebleeds, tinnitus.  Respiratory: POSITIVE for night sweats. Negative for chronic cough, coughing blood, shortness of breath, Tuberculosis, or wheezing.  Cardiovascular: POSITIVE for varicose veins. Negative for chest pain, dyspnea at night, heart murmur, palpitations, pacemaker, pacemaker, poor circulation, or swollen legs/feet.  Gastrointestinal: POSITIVE for increasing constipation and nausea. Negative for melena, hematochezia, chronic diarrhea, heartburn, Hepatitis A/B/C,  Liver Disease, or vomiting.   Genitourinary: POSITIVE for urinary incontinence and urgency. Negative for Urinary retention, genital discharge, or UTI.   Neurological: POSITIVE for headaches,  numbness of arms/legs, tingling  "in hands/arms/legs, memory problems. Negative for syncope or seizures.  Psychological: POSITIVE for anxiety, depression or restlessness. Negative for panic attacks.   Skin: POSITIVE for chronic skin itching and skin rashes/hives. Negative for color changes in hand/feet when cold, poor scarring, non-healing ulcers, unusual moles.  Musculoskeletal: POSITIVE for muscle tenderness in arms/legs. Negative for arthritis, joint swelling in hands/wrists/hips/knees/joints, or osteoporosis.  Endocrine: POSITIVE for intolerance for warm rooms and rapid weight gain/loss. Negative for excessive thirst/hunger, loss of libido, multiple broken bones, galactorrhea, or thyroid issues.  Hematologic/Lymphatic: POSITIVE for significant fatigue. Negative for easy skin bruising, prolonged bleeding, tender glands/lymph nodes.  Allergies: POSITIVE for hay fever. Negative for asthma.      PHYSICAL EXAM  /69 (BP Location: Right arm, Patient Position: Sitting, Cuff Size: Adult Regular)  Pulse 62  Ht 1.607 m (5' 3.25\")  Wt 68.3 kg (150 lb 9.6 oz)  BMI 26.47 kg/m2    General: Awake, alert, oriented. Well nourished, well developed, no acute distress.  HEENT: Head normocephalic, atraumatic. No carotid bruit. Neck supple. Good range of motion. No palpable thyroid mass.  Heart: Regular rhythm and rate. No murmurs.  Lungs: Clear to auscultation and percussion bilaterally. No rhonchi, rales or wheeze.  Abdomen: Soft, non-tender, non-distended. No hepatosplenomegaly.  Extremity: Warm with no clubbing or cyanosis. No lower extremity edema.    Neurological:  Awake, alert and oriented to date, time, place and person. Speech fluent.   Pupils equal, round, reactive to light.  Extraocular movement intact.  Visual fields are full on confrontation.  Hearing is grossly normal to finger rub.   Facial sensation intact.  Face symmetric.  Tongue midline.  Uvula elevates equally.    Motor: full strength throughout.  Sensation: intact to light touch and " pinpoint.  Deep tendon reflexes: 1+ throughout. Negative for clonus. Negative for Blanchard's sign. No dysmetria.      ASSESSMENT   78 year old right handed female with a history of Parkinson's disease.  Tremor mostly in the right side.  Dyskinesia and motor fluctuations.    Patient is undergoing DBS candidacy workup.  She is scheduled to have neuropsych evaluation.  Her medications are also being adjusted by Dr. Webb as well.    During today's visit, we discussed both phase I and II of DBS surgery.  We discussed that during Phase I, we would place the DBS electrode on the left side under MAC and microelectrode recording.  She would then return one week later for the phase II with placement of the DBS generator/battery over the left chest wall under general anesthesia.  If she is a unilateral candidate or wait and see strategy candidate, then she will undergo another evaluation/discussion prior to proceeding to the right side implantation.  If  she is a bilateral candidate in a staged fashion, she would return three weeks later for the phase I and II combined for the placement of the DBS electrode on the right side under MAC and microelectrode recording followed by connection to the previously implanted generator/battery.    She had very good questions regarding the surgical procedure and the risks involved.  She was also interested in the electrophysiology method that we use.    Briefly, following topic was discussed.    [employer]  MRI compatible.  Non-rechargeable and rechargeable generator/battery available.  4 contact electrode.  Communication method: have the  or patient controller on the skin directly over the generator/battery.    [employer]  Not yet MRI compatible.  Will become MRI compatible with retro-approval when FDA approves the device for MRI use.  Non-rechargeable generator/battery.  9 contact segmented/directional electrode.  Communication method: Wireless/bluetooth.    [employer]  Not MRI compatible.  Working on generator/battery that will be MRI compatible.  If patient gets an implant and needs an MRI in the future, when the device becomes available, patient can have the upgrade.  Rechargeable generator/battery.  8 contact electrode.  Independent current control at each contacts.  Communication method: Wireless.    I did discuss that the implant technique for these devices are relatively the same which was discussed again.  They all have similar risks associated with the surgery.    Risks, benefits, alternative therapies were discussed with the patient, including but not limited to infection and bleeding (intracranial), injury to the brain, stroke, death, hardware failure and possible need for more surgeries.  Surgical procedure was discussed in detail.  I also discussed possible use of PAULO for neuronavigation.  All questions were answered, and she expressed understanding.     A full history and physical exam was performed during today's visit.  Her case will be discussed at the Movement Disorder Consensus Group Meeting to determine whether she is a good candidate for DBS surgery.      PLAN:  1.  We will discuss her case at the Movement Disorder Consensus Group Meeting, and we will contact her regarding her DBS candidacy.     I, Demetris Morales, am serving as a scribe to document services personally performed by Murray Stearns MD, PhD, based upon my observations and the provider's statements to me. All documentation has been reviewed and edited by the aforementioned doctor prior to being entered into the official medical record.    I, Murray Stearns, attest that above named individual is acting in scribe capacity, has observed my performance of the services and has documented them in accordance with my direction. The documentation recorded by the scribe accurately reflects the service I personally performed and the decisions made by me. The document was also partially recorded  by me and the entire document was edited by me as well.      65 minutes were spent face to face with the patient of which more than 50% of the time was spent counseling and discussing the above issues regarding diagnosis, surgical and device options, and steps for further evaluation.    Again, thank you for allowing me to participate in the care of your patient.      Sincerely,    Murray Stearns MD

## 2018-03-19 NOTE — MR AVS SNAPSHOT
After Visit Summary   3/19/2018    Gabi Manley    MRN: [id number]           Patient Information     Date Of Birth          1940        Visit Information        Provider Department      3/19/2018 1:30 PM Murray Stearns MD [facility] Neurosurgery        Today's Diagnoses     Parkinson's disease (H)    -  1       Follow-ups after your visit        Your next 10 appointments already scheduled     Mar 28, 2018  7:00 AM CDT   (Arrive by 6:45 AM)   New Movement Disorder with STEFANO Villalobos [facility] Neurology ([facility])    [address] [place] [id number] [phone]            Mar 29, 2018  8:00 AM CDT   (Arrive by 7:45 AM)   Neuropsych Eval with Allison Guadarrama, PhD [facility] Neuropsychology ([facility])    [address] [place] [address] [phone]              Who to contact     Please call your clinic at [phone] to:    Ask questions about your health    Make or cancel appointments    Discuss your medicines    Learn about your test results    Speak to your doctor            Additional Information About Your Visit        [employer] Information     [employer] gives you secure access to your electronic health record. If you see a primary care provider, you can also send messages to your care team and make appointments. If you have questions, please call your primary care clinic.  If you do not have a primary care provider, please call [phone] and they will assist you.      [employer] is an electronic gateway that provides easy, online access to your medical records. With [employer] you can request a clinic appointment, read your test results, renew a prescription or communicate with your care team.     To access your existing account, please contact your [facility] or call [phone] for assistance.        Care EveryWhere ID      "This is your Care EveryWhere ID. This could be used by other organizations to access your [facility] medical records  [id number]        Your Vitals Were     Pulse Height BMI (Body Mass Index)             62 1.607 m (5' 3.25\") 26.47 kg/m2          Blood Pressure from Last 3 Encounters:   03/21/18 110/51   03/19/18 114/69   02/06/18 100/66    Weight from Last 3 Encounters:   03/21/18 68.3 kg (150 lb 9.6 oz)   03/19/18 68.3 kg (150 lb 9.6 oz)   02/21/18 71.2 kg (157 lb)              Today, you had the following     No orders found for display       Primary Care Provider Office Phone # Fax #    Tracey Abdi [phone]       [address] [address]        Equal Access to Services     ART GRAY : Hadii vinita salo Sohailey, waaxda luqadaha, qaybta kaalmada adeinesyada, nabil edwards . So [facility] [phone].    ATENCIÓN: Si habla español, tiene a concepcion disposición servicios gratuitos de asistencia lingüística. Anita al [phone].    We comply with applicable federal civil rights laws and [place] laws. We do not discriminate on the basis of race, color, national origin, age, disability, sex, sexual orientation, or gender identity.            Thank you!     Thank you for choosing [facility]  for your care. Our goal is always to provide you with excellent care. Hearing back from our patients is one way we can continue to improve our services. Please take a few minutes to complete the written survey that you may receive in the mail after your visit with us. Thank you!             Your Updated Medication List - Protect others around you: Learn how to safely use, store and throw away your medicines at www.disposemymeds.org.          This list is accurate as of 3/19/18 11:59 PM.  Always use your most recent med list.                   Brand Name Dispense Instructions for use Diagnosis    amLODIPine 5 MG tablet    NORVASC     Take 5 mg by " mouth daily        atorvastatin 20 MG tablet    LIPITOR     Take 20 mg by mouth daily        carbidopa-levodopa  MG per tablet    SINEMET    360 tablet    Take 1.5 tablets every 2 hours while awake    Parkinson disease (H)       cetirizine 10 MG tablet    zyrTEC     Take 10 mg by mouth daily        escitalopram 10 MG tablet    LEXAPRO     Take 10 mg by mouth daily        ondansetron 4 MG ODT tab    ZOFRAN-ODT     Take by mouth every 8 hours as needed for nausea        pantoprazole 20 MG EC tablet    PROTONIX     Take 20 mg by mouth daily        THERAPEUTIC MULTIVITAMIN PO      Take 1 tablet by mouth daily        VITAMIN D-1000 MAX ST 1000 UNITS Tabs   Generic drug:  cholecalciferol      Take 2,000 Units by mouth daily

## 2018-03-19 NOTE — PROGRESS NOTES
HISTORY AND PHYSICAL EXAM    Chief Complaint   Patient presents with     RECHECK     Deep Brain Stimulation; Parkinson's disease       HISTORY OF PRESENT ILLNESS  We saw Ms. Gabi Manley today in Neurosurgery Clinic today as part of her work-up for Deep Brain Stimulation.  She is a 78 year old woman with a history of Idiopathic Parkinson s disease.  Her symptoms began in 2009 when she was unable to step up into her daughter s house.  She then developed resting tremor in her hands. She had good control of her symptoms for many years, but is now having increased side-effects.  She is now having motor fluctuations with wearing off and peak dose dyskinesia.  She also has freezing gait and cramps in her left leg.  She reports that her tremor is mostly on her right side.  Recently, Dr. Webb has changed her carbidopa/levodopa from long acting to immediate release every 2 hours.  She is telling me that she feels worse with this and she thinks that her dyskinesia is worse.        Past Medical History:   Diagnosis Date     Hypertension      Parkinson's disease (H)        History reviewed. No pertinent surgical history.    History reviewed. No pertinent family history.    Social History     Social History     Marital status:      Spouse name: N/A     Number of children: N/A     Years of education: N/A     Occupational History     Not on file.     Social History Main Topics     Smoking status: Never Smoker     Smokeless tobacco: Never Used     Alcohol use No     Drug use: No     Sexual activity: Not Currently     Other Topics Concern     Not on file     Social History Narrative          Allergies   Allergen Reactions     No Clinical Screening - See Comments Shortness Of Breath     Erythromycin      Other reaction(s): Unknown     Ioxaglate Other (See Comments)     Angioedema     Paroxetine      Other reaction(s): Unknown     Penicillins      Facial swelling     Trazodone      Other reaction(s): Unknown     Vancomycin  Hives       Current Outpatient Prescriptions   Medication     carbidopa-levodopa (SINEMET)  MG per tablet     amLODIPine (NORVASC) 5 MG tablet     atorvastatin (LIPITOR) 20 MG tablet     cholecalciferol (VITAMIN D-1000 MAX ST) 1000 UNITS TABS     escitalopram (LEXAPRO) 10 MG tablet     pantoprazole (PROTONIX) 20 MG EC tablet     Multiple Vitamin (THERAPEUTIC MULTIVITAMIN PO)     ondansetron (ZOFRAN-ODT) 4 MG ODT tab     cetirizine (ZYRTEC) 10 MG tablet     No current facility-administered medications for this visit.        REVIEW OF SYSTEMS:  General: POSITIVE for chills/sweats/fever, difficulty sleeping, headache, recent fatigue, or weight loss.  Eyes: POSITIVE for blurred vision. Negative for crossed eyes, double vision, recent eye infections, vision flashes, or vision halos.  Ears/Nose/Mouth/Throat: POSITIVE for earache and sinus problems. Negative for bleeding gums, difficulty swallowing, ear discharge, hearing loss, hoarseness, nosebleeds, tinnitus.  Respiratory: POSITIVE for night sweats. Negative for chronic cough, coughing blood, shortness of breath, Tuberculosis, or wheezing.  Cardiovascular: POSITIVE for varicose veins. Negative for chest pain, dyspnea at night, heart murmur, palpitations, pacemaker, pacemaker, poor circulation, or swollen legs/feet.  Gastrointestinal: POSITIVE for increasing constipation and nausea. Negative for melena, hematochezia, chronic diarrhea, heartburn, Hepatitis A/B/C,  Liver Disease, or vomiting.   Genitourinary: POSITIVE for urinary incontinence and urgency. Negative for Urinary retention, genital discharge, or UTI.   Neurological: POSITIVE for headaches,  numbness of arms/legs, tingling in hands/arms/legs, memory problems. Negative for syncope or seizures.  Psychological: POSITIVE for anxiety, depression or restlessness. Negative for panic attacks.   Skin: POSITIVE for chronic skin itching and skin rashes/hives. Negative for color changes in hand/feet when cold, poor  "scarring, non-healing ulcers, unusual moles.  Musculoskeletal: POSITIVE for muscle tenderness in arms/legs. Negative for arthritis, joint swelling in hands/wrists/hips/knees/joints, or osteoporosis.  Endocrine: POSITIVE for intolerance for warm rooms and rapid weight gain/loss. Negative for excessive thirst/hunger, loss of libido, multiple broken bones, galactorrhea, or thyroid issues.  Hematologic/Lymphatic: POSITIVE for significant fatigue. Negative for easy skin bruising, prolonged bleeding, tender glands/lymph nodes.  Allergies: POSITIVE for hay fever. Negative for asthma.      PHYSICAL EXAM  /69 (BP Location: Right arm, Patient Position: Sitting, Cuff Size: Adult Regular)  Pulse 62  Ht 1.607 m (5' 3.25\")  Wt 68.3 kg (150 lb 9.6 oz)  BMI 26.47 kg/m2    General: Awake, alert, oriented. Well nourished, well developed, no acute distress.  HEENT: Head normocephalic, atraumatic. No carotid bruit. Neck supple. Good range of motion. No palpable thyroid mass.  Heart: Regular rhythm and rate. No murmurs.  Lungs: Clear to auscultation and percussion bilaterally. No rhonchi, rales or wheeze.  Abdomen: Soft, non-tender, non-distended. No hepatosplenomegaly.  Extremity: Warm with no clubbing or cyanosis. No lower extremity edema.    Neurological:  Awake, alert and oriented to date, time, place and person. Speech fluent.   Pupils equal, round, reactive to light.  Extraocular movement intact.  Visual fields are full on confrontation.  Hearing is grossly normal to finger rub.   Facial sensation intact.  Face symmetric.  Tongue midline.  Uvula elevates equally.    Motor: full strength throughout.  Sensation: intact to light touch and pinpoint.  Deep tendon reflexes: 1+ throughout. Negative for clonus. Negative for Blanchard's sign. No dysmetria.      ASSESSMENT   78 year old right handed female with a history of Parkinson's disease.  Tremor mostly in the right side.  Dyskinesia and motor fluctuations.    Patient is " undergoing DBS candidacy workup.  She is scheduled to have neuropsych evaluation.  Her medications are also being adjusted by Dr. Webb as well.    During today's visit, we discussed both phase I and II of DBS surgery.  We discussed that during Phase I, we would place the DBS electrode on the left side under MAC and microelectrode recording.  She would then return one week later for the phase II with placement of the DBS generator/battery over the left chest wall under general anesthesia.  If she is a unilateral candidate or wait and see strategy candidate, then she will undergo another evaluation/discussion prior to proceeding to the right side implantation.  If she is a bilateral candidate in a staged fashion, she would return three weeks later for the phase I and II combined for the placement of the DBS electrode on the right side under MAC and microelectrode recording followed by connection to the previously implanted generator/battery.    She had very good questions regarding the surgical procedure and the risks involved.  She was also interested in the electrophysiology method that we use.    Briefly, following topic was discussed.    [employer]  MRI compatible.  Non-rechargeable and rechargeable generator/battery available.  4 contact electrode.  Communication method: have the  or patient controller on the skin directly over the generator/battery.    [employer]  Not yet MRI compatible.  Will become MRI compatible with retro-approval when FDA approves the device for MRI use.  Non-rechargeable generator/battery.  9 contact segmented/directional electrode.  Communication method: Wireless/bluetooth.    [employer]  Not MRI compatible.  Working on generator/battery that will be MRI compatible.  If patient gets an implant and needs an MRI in the future, when the device becomes available, patient can have the upgrade.  Rechargeable generator/battery.  8 contact electrode.  Independent current control  at each contacts.  Communication method: Wireless.    I did discuss that the implant technique for these devices are relatively the same which was discussed again.  They all have similar risks associated with the surgery.    Risks, benefits, alternative therapies were discussed with the patient, including but not limited to infection and bleeding (intracranial), injury to the brain, stroke, death, hardware failure and possible need for more surgeries.  Surgical procedure was discussed in detail.  I also discussed possible use of PAULO for neuronavigation.  All questions were answered, and she expressed understanding.     A full history and physical exam was performed during today's visit.  Her case will be discussed at the Movement Disorder Consensus Group Meeting to determine whether she is a good candidate for DBS surgery.      PLAN:  1.  We will discuss her case at the Movement Disorder Consensus Group Meeting, and we will contact her regarding her DBS candidacy.     IDemetris, am serving as a scribe to document services personally performed by Murray Stearns MD, PhD, based upon my observations and the provider's statements to me. All documentation has been reviewed and edited by the aforementioned doctor prior to being entered into the official medical record.    I, Murray Stearns, attest that above named individual is acting in scribe capacity, has observed my performance of the services and has documented them in accordance with my direction. The documentation recorded by the scribe accurately reflects the service I personally performed and the decisions made by me. The document was also partially recorded by me and the entire document was edited by me as well.      65 minutes were spent face to face with the patient of which more than 50% of the time was spent counseling and discussing the above issues regarding diagnosis, surgical and device options, and steps for further evaluation.

## 2018-03-21 NOTE — LETTER
3/21/2018       RE: Gabi Manley  [address]   [place] [address]     Dear Colleague,    Thank you for referring your patient, Gabi Manley, to the [place] [facility] at [facility]. Please see a copy of my visit note below.    Movement Disorder Clinic follow up note    Patient: Gabi Manley  Medical Record Number: [id number]  Encounter Date: March 21, 2018  PCP:Tracey Abdi    CC: Parkinson's disease    Impression:   #1 Idiopathic Parkinson's disease  #2 Motor flucuations with peak dose dyskinesia and wearing off  #3 Levodopa induced side-effects (dyskinesia, nausea, headache)    Recommendations:  #1 Reduce carbidopa/levodopa 25/100 IR to 1 and 1/4 tablets every two hours while awake  #2 Start DBS work-up next week  #3 See back after DBS consensus conference to review her DBS decision    Return to clinic: After DBS consensus conference    Interval Hx:: Ms. Gabi Manley reports that she is about the same.  We had asked her to try carbidopa/levodopa 1 tablet of 25 100 immediate release every 2 hours.  She cannot recall if she did this.  She may have tried 1 dose of 1-1/4 tablets but she does not know its effect.  She went back to 1-1/2 tablets every 2 hours.  Has at last visit she has mixed feelings about the carbidopa levodopa immediate release.  She has more energy.  She says she is more like her old self.  She is more motivated and doing more things.  However the medicine clearly has more side effects.  She has dyskinesia which is troublesome.  The medicine causes nausea and headache.  Because she had severe headaches we did an MRI expediting this in her DBS workup.  I reviewed this with her today.  This shows age-related atrophy as expected but there are no space-occupying lesions or mass lesions.  There is no lesion to be concerned about.  I asked her if she would like to go back on her carbidopa levodopa ER.  She feels she was too tired on this and at this point  "she would like to continue her immediately released regimen.  She will try to definitely go back to 1-1/4 tablets every 2 hours while awake.    Her DBS workup will start next week.  She will meet Dr. Stearns and get neuropsychometric testing.  She is confident that she will pass any memory testing.  I told her I would meet with her after the DBS consensus conference so that I could talk to her and her son and make sure that they understand all the recommendations and make an informed choice.    Current medications  Current Outpatient Prescriptions   Medication     carbidopa-levodopa (SINEMET)  MG per tablet     amLODIPine (NORVASC) 5 MG tablet     atorvastatin (LIPITOR) 20 MG tablet     cholecalciferol (VITAMIN D-1000 MAX ST) 1000 UNITS TABS     escitalopram (LEXAPRO) 10 MG tablet     pantoprazole (PROTONIX) 20 MG EC tablet     Multiple Vitamin (THERAPEUTIC MULTIVITAMIN PO)     ondansetron (ZOFRAN-ODT) 4 MG ODT tab     cetirizine (ZYRTEC) 10 MG tablet     No current facility-administered medications for this visit.      Movement Disorders Medications   6 8 10 12 2 4 6 8 10      Carbidopa/levodopa 25/100 IR 1.5 1.5 1.5 1.5 1.5 1.5 1.5 1.5 1.5                                        Examination:  /51  Pulse 62  Temp 98.2  F (36.8  C) (Oral)  Resp 24  Ht 1.607 m (5' 3.25\")  Wt 68.3 kg (150 lb 9.6 oz)  SpO2 99%  Breastfeeding? No  BMI 26.47 kg/m2  General- no distress, no rash, good pulses, no edema  Respiratory-auscultation over the anterior lung fields is clear  Cardiac- regular rate and rhythm    Neurologic  Mental status  Patient is alert, appropriate, speech is fluent and comprehension is intact    Cranial nerve testing  Pupils are equal and reactive to light, visual fields are full to confrontation bilaterally  Extraocular movements are full  Facial sensation is intact, face is symmetric with rest and activation  Palate rises symmetrically, tongue protrudes at midline with normal " movements  Sterocleidomastoid and trapezius strength is normal and symmetric    Motor  Bulk and tone are normal  Strength is 5/5 shoulder abduction, finger abduction, arm flexion and extension, hip flexion, plantar and dorsiflexion    Sensation  Intact to pin, vibration   Proprioception intact in great toes and fingers    Tendon reflexes  Testing at brachioradialis, biceps, triceps, patella and achilles bilaterally showed normal reflexes, symmetrically, without Babinski or Blanchard signs    Coordination  Finger nose finger testing: normal See UPDRS flowsheet  UPDRS part     Patient seen for 35 minutes over 50% of which was counseling      Again, thank you for allowing me to participate in the care of your patient.      Sincerely,    Farshad Webb MD

## 2018-03-21 NOTE — PROGRESS NOTES
Movement Disorder Clinic follow up note    Patient: Gabi Manley  Medical Record Number: [id number]  Encounter Date: March 21, 2018  PCP:Tracey Abdi    CC: Parkinson's disease    Impression:   #1 Idiopathic Parkinson's disease  #2 Motor flucuations with peak dose dyskinesia and wearing off  #3 Levodopa induced side-effects (dyskinesia, nausea, headache)    Recommendations:  #1 Reduce carbidopa/levodopa 25/100 IR to 1 and 1/4 tablets every two hours while awake  #2 Start DBS work-up next week  #3 See back after DBS consensus conference to review her DBS decision    Return to clinic: After DBS consensus conference    Interval Hx:: Ms. Gabi Manley reports that she is about the same.  We had asked her to try carbidopa/levodopa 1 tablet of 25 100 immediate release every 2 hours.  She cannot recall if she did this.  She may have tried 1 dose of 1-1/4 tablets but she does not know its effect.  She went back to 1-1/2 tablets every 2 hours.  Has at last visit she has mixed feelings about the carbidopa levodopa immediate release.  She has more energy.  She says she is more like her old self.  She is more motivated and doing more things.  However the medicine clearly has more side effects.  She has dyskinesia which is troublesome.  The medicine causes nausea and headache.  Because she had severe headaches we did an MRI expediting this in her DBS workup.  I reviewed this with her today.  This shows age-related atrophy as expected but there are no space-occupying lesions or mass lesions.  There is no lesion to be concerned about.  I asked her if she would like to go back on her carbidopa levodopa ER.  She feels she was too tired on this and at this point she would like to continue her immediately released regimen.  She will try to definitely go back to 1-1/4 tablets every 2 hours while awake.    Her DBS workup will start next week.  She will meet Dr. Stearns and get neuropsychometric testing.  She is confident that she will pass  "any memory testing.  I told her I would meet with her after the DBS consensus conference so that I could talk to her and her son and make sure that they understand all the recommendations and make an informed choice.    Current medications  Current Outpatient Prescriptions   Medication     carbidopa-levodopa (SINEMET)  MG per tablet     amLODIPine (NORVASC) 5 MG tablet     atorvastatin (LIPITOR) 20 MG tablet     cholecalciferol (VITAMIN D-1000 MAX ST) 1000 UNITS TABS     escitalopram (LEXAPRO) 10 MG tablet     pantoprazole (PROTONIX) 20 MG EC tablet     Multiple Vitamin (THERAPEUTIC MULTIVITAMIN PO)     ondansetron (ZOFRAN-ODT) 4 MG ODT tab     cetirizine (ZYRTEC) 10 MG tablet     No current facility-administered medications for this visit.      Movement Disorders Medications   6 8 10 12 2 4 6 8 10      Carbidopa/levodopa 25/100 IR 1.5 1.5 1.5 1.5 1.5 1.5 1.5 1.5 1.5                                        Examination:  /51  Pulse 62  Temp 98.2  F (36.8  C) (Oral)  Resp 24  Ht 1.607 m (5' 3.25\")  Wt 68.3 kg (150 lb 9.6 oz)  SpO2 99%  Breastfeeding? No  BMI 26.47 kg/m2  General- no distress, no rash, good pulses, no edema  Respiratory-auscultation over the anterior lung fields is clear  Cardiac- regular rate and rhythm    Neurologic  Mental status  Patient is alert, appropriate, speech is fluent and comprehension is intact    Cranial nerve testing  Pupils are equal and reactive to light, visual fields are full to confrontation bilaterally  Extraocular movements are full  Facial sensation is intact, face is symmetric with rest and activation  Palate rises symmetrically, tongue protrudes at midline with normal movements  Sterocleidomastoid and trapezius strength is normal and symmetric    Motor  Bulk and tone are normal  Strength is 5/5 shoulder abduction, finger abduction, arm flexion and extension, hip flexion, plantar and dorsiflexion    Sensation  Intact to pin, vibration   Proprioception intact " in great toes and fingers    Tendon reflexes  Testing at brachioradialis, biceps, triceps, patella and achilles bilaterally showed normal reflexes, symmetrically, without Babinski or Blanchard signs    Coordination  Finger nose finger testing: normal See UPDRS flowsheet  UPDRS part     Patient seen for 35 minutes over 50% of which was counseling

## 2018-03-21 NOTE — NURSING NOTE
Chief Complaint   Patient presents with     RECHECK     [facility]- MOVEMENT DISORDER, 4 WEEKS F/U     Ignacio Hernandez, CMA

## 2018-03-21 NOTE — MR AVS SNAPSHOT
After Visit Summary   3/21/2018    Gabi Manley    MRN: [id number]           Patient Information     Date Of Birth          1940        Visit Information        Provider Department      3/21/2018 2:00 PM Farshad Webb MD [facility] Neurology        Today's Diagnoses     Parkinson's disease (H)    -  1      Care Instructions    #1  Reduce Carbidopa/levodopa 25/100 1 and 1/4 tabs every 2 hours while awake  #2  Will see back after DBS consensus conference.          Follow-ups after your visit        Follow-up notes from your care team     Return if symptoms worsen or fail to improve.      Your next 10 appointments already scheduled     Mar 28, 2018  7:00 AM CDT   (Arrive by 6:45 AM)   New Movement Disorder with STEFANO Villalobos [facility] Neurology ([facility])    [address] [place] [address] [phone]            Mar 29, 2018  8:00 AM CDT   (Arrive by 7:45 AM)   Neuropsych Eval with Allison Guadarrama, PhD [facility] Neuropsychology ([facility])    [address] [place] [address] [phone]              Who to contact     Please call your clinic at [phone] to:    Ask questions about your health    Make or cancel appointments    Discuss your medicines    Learn about your test results    Speak to your doctor            Additional Information About Your Visit        Pago[employer]ilhart Information     Zinio gives you secure access to your electronic health record. If you see a primary care provider, you can also send messages to your care team and make appointments. If you have questions, please call your primary care clinic.  If you do not have a primary care provider, please call [phone] and they will assist you.      Zinio is an electronic gateway that provides easy, online access to your medical records. With Zinio, you can request a clinic appointment, read  "your test results, renew a prescription or communicate with your care team.     To access your existing account, please contact your [facility] or call [phone] for assistance.        Care EveryWhere ID     This is your Care EveryWhere ID. This could be used by other organizations to access your [place] medical records  [id number]        Your Vitals Were     Pulse Temperature Respirations Height Pulse Oximetry Breastfeeding?    62 98.2  F (36.8  C) (Oral) 24 1.607 m (5' 3.25\") 99% No    BMI (Body Mass Index)                   26.47 kg/m2            Blood Pressure from Last 3 Encounters:   03/21/18 110/51   03/19/18 114/69   02/06/18 100/66    Weight from Last 3 Encounters:   03/21/18 68.3 kg (150 lb 9.6 oz)   03/19/18 68.3 kg (150 lb 9.6 oz)   02/21/18 71.2 kg (157 lb)              Today, you had the following     No orders found for display       Primary Care Provider Office Phone # Fax #    Tracey Abdi [phone]       [place] [address]        Equal Access to Services     ART GRAY : Hadii vinita ku jonelleo Sodemetriusali, waaxda luqadaha, qaybta kaalmada adeegyada, nabil ramsay. So [facility] [phone].    ATENCIÓN: Si habla español, tiene a concepcion disposición servicios gratuitos de asistencia lingüística. [facility] [phone].    We comply with applicable federal civil rights laws and [place] laws. We do not discriminate on the basis of race, color, national origin, age, disability, sex, sexual orientation, or gender identity.            Thank you!     Thank you for choosing [facility] NEUROLOGY  for your care. Our goal is always to provide you with excellent care. Hearing back from our patients is one way we can continue to improve our services. Please take a few minutes to complete the written survey that you may receive in the mail after your visit with us. Thank you!             Your Updated " Medication List - Protect others around you: Learn how to safely use, store and throw away your medicines at www.disposemymeds.org.          This list is accurate as of 3/21/18  2:55 PM.  Always use your most recent med list.                   Brand Name Dispense Instructions for use Diagnosis    amLODIPine 5 MG tablet    NORVASC     Take 5 mg by mouth daily        atorvastatin 20 MG tablet    LIPITOR     Take 20 mg by mouth daily        carbidopa-levodopa  MG per tablet    SINEMET    360 tablet    Take 1.5 tablets every 2 hours while awake    Parkinson disease (H)       cetirizine 10 MG tablet    zyrTEC     Take 10 mg by mouth daily        escitalopram 10 MG tablet    LEXAPRO     Take 10 mg by mouth daily        ondansetron 4 MG ODT tab    ZOFRAN-ODT     Take by mouth every 8 hours as needed for nausea        pantoprazole 20 MG EC tablet    PROTONIX     Take 20 mg by mouth daily        THERAPEUTIC MULTIVITAMIN PO      Take 1 tablet by mouth daily        VITAMIN D-1000 MAX ST 1000 UNITS Tabs   Generic drug:  cholecalciferol      Take 2,000 Units by mouth daily

## 2018-03-21 NOTE — PATIENT INSTRUCTIONS
#1  Reduce Carbidopa/levodopa 25/100 1 and 1/4 tabs every 2 hours while awake  #2  Will see back after DBS consensus conference.

## 2018-03-29 NOTE — PROGRESS NOTES
The patient was seen for neuropsychological evaluation at the request of Farshad Webb for the purposes of diagnostic clarification and treatment planning.  Three hours of face-to-face testing were provided by this writer.  Please see Dr. Allison Gudaarrama's report for a full interpretation of the findings.

## 2018-03-29 NOTE — MR AVS SNAPSHOT
After Visit Summary   3/29/2018    Gabi aMnley    MRN: [id number]           Patient Information     Date Of Birth          1940        Visit Information        Provider Department      3/29/2018 8:00 AM Allison Guadarrama, PhD [facility] Neuropsychology        Today's Diagnoses     Parkinson's disease (H)    -  1    Mental disorder due to general medical condition           Follow-ups after your visit        Your next 10 appointments already scheduled     Apr 18, 2018  9:30 AM CDT   (Arrive by 9:15 AM)   Return Movement Disorder with Pearl King MD   [facility] Neurology ([facility])    [address] [place] [address]  3rd [facility] [address] [phone]              Who to contact     Please call your clinic at [phone] to:    Ask questions about your health    Make or cancel appointments    Discuss your medicines    Learn about your test results    Speak to your doctor            Additional Information About Your Visit        MyChart Information     [email or web address] gives you secure access to your electronic health record. If you see a primary care provider, you can also send messages to your care team and make appointments. If you have questions, please call your primary care clinic.  If you do not have a primary care provider, please call [phone] and they will assist you.      [email or web address] is an electronic gateway that provides easy, online access to your medical records. With [email or web address], you can request a clinic appointment, read your test results, renew a prescription or communicate with your care team.     To access your existing account, please contact your [facility] or call [phone] for assistance.        Care EveryWhere ID     This is your Care EveryWhere ID. This could be used by other organizations to access your [place] medical records  [id number]         Blood Pressure from Last 3 Encounters:   03/21/18 110/51    03/19/18 114/69   02/06/18 100/66    Weight from Last 3 Encounters:   03/21/18 68.3 kg (150 lb 9.6 oz)   03/19/18 68.3 kg (150 lb 9.6 oz)   02/21/18 71.2 kg (157 lb)              We Performed the Following     [id number] TESTING, PER HR/PSYCHOLOGIST     NEUROPSYCH TESTING BY [facility]        Primary Care Provider Office Phone # Fax #    Tracey AbdiAARON [phone]       [address] [address]        Equal Access to Services     [facility]: Hadii aad ku hadasho Soomaali, waaxda luqadaha, qaybta kaalmada adeej, nabil edwards . So [facility] [phone].    ATENCIÓN: Si habla español, tiene a concepcion disposición servicios gratuitos de asistencia lingüística. [facility] [phone].    We comply with applicable federal civil rights laws and [place] laws. We do not discriminate on the basis of race, color, national origin, age, disability, sex, sexual orientation, or gender identity.            Thank you!     Thank you for choosing [facility] NEUROPSYCHOLOGY  for your care. Our goal is always to provide you with excellent care. Hearing back from our patients is one way we can continue to improve our services. Please take a few minutes to complete the written survey that you may receive in the mail after your visit with us. Thank you!             Your Updated Medication List - Protect others around you: Learn how to safely use, store and throw away your medicines at www.disposemymeds.org.          This list is accurate as of 3/29/18 11:59 PM.  Always use your most recent med list.                   Brand Name Dispense Instructions for use Diagnosis    amLODIPine 5 MG tablet    NORVASC     Take 5 mg by mouth daily        atorvastatin 20 MG tablet    LIPITOR     Take 20 mg by mouth daily        carbidopa-levodopa  MG per tablet    SINEMET    360 tablet    Take 1.5 tablets every 2 hours while awake    Parkinson disease (H)       cetirizine 10 MG  tablet    zyrTEC     Take 10 mg by mouth daily        escitalopram 10 MG tablet    LEXAPRO     Take 10 mg by mouth daily        ondansetron 4 MG ODT tab    ZOFRAN-ODT     Take by mouth every 8 hours as needed for nausea        pantoprazole 20 MG EC tablet    PROTONIX     Take 20 mg by mouth daily        THERAPEUTIC MULTIVITAMIN PO      Take 1 tablet by mouth daily        VITAMIN D-1000 MAX ST 1000 units Tabs   Generic drug:  cholecalciferol      Take 2,000 Units by mouth daily

## 2018-03-30 NOTE — TELEPHONE ENCOUNTER
Spoke with pt to let her know that I talked with NP Eileen Anton, who told me pt mixed up her appointment time and that she was unable to see her. I let patient know I am looking for day to reschedule her but I don't see openings in April for the motor testing. I will work with the providers to see if we can find a date/time before May and I will probably have a little more information next week. I really just wanted pt to know she is on my radar for rescheduling.  Pt appreciative and in agreement with plan. No further questions at this time.

## 2018-03-30 NOTE — TELEPHONE ENCOUNTER
Left  for pt offering to reschedule motor testing on 4/18/18 from 9:30 to 11:30 with Dr. King. Left my direct number and will await return call.

## 2018-04-13 NOTE — TELEPHONE ENCOUNTER
"                                                      Deep Brain Stimulation Surgery Surgical Candidacy Form    Referring Provider: Dr. Yovany Lester    Patient Information: Lives in [place]  Name: Gabi Manley  YOB: 1940  Age: 78 year old  Height: 5'3.25\"  Weight:150 lbs. 9.6 oz  BMI: 26.52  Blood Pressure: 110/51  Diagnosis: Parkinson's disease  Age of Onset of Symptoms: 69. Year of Onset of Symptoms: 2009.  Age of Diagnosis: 70. Year of Diagnosis: 2010.  Handedness: Right.  Side of Onset: Note states it started with both hands resting tremor and unnamed foot initiation   Disease Features: Dyskinesias, Fluctuations and Tremor   Surgery Goals: Other: She wants to increase her energy. Decrease tremor and dyskinesias.   Medications: As of 4/13/18  Current Outpatient Prescriptions   Medication Sig Dispense Refill     carbidopa-levodopa (SINEMET)  MG per tablet Take 1.5 tablets every 2 hours while awake 360 tablet 3     amLODIPine (NORVASC) 5 MG tablet Take 5 mg by mouth daily       atorvastatin (LIPITOR) 20 MG tablet Take 20 mg by mouth daily       cholecalciferol (VITAMIN D-1000 MAX ST) 1000 UNITS TABS Take 2,000 Units by mouth daily        escitalopram (LEXAPRO) 10 MG tablet Take 10 mg by mouth daily       pantoprazole (PROTONIX) 20 MG EC tablet Take 20 mg by mouth daily       Multiple Vitamin (THERAPEUTIC MULTIVITAMIN PO) Take 1 tablet by mouth daily       ondansetron (ZOFRAN-ODT) 4 MG ODT tab Take by mouth every 8 hours as needed for nausea       cetirizine (ZYRTEC) 10 MG tablet Take 10 mg by mouth daily       PD Medications                   6 am 8am  10am  noon 2pm 4pm  6pm 8pm   Sinemet 25/100mg                                                 1.25 tab 1.25 tab 1.25 tab 1.25 tab 1.25 tab 1.25 tab 1.25 tab 1.25 tab      Motor Assessment: 4/18/18 Dr. King  ON: 15  OFF: 40  % Change: 62.5%   Neuropsychological Evaluation:3/29/18    Cognitive Issues: Results indicate relative weaknesses in " learning and retrieval of learned information, word retrieval, complex attention, and executive functioning. The findings do not reflect dementia at this time and are only subtly abnormal. Compared to an outside evaluation on 10/7/2015, she has had slight decline in complex attention, memory, and fluency.    Psychiatric Issues: She has a longstanding history of depression and is endorsing mild depressive symptomatology currently, managed by her PCP. She had moved to an assisted living apartment a few years ago, but the assisted living components were discontinued for the apartment complex, so she will be moving in the near future. She has a good support system in her family.    Depression: Mild depressive symptoms.    Cognitive Function: Mild memory difficulties or frontal deficits.    Psychosis: No hallucinations.     MRI Date: 3/12/18    Impression:   1. No structural abnormality noted.  2.  Absence of swallow tail sign has been associated with Parkinson's  disease.    3. Small vessel ischemic disease and mild cerebral volume loss     PMH: -  Past Medical History:   Diagnosis Date     Hypertension      Parkinson's disease (H)      No past surgical history on file.    Soc Hx:  reports that she has never smoked. She has never used smokeless tobacco. She reports that she does not drink alcohol or use illicit drugs. Plays the organ and likes the computer.    Family Hx of Movement Disorders: family history is not on file.    Consult Dr. Webb   Patient discussed at conference on: 18     DBS Meeting Notes/Further Work-up Needed: -  Yuliana Menezes, documentation  From the 18 Consensus meetin. Yes Candidate  2. Bilateral staged Gpi Left side first.  3. Domperidone trial 10 mg TID, [facility] website info - treats constipation too - [facility] rx  4. Abbott Infinity 7    Pearl King MD documentation 18  I spoke with Ms. Manley to let her know the results of our DBS consensus meeting. The group felt  she would be a good candidate for staged bilateral GPi DBS with the Abbott lead. The plan is to start with the left GPi and she agreed she would want her right body treated first.     She feels even in the last one week that her PD symptoms including right hand tremor have been less severe which she attributes to being under less stress. She moved last week into an assisted living and is very happy there.     She has been eating candies which have been helping with nausea. I mentioned to her that the group had also recommended that she could consider domperidone up to 10 mg three times a day to help if the nausea does not improve with conservative measures or supplemental carbidopa. I did inform her of the following: [[place] Boxed Warning]: Domperidone may be associated with an increased risk of serious ventricular arrhythmias or sudden cardiac death, particularly with doses >30 mg or when used in patients >60 years of age. Use the lowest possible dose for the shortest duration necessary.      She would be interested in discussing scheduling her DBS surgery, although may want more time before actually scheduling the surgery. I will also have our clinic schedulers contact her to schedule followup with Dr. Webb in 3 months.

## 2018-04-17 NOTE — PROGRESS NOTES
[facility]    HPI:  Ms. Manley is a right handed woman with PD who presents for videotaped assessments. In August 2009, she noticed that she could not step up into her daughter's house. Shortly after that, she developed tremor of right then left hands that was a resting tremor. She was started on carbidopa-levodopa around 2010.  She was recently changed from extended release Sinemet to immediate release Sinemet in February.  She reports significant improvement in her mobility and amount of energy she has during the day since this change.  The Sinemet dose was decreased from 1.5 tablets every 2 hours to 1.25 tablets every 2 hours with significant improvement in daytime fatigue, increased ability to sleep at night, and less dyskinesia.    She had difficulty saying how long it takes for her medications to kick in.  Earlier in the day she feels she has increased on time.  Later in the day she has noticed that her Sinemet will wear off approximately 30 minutes prior to her next dose, with increased tremors, increased slowness of movement, and more difficulty walking.    She has lost 20 lbs since Feb (150's to 130's lbs) in the setting of having significant nausea related to Sinemet IR.  This nausea is not present every day but when it is present it is severe.  The nausea tends to be worse in the evening hours.    Her goals for surgery include reducing tremor, increasing On time, and having more energy during the day.    She lives in an assisted living facility, where she gets help with cooking and chores.    UDPRS Part I and II  Part I   1.1 Cognitive impairment: 1: Slight: Impairment appreciated by patient or caregiver with no concrete interference with the patient's ability to carry out normal activities and social interactions.This is mainly when she is off medications.  1.2 Hallucinations and psychosis: 0: Normal: No hallucinations or psychotic behaviour.   1.3 Depressed mood: 1: Slight: Episodes of  depressed mood that are not sustained for more than one day at a time. No interference with patient s ability to carry out normal activities and social interactions.   1.4 Anxious mood: 0: Normal: No anxious feelings.   1.5 Apathy: 1: Slight: Apathy appreciated by patient and/or caregiver, but no interference with daily activities and social interactions. This is much improved since adjusting the Sinemet.   1.6 Features of DDS: 0: Normal: No problems present.   1.7 Sleep problems: 2: Mild: Sleep problems usually cause some difficulties getting a full night of sleep. Now able to sleep about 4 hours at a time.   1.8 Daytime sleepiness: 1: Slight: Daytime sleepiness occurs but I can resist and I stay awake.   1.9 Pain and other sensations: 1: Slight: I have these feelings. However, I can do things and be with other people without difficulty. She does have chronic low back pain.   1.10 Urinary problems: 4: Severe: I cannot control my urine and use a protective garment or have a bladder tube.   1.11 Constipation problems: 1: Slight: I have been constipated. I use extra effort to move my bowels. However, this problem does not disturb my activities or my being comfortable. She has been doing dietary.   1.12 Light headedness on standin: Normal: No dizzy or foggy feelings.   1.13 Fatigue:1: Slight: Fatigue occurs. However it does not cause me troubles doing things or being with people.   Total:    Part II   2.1 Speech: 2: Mild: My speech causes people to ask me to occasionally repeat myself, but not everyday. She recently volunteered to be the .   2.2 Saliva and droolin: Normal: Not at all (no problems).   2.3 Chewing and swallowin: Slight: I am aware of slowness in my chewing or increased effort at swallowing, but I do not choke or need to have my food specially prepared.  2.4 Eating tasks: 2: Mild: I am slow with my eating and have occasional food spills. I may need help with a few tasks such  as cutting meat.   2.5 Dressing: 3: Moderate: I need help for many dressing tasks.   2.6 Hygiene: 1: Slight: I am slow but I do not need any help.   2.7 Handwriting: 3: Moderate: Many words are unclear and difficult to read but this is only when tremor is present  2.8 Doing hobbies and other activities: 2: Mild: I have some difficulty doing these activities.   2.9 Turning in bed:1: Slight: I have a bit of trouble turning, but I do not need any help.   2.10 Tremor: 3: Moderate: Shaking or tremor causes problems with many of my daily activities.  2.11 Getting out of bed: 2: Mild: I need more than one try to get up or need occasional help.   2.12 Walking and balance: 3: Moderate: I usually use a walking aid (cane, walker) to walk safely without falling. However, I do not usually need the support of another person.   2.13 Freezin: Mild: I freeze and have trouble starting to walk again, but I do not need someone s help or a walking aid (cane or walker) because of freezing.   Total:   Total Part I and II:     PD Medications                   6 am 8am  10am  noon 2pm 4pm  6pm 8pm   Sinemet 25/100mg                                                 1.25 tab 1.25 tab 1.25 tab 1.25 tab 1.25 tab 1.25 tab 1.25 tab 1.25 tab                                                                                        Current Outpatient Prescriptions   Medication Sig Dispense Refill     amLODIPine (NORVASC) 5 MG tablet Take 5 mg by mouth daily       atorvastatin (LIPITOR) 20 MG tablet Take 20 mg by mouth daily       carbidopa-levodopa (SINEMET)  MG per tablet Patient is taking 1.25 tablets every two hours up to 12 tablets per day. 360 tablet 3     cetirizine (ZYRTEC) 10 MG tablet Take 10 mg by mouth daily       cholecalciferol (VITAMIN D-1000 MAX ST) 1000 UNITS TABS Take 2,000 Units by mouth daily        escitalopram (LEXAPRO) 10 MG tablet Take 10 mg by mouth daily       Multiple Vitamin (THERAPEUTIC MULTIVITAMIN PO)  "Take 1 tablet by mouth daily       ondansetron (ZOFRAN-ODT) 4 MG ODT tab Take by mouth every 8 hours as needed for nausea       pantoprazole (PROTONIX) 20 MG EC tablet Take 20 mg by mouth daily       [DISCONTINUED] carbidopa-levodopa (SINEMET)  MG per tablet Take 1.5 tablets every 2 hours while awake 360 tablet 3     [DISCONTINUED] carbidopa-levodopa (SINEMET)  MG per tablet Take 2 tablets by mouth once for 1 dose 1.5 tablet 0        Allergies   Allergen Reactions     No Clinical Screening - See Comments Shortness Of Breath     Erythromycin      Other reaction(s): Unknown     Ioxaglate Other (See Comments)     Angioedema     Paroxetine      Other reaction(s): Unknown     Penicillins      Facial swelling     Trazodone      Other reaction(s): Unknown     Vancomycin Hives      ROS:  A complete ROS was obtained and is negative except as per HPI.     Patient Active Problem List   Diagnosis     Allergic rhinitis     Carpal tunnel syndrome of left wrist     Other diseases of lung, not elsewhere classified     Esophageal reflux     Female stress incontinence     HLD (hyperlipidemia)     HTN (hypertension)     Parkinson's disease (H)     Vitamin D deficiency       Examination  B/P: Data Unavailable, T: Data Unavailable, P: Data Unavailable, R: Data Unavailable 138 lbs 9.6 oz  Blood pressure 149/84, pulse 66, height 1.607 m (5' 3.25\"), weight 62.9 kg (138 lb 9.6 oz), not currently breastfeeding., Body mass index is 24.36 kg/(m^2).    General examination: no apparent distress      Neuro exam:   Mental status:  Alert, oriented x3. Answers questions and follows commands appropriately   Cranial nerves: Extraocular movements intact.Facial sensation intact, facial strength intact. Hypomimia present   Motor: Tone:see assessments below .    Coordination: FTN intact without dysmetria  During her off assessments, she would have right jerky quality of tremor, possibly a form of re-emergent tremor with posture   This was also " noted with left finger tapping  Gait: Reduced arm swing bilaterally with mild gait instability when she was off meds, she had significant improvement in her gait with medications  During on medication assessment, she did develop significant dyskinesias of her trunk and extremities    MDS-UPDRS Part III  Last dose of Sinemet at 8pm on 04/17/18   Off meds  On meds    Speech:  2: Mild: Loss of modulation, diction, or volume, with a few words unclear, but the overall sentences easy to follow.  1   Facial Expression:  1: Slight: Minimal masked facies manifested only by decreased frequency of blinking.  1   Rigidity-Neck  1: Slight: Rigidity only detected with activation maneuver.  0   Rigidity-RUE  2: Mild: Rigidity detected without the activation maneuver, but full range of motion is easily achieved. 1   Rigidity-LUE  1: Slight: Rigidity only detected with activation maneuver.  0   Rigidity-RLE  0: Normal: No rigidity.  0   Rigidity-LLE  0: Normal: No rigidity.  0   Finger tap-R Hand  2: Mild: Any of the following: a) 3 to 5 interruptions during tapping; b) mild slowing; c) the amplitude decrements midway in the 10-tap sequence.  1   Finger tap-L Hand  3: Moderate: Any of the following: a) more than 5 interruptions during tapping or at least one longer arrest (freeze) in ongoing movement; b) moderate slowing; c) the amplitude decrements starting after the 1st tap.  2   Hand Movements-Right  1: Slight: Any of the following: a) the regular rhythm is broken with one or two interruptions or hesitations of the movement; b) slight slowing; c) the amplitude decrements near the end of the task.  0   Hand Movements-Left  2: Mild: Any of the following: a) 3 to 5 interruptions during the movements; b) mild slowing; c) the amplitude decrements midway in the task.  1   Pronation-R Hand  0: Normal: No problem.  0   Pronation-L Hand  0: Normal: No problem.  0   Toe Tap-R Foot  1 0   Toe Tap-L Foot  0 1   Leg Agility-R Leg  1 0   Leg  Agility-L Leg  1: Slight: Any of the following: a) the regular rhythm is broken with one or two interruptions or hesitations of the movement; b) slight slowing; c) the amplitude decrements near the end of the task. 0   Arise from chair  1: Slight: Arising is slower than normal; or may need more than one attempt; or may need to move forward in the chair to arise. No need to use the arms of the chair. 0   Gait  2: Mild: Independent walking but with substantial gait impairment.  1   Freezing of Gait  1: Slight: Freezes on starting, turning or walking through doorway with a single halt during any of these events, but then continues smoothly without freezing during straight walking.  0   Postural Stability  3: Moderate: Stands safely, but with absence of postural response; falls if not caught by examiner.  2   Posture  3: Moderate: Stooped posture, scoliosis or leaning to one side that cannot be corrected volitionally to a normal posture by the patient.  2   Global spontaneity of Movement  2: Mild: Mild global slowness and poverty of spontaneous movements.  1   Postural Tremor-R Hand  1: Slight: Tremor is present but less than 1 cm in amplitude.  0   Postural Tremor-L Hand  2: Mild: Tremor is at least 1 but less than 3 cm in amplitude.  0   Kinetic Tremor-R Hand  0: Normal: No tremor.  1   Kinetic Tremor-L Hand  0: Normal: No tremor.  1   Rest Tremor Amp-RUE  2 0   Rest Tremor Amp-LUE  2 0   Rest Tremor Amp-RLE   1 0   Rest Tremor Amp-LLE   0 0   Rest Tremor Amp-Lip/Jaw  0 0   Constancy of Rest Tremor  2: Mild: Tremor at rest is present 26-50% of the entire examination period.  0   Total:   40/132 15/132     Improvement with medications: (40-15)/40= 62.5%    Assessment/plan:   Ms. Manley is a 78-year-old woman with tremor predominant Parkinson's disease as well as significant bradykinesia for DBS evaluation. She has motor fluctuations with dyskinesias as well, improved with recent reduction of her Sinemet.  She has a  robust levodopa response with notable improvement in rigidity, tremor, and bradykinesia. Overall, she has done much better on immediate release Sinemet however, she does have the adverse side effect of causing at times significant nausea with 20 lbs weight loss in 2 months.  She will likely be a good candidate for DBS surgery, would consider GPI stimulation given the prominent dyskinesia.    Her goals for surgery include reducing tremor, increasing On time, and having more energy during the day.    -For the Sinemet associated nausea, I recommended conservative measures including dried seema or seema tablets  -If conservative measures are not enough, I also provided a prescription for carbidopa 25 mg PRN which she can take with her next scheduled dose of Sinemet if she is noticing increased Sinemet associated nausea  -Given the significant weight losses over the last several months, I did also recommend she follow-up with her primary care doctor to make sure she has received all her age-appropriate cancer screenings although, again the timing of the weight loss with switching to the immediate release Sinemet and associated nausea makes this the most likely explanation for her weight loss  -We will discuss her at our DBS consensus meeting tomorrow    Time Spent on This Encounter  I, Pearl King, spent a total of 90 minutes in clinic and doing a videotaped assessment of her Parkinsonism.  Over 50% of my time on the unit was spent counseling the patient and /or coordinating care regarding her Parkinsonism. See note for details.

## 2018-04-17 NOTE — PROGRESS NOTES
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 Information was obtained via interview with the patient and her son, and review of her medical records, including a prior neuropsychological evaluation. Records indicate a history of idiopathic Parkinson's disease, with symptom onset around August 2009 when she noticed that she could not step up into her daughter's house. Shortly after that she developed resting tremor of both hands. By 2010 she was started on carbidopa/levodopa with excellent initial benefit but in recent years she has had worsening of her Parkinson's and has had motor fluctuations with peak dose dyskinesia and wearing off, and levodopa induced side effects including dyskinesia, nausea, and headache. She is interested in undergoing deep brain stimulation (DBS) surgery for management of her symptoms. This neuropsychological evaluation was undertaken at the request of Farshad Webb M.D., as part of the presurgical protocol, to assess cognitive functioning and mood, as they pertain to her surgical candidacy and to establish a current neurocognitive baseline.    Ms. Manley first underwent a neuropsychological evaluation under the direction of Ike Delgado, PhD at an outside clinic on 11/10/2015. At the time, concerns had been raised regarding changes in cognitive functioning and emotional adjustment. Earlier that year, she had moved in with her daughter and had a period of adjustment problems after that with increased anxiety and depression symptoms. Antidepressant medications had good effect and she was increasing her social interactions. During the  evaluation she was noted to have exceptionally labile mood and affect with a pseudobulbar quality. Results of her evaluation were described as mildly abnormal, with general intellectual functioning in the above average range. Visually-based learning was poor, and she had mild executive dysfunction on tests that were mediated by visual perceptual processes.     CLINICAL INTERVIEW FINDINGS    Upon interview, Ms. Manley and her son indicated that she was diagnosed with Parkinson's disease in 2009. Her first symptoms included slow movements and trouble getting up two steps. She went to the doctor shortly after that and was diagnosed with Parkinson's disease. Around a month ago, Dr. Webb changed her medications, and she has found that she is more creative and that her interest level has increased substantially and she wants to do more things. She enjoys doing things that she could not do such as playing the organ and computer, as she did not have any interest in doing these until the medication change. She is, however, bothered by increased dyskinesia. She is experiencing nausea. She also feels like her shaking is worse and she is experiencing dryness. She is therefore interested in deep brain stimulation surgery. Her goals for surgery include increasing her energy. She stated that she has spoken with seven people who have had the surgery, and some had more energy afterwards. She also hopes that the surgery will help with her shaking, which wears her out. She has an excellent understanding of the surgical procedure. She feels confident about being awake during the surgery. She understands that stroke is a risk of surgery, and that death is also a risk. She feels that it is in God's hands, even if the surgery goes awry. Her son is supportive of her pursuing the surgery and hopes that her quality of life will be better.    Ms. Manley and her family have not been noticing significant changes in cognition. She does notice  difficulty with word finding, which takes her longer. Although her attention and concentration have been good, she finds that she must reread information more than she used to. She has not been reading much recently because of cataracts. It takes her longer to make decisions. She has not noticed significant difficult with memory.    For the last two years, Ms. Manley has lived in a senior apartment. It was initially assisted living, and meals were provided. Unfortunately, those services have been discontinued so now it is an independent living apartment. She has identified a new assisted-living center where she will be moving in the near future. She does not want to have to make the move because she has good friends in her current apartment complex. She manages her own finances, although occasionally she is too tired to pay her bills so they are late. Her granddaughter will start to help with her finances now. She manages her own medications, setting an alarm to remind her when to take the medications. She has a system that works well, in that she will not turn off the alarm until she takes the medicine. She stopped driving last April when her insurance came due. She stated that she used to have two or three good hours that were guaranteed and she could drive during that time, but this is no longer the case and she does not want to get stuck somewhere without being able to get herself home. She tends to buy pre-cooked food and is looking forward to having meals provided again when she moves. For the most part, she handles her personal cares independently. She does not always feel safe bathing. She keeps her Life Alert with her at all times.    Ms. Manley has a long-standing history of depression and to a lesser extent, anxiety. She was hospitalized once for psychiatric care in the 1980s. She has worked with psychologists in the past, but not currently. She stated that she is doing well now with her mood. She understands  "that she will take antidepressants for the rest of her life, although she stated that she takes a low dose. Her doctor recently suggested increasing the dose but she has not yet done that. When asked to describe her mood currently, she stated that she is kind of undone, and that everything is a lot of effort and she is not as \"up\" as she used to be. It is a good day for her if she has been able to shower and dress. She has not particularly felt sadness but has had some hopelessness because it is difficult to do things. She has not had feelings of guilt, and in fact feels proud of herself and likes to help people out, and she is grateful that she is here. She has had some anxiety, particularly since her new Parkinson's medication, which makes her feel rushed and  gears [her] up . There is a slight negative feeling that she associates with it. She has been no more irritable than normal and has not been crying any more than normal. Her sleep is interrupted every 2 1/2 hours to go to the bathroom, although last night she slept all night long. Her sleep is somewhat better with her medications. She had been napping during the day before her change of medications, but has not been napping much now. Her appetite is quite low. She eats healthy food but has lost weight. Her interest level is much improved since the change in medications, and she feels like doing something all of the time, a feeling what she enjoys. She denied visual or auditory hallucinations. Although she has had periods of time when she has felt very discouraged about life and did not know how to proceed, she denied suicidal ideation or any history of attempts to commit suicide.    Ms. Manley used to drink up to two alcoholic beverages at a time, but stated that she made a mental commitment to herself not to drink any alcohol after her last divorce. She denied illicit drug use or tobacco use. She does not mccarty and denied other compulsive behaviors.    In " school, Ms. Manley earned A's and B's. She graduated from high school and took some college courses, attending a business school during the summers. She worked as a  for the state, and had eight children so she left work. 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 She was  and  twice to her first , and was  for 10 years to another man as well.    Ms. Manley denied any history of seizure or stroke. She stated that she has gone to the hospital twice with falls on her head, and does not know she had loss of consciousness. She recalls blacking out once and falling forward. She could hear everything but could not see anything. This was apparently associated with changes in her blood pressure. She notices weakness on her left side and feels that the Parkinson's is worse on that side, although she feels the tremor is worse in her right hand.    PAST MEDICAL HISTORY: Records indicate a history of carpal tunnel syndrome of the left wrist, esophageal reflux, hypertension, Parkinson's disease, hyperlipidemia, and vitamin D deficiency.    CURRENT MEDICATIONS: Include carbidopa-levodopa, amlodipine, atorvastatin, cholecalciferol, as citalopram, pantoprazole, multiple vitamins, ondansetron, and cetirizine.    FAMILY MEDICAL HISTORY: Significant for a mother who  of bladder cancer, and also had shaking of her head, which was not diagnosed as Parkinson s disease. Her father  of cancer of the throat.     BEHAVIORAL OBSERVATIONS    During the evaluation, Ms. Manley was quite pleasant, cooperative, and seemed to understand the instructions. She was alert and oriented to person, place, and time. Tremors were observed clinically primarily in her left hand, and she also had dyskinesias and tended to rock in her chair. She used a walker and gait was slow. She had difficulty standing up from her chair but was  able to do so independently. Mood was euthymic. Speech was slightly halted due to word searching, with normal articulation and volume. Spontaneous conversation was present appropriate. Internal performance validity measures fell within normal limits. The results are believed to accurately reflect her current level of functioning.    MEASURES ADMINISTERED    The following measures were administered by a trained psychometrist, under the direct supervision of a licensed psychologist.    Subtests of the Wechsler Adult Intelligence Scale-4; Reading subtest of the Wide Range Achievement Test-4; subtests of the Wechsler Memory Scale-Revised; Lux Verbal Learning Test-Revised, Form 3; Brief Visual Memory Test - Revised, Form 2; [place] Naming Test; D-KEFS Verbal Fluency, Standard Form; Trail Making Test; Stroop; [place] Card Sorting Test - 64 items; Bower Judgement of Line Orientation, Form V; Clock Drawing; Dementia Rating Scale - 2, Standard Form (DRS-2); Andujar Depression Inventory-2 (BDI-2); PDQ-39; QUIP-RS; RBDSQ; Apathy Scale.      RESULTS AND INTERPRETATION    Overall intellectual functioning was estimated to fall in the high average range, consistent with premorbid estimates based on single word reading abilities. Performance on a screening measure of dementia was high average (DRS-2 Total Score = 141/144).    Confrontation naming was low average for her age, and improved with phonemic cues. Verbal abstract reasoning was average. Ability to comprehend and articulate responses to complex social situations was high average. Letter fluency was high average. Generative naming to category was average for her age, and switching fluency was mildly impaired.    Attention span was average for her age. Divided attention was low average, and was notable for slight difficulty shifting cognitive set. Performance on a measure of distractibility was low average. Psychomotor processing speed was high average.    Basic visual  perception, including matching lines and angles, was average for her age. Construction of a clock fell within normal limits. Nonverbal deductive reasoning was above average.    Novel problem-solving, including the ability to generate strategies and solutions, fell well within normal limits for her age and level of education.    Immediate recall of verbal narrative material was average, with low average recall following a 30 minute delay. On a multiple trial list learning task, immediate recall was mildly impaired, with moderately impaired retention (33%) following a 25 minute delay. Recognition memory on this task was low average. Immediate recall of visual material was borderline impaired, with low average recall following a 25 minute delay. Recognition memory on this task fell within normal limits.    On the BDI-2, a self-report questionnaire, Ms. Manley endorsed a mild level of depressive symptomatology. Specifically, she indicated that she feels more restless and wound up than usual. She does not have enough energy to do very much and sleeps somewhat less than usual. She is too tired or fatigued to do most of the things that she used to do. She has lost interest in sex completely. Her appetite is much less than before. She finds it more difficult to make decisions and finds it hard to keep her mind on anything for very long.    IMPRESSIONS AND RECOMMENDATIONS    Current results indicate a relative weakness in learning and retrieval of learned information, as well as in word retrieval. Complex attentional processing falls within normal limits but reflects a relative weakness. Similarly, executive functioning falls within normal limits, although qualitative review of her performance indicates slight difficulty shifting cognitive set. Language and visual processing fall within normal limits, in the average to above average range. She endorses a mild level of depressive symptomatology.     Compared to her outside  evaluation on 10/7/2015, she has had slight declines in complex attention, memory, and fluency. Performance otherwise remains stable across cognitive domains.    This pattern of performance does not reflect dementia at this time, and is only slightly abnormal. The pattern of performance is suggestive of frontal and subcortical system involvement, and is consistent with her history of Parkinson's disease. Although she has relative weaknesses in attention and learning and retrieval, the findings do not appear to reflect Mild Cognitive Impairment. She does endorse mild depressive symptomatology, which is reportedly long-standing, and for which is currently receiving treatment with antidepressant medications. Depressed mood and nonrestorative sleep may exacerbate her cognitive inefficiencies although they do not appear to be the sole contributors.    Ms. Manley has an excellent understanding of the surgical procedure and a good understanding of the risks involved. She does appear to be capable of comprehending medical information and making well reasoned decisions for herself. She has a good support system in her family. She appears to be a good candidate for surgery from a neurocognitive perspective.    In terms of daily functioning, Ms. Manley's cognitive inefficiencies are not likely to interfere with her ability to actively participate in treatment or to manage her instrumental activities of daily living independently. She may benefit from the use of written reminders or checklists. She may also benefit from structure and routine. Her mood has been affected recently by her living situation. She likes her apartment, which was assisted living when she moved into it. Unfortunately, the assisted living services have been discontinued for the entire facility, so she will need to move in the near future. She has made many friends at her current apartment complex and is disappointed about the move, although she appears to be  quite sociable and is likely do well after a period of adjustment. Results may serve as a more current baseline of her neurocognitive functioning. Repeated neuropsychological evaluation in one year may help to determine whether her cognitive inefficiencies progress, remit, or remain stable.    Allison Guadarrama, Ph.D., [facility]  Licensed [employer]ologist, [employer] 4336  Board Certified in Clinical Neuropsychology    Time spent:  Four hours professional time, including interview, record review, data integration, and report writing (CPT 98055); an additional three hours, including testing administered by a psychometrist and interpreted by a neuropsychologist (CPT 02630). ICD-10 diagnosis: G20; F06.8.

## 2018-04-18 NOTE — NURSING NOTE
Chief Complaint   Patient presents with     RECHECK     P RETURN - MOVEMENT DISORDER     Yesika Gilbert MA

## 2018-04-18 NOTE — MR AVS SNAPSHOT
"              After Visit Summary   4/18/2018    Gabi Manley    MRN: [id number]           Patient Information     Date Of Birth          1940        Visit Information        Provider Department      4/18/2018 9:30 AM Pearl King MD [facility] Neurology        Today's Diagnoses     Paralysis agitans (H)    -  1    Parkinson disease (H)          Care Instructions    For the nausea, you can try taking dried seema to ease the nausea. If this does not work and you are noticing nausea, you can take one tablet of carbidopa (Lodosyn) with your next dose of Sinemet.           Follow-ups after your visit        Who to contact     Please call your clinic at [phone] to:    Ask questions about your health    Make or cancel appointments    Discuss your medicines    Learn about your test results    Speak to your doctor            Additional Information About Your Visit        MyChart Information     [employer] gives you secure access to your electronic health record. If you see a primary care provider, you can also send messages to your care team and make appointments. If you have questions, please call your primary care clinic.  If you do not have a primary care provider, please call [phone] and they will assist you.      [employer] is an electronic gateway that provides easy, online access to your medical records. With [employer], you can request a clinic appointment, read your test results, renew a prescription or communicate with your care team.     To access your existing account, please contact your [facility] or call [phone] for assistance.        Care EveryWhere ID     This is your Care EveryWhere ID. This could be used by other organizations to access your [facility] medical records  [id number]        Your Vitals Were     Pulse Height BMI (Body Mass Index)             66 1.607 m (5' 3.25\") 24.36 kg/m2          Blood Pressure from Last 3 Encounters:   04/18/18 149/84 "   03/21/18 110/51   03/19/18 114/69    Weight from Last 3 Encounters:   04/18/18 62.9 kg (138 lb 9.6 oz)   03/21/18 68.3 kg (150 lb 9.6 oz)   03/19/18 68.3 kg (150 lb 9.6 oz)              Today, you had the following     No orders found for display         Today's Medication Changes          These changes are accurate as of 4/18/18 11:23 AM.  If you have any questions, ask your nurse or doctor.               Start taking these medicines.        Dose/Directions    carbidopa 25 MG Tabs tablet   Commonly known as:  LODOSYN   Used for:  Paralysis agitans (H)   Started by:  Pearl King MD        Dose:  25 mg   Take 1 tablet (25 mg) by mouth as needed   Quantity:  30 tablet   Refills:  3         These medicines have changed or have updated prescriptions.        Dose/Directions    carbidopa-levodopa  MG per tablet   Commonly known as:  SINEMET   This may have changed:  additional instructions   Used for:  Parkinson disease (H)   Changed by:  Pearl King MD        Patient is taking 1.25 tablets every two hours up to 12 tablets per day.   Quantity:  360 tablet   Refills:  3            Where to get your medicines      These medications were sent to [facility] [address] [place] [address] [address] AT [facility] [place] [address] [facility] [facility] [address]     Phone:  [phone]     carbidopa 25 MG Tabs tablet    carbidopa-levodopa  MG per tablet                Primary Care Provider Office Phone # Fax #    Tracey Abdi [phone]       [address] [address]        Equal Access to Services     [facility] Center[facility]IMINO AH: Hadii vinita Borjas, waaxda luqadaha, qaybta kaalmajewell rojas, nabil ramsay. So [facility] [phone].    ATENCIÓN: Si habla español, tiene a concepcion disposición servicios gratuitos de asistencia lingüística. Llame al [phone].    We comply with applicable federal civil rights  laws and [place] laws. We do not discriminate on the basis of race, color, national origin, age, disability, sex, sexual orientation, or gender identity.            Thank you!     Thank you for choosing [facility] NEUROLOGY  for your care. Our goal is always to provide you with excellent care. Hearing back from our patients is one way we can continue to improve our services. Please take a few minutes to complete the written survey that you may receive in the mail after your visit with us. Thank you!             Your Updated Medication List - Protect others around you: Learn how to safely use, store and throw away your medicines at www.disposemymeds.org.          This list is accurate as of 4/18/18 11:23 AM.  Always use your most recent med list.                   Brand Name Dispense Instructions for use Diagnosis    amLODIPine 5 MG tablet    NORVASC     Take 5 mg by mouth daily        atorvastatin 20 MG tablet    LIPITOR     Take 20 mg by mouth daily        carbidopa 25 MG Tabs tablet    LODOSYN    30 tablet    Take 1 tablet (25 mg) by mouth as needed    Paralysis agitans (H)       carbidopa-levodopa  MG per tablet    SINEMET    360 tablet    Patient is taking 1.25 tablets every two hours up to 12 tablets per day.    Parkinson disease (H)       cetirizine 10 MG tablet    zyrTEC     Take 10 mg by mouth daily        escitalopram 10 MG tablet    LEXAPRO     Take 10 mg by mouth daily        ondansetron 4 MG ODT tab    ZOFRAN-ODT     Take by mouth every 8 hours as needed for nausea        pantoprazole 20 MG EC tablet    PROTONIX     Take 20 mg by mouth daily        THERAPEUTIC MULTIVITAMIN PO      Take 1 tablet by mouth daily        VITAMIN D-1000 MAX ST 1000 units Tabs   Generic drug:  cholecalciferol      Take 2,000 Units by mouth daily

## 2018-04-18 NOTE — TELEPHONE ENCOUNTER
Health Call Center    Phone Message    May a detailed message be left on voicemail: yes    Reason for Call: Medication Question or concern regarding medication   Prescription Clarification  Name of Medication: carbidopa-levodopa (SINEMET)  MG per tablet  Prescribing Provider: Pearl King   Pharmacy: [facility] [address] [place], [place] [address] AT [facility] [address]   What on the order needs clarification? Pharmacist Jose G calling to verify order. Jose G states the amount of medication does not match dosage. Please call back at [phone]          Action Taken: Message routed to:  [facility] ([facility]  NEUROLOGY

## 2018-04-18 NOTE — LETTER
4/18/2018       RE: Gabi Manley  [address]  [facility] [id number]     Dear Colleague,    Thank you for referring your patient, Gabi Manley, to the [facility] NEUROLOGY at [facility]. Please see a copy of my visit note below.    [facility]    HPI:  Ms. Manley is a right handed woman with PD who presents for videotaped assessments. In August 2009, she noticed that she could not step up into her daughter's house. Shortly after that, she developed tremor of right then left hands that was a resting tremor. She was started on carbidopa-levodopa around 2010.  She was recently changed from extended release Sinemet to immediate release Sinemet in February.  She reports significant improvement in her mobility and amount of energy she has during the day since this change.  The Sinemet dose was decreased from 1.5 tablets every 2 hours to 1.25 tablets every 2 hours with significant improvement in daytime fatigue, increased ability to sleep at night, and less dyskinesia.    She had difficulty saying how long it takes for her medications to kick in.  Earlier in the day she feels she has increased on time.  Later in the day she has noticed that her Sinemet will wear off approximately 30 minutes prior to her next dose, with increased tremors, increased slowness of movement, and more difficulty walking.    She has lost 20 lbs since Feb (150's to 130's lbs) in the setting of having significant nausea related to Sinemet IR.  This nausea is not present every day but when it is present it is severe.  The nausea tends to be worse in the evening hours.    Her goals for surgery include reducing tremor, increasing On time, and having more energy during the day.    She lives in an assisted living facility, where she gets help with cooking and chores.    UDPRS Part I and II  Part I   1.1 Cognitive impairment: 1: Slight: Impairment appreciated by patient or caregiver with no concrete  interference with the patient's ability to carry out normal activities and social interactions.This is mainly when she is off medications.  1.2 Hallucinations and psychosis: 0: Normal: No hallucinations or psychotic behaviour.   1.3 Depressed mood: 1: Slight: Episodes of depressed mood that are not sustained for more than one day at a time. No interference with patient s ability to carry out normal activities and social interactions.   1.4 Anxious mood: 0: Normal: No anxious feelings.   1.5 Apathy: 1: Slight: Apathy appreciated by patient and/or caregiver, but no interference with daily activities and social interactions. This is much improved since adjusting the Sinemet.   1.6 Features of DDS: 0: Normal: No problems present.   1.7 Sleep problems: 2: Mild: Sleep problems usually cause some difficulties getting a full night of sleep. Now able to sleep about 4 hours at a time.   1.8 Daytime sleepiness: 1: Slight: Daytime sleepiness occurs but I can resist and I stay awake.   1.9 Pain and other sensations: 1: Slight: I have these feelings. However, I can do things and be with other people without difficulty. She does have chronic low back pain.   1.10 Urinary problems: 4: Severe: I cannot control my urine and use a protective garment or have a bladder tube.   1.11 Constipation problems: 1: Slight: I have been constipated. I use extra effort to move my bowels. However, this problem does not disturb my activities or my being comfortable. She has been doing dietary.   1.12 Light headedness on standin: Normal: No dizzy or foggy feelings.   1.13 Fatigue:1: Slight: Fatigue occurs. However it does not cause me troubles doing things or being with people.   Total:    Part II   2.1 Speech: 2: Mild: My speech causes people to ask me to occasionally repeat myself, but not everyday. She recently volunteered to be the .   2.2 Saliva and droolin: Normal: Not at all (no problems).   2.3 Chewing and swallowing:  1: Slight: I am aware of slowness in my chewing or increased effort at swallowing, but I do not choke or need to have my food specially prepared.  2.4 Eating tasks: 2: Mild: I am slow with my eating and have occasional food spills. I may need help with a few tasks such as cutting meat.   2.5 Dressing: 3: Moderate: I need help for many dressing tasks.   2.6 Hygiene: 1: Slight: I am slow but I do not need any help.   2.7 Handwriting: 3: Moderate: Many words are unclear and difficult to read but this is only when tremor is present  2.8 Doing hobbies and other activities: 2: Mild: I have some difficulty doing these activities.   2.9 Turning in bed:1: Slight: I have a bit of trouble turning, but I do not need any help.   2.10 Tremor: 3: Moderate: Shaking or tremor causes problems with many of my daily activities.  2.11 Getting out of bed: 2: Mild: I need more than one try to get up or need occasional help.   2.12 Walking and balance: 3: Moderate: I usually use a walking aid (cane, walker) to walk safely without falling. However, I do not usually need the support of another person.   2.13 Freezin: Mild: I freeze and have trouble starting to walk again, but I do not need someone s help or a walking aid (cane or walker) because of freezing.   Total:   Total Part I and II: 37/104    PD Medications                   6 am 8am  10am  noon 2pm 4pm  6pm 8pm   Sinemet 25/100mg                                                 1.25 tab 1.25 tab 1.25 tab 1.25 tab 1.25 tab 1.25 tab 1.25 tab 1.25 tab                                                                                        Current Outpatient Prescriptions   Medication Sig Dispense Refill     amLODIPine (NORVASC) 5 MG tablet Take 5 mg by mouth daily       atorvastatin (LIPITOR) 20 MG tablet Take 20 mg by mouth daily       carbidopa-levodopa (SINEMET)  MG per tablet Patient is taking 1.25 tablets every two hours up to 12 tablets per day. 360 tablet 3      "cetirizine (ZYRTEC) 10 MG tablet Take 10 mg by mouth daily       cholecalciferol (VITAMIN D-1000 MAX ST) 1000 UNITS TABS Take 2,000 Units by mouth daily        escitalopram (LEXAPRO) 10 MG tablet Take 10 mg by mouth daily       Multiple Vitamin (THERAPEUTIC MULTIVITAMIN PO) Take 1 tablet by mouth daily       ondansetron (ZOFRAN-ODT) 4 MG ODT tab Take by mouth every 8 hours as needed for nausea       pantoprazole (PROTONIX) 20 MG EC tablet Take 20 mg by mouth daily       [DISCONTINUED] carbidopa-levodopa (SINEMET)  MG per tablet Take 1.5 tablets every 2 hours while awake 360 tablet 3     [DISCONTINUED] carbidopa-levodopa (SINEMET)  MG per tablet Take 2 tablets by mouth once for 1 dose 1.5 tablet 0        Allergies   Allergen Reactions     No Clinical Screening - See Comments Shortness Of Breath     Erythromycin      Other reaction(s): Unknown     Ioxaglate Other (See Comments)     Angioedema     Paroxetine      Other reaction(s): Unknown     Penicillins      Facial swelling     Trazodone      Other reaction(s): Unknown     Vancomycin Hives      ROS:  A complete ROS was obtained and is negative except as per HPI.     Patient Active Problem List   Diagnosis     Allergic rhinitis     Carpal tunnel syndrome of left wrist     Other diseases of lung, not elsewhere classified     Esophageal reflux     Female stress incontinence     HLD (hyperlipidemia)     HTN (hypertension)     Parkinson's disease (H)     Vitamin D deficiency       Examination  B/P: Data Unavailable, T: Data Unavailable, P: Data Unavailable, R: Data Unavailable 138 lbs 9.6 oz  Blood pressure 149/84, pulse 66, height 1.607 m (5' 3.25\"), weight 62.9 kg (138 lb 9.6 oz), not currently breastfeeding., Body mass index is 24.36 kg/(m^2).    General examination: no apparent distress      Neuro exam:   Mental status:  Alert, oriented x3. Answers questions and follows commands appropriately   Cranial nerves: Extraocular movements intact.Facial sensation " intact, facial strength intact. Hypomimia present   Motor: Tone:see assessments below .    Coordination: FTN intact without dysmetria  During her off assessments, she would have right jerky quality of tremor, possibly a form of re-emergent tremor with posture   This was also noted with left finger tapping  Gait: Reduced arm swing bilaterally with mild gait instability when she was off meds, she had significant improvement in her gait with medications  During on medication assessment, she did develop significant dyskinesias of her trunk and extremities    MDS-UPDRS Part III  Last dose of Sinemet at 8pm on 04/17/18   Off meds  On meds    Speech:  2: Mild: Loss of modulation, diction, or volume, with a few words unclear, but the overall sentences easy to follow.  1   Facial Expression:  1: Slight: Minimal masked facies manifested only by decreased frequency of blinking.  1   Rigidity-Neck  1: Slight: Rigidity only detected with activation maneuver.  0   Rigidity-RUE  2: Mild: Rigidity detected without the activation maneuver, but full range of motion is easily achieved. 1   Rigidity-LUE  1: Slight: Rigidity only detected with activation maneuver.  0   Rigidity-RLE  0: Normal: No rigidity.  0   Rigidity-LLE  0: Normal: No rigidity.  0   Finger tap-R Hand  2: Mild: Any of the following: a) 3 to 5 interruptions during tapping; b) mild slowing; c) the amplitude decrements midway in the 10-tap sequence.  1   Finger tap-L Hand  3: Moderate: Any of the following: a) more than 5 interruptions during tapping or at least one longer arrest (freeze) in ongoing movement; b) moderate slowing; c) the amplitude decrements starting after the 1st tap.  2   Hand Movements-Right  1: Slight: Any of the following: a) the regular rhythm is broken with one or two interruptions or hesitations of the movement; b) slight slowing; c) the amplitude decrements near the end of the task.  0   Hand Movements-Left  2: Mild: Any of the following: a) 3  to 5 interruptions during the movements; b) mild slowing; c) the amplitude decrements midway in the task.  1   Pronation-R Hand  0: Normal: No problem.  0   Pronation-L Hand  0: Normal: No problem.  0   Toe Tap-R Foot  1 0   Toe Tap-L Foot  0 1   Leg Agility-R Leg  1 0   Leg Agility-L Leg  1: Slight: Any of the following: a) the regular rhythm is broken with one or two interruptions or hesitations of the movement; b) slight slowing; c) the amplitude decrements near the end of the task. 0   Arise from chair  1: Slight: Arising is slower than normal; or may need more than one attempt; or may need to move forward in the chair to arise. No need to use the arms of the chair. 0   Gait  2: Mild: Independent walking but with substantial gait impairment.  1   Freezing of Gait  1: Slight: Freezes on starting, turning or walking through doorway with a single halt during any of these events, but then continues smoothly without freezing during straight walking.  0   Postural Stability  3: Moderate: Stands safely, but with absence of postural response; falls if not caught by examiner.  2   Posture  3: Moderate: Stooped posture, scoliosis or leaning to one side that cannot be corrected volitionally to a normal posture by the patient.  2   Global spontaneity of Movement  2: Mild: Mild global slowness and poverty of spontaneous movements.  1   Postural Tremor-R Hand  1: Slight: Tremor is present but less than 1 cm in amplitude.  0   Postural Tremor-L Hand  2: Mild: Tremor is at least 1 but less than 3 cm in amplitude.  0   Kinetic Tremor-R Hand  0: Normal: No tremor.  1   Kinetic Tremor-L Hand  0: Normal: No tremor.  1   Rest Tremor Amp-RUE  2 0   Rest Tremor Amp-LUE  2 0   Rest Tremor Amp-RLE   1 0   Rest Tremor Amp-LLE   0 0   Rest Tremor Amp-Lip/Jaw  0 0   Constancy of Rest Tremor  2: Mild: Tremor at rest is present 26-50% of the entire examination period.  0   Total:   40/132 15/132     Improvement with medications: (40-15)/40=  62.5%    Assessment/plan:   Ms. Manley is a 78-year-old woman with tremor predominant Parkinson's disease as well as significant bradykinesia for DBS evaluation. She has motor fluctuations with dyskinesias as well, improved with recent reduction of her Sinemet.  She has a robust levodopa response with notable improvement in rigidity, tremor, and bradykinesia. Overall, she has done much better on immediate release Sinemet however, she does have the adverse side effect of causing at times significant nausea with 20 lbs weight loss in 2 months.  She will likely be a good candidate for DBS surgery, would consider GPI stimulation given the prominent dyskinesia.    Her goals for surgery include reducing tremor, increasing On time, and having more energy during the day.    -For the Sinemet associated nausea, I recommended conservative measures including dried seema or seema tablets  -If conservative measures are not enough, I also provided a prescription for carbidopa 25 mg PRN which she can take with her next scheduled dose of Sinemet if she is noticing increased Sinemet associated nausea  -Given the significant weight losses over the last several months, I did also recommend she follow-up with her primary care doctor to make sure she has received all her age-appropriate cancer screenings although, again the timing of the weight loss with switching to the immediate release Sinemet and associated nausea makes this the most likely explanation for her weight loss  -We will discuss her at our DBS consensus meeting tomorrow    Time Spent on This Encounter  I, Pearl King, spent a total of 90 minutes in clinic and doing a videotaped assessment of her Parkinsonism.  Over 50% of my time on the unit was spent counseling the patient and /or coordinating care regarding her Parkinsonism. See note for details.          Again, thank you for allowing me to participate in the care of your patient.      Sincerely,    Pearl King,  MD

## 2018-04-18 NOTE — PATIENT INSTRUCTIONS
For the nausea, you can try taking dried seema to ease the nausea. If this does not work and you are noticing nausea, you can take one tablet of carbidopa (Lodosyn) with your next dose of Sinemet.

## 2018-04-25 NOTE — TELEPHONE ENCOUNTER
I spoke with Ms. Manley to let her know the results of our DBS consensus meeting. The group felt she would be a good candidate for staged bilateral GPi DBS with the Abbott lead. The plan is to start with the left GPi and she agreed she would want her right body treated first.     She feels even in the last one week that her PD symptoms including right hand tremor have been less severe which she attributes to being under less stress. She moved last week into an assisted living and is very happy there.     She has been eating candies which have been helping with nausea. I mentioned to her that the group had also recommended that she could consider domperidone up to 10 mg three times a day to help if the nausea does not improve with conservative measures or supplemental carbidopa. I did inform her of the following: [Stateless Boxed Warning]: Domperidone may be associated with an increased risk of serious ventricular arrhythmias or sudden cardiac death, particularly with doses >30 mg or when used in patients >60 years of age. Use the lowest possible dose for the shortest duration necessary.      She would be interested in discussing scheduling her DBS surgery, although may want more time before actually scheduling the surgery. I will also have our clinic schedulers contact her to schedule followup with Dr. Webb in 3 months.   -----------------------------------------------------------------------------------------------------------------------------------------------------------------  Yuliana Menezes, documentation  From the 18 Consensus meetin. Yes Candidate  2. Bilateral staged Gpi Left side first.  3. Domperidone trial 10 mg TID, [facility] website info - treats constipation too - Pearl will rx  4. Abbott Infinity 7

## 2018-04-27 NOTE — TELEPHONE ENCOUNTER
Spoke to patient. She is interested in hearing more about DEEP BRAIN STIMULATION research opportunities.

## 2018-04-30 NOTE — PROGRESS NOTES
WAIS-IV    Raw              Age Scaled   Similarities   21       9   Comprehension        27    13    Letter Num. Seq.   17     -   Digit Span   23    10    Matrix Reasoning   21    16      WIDE RANGE ACHIEVEMENT TEST - 4    Standard  %tile               Grade      Score  Rank               Equiv.  Reading   112     79          >12.9      WECHSLER MEMORY SCALE - REVISED    Raw Score        MAS         Information & Orientation       14   Logical Memory  Immed.  19     9    Logical Memory  30 min.  11     8      BOSTON NAMING TEST  Score (Correct+Stim cue)        48      MAS    8       # correct Stimulus Cue:            -            # correct Phonemic Cue:          11       MELCHOR VERBAL LEARNING TEST - REVISED  Form 3                                              Raw                 T                                        Trial 1               4   Trial 2               5   Trial 3  6   Total 1-3     15       34   Learning  2   Delay  2       27   Percent Retained     33       25   True Positives     12   Discrimination Index  9       40   False Positives  3     BRIEF VISUAL MEMORY TEST - REVISED  Form  2                                               Raw                 T                                        Trial 1               5   Trial 2               4   Trial 3  5   Total 1-3     14       39   Learning  0       30   Delay  6       43   Percent Retained   120     >16 (%ile)  True Positives  6   Discrimination Index  5     >16 (%ile)  False Positives  1     D-KEFS VERBAL FLUENCY  Standard/Alternate    Raw              Age Scaled   Letter Fluency  40     12   Category Fluency       30      9   Switching Fluency    Total Correct    9      6    Switching Acc.    8      7     CLOCK DRAWING  Command  3/3    Copy   3/3       TRAIL MAKING TEST         Seconds         Errors           MAS                  A    31    0    12  .  B     202    2      7      STROOP                    Raw   +  Ronald =     Total      MAS         Word  82  +  - =   82     9    Color  54  +  - =   54     9    C-W  22  +  - =   22     8      [place]IS[place]IN CARD SORTING TEST - 64  # Categories      4             >16    %ile  # persev err.          5         T        >80        Conc lev resp.       49        T      68         FMS        1           WORTHY JUDGMENT OF LINE ORIENTATION  Form V  Raw     22  MAS         10     DEMENTIA RATING SCALE - 2 - Standard    Raw MAS Raw     MAS   Attention  36   12   Concept  39    13   Init/Psv  37   12  Memory  23    10   Construct    6   10  Total    141/144   13     JONES DEPRESSION INVENTORY - 2: 15    [facility] = [place] Older Adult Normative Study Age Adj. Scaled Score

## 2018-05-15 NOTE — TELEPHONE ENCOUNTER
Called [facility] and gave a verbal order for Carbidopa-Levodopa 25-100mg tabs per request of patient.

## 2018-05-15 NOTE — TELEPHONE ENCOUNTER
[facility] Call Center    Phone Message    May a detailed message be left on voicemail: yes    Reason for Call: Other: Pt would like Rx for carbidopa-levodopa (SINEMET)  MG per tablet sent to [facility] instead of the [facility] pharmacy as it is cheaper through [facility]. Please call back pt with any questions or when Rx has been sent. Thanks     Action Taken: Message routed to:  [facility] ([facility] Neuro

## 2018-05-23 NOTE — TELEPHONE ENCOUNTER
Pt returned my call. We discussed the surgery dates and each surgery in detail, including hospital stay in ICU for the lead placements and outpatient for battery; post op recovery, including having someone with her for at least the first 24 hours after discharge; and research opportunities. I discussed some of the research opportunities in general detail, but will ask RN Bre Pineda to follow up with her to discuss the various studies in detail. I will send a preop packet out and pt can call me when she receives it or as we get closer to the surgery dates. No further questions. She is in agreement with plan.

## 2018-05-23 NOTE — TELEPHONE ENCOUNTER
Left  for pt. Our surgery scheduler spoke with her yesterday about DBS surgery dates, and pt had questions about the surgery, research opportunities, etc. I left my direct number and requested that pt call me at her earliest convenience.

## 2018-06-27 NOTE — TELEPHONE ENCOUNTER
I spoke with patient regarding the clinical core and externalization studies. Pt would like me to send consents via mail to review with her son. I will follow up with her in one week.     Patient asked if she will be taking her medications after DBS. I told her that there is potential she may reduce some PD medications, but that she would work with her provider at her programming visits (starting about 4-6 weeks after her surgery) to make those changes.     Bre Pineda RN, MPH  Research Nurse, Movement Disorders

## 2018-06-28 NOTE — PROGRESS NOTES
Appointment for [facility] Friday 6/29/2018 canceled due to staffing. I spoke with patient at 1:15pm 6/28/18 and let her know that [facility] would be in touch with her next week to look at rescheduling. Patient was understanding and will await next steps.    Bre Pineda, RN, MPH  Research Nurse, Movement Disorders

## 2018-07-19 NOTE — TELEPHONE ENCOUNTER
I called patient to follow up on the clinical core research study information I sent her. She is interested in participating and we will be meeting Wednesday 7/25/18 after her 11:30am appointment with Dr. Webb. I recommended that she plan for [facility] to pick her up at 3pm to allow time for the study visit and lab visit.     She had some questions about her upcoming PAC appointment on Monday 7/23 and if we've received her records from her physical with Dr. Tracey Abdi on 7/11/18. I explained that she will still need to meet with our anesthesia team and to keep her PAC appointment. Patient agrees to plan. I sent Yris Cummings RN a message to look into these H&P records.    Bre Pineda RN, MPH  Research Nurse, Movement Disorders

## 2018-07-23 NOTE — ANESTHESIA PREPROCEDURE EVALUATION
Anesthesia Evaluation     . Pt has had prior anesthetic. Type: General and MAC           ROS/MED HX    ENT/Pulmonary:  - neg pulmonary ROS     Neurologic:     (+)Parkinson's disease features: tremors, worse on right,     Cardiovascular:     (+) hypertension----. : . . . :. . Previous cardiac testing date:results:date: results:ECG reviewed date:7/11/2018 results:NSR date: results:          METS/Exercise Tolerance:  3 - Able to walk 1-2 blocks without stopping   Hematologic:     (+) History of blood clots pt is not anticoagulated, -      Musculoskeletal:         GI/Hepatic:     (+) GERD Asymptomatic on medication,       Renal/Genitourinary:  - ROS Renal section negative       Endo:  - neg endo ROS       Psychiatric:     (+) psychiatric history depression      Infectious Disease:  - neg infectious disease ROS       Malignancy:      - no malignancy   Other:    (+) No chance of pregnancy C-spine cleared: N/A, no H/O Chronic Pain,no other significant disability                    Physical Exam  Normal systems: cardiovascular and pulmonary    Airway   Mallampati: II  TM distance: >3 FB  Neck ROM: full    Dental   (+) partials  Comment: Upper and lower partials     Cardiovascular   Rhythm and rate: regular and normal      Pulmonary    breath sounds clear to auscultation    Other findings:     Basic Metabolic Panel (07/11/2018 10:47 AM)  Component Value Ref Range  Sodium 141 136 - 145 mmol/L  Potassium 4.4 3.5 - 5.0 mmol/L  Chloride 105 98 - 107 mmol/L  CO2 26 22 - 31 mmol/L  Anion Gap, Calculation 10 5 - 18 mmol/L  Glucose 78 70 - 125 mg/dL  Calcium 9.2 8.5 - 10.5 mg/dL  BUN 13 8 - 28 mg/dL  Creatinine 0.80 0.60 - 1.10 mg/dL  GFR MDRD Af Amer >60 >60 mL/min/1.73m2  GFR MDRD Non Af Amer >60 >60 mL/min/1.73m2        Results for STERN, ALEX A (MRN [id number]) as of 7/23/2018 14:40    7/23/2018 10:26  WBC: 5.3  Hemoglobin: 14.2  Hematocrit: 44.3  Platelet Count: 140 (L)  RBC Count: 4.61  MCV: 96  MCH: 30.8  MCHC: 32.1  RDW:  13.6  INR: 1.03  ABO: O  RH(D): Pos  Antibody Screen: Neg  Test Valid Only At: [facility]  Specimen Expires: 07/26/2018  Blood Bank Comment: Preadmission exte...             PAC Discussion and Assessment    ASA Classification: 3  Case is suitable for: [place]  Anesthetic techniques and relevant risks discussed: GA  Invasive monitoring and risk discussed: Yes  Types:   Possibility and Risk of blood transfusion discussed: Yes  NPO instructions given:   Additional anesthetic preparation and risks discussed:   Needs early admission to pre-op area:   Other:     PAC Resident/NP Anesthesia Assessment:  Gabi Manley is a 77 yo female scheduled for Stealth Assisted Left Deep Brain Stimulator Placement, Phase I, Placement Left Side Deep Brain Stimulator Electrode, Target Left Globus Pallidus Internus, With Microelectrode Recording on 8/7/2018, and phase II 8/21/2018 by Dr. Stearns in treatment of Parkinson's disease.      Pt had preop at outside clinic and it is scanned into EPIC    Previous anesthesia without complications     1) Cardiac: HTN, well managed. EKG 7/11/2018 NSR  2) Pulmonary: Never smoked, denies pulmonary symptoms. PE 10 years ago, no recurrence. Not currently anticoagulated  3) GI: GERD, well managed with protonix  4) Neuro: Parkinson's disease with bilateral tremors, worse on right. She uses a rolling walker, but has remained active.    Upper and lower partials               Reviewed and Signed by PAC Mid-Level Provider/Resident  Mid-Level Provider/Resident: STEFANO De La Paz  Date: 7/23/2018  Time: 0900    Attending Anesthesiologist Anesthesia Assessment:        Anesthesiologist:   Date:   Time:   Pass/Fail:   Disposition:     PAC Pharmacist Assessment:        Pharmacist:   Date:   Time:      Anesthesia Plan      History & Physical Review      ASA Status:  3 .    NPO Status:  > 8 hours    Plan for MAC with Intravenous induction. Maintenance will be TIVA.  Reason for MAC:  Deep or markedly invasive  procedure (G8)  PONV prophylaxis:  Ondansetron (or other 5HT-3) and Dexamethasone or Solumedrol  Additional equipment: 2nd IV      Postoperative Care  Postoperative pain management:  IV analgesics, Oral pain medications and Multi-modal analgesia.      Consents  Anesthetic plan, risks, benefits and alternatives discussed with:  Patient..                          .

## 2018-07-23 NOTE — PATIENT INSTRUCTIONS
Preparing for Your Surgery      Name:  Gabi Manley   MRN:  [id number]   :  1940   Today's Date:  2018     Arriving for surgery:  Surgery date:  18  Arrival time:  6:00AM  Please come to:       [facility] [address]  [address] [place]  [address]    -   parking is available in front of the hospital from 5:15 am to 8:00 pm    -  Stop at the Information Desk in the lobby    -   Inform the information person that you are here for surgery. An escort to 3c will be provided. If you would not like an escort, please proceed to 3C on the 3rd floor. [phone]     What can I eat or drink?  -  You may have solid food or milk products until 8 hours prior to your surgery. Midnight  -  You may have water, apple juice or 7up/Sprite until 2 hours prior to your surgery. 6:00AM    Which medicines can I take?  Stop Aspirin, vitamins and supplements one week prior to surgery, including multivitamins.   Hold Ibuprofen and Naproxen for 24 hours prior to surgery.   -  Do NOT take these medications in the morning, the day of surgery:  Please do not take Carbidopa-Levodopa and vitamin D the morning of surgery.     -  Please take these medications the day of surgery:  Please take amlodipine, atorvastatin, lexapro and pantoprazole the morning of surgery.     How do I prepare myself?  -  Take two showers: one the night before surgery; and one the morning of surgery.         Use Scrubcare or Hibiclens to wash from neck down.  You may use your own shampoo and conditioner. No other hair products.   -  Do NOT use lotion, powder, deodorant, or antiperspirant the day of your surgery.  -  Do NOT wear any makeup, fingernail polish or jewelry.  - Do not bring your own medications to the hospital, except for inhalers and eye drops.  -  Bring your ID and insurance card.    Questions or Concerns:  -If you have questions or concerns regarding the day of surgery, please call [phone].      -For questions after surgery please call your surgeons office.           AFTER YOUR SURGERY  Breathing exercises   Breathing exercises help you recover faster. Take deep breaths and let the air out slowly. This will:     Help you wake up after surgery.    Help prevent complications like pneumonia.  Preventing complications will help you go home sooner.   We may give you a breathing device (incentive spirometer) to encourage you to breathe deeply.   Nausea and vomiting   You may feel sick to your stomach after surgery; if so, let your nurse know.    Pain control:  After surgery, you may have pain. Our goal is to help you manage your pain. Pain medicine will help you feel comfortable enough to do activities that will help you heal.  These activities may include breathing exercises, walking and physical therapy.   To help your health care team treat your pain we will ask: 1) If you have pain  2) where it is located 3) describe your pain in your words  Methods of pain control include medications given by mouth, vein or by nerve block for some surgeries.  Sequential Compression Device (SCD) or Pneumo Boots:  You may need to wear SCD S on your legs or feet. These are wraps connected to a machine that pumps in air and releases it. The repeated pumping helps prevent blood clots from forming.

## 2018-07-23 NOTE — MR AVS SNAPSHOT
After Visit Summary   2018    Gabi Manley    MRN: [id number]           Patient Information     Date Of Birth          1940        Visit Information        Provider Department      2018 10:00 AM Rn, [facility]        Care Instructions    Preparing for Your Surgery      Name:  Gabi Manley   MRN:  [id number]   :  1940   Today's Date:  2018     Arriving for surgery:  Surgery date:  18  Arrival time:  6:00AM  Please come to:       [facility] [address]  [address] [place]  [address]    -   parking is available in front of the hospital from 5:15 am to 8:00 pm    -  Stop at the Information Desk in the lobby    -   Inform the information person that you are here for surgery. An escort to 3c will be provided. If you would not like an escort, please proceed to 3C on the 3rd floor. [phone]     What can I eat or drink?  -  You may have solid food or milk products until 8 hours prior to your surgery. Midnight  -  You may have water, apple juice or 7up/Sprite until 2 hours prior to your surgery. 6:00AM    Which medicines can I take?  Stop Aspirin, vitamins and supplements one week prior to surgery, including multivitamins.   Hold Ibuprofen and Naproxen for 24 hours prior to surgery.   -  Do NOT take these medications in the morning, the day of surgery:  Please do not take Carbidopa-Levodopa and vitamin D the morning of surgery.     -  Please take these medications the day of surgery:  Please take amlodipine, atorvastatin, lexapro and pantoprazole the morning of surgery.     How do I prepare myself?  -  Take two showers: one the night before surgery; and one the morning of surgery.         Use Scrubcare or Hibiclens to wash from neck down.  You may use your own shampoo and conditioner. No other hair products.   -  Do NOT use lotion, powder, deodorant, or antiperspirant the day of your  surgery.  -  Do NOT wear any makeup, fingernail polish or jewelry.  - Do not bring your own medications to the hospital, except for inhalers and eye drops.  -  Bring your ID and insurance card.    Questions or Concerns:  -If you have questions or concerns regarding the day of surgery, please call [phone].     -For questions after surgery please call your surgeons office.           AFTER YOUR SURGERY  Breathing exercises   Breathing exercises help you recover faster. Take deep breaths and let the air out slowly. This will:     Help you wake up after surgery.    Help prevent complications like pneumonia.  Preventing complications will help you go home sooner.   We may give you a breathing device (incentive spirometer) to encourage you to breathe deeply.   Nausea and vomiting   You may feel sick to your stomach after surgery; if so, let your nurse know.    Pain control:  After surgery, you may have pain. Our goal is to help you manage your pain. Pain medicine will help you feel comfortable enough to do activities that will help you heal.  These activities may include breathing exercises, walking and physical therapy.   To help your health care team treat your pain we will ask: 1) If you have pain  2) where it is located 3) describe your pain in your words  Methods of pain control include medications given by mouth, vein or by nerve block for some surgeries.  Sequential Compression Device (SCD) or Pneumo Boots:  You may need to wear SCD S on your legs or feet. These are wraps connected to a machine that pumps in air and releases it. The repeated pumping helps prevent blood clots from forming.           Follow-ups after your visit        Your next 10 appointments already scheduled     Jul 23, 2018 10:00 AM CDT   (Arrive by 9:45 AM)   PAC RN ASSESSMENT with Cecilia Pac Rn   [facility] ([facility])    [address] [facility] [address] [facility]  4th Floor  [facility] [address]    [phone]            Jul 23, 2018 10:30 AM CDT   (Arrive by 10:15 AM)   PAC Anesthesia Consult with  Pac Anesthesiologist   [facility] Preoperative Assessment [facility] ([facility])    [address] [place] [address]  [address] [facility] [address]   [phone]            Jul 25, 2018 11:30 AM CDT   (Arrive by 11:15 AM)   Return Movement Disorder with Farshad Webb MD   [facility] Neurology ([facility])    [address] [place] [address]  3rd [facility] [address]   [phone]            Jul 25, 2018 12:00 PM CDT   Nurse Visit with  Neurology Nurse   [facility] Neurology ([facility])    [address] [place] [address]   [phone]            Jul 25, 2018  2:30 PM CDT   LAB with  LAB   [facility] Lab ([facility])    [address] [place] [address]   [phone]           Please do not eat 10-12 hours before your appointment if you are coming in fasting for labs on lipids, cholesterol, or glucose (sugar). This does not apply to pregnant women. Water, hot tea and black coffee (with nothing added) are okay. Do not drink other fluids, diet soda or chew gum.            Aug 07, 2018   Procedure with [place] Stearns MD   [facility], [place], Same Day Surgery (--)    [address] [place] [address] [phone]            Aug 07, 2018  9:00 AM CDT   CT HEAD W/O CONTRAST with UUCT1   [facility], [place], [place] ([facility] [place])    [address] [facility] [address] [phone]           Please bring any scans or X-rays taken at other hospitals, if similar tests were done. Also bring a list of your medicines, including vitamins, minerals and over-the-counter drugs. It is safest to leave personal items at home.  Be sure to tell your doctor:   If you have any allergies.   If there s any chance you are pregnant.   If  you are breastfeeding.  You do not need to do anything special to prepare for this exam.  Please wear loose clothing, such as a sweat suit or jogging clothes. Avoid snaps, zippers and other metal. We may ask you to undress and put on a hospital gown.            Aug 21, 2018   Procedure with [place] Stearns MD   [facility], [place], Same Day Surgery (--)    [address] [facility] [address] [phone]            Sep 05, 2018  3:00 PM CDT   (Arrive by 2:45 PM)   Return Visit with STEFANO Santos [facility] Neurosurgery ([facility] [place])    [address] [facility]  3rd Floor  [facility] [id number] [phone]            Sep 18, 2018   Procedure with [place] Stearns MD   [facility], [place], Same Day Surgery (--)    [address] [facility] [address]   [phone]              Who to contact     Please call your clinic at [phone] to:    Ask questions about your health    Make or cancel appointments    Discuss your medicines    Learn about your test results    Speak to your doctor            Additional Information About Your Visit        ARX Information     ARX gives you secure access to your electronic health record. If you see a primary care provider, you can also send messages to your care team and make appointments. If you have questions, please call your primary care clinic.  If you do not have a primary care provider, please call [phone] and they will assist you.      ARX is an electronic gateway that provides easy, online access to your medical records. With ARX, you can request a clinic appointment, read your test results, renew a prescription or communicate with your care team.     To access your existing account, please contact your [facility] or call [phone] for assistance.        Care EveryWhere ID     This is your Care EveryWhere ID. This could be used by other organizations to access your [place]  medical records  [id number]         Blood Pressure from Last 3 Encounters:   07/23/18 146/86   04/18/18 149/84   03/21/18 110/51    Weight from Last 3 Encounters:   07/23/18 62.3 kg (137 lb 4.8 oz)   04/18/18 62.9 kg (138 lb 9.6 oz)   03/21/18 68.3 kg (150 lb 9.6 oz)              Today, you had the following     No orders found for display         Today's Medication Changes          These changes are accurate as of 7/23/18  9:17 AM.  If you have any questions, ask your nurse or doctor.               These medicines have changed or have updated prescriptions.        Dose/Directions    carbidopa-levodopa  MG per tablet   Commonly known as:  SINEMET   This may have changed:    - how much to take  - how to take this  - when to take this  - additional instructions   Used for:  Parkinson disease (H)        Patient is taking 1.25 tablets every two hours up to 12 tablets per day.   Quantity:  360 tablet   Refills:  3                Primary Care Provider Office Phone # Fax #    Tracey Abdi [phone]       [place] [address]        Equal Access to Services     ART GRAY AH: Alberto Borjas, wausamada miguel, qabecca peralesalneyda rojas, nabil ramsay. So [facility] [phone].    ATENCIÓN: Si habla español, tiene a concepcion disposición servicios gratuitos de asistencia lingüística. [facility] [phone].    We comply with applicable federal civil rights laws and [place] laws. We do not discriminate on the basis of race, color, national origin, age, disability, sex, sexual orientation, or gender identity.            Thank you!     Thank you for choosing [facility]  for your care. Our goal is always to provide you with excellent care. Hearing back from our patients is one way we can continue to improve our services. Please take a few minutes to complete the written survey that you may receive in the mail  after your visit with us. Thank you!             Your Updated Medication List - Protect others around you: Learn how to safely use, store and throw away your medicines at www.disposemymeds.org.          This list is accurate as of 7/23/18  9:17 AM.  Always use your most recent med list.                   Brand Name Dispense Instructions for use Diagnosis    amLODIPine 5 MG tablet    NORVASC     Take 5 mg by mouth every morning        atorvastatin 20 MG tablet    LIPITOR     Take 20 mg by mouth every morning        carbidopa-levodopa  MG per tablet    SINEMET    360 tablet    Patient is taking 1.25 tablets every two hours up to 12 tablets per day.    Parkinson disease (H)       escitalopram 10 MG tablet    LEXAPRO     Take 10 mg by mouth every morning        pantoprazole 20 MG EC tablet    PROTONIX     Take 20 mg by mouth every morning        THERAPEUTIC MULTIVITAMIN PO      Take 1 tablet by mouth every morning        VITAMIN D-1000 MAX ST 1000 units Tabs   Generic drug:  cholecalciferol      Take 2,000 Units by mouth every morning

## 2018-07-24 NOTE — TELEPHONE ENCOUNTER
Called patient to schedule initial programming. Left voice mail asking if 9/14 at 7:00 - 10:30 would work for her. I will have this scheduled now and change if it does not work.    CT scheduled for 10/19 at 11:20 check in 11:05 after her second side programming.

## 2018-07-25 NOTE — LETTER
"7/25/2018       RE: Gabi Manley  [address]  [facility] [address]     Dear Colleague,    Thank you for referring your patient, Gabi Manley, to the [facility] NEUROLOGY at [facility]. Please see a copy of my visit note below.    Movement Disorder Clinic follow up note    Patient: Gabi Manley  Medical Record Number: [id number]  Encounter Date: July 25, 2018  PCP:Tracey Abdi    CC: Parkinson's disease    Impression:  1.  Idiopathic Parkinson's disease  2.  Severe motor fluctuations with short duration response, mild dyskinesia and mild nonmotor off symptoms  3.  Urinary incontinence  4.  Planned STN DBS     Recommendations:    1.  Continue carbidopa/levodopa 25/100 immediate release, 1.25 tablets every 2 hours while awake  2.  DBS is planned as a staged procedure.  The first procedure will be a left STN DBS lead implantation on 8/7/2018.  She will have the generator pack implanted on 8/21/2018.  She will have a right STN DBS lead implantation on 9/18/2018 with connection to the existing battery pack.  3.  The patient understands that she will be under the care postoperatively of Maddie mane NP.  She knows that Ms. Anton is an expert in programming and Parkinson's care and will manage her medicines through the DBS programming and optimization process.  I will see the patient back when she has reached optimal programming and medication stabilization and follow her medically at that point.    Return to clinic: After DBS optimization    Interval Hx:: Ms. Gabi Manley returns to clinic today for follow-up of her Parkinson's disease.  She continues to suffer from severe and disabling motor fluctuations.  She has an\"on\" response in about one half hour after medication intake.  By 1 hour to 1-1/2 hours later she is beginning to wear off rapidly.  During her \"on\"time she can get up and be independent.  When she is off she is nearly nonambulatory.  During her on off testing her " "UPDRS part 3 motor score was 40 in the off state and 15 in the on state indicating a 62.5% improvement.  Today she comes in in the off state.  Her reading today on the UPDRS part 3 motor score is 70.    She is tolerating her medication.  She formally had severe headaches nausea with it but this is better.  She has minimal dyskinesia.  She does get hot and flushed when she is in the office today but has no other known nonmotor symptoms in this state.    She does denies any significant cognitive or memory problems.  She has no hallucination.  Her last fall was 2 months ago when she fell on her side.  She has mild gait initiation freezing.  She does not faint.  She is incontinent at all times.  She has no swallowing difficulties.    She went to the [place] DBS support group and spoke with many others who had already had DBS surgery.  She was very encouraged by the reports.      Current medications    Movement Disorder-related Medications                   6 AM 8 AM 10 AM 12 noon 2 PM 4 PM 6 PM 8 PM   Carbidopa/levodopa 25/100     IR                                           1.25 1.25 1.25 1.25 1.25 1.25 1.25 1.25                                                                                        Current Outpatient Prescriptions   Medication     amLODIPine (NORVASC) 5 MG tablet     atorvastatin (LIPITOR) 20 MG tablet     carbidopa-levodopa (SINEMET)  MG per tablet     cholecalciferol (VITAMIN D-1000 MAX ST) 1000 UNITS TABS     escitalopram (LEXAPRO) 10 MG tablet     Multiple Vitamin (THERAPEUTIC MULTIVITAMIN PO)     pantoprazole (PROTONIX) 20 MG EC tablet     No current facility-administered medications for this visit.        Examination:  /75  Pulse 58  Temp 97.6  F (36.4  C) (Oral)  Resp 20  Ht 1.6 m (5' 3\")  Wt 62.8 kg (138 lb 8 oz)  SpO2 98%  BMI 24.53 kg/m2  General- no distress, no rash, good pulses, no edema  Respiratory-auscultation over the anterior lung fields is clear  Cardiac- regular " rate and rhythm    Neurologic  Mental status  Patient is alert, appropriate, speech is fluent and comprehension is intact    Cranial nerve testing  Pupils are equal and reactive to light, visual fields are full to confrontation bilaterally  Extraocular movements are full  Facial sensation is intact, face is symmetric with rest and activation  Palate rises symmetrically, tongue protrudes at midline with normal movements  Sterocleidomastoid and trapezius strength is normal and symmetric    UPDRS Values 7/25/2018   Time: 11:37 AM   Medication Off   R Brain DBS: None   L Brain DBS: None   Speech 2   Facial Expression 2   Rigidity Neck 2   Rigidity RUE 3   Rigidity LUE 3   Rigidity RLE 2   Rigidity LLE 2   Finger Taps R 3   Finger Taps L 4   Hand Mvt R 3   Hand Mvt L 4   Pron-/Supinate R 3   Pron-/Supinate L 3   Toe Tap R 2   Toe Tap L 2   Leg Agility R 2   Leg Agility L 2   Arise From Chair 3   Gait 3   Gait Freezing 0   Postural Stability 3   Posture 2   Global Spont Mvt 2   Postural Tremor RUE 2   Postural Tremor LUE 2   Kinetic Tremor RUE 1   Kinetic Tremor LUE 1   Rest Tremor RUE 3   Rest Tremor LUE 3   Rest Tremor RLE 0   Rest Tremor LLE 0   Rest Tremor Lip/Jaw 1   Rest Tremor Constancy 4   Total Right 24   Total Left 26   Axial Total 19   Total 74   30 minutes of total time was spent face-to-face with the patient over 50% of which was counseling..      Again, thank you for allowing me to participate in the care of your patient.      Sincerely,    Farshad Webb MD

## 2018-07-25 NOTE — MR AVS SNAPSHOT
After Visit Summary   7/25/2018    Gabi Manley    MRN: [id number]           Patient Information     Date Of Birth          1940        Visit Information        Provider Department      7/25/2018 12:00 PM Nurse, [facility] Neurology        Today's Diagnoses     Paralysis agitans (H)    -  1       Follow-ups after your visit        Your next 10 appointments already scheduled     Aug 07, 2018   Procedure with Murray Stearns MD   [facility], Same Day Surgery (--)    [address] [facility] [address]   [phone]            Aug 07, 2018  9:00 AM CDT   CT HEAD W/O CONTRAST with UUCT1   [facility], [place] ([facility])    [address] [facility] [address] [phone]           Please bring any scans or X-rays taken at other hospitals, if similar tests were done. Also bring a list of your medicines, including vitamins, minerals and over-the-counter drugs. It is safest to leave personal items at home.  Be sure to tell your doctor:   If you have any allergies.   If there s any chance you are pregnant.   If you are breastfeeding.  You do not need to do anything special to prepare for this exam.  Please wear loose clothing, such as a sweat suit or jogging clothes. Avoid snaps, zippers and other metal. We may ask you to undress and put on a hospital gown.            Aug 21, 2018   Procedure with Murray Stearns MD   [facility], Same Day Surgery (--)    [address] [place] [address]   [phone]            Sep 05, 2018  3:00 PM CDT   (Arrive by 2:45 PM)   Return Visit with STEFANO Santos CNP   [facility] Neuro[facility] ([facility])    [address] [facility]  3rd Floor  [facility] [id number] [phone]            Sep 14, 2018  7:00 AM CDT   (Arrive by 6:45 AM)   New Movement Disorder with STEFANO Villalobos CNP   [facility] ([facility])    [address] [place] [id number] [phone]            Sep 18, 2018   Procedure with Murray Stearns MD   [facility], [place], Same Day Surgery (--)    [address] [facility] [address] [phone]            Sep 18, 2018  8:40 AM CDT   CT HEAD W/O CONTRAST with UUCT1   [facility] [place] ([facility])    [address] [facility] [address] [phone]           Please bring any scans or X-rays taken at other hospitals, if similar tests were done. Also bring a list of your medicines, including vitamins, minerals and over-the-counter drugs. It is safest to leave personal items at home.  Be sure to tell your doctor:   If you have any allergies.   If there s any chance you are pregnant.   If you are breastfeeding.  You do not need to do anything special to prepare for this exam.  Please wear loose clothing, such as a sweat suit or jogging clothes. Avoid snaps, zippers and other metal. We may ask you to undress and put on a hospital gown.            Oct 03, 2018  3:00 PM CDT   (Arrive by 2:45 PM)   Return Visit with STEFANO Santos CNP [facility] Neurosurgery ([facility])    [address] [place] [id number] [phone]            Oct 19, 2018  7:00 AM CDT   (Arrive by 6:45 AM)   Return Movement Disorder with STEFANO Villalobos CNP [facility] Neurology ([facility])    [address] [place] [id number] [phone]            Oct 19, 2018 11:20 AM CDT   CT HEAD W/O CONTRAST with UCCT2   [facility] [place] ([facility])    [address] [place] [id number] [phone]           Please bring any scans or X-rays taken at other hospitals, if similar tests were done. Also bring a list of your medicines, including vitamins, minerals and  "over-the-counter drugs. It is safest to leave personal items at home.  Be sure to tell your doctor:   If you have any allergies.   If there s any chance you are pregnant.   If you are breastfeeding.  You do not need to do anything special to prepare for this exam.  Please wear loose clothing, such as a sweat suit or jogging clothes. Avoid snaps, zippers and other metal. We may ask you to undress and put on a hospital gown.              Future tests that were ordered for you today     Open Future Orders        Priority Expected Expires Ordered    CT Head w/o Contrast Routine 7/24/2018 7/24/2019 7/24/2018            Who to contact     Please call your clinic at [phone] to:    Ask questions about your health    Make or cancel appointments    Discuss your medicines    Learn about your test results    Speak to your doctor            Additional Information About Your Visit        [employer] Information     [employer] gives you secure access to your electronic health record. If you see a primary care provider, you can also send messages to your care team and make appointments. If you have questions, please call your primary care clinic.  If you do not have a primary care provider, please call [phone] and they will assist you.      Sweeten is an electronic gateway that provides easy, online access to your medical records. With [employer], you can request a clinic appointment, read your test results, renew a prescription or communicate with your care team.     To access your existing account, please contact your [facility] or call [phone] for assistance.        Care EveryWhere ID     This is your Care EveryWhere ID. This could be used by other organizations to access your [place] medical records  [id number]        Your Vitals Were     Pulse Temperature Height BMI (Body Mass Index)          58 97.6  F (36.4  C) (Oral) 1.6 m (5' 3\") 24.45 kg/m2         Blood Pressure from Last 3 " Encounters:   07/25/18 132/75   07/25/18 132/75   07/23/18 146/86    Weight from Last 3 Encounters:   07/25/18 62.6 kg (138 lb)   07/25/18 62.8 kg (138 lb 8 oz)   07/23/18 62.3 kg (137 lb 4.8 oz)              Today, you had the following     No orders found for display         Today's Medication Changes          These changes are accurate as of 7/25/18  4:03 PM.  If you have any questions, ask your nurse or doctor.               These medicines have changed or have updated prescriptions.        Dose/Directions    carbidopa-levodopa  MG per tablet   Commonly known as:  SINEMET   This may have changed:    - how much to take  - how to take this  - when to take this  - additional instructions   Used for:  Parkinson disease (H)        Patient is taking 1.25 tablets every two hours up to 12 tablets per day.   Quantity:  360 tablet   Refills:  3                Primary Care Provider Office Phone # Fax #    Tracey Abdi [phone]       [place] [id number]        Equal Access to Services     [facility] FarmMAXIMINO : Hadalex hays Sohailey, waaxda luqsergio, qaybta kaalneyda rojas, nabil ramsay. So [facility] [phone].    ATENCIÓN: Si habla español, tiene a concepcion disposición servicios gratuitos de asistencia lingüística. [facility] [phone].    We comply with applicable federal civil rights laws and [place] laws. We do not discriminate on the basis of race, color, national origin, age, disability, sex, sexual orientation, or gender identity.            Thank you!     Thank you for choosing [facility] NEUROLOGY  for your care. Our goal is always to provide you with excellent care. Hearing back from our patients is one way we can continue to improve our services. Please take a few minutes to complete the written survey that you may receive in the mail after your visit with us. Thank you!             Your Updated Medication List - Protect others  around you: Learn how to safely use, store and throw away your medicines at www.disposemymeds.org.          This list is accurate as of 7/25/18  4:03 PM.  Always use your most recent med list.                   Brand Name Dispense Instructions for use Diagnosis    amLODIPine 5 MG tablet    NORVASC     Take 5 mg by mouth every morning        atorvastatin 20 MG tablet    LIPITOR     Take 20 mg by mouth every morning        carbidopa-levodopa  MG per tablet    SINEMET    360 tablet    Patient is taking 1.25 tablets every two hours up to 12 tablets per day.    Parkinson disease (H)       escitalopram 10 MG tablet    LEXAPRO     Take 10 mg by mouth every morning        pantoprazole 20 MG EC tablet    PROTONIX     Take 20 mg by mouth every morning        THERAPEUTIC MULTIVITAMIN PO      Take 1 tablet by mouth every morning        VITAMIN D-1000 MAX ST 1000 units Tabs   Generic drug:  cholecalciferol      Take 2,000 Units by mouth every morning

## 2018-07-25 NOTE — MR AVS SNAPSHOT
After Visit Summary   7/25/2018    Gabi Manley    MRN: [id number]           Patient Information     Date Of Birth          1940        Visit Information        Provider Department      7/25/2018 11:30 AM Farshad Webb MD [facility] Neurology        Today's Diagnoses     Parkinson's disease (H)    -  1      Care Instructions    #1 DBS surgery is planned and scheduled.  #2 I will see her back after her DBS is optimized          Follow-ups after your visit        Follow-up notes from your care team     Return if symptoms worsen or fail to improve.      Your next 10 appointments already scheduled     Jul 25, 2018  2:30 PM CDT   LAB with [facility] ([facility])    [address] [address] [phone]           Please do not eat 10-12 hours before your appointment if you are coming in fasting for labs on lipids, cholesterol, or glucose (sugar). This does not apply to pregnant women. Water, hot tea and black coffee (with nothing added) are okay. Do not drink other fluids, diet soda or chew gum.            Aug 07, 2018   Procedure with Murray Stearns MD   [facility], [place], Same Day Surgery (--)    [address] [facility] [id number] [phone]            Aug 07, 2018  9:00 AM CDT   CT HEAD W/O CONTRAST with UUCT1   [facility], [place] ([facility] [place])    [address] [facility] [id number] [phone]           Please bring any scans or X-rays taken at other hospitals, if similar tests were done. Also bring a list of your medicines, including vitamins, minerals and over-the-counter drugs. It is safest to leave personal items at home.  Be sure to tell your doctor:   If you have any allergies.   If there s any chance you are pregnant.   If you are breastfeeding.  You do not need to do anything special to prepare for this exam.  Please wear loose clothing, such as a  sweat suit or jogging clothes. Avoid snaps, zippers and other metal. We may ask you to undress and put on a hospital gown.            Aug 21, 2018   Procedure with Murray Stearns MD   [facility], [place], Same Day Surgery (--)    [address] [place] [address]   [phone]            Sep 05, 2018  3:00 PM CDT   (Arrive by 2:45 PM)   Return Visit with STEFANO Santos CNP   [facility] Neurosurgery ([facility])    [address] [place] [address]   [phone]            Sep 14, 2018  7:00 AM CDT   (Arrive by 6:45 AM)   New Movement Disorder with STEFANO Villalobos CNP   [facility] Neurology ([facility])    [address] [place] [address]   [phone]            Sep 18, 2018   Procedure with Murray Stearns MD   [facility] [place], Same Day Surgery (--)    [address] [place] [address]   [phone]            Sep 18, 2018  8:40 AM CDT   CT HEAD W/O CONTRAST with UUCT1   [facility] [place] [place] ([facility])    [address] [facility] [address]   [phone]           Please bring any scans or X-rays taken at other hospitals, if similar tests were done. Also bring a list of your medicines, including vitamins, minerals and over-the-counter drugs. It is safest to leave personal items at home.  Be sure to tell your doctor:   If you have any allergies.   If there s any chance you are pregnant.   If you are breastfeeding.  You do not need to do anything special to prepare for this exam.  Please wear loose clothing, such as a sweat suit or jogging clothes. Avoid snaps, zippers and other metal. We may ask you to undress and put on a hospital gown.            Oct 03, 2018  3:00 PM CDT   (Arrive by 2:45 PM)   Return Visit with STEFANO Santos CNP [facility] Neurosurgery ([facility])    [address] [place] [address]  "[address]  3rd [facility] [address] [phone]            Oct 19, 2018  7:00 AM CDT   (Arrive by 6:45 AM)   Return Movement Disorder with STEFANO Villalobos CNP [facility] Neurology ([facility])    [address] [facility]  3rd [facility] [address] [phone]              Future tests that were ordered for you today     Open Future Orders        Priority Expected Expires Ordered    CT Head w/o Contrast Routine 7/24/2018 7/24/2019 7/24/2018            Who to contact     Please call your clinic at [phone] to:    Ask questions about your health    Make or cancel appointments    Discuss your medicines    Learn about your test results    Speak to your doctor            Additional Information About Your Visit        [email or web address] Information     [email or web address] gives you secure access to your electronic health record. If you see a primary care provider, you can also send messages to your care team and make appointments. If you have questions, please call your primary care clinic.  If you do not have a primary care provider, please call [phone] and they will assist you.      [email or web address] is an electronic gateway that provides easy, online access to your medical records. With [email or web address], you can request a clinic appointment, read your test results, renew a prescription or communicate with your care team.     To access your existing account, please contact your [facility] or call [phone] for assistance.        Care EveryWhere ID     This is your Care EveryWhere ID. This could be used by other organizations to access your [place] medical records  [id number]        Your Vitals Were     Pulse Temperature Respirations Height Pulse Oximetry BMI (Body Mass Index)    58 97.6  F (36.4  C) (Oral) 20 1.6 m (5' 3\") 98% 24.53 kg/m2       Blood Pressure from Last 3 Encounters:   07/25/18 132/75   07/25/18 132/75   07/23/18 146/86    Weight from Last 3 " Encounters:   07/25/18 62.6 kg (138 lb)   07/25/18 62.8 kg (138 lb 8 oz)   07/23/18 62.3 kg (137 lb 4.8 oz)              Today, you had the following     No orders found for display         Today's Medication Changes          These changes are accurate as of 7/25/18 12:20 PM.  If you have any questions, ask your nurse or doctor.               These medicines have changed or have updated prescriptions.        Dose/Directions    carbidopa-levodopa  MG per tablet   Commonly known as:  SINEMET   This may have changed:    - how much to take  - how to take this  - when to take this  - additional instructions   Used for:  Parkinson disease (H)        Patient is taking 1.25 tablets every two hours up to 12 tablets per day.   Quantity:  360 tablet   Refills:  3                Primary Care Provider Office Phone # Fax #    Tracey AbdiAARON [phone]       [place] [address]        Equal Access to Services     ART GRAY : Hadii vinita salo Sohailey, waaxda luqadaha, qaybta kaalmada adeegyajewell, nabil edwards . So [facility] [phone].    ATENCIÓN: Si cashla español, tiene a concepcion disposición servicios gratuitos de asistencia lingüística. Llame al [phone].    We comply with applicable federal civil rights laws and [place] laws. We do not discriminate on the basis of race, color, national origin, age, disability, sex, sexual orientation, or gender identity.            Thank you!     Thank you for choosing [facility] NEUROLOGY  for your care. Our goal is always to provide you with excellent care. Hearing back from our patients is one way we can continue to improve our services. Please take a few minutes to complete the written survey that you may receive in the mail after your visit with us. Thank you!             Your Updated Medication List - Protect others around you: Learn how to safely use, store and throw away your medicines at  www.disposemymeds.org.          This list is accurate as of 7/25/18 12:20 PM.  Always use your most recent med list.                   Brand Name Dispense Instructions for use Diagnosis    amLODIPine 5 MG tablet    NORVASC     Take 5 mg by mouth every morning        atorvastatin 20 MG tablet    LIPITOR     Take 20 mg by mouth every morning        carbidopa-levodopa  MG per tablet    SINEMET    360 tablet    Patient is taking 1.25 tablets every two hours up to 12 tablets per day.    Parkinson disease (H)       escitalopram 10 MG tablet    LEXAPRO     Take 10 mg by mouth every morning        pantoprazole 20 MG EC tablet    PROTONIX     Take 20 mg by mouth every morning        THERAPEUTIC MULTIVITAMIN PO      Take 1 tablet by mouth every morning        VITAMIN D-1000 MAX ST 1000 units Tabs   Generic drug:  cholecalciferol      Take 2,000 Units by mouth every morning

## 2018-07-25 NOTE — PROGRESS NOTES
"[facility] follow up note    Patient: Gabi Manley  Medical Record Number: [id number]  Encounter Date: July 25, 2018  PCP:Tracey Abdi    CC: Parkinson's disease    Impression:  1.  Idiopathic Parkinson's disease  2.  Severe motor fluctuations with short duration response, mild dyskinesia and mild nonmotor off symptoms  3.  Urinary incontinence  4.  Planned STN DBS     Recommendations:    1.  Continue carbidopa/levodopa 25/100 immediate release, 1.25 tablets every 2 hours while awake  2.  DBS is planned as a staged procedure.  The first procedure will be a left STN DBS lead implantation on 8/7/2018.  She will have the generator pack implanted on 8/21/2018.  She will have a right STN DBS lead implantation on 9/18/2018 with connection to the existing battery pack.  3.  The patient understands that she will be under the care postoperatively of Maddie mane NP.  She knows that Ms. Anton is an expert in programming and Parkinson's care and will manage her medicines through the DBS programming and optimization process.  I will see the patient back when she has reached optimal programming and medication stabilization and follow her medically at that point.    Return to clinic: After DBS optimization    Interval Hx:: Ms. Gabi Manley returns to clinic today for follow-up of her Parkinson's disease.  She continues to suffer from severe and disabling motor fluctuations.  She has an\"on\" response in about one half hour after medication intake.  By 1 hour to 1-1/2 hours later she is beginning to wear off rapidly.  During her \"on\"time she can get up and be independent.  When she is off she is nearly nonambulatory.  During her on off testing her UPDRS part 3 motor score was 40 in the off state and 15 in the on state indicating a 62.5% improvement.  Today she comes in in the off state.  Her reading today on the UPDRS part 3 motor score is 70.    She is tolerating her medication.  She formally had severe headaches " "nausea with it but this is better.  She has minimal dyskinesia.  She does get hot and flushed when she is in the office today but has no other known nonmotor symptoms in this state.    She does denies any significant cognitive or memory problems.  She has no hallucination.  Her last fall was 2 months ago when she fell on her side.  She has mild gait initiation freezing.  She does not faint.  She is incontinent at all times.  She has no swallowing difficulties.    She went to the [facility] DBS support group and spoke with many others who had already had DBS surgery.  She was very encouraged by the reports.      Current medications    Movement Disorder-related Medications                   6 AM 8 AM 10 AM 12 noon 2 PM 4 PM 6 PM 8 PM   Carbidopa/levodopa 25/100     IR                                           1.25 1.25 1.25 1.25 1.25 1.25 1.25 1.25                                                                                        Current Outpatient Prescriptions   Medication     amLODIPine (NORVASC) 5 MG tablet     atorvastatin (LIPITOR) 20 MG tablet     carbidopa-levodopa (SINEMET)  MG per tablet     cholecalciferol (VITAMIN D-1000 MAX ST) 1000 UNITS TABS     escitalopram (LEXAPRO) 10 MG tablet     Multiple Vitamin (THERAPEUTIC MULTIVITAMIN PO)     pantoprazole (PROTONIX) 20 MG EC tablet     No current facility-administered medications for this visit.        Examination:  /75  Pulse 58  Temp 97.6  F (36.4  C) (Oral)  Resp 20  Ht 1.6 m (5' 3\")  Wt 62.8 kg (138 lb 8 oz)  SpO2 98%  BMI 24.53 kg/m2  General- no distress, no rash, good pulses, no edema  Respiratory-auscultation over the anterior lung fields is clear  Cardiac- regular rate and rhythm    Neurologic  Mental status  Patient is alert, appropriate, speech is fluent and comprehension is intact    Cranial nerve testing  Pupils are equal and reactive to light, visual fields are full to confrontation bilaterally  Extraocular movements are " full  Facial sensation is intact, face is symmetric with rest and activation  Palate rises symmetrically, tongue protrudes at midline with normal movements  Sterocleidomastoid and trapezius strength is normal and symmetric    UPDRS Values 7/25/2018   Time: 11:37 AM   Medication Off   R Brain DBS: None   L Brain DBS: None   Speech 2   Facial Expression 2   Rigidity Neck 2   Rigidity RUE 3   Rigidity LUE 3   Rigidity RLE 2   Rigidity LLE 2   Finger Taps R 3   Finger Taps L 4   Hand Mvt R 3   Hand Mvt L 4   Pron-/Supinate R 3   Pron-/Supinate L 3   Toe Tap R 2   Toe Tap L 2   Leg Agility R 2   Leg Agility L 2   Arise From Chair 3   Gait 3   Gait Freezing 0   Postural Stability 3   Posture 2   Global Spont Mvt 2   Postural Tremor RUE 2   Postural Tremor LUE 2   Kinetic Tremor RUE 1   Kinetic Tremor LUE 1   Rest Tremor RUE 3   Rest Tremor LUE 3   Rest Tremor RLE 0   Rest Tremor LLE 0   Rest Tremor Lip/Jaw 1   Rest Tremor Constancy 4   Total Right 24   Total Left 26   Axial Total 19   Total 74   30 minutes of total time was spent face-to-face with the patient over 50% of which was counseling..

## 2018-07-25 NOTE — PROGRESS NOTES
I met with participant following her clinical visit with Dr. Webb. I escorted her to a consult room and then consented her for the [place] clinical core study. Following consent, patient completed the baseline clinical core visit lasting 2.5 hours. I escorted participant to 1st floor lab for her blood draw and to first floor lost and found to find her Alert battery pack. She is to mail back the research data watch when prompted in 6 days. I gave her my contact information and encouraged her to contact me with any neurology clinic questions or concerns in the future. All questions answered.    Bre Pineda RN, MPH  Research Nurse, Movement Disorders

## 2018-07-26 NOTE — TELEPHONE ENCOUNTER
[facility] Call Center    Phone Message    May a detailed message be left on voicemail: yes    Reason for Call: Other: Pt is requesting a call back from Yuliana to go over DBS programming      Action Taken: Message routed to:  [facility] ([facility] Neurology

## 2018-07-31 NOTE — TELEPHONE ENCOUNTER
I called patient and left a voicemail today 7/31/18 at 3:05pm and clarified that the appointment on 9/14/18 with Eileen for initial programming is at 7am, not 7:30am.  I will be meeting with patient before that time so I will clarify her appointment time again.    Bre Pineda, RN, MPH  Research Nurse, Movement Disorders

## 2018-08-07 NOTE — BRIEF OP NOTE
[facility], [place]    Brief Operative Note    Pre-operative diagnosis: Parkinson's Disease   Post-operative diagnosis Parkinson's Disease   Procedure: Procedure(s):  Stealth Assisted Left Deep Brain Stimulator Placement, Phase I, Placement Left Side Deep Brain Stimulator Electrode, Target Left Globus Pallidus Internus, With Microelectrode Recording - Wound Class: I-Clean  Surgeon: Surgeon(s) and Role:     * Murray Stearns MD - Primary     * Kwan Champagne MD - Resident - Assisting     * Aj Ayon MD - Resident - Assisting  Anesthesia: Combined MAC with Local   Estimated blood loss: 5cc  Drains: None  Specimens: None  Findings:  Good stimulation effect  Complications: None.  Implants:   Name Type Inv Item Serial No. LRB Used Lot No. Latex?   Cranial [place] cover system    Left 1 [id number]    : [employer]-   Inf[employer] DBS System   [id number] Left 1     : [employer]

## 2018-08-07 NOTE — PLAN OF CARE
Problem: Patient Care Overview  Goal: Plan of Care/Patient Progress Review  Outcome: Improving  D: Parkinson's disease  I/A: Pt arrived to unit around 1600. Neuro intact. Pupils PERRLA, strong strength in all extremities, CN intact. Pt complained of mild headache upon arrival, tylenol given and currently denies headache. Pt in sinus bradycardia, to sinus rhythm, 50-60's. BP goal <140, hydralazine given once. Pt on 2L nasal cannula. Diet advanced to regular diet, tolerating PO intake. Urine output 30mL/hr. NS running at 75mL/hr.  P: Continue to monitor neuro status closely. Report to be given to oncoming RN.

## 2018-08-07 NOTE — ANESTHESIA POSTPROCEDURE EVALUATION
Patient: Gabi Manley    Procedure(s):  Stealth Assisted Left Deep Brain Stimulator Placement, Phase I, Placement Left Side Deep Brain Stimulator Electrode, Target Left Globus Pallidus Internus, With Microelectrode Recording - Wound Class: I-Clean    Diagnosis:Parkinson's Disease   Diagnosis Additional Information: No value filed.    Anesthesia Type:  MAC    Note:  Anesthesia Post Evaluation    Patient location during evaluation: PACU  Patient participation: Able to fully participate in evaluation  Level of consciousness: awake and alert  Pain management: satisfactory to patient  Airway patency: patent  Cardiovascular status: acceptable and stable  Respiratory status: acceptable and spontaneous ventilation  Hydration status: euvolemic  PONV: controlled     Anesthetic complications: None          Last vitals:  Vitals:    08/07/18 0647 08/07/18 0700   BP: (!) 170/111 (!) 177/104   Resp: 18    Temp: 36.7  C (98.1  F)    SpO2: 98%          Electronically Signed By: Vidal Puentes MD  August 7, 2018  2:20 PM

## 2018-08-07 NOTE — ANESTHESIA CARE TRANSFER NOTE
Patient: Gabi Manley    Procedure(s):  Stealth Assisted Left Deep Brain Stimulator Placement, Phase I, Placement Left Side Deep Brain Stimulator Electrode, Target Left Globus Pallidus Internus, With Microelectrode Recording - Wound Class: I-Clean    Diagnosis: Parkinson's Disease   Diagnosis Additional Information: No value filed.    Anesthesia Type:   MAC     Note:  Airway :Nasal Cannula  Patient transferred to:PACU  Comments: Patient oxygenating and ventilating well on 2LNC.  Patient LAO, following commands, and fentanyl titrated for pain reported by patient.  Report given to RN and VSS.  Handoff Report: Identifed the Patient, Identified the Reponsible Provider, Reviewed the pertinent medical history, Discussed the surgical course, Reviewed Intra-OP anesthesia mangement and issues during anesthesia, Set expectations for post-procedure period and Allowed opportunity for questions and acknowledgement of understanding      Vitals: (Last set prior to Anesthesia Care Transfer)    CRNA VITALS  8/7/2018 1328 - 8/7/2018 1411      8/7/2018             NIBP: 136/72    Pulse: 57    Ht Rate: 57    SpO2: 98 %    Resp Rate (observed): 16                Electronically Signed By: STEFANO Levi CRNA  August 7, 2018  2:11 PM

## 2018-08-07 NOTE — IP AVS SNAPSHOT
MRN:[id number]                      After Visit Summary   8/7/2018    Gabi Manley    MRN: [id number]           Thank you!     Thank you for choosing [facility] for your care. Our goal is always to provide you with excellent care. Hearing back from our patients is one way we can continue to improve our services. Please take a few minutes to complete the written survey that you may receive in the mail after you visit with us. Thank you!        Patient Information     Date Of Birth          1940        Designated Caregiver       Most Recent Value    Caregiver    Will someone help with your care after discharge? yes    Name of designated caregiver Mino [son]    Phone number of caregiver [phone]    Caregiver address [place], [place]      About your hospital stay     You were admitted on:  August 7, 2018 You last received care in the:  [address] [facility]    You were discharged on:  August 8, 2018        Reason for your hospital stay       You underwent surgery to have deep brain stimulator placement                  Who to Call     For medical emergencies, please call 911.  For non-urgent questions about your medical care, please call your primary care provider or clinic, [phone]  For questions related to your surgery, please call your surgery clinic        Attending Provider     Provider Murray Pimentel MD Neurosurgery       Primary Care Provider Office Phone # Fax #    Tracey Abdi [phone]      After Care Instructions     Activity       Your activity upon discharge:   Do not do any bending, twisting, strenuous exercise, or heavy lifting (greater than 10 pounds) for 4-6 weeks. Be careful and ask for assistance when walking or going up and down stairs. Avoid any activities that could result in trauma to the surgical wound. Do not drive within 3 months of having your last seizure or while using narcotics or other sedating medications, such as sleep  aids, muscle relaxants, etc.            Diet       Follow this diet upon discharge: Orders Placed This Encounter      Advance Diet as Tolerated: Regular Diet Adult; Regular Diet Adult            Discharge Instructions       You underwent surgery place a  deep brain stimulator  by Murray Stearns MD, PhD      - Your sutures are absorbable.     - You will have follow up scheduled with the physician assistants and/or nurse practitioners in our clinic 2 weeks after your surgery.  If you live far away, you may see your primary care doctor for a wound check at 2 weeks.     - If you have not heard from our clinic about your follow up visit by 3-4 days following your discharge, please call our clinic at [phone] to schedule an appointment with the Neurosurgery teams.     After discharge, your activity restrictions are:   -We encourage short frequent walks, increasing as tolerated.  - No driving until you are seen in clinic and cleared by your neurosurgeon.  If you have had a seizure, you may not drive for at least 3 months according to [place] law.    - No strenuous activity.  - No lifting more than 10 pounds until you are seen in clinic (a gallon of milk weighs approximately 8 pounds)    Wound care  - You are ok to shower, but do not soak your incisions. Pat them dry if they get damp.   - Avoid coloring your hair or permanent styling until cleared by your surgeon  - No baths, hot tubs or pools for 4-6 weeks after surgery.       Call if you have any of the followin. Temperature greater than 101.5 F.   2. Any redness, swelling or discharge from the wound.   3. Any new weakness, numbness or altered mental status.  4. Worsening pain that is not improving with the pain medications you were prescribed.     Call [phone] or after 5:00 pm or on weekends call [phone] and ask for the neurosurgery resident on call. Thank You.            Wound care and dressings       Instructions to care for your wound at  home:  You should remove your dressings and bandages on post-operative day #2. You should then keep the wound undressed and open to air. You are allowed to take showers and get the wound wet starting on post-operative day #3 but you may not scrub or soak the wound or keep it submerged under water. If you do happen to get the wound wet, be sure to pat dry it rather than scrubbing it with a towel.                  Follow-up Appointments     Adult [facility] Follow-up and recommended labs and tests       Please follow up with Dr Murray Stearns on August 21, 2018 as previously scheduled    Appointments on [place] and/or [facility] (with [facility] or [facility] provider or service). Call [phone] if you haven't heard regarding these appointments within 7 days of discharge.                  Your next 10 appointments already scheduled     Aug 21, 2018   Procedure with Murray Stearns MD   [facility], Same Day Surgery (--)    [address] [place] [id number]   [phone]            Sep 05, 2018  3:00 PM CDT   (Arrive by 2:45 PM)   Return Visit with STEFANO Santos CNP   [facility] Neurosurgery ([facility])    [address] [place] [id number] [phone]            Sep 14, 2018  7:00 AM CDT   (Arrive by 6:45 AM)   New Movement Disorder with STEFANO Villalobos CNP   [facility] Neurology ([facility])    [address] [place] [id number] [phone]            Sep 18, 2018   Procedure with Murray Stearns MD   [facility], Same Day Surgery (--)    [address] [place] [phone]   [phone]            Sep 18, 2018  8:40 AM CDT   CT HEAD W/O CONTRAST with UUCT1   [facility] [place], [place] ([facility], [facility])    [address] [facility] [address] [phone]           Please bring any scans or X-rays taken at other hospitals, if similar  tests were done. Also bring a list of your medicines, including vitamins, minerals and over-the-counter drugs. It is safest to leave personal items at home.  Be sure to tell your doctor:   If you have any allergies.   If there s any chance you are pregnant.   If you are breastfeeding.  You do not need to do anything special to prepare for this exam.  Please wear loose clothing, such as a sweat suit or jogging clothes. Avoid snaps, zippers and other metal. We may ask you to undress and put on a hospital gown.            Oct 03, 2018  3:00 PM CDT   (Arrive by 2:45 PM)   Return Visit with STEFANO Santos CNP   [facility] Neurosurgery ([facility])    [address] [place] [id number] [phone]            Oct 19, 2018  7:00 AM CDT   (Arrive by 6:45 AM)   Return Movement Disorder with STEFANO Villalobos CNP   [facility] Neurology ([facility])    [address] [place] [id number] [phone]            Oct 19, 2018 11:20 AM CDT   CT HEAD W/O CONTRAST with UCCT2   [facility] [place] [facility] ([facility])    [address] [place] [id number] [phone]           Please bring any scans or X-rays taken at other hospitals, if similar tests were done. Also bring a list of your medicines, including vitamins, minerals and over-the-counter drugs. It is safest to leave personal items at home.  Be sure to tell your doctor:   If you have any allergies.   If there s any chance you are pregnant.   If you are breastfeeding.  You do not need to do anything special to prepare for this exam.  Please wear loose clothing, such as a sweat suit or jogging clothes. Avoid snaps, zippers and other metal. We may ask you to undress and put on a hospital gown.              Pending Results     No orders found for last 3 day(s).            Statement of Approval     Ordered        "   08/08/18 0939  I have reviewed and agree with all the recommendations and orders detailed in this document.  EFFECTIVE NOW     Approved and electronically signed by:  Patito Castillo APRN CNP             Admission Information     Date & Time Provider Department Dept. Phone    8/7/2018 Murray Stearns MD [address] [facility] [place] [phone]      Your Vitals Were     Blood Pressure Temperature Respirations Height Weight Pulse Oximetry    116/104 98  F (36.7  C) (Oral) 21 1.6 m (5' 3\") 61.8 kg (136 lb 3.9 oz) 95%    BMI (Body Mass Index)                   24.13 kg/m2           MyChart Information     [facility] gives you secure access to your electronic health record. If you see a primary care provider, you can also send messages to your care team and make appointments. If you have questions, please call your primary care clinic.  If you do not have a primary care provider, please call [phone] and they will assist you.        Care EveryWhere ID     This is your Care EveryWhere ID. This could be used by other organizations to access your [facility] medical records  [id number]        Equal Access to Services     ART GRAY : Hadii vinita Borjas, katrin rivera, qabecca rojas, nabil ramsay. So [facility] [phone].    ATENCIÓN: Si habla español, tiene a concepcion disposición servicios gratuitos de asistencia lingüística. Anita al [phone].    We comply with applicable federal civil rights laws and [place] laws. We do not discriminate on the basis of race, color, national origin, age, disability, sex, sexual orientation, or gender identity.               Review of your medicines      START taking        Dose / Directions    oxyCODONE IR 5 MG tablet   Commonly known as:  ROXICODONE   Used for:  S/P deep brain stimulator placement        Dose:  5-10 mg   Take 1-2 tablets (5-10 mg) by mouth every 4 hours as needed for other (pain control or " improvement in physical function. Hold dose for analgesic side effects.)   Quantity:  30 tablet   Refills:  0       senna-docusate 8.6-50 MG per tablet   Commonly known as:  SENOKOT-S;PERICOLACE   Used for:  S/P deep brain stimulator placement        Dose:  2 tablet   Take 2 tablets by mouth 2 times daily   Quantity:  30 tablet   Refills:  0         CONTINUE these medicines which may have CHANGED, or have new prescriptions. If we are uncertain of the size of tablets/capsules you have at home, strength may be listed as something that might have changed.        Dose / Directions    carbidopa-levodopa  MG per tablet   Commonly known as:  SINEMET   This may have changed:    - how much to take  - how to take this  - when to take this  - additional instructions   Used for:  Parkinson disease (H)        Patient is taking 1.25 tablets every two hours up to 12 tablets per day.   Quantity:  360 tablet   Refills:  3         CONTINUE these medicines which have NOT CHANGED        Dose / Directions    amLODIPine 5 MG tablet   Commonly known as:  NORVASC        Dose:  5 mg   Take 5 mg by mouth every morning   Refills:  0       atorvastatin 20 MG tablet   Commonly known as:  LIPITOR        Dose:  20 mg   Take 20 mg by mouth every morning   Refills:  0       escitalopram 10 MG tablet   Commonly known as:  LEXAPRO        Dose:  10 mg   Take 10 mg by mouth every morning   Refills:  0       pantoprazole 20 MG EC tablet   Commonly known as:  PROTONIX        Dose:  20 mg   Take 20 mg by mouth every morning   Refills:  0       THERAPEUTIC MULTIVITAMIN PO        Dose:  1 tablet   Take 1 tablet by mouth every morning   Refills:  0       VITAMIN D-1000 MAX ST 1000 units Tabs   Generic drug:  cholecalciferol        Dose:  2000 Units   Take 2,000 Units by mouth every morning   Refills:  0            Where to get your medicines      These medications were sent to [facility] - [place], [place] - [address] [facility]  [address]  [facility] [address]     Phone:  [phone]     senna-docusate 8.6-50 MG per tablet         Some of these will need a paper prescription and others can be bought over the counter. Ask your nurse if you have questions.     Bring a paper prescription for each of these medications     oxyCODONE IR 5 MG tablet                Protect others around you: Learn how to safely use, store and throw away your medicines at www.disposemymeds.org.        Information about OPIOIDS     PRESCRIPTION OPIOIDS: WHAT YOU NEED TO KNOW   We gave you an opioid (narcotic) pain medicine. It is important to manage your pain, but opioids are not always the best choice. You should first try all the other options your care team gave you. Take this medicine for as short a time (and as few doses) as possible.    Some activities can increase your pain, such as bandage changes or therapy sessions. It may help to take your pain medicine 30 to 60 minutes before these activities. Reduce your stress by getting enough sleep, working on hobbies you enjoy and practicing relaxation or meditation. Talk to your care team about ways to manage your pain beyond prescription opioids.    These medicines have risks:    DO NOT drive when on new or higher doses of pain medicine. These medicines can affect your alertness and reaction times, and you could be arrested for driving under the influence (DUI). If you need to use opioids long-term, talk to your care team about driving.    DO NOT operate heavy machinery    DO NOT do any other dangerous activities while taking these medicines.    DO NOT drink any alcohol while taking these medicines.     If the opioid prescribed includes acetaminophen, DO NOT take with any other medicines that contain acetaminophen. Read all labels carefully. Look for the word  acetaminophen  or  Tylenol.  Ask your pharmacist if you have questions or are unsure.    You can get addicted to pain medicines, especially if you have  a history of addiction (chemical, alcohol or substance dependence). Talk to your care team about ways to reduce this risk.    All opioids tend to cause constipation. Drink plenty of water and eat foods that have a lot of fiber, such as fruits, vegetables, prune juice, apple juice and high-fiber cereal. Take a laxative (Miralax, milk of magnesia, Colace, Senna) if you don t move your bowels at least every other day. Other side effects include upset stomach, sleepiness, dizziness, throwing up, tolerance (needing more of the medicine to have the same effect), physical dependence and slowed breathing.    Store your pills in a secure place, locked if possible. We will not replace any lost or stolen medicine. If you don t finish your medicine, please throw away (dispose) as directed by your pharmacist. The [place] Pollution [employer] has more information about safe disposal: https://www.pca.[facility].us/living-green/managing-unwanted-medications             Medication List: This is a list of all your medications and when to take them. Check marks below indicate your daily home schedule. Keep this list as a reference.      Medications           Morning Afternoon Evening Bedtime As Needed    amLODIPine 5 MG tablet   Commonly known as:  NORVASC   Take 5 mg by mouth every morning   Last time this was given:  5 mg on 8/8/2018  7:39 AM                                atorvastatin 20 MG tablet   Commonly known as:  LIPITOR   Take 20 mg by mouth every morning   Last time this was given:  20 mg on 8/8/2018  7:39 AM                                carbidopa-levodopa  MG per tablet   Commonly known as:  SINEMET   Patient is taking 1.25 tablets every two hours up to 12 tablets per day.   Last time this was given:  1 tablet on 8/8/2018 10:07 AM                                escitalopram 10 MG tablet   Commonly known as:  LEXAPRO   Take 10 mg by mouth every morning   Last time this was given:  10 mg on 8/8/2018  7:39 AM                                 oxyCODONE IR 5 MG tablet   Commonly known as:  ROXICODONE   Take 1-2 tablets (5-10 mg) by mouth every 4 hours as needed for other (pain control or improvement in physical function. Hold dose for analgesic side effects.)   Last time this was given:  10 mg on 8/8/2018  4:23 AM                                pantoprazole 20 MG EC tablet   Commonly known as:  PROTONIX   Take 20 mg by mouth every morning   Last time this was given:  20 mg on 8/8/2018  7:39 AM                                senna-docusate 8.6-50 MG per tablet   Commonly known as:  SENOKOT-S;PERICOLACE   Take 2 tablets by mouth 2 times daily   Last time this was given:  2 tablets on 8/8/2018  7:40 AM                                THERAPEUTIC MULTIVITAMIN PO   Take 1 tablet by mouth every morning                                VITAMIN D-1000 MAX ST 1000 units Tabs   Take 2,000 Units by mouth every morning   Generic drug:  cholecalciferol

## 2018-08-07 NOTE — IP AVS SNAPSHOT
[address] [address]    Phone:  [phone]                                       After Visit Summary   8/7/2018    Gabi Manley    MRN: [id number]           After Visit Summary Signature Page     I have received my discharge instructions, and my questions have been answered. I have discussed any challenges I see with this plan with the nurse or doctor.    ..........................................................................................................................................  Patient/Patient Representative Signature      ..........................................................................................................................................  Patient Representative Print Name and Relationship to Patient    ..................................................               ................................................  Date                                            Time    ..........................................................................................................................................  Reviewed by Signature/Title    ...................................................              ..............................................  Date                                                            Time

## 2018-08-08 NOTE — PLAN OF CARE
Problem: Patient Care Overview  Goal: Plan of Care/Patient Progress Review  Outcome: Improving  Status  D/I: Patient on unit 4A Surgical/Neuro ICU s/p DBS  Neuro- intact, A/Ox4, PERRLA, follows commands, 5/5 strength in all extremities. Does have tremors in all extremities, worse in the UEs.   CV- VSS, SBP goal <140- gave 10 mg hydralazine 1 times. Afebrile. Occasionally bradycardia into the 50s.   Pulm- Clear, RA, ETCO2 monitoring.   GI- No BM, active bowel sounds. Tolerating eating.   - Minimal urine output, MD's notified and gave 500 ml bolus at 0600- will monitor for effectiveness. Medel   Skin- Incisions on head, otherwise WNL.   Gtts- TKO    Lines- L PIV  Electrolytes- replaced per protocol.  Social-  Family at bedside and updated.  See flow sheets for further interventions and assessments.  P: Continue to monitor pt closely, Notify MD of changes/concerns

## 2018-08-08 NOTE — DISCHARGE SUMMARY
[facility] Discharge Summary and Instructions    Gabi Manley MRN# [id number]   Age: 78 year old YOB: 1940     Date of Admission:  8/7/2018  Date of Discharge::  8/8/2018  Admitting Physician:  Murray Stearns MD  Discharge Physician:  Murray Stearns MD          Admission Diagnoses:   Parkinson's Disease   Parkinson's disease (H)          Discharge Diagnosis:   Parkinson's Disease   Parkinson's disease (H)          Procedures:   8/7/2016  Stealth Assisted Left Deep Brain Stimulator Placement, Phase I, Placement Left Side Deep Brain Stimulator Electrode, Target Left Globus Pallidus Internus, With Microelectrode Recording            Brief History of Illness:   78 year old woman with a history of Idiopathic Parkinson s disease.  Her symptoms began in 2009 when she was unable to step up into her daughter s house.  She then developed resting tremor in her hands. She had good control of her symptoms for many years, but is now having increased side-effects.  She is now having motor fluctuations with wearing off and peak dose dyskinesia.  She also has freezing gait and cramps in her left leg.  She reports that her tremor is mostly on her right side.  Recently, Dr. Webb has changed her carbidopa/levodopa from long acting to immediate release every 2 hours which made her feel worse.   Patient underwent DBS candidacy workup and was felt to be appropriate for DBS placement.           Hospital Course:   Following surgery the patient was monitored overnight in the Neurosurgical ICU where she remained neurologically and medically stable.  The patient was ambulatory, tolerating diet, voiding and passing flatus POD #1, therefore was able to discharge home with family.   Patient will return for Phase II of her procedure on August 21, 2018.            Discharge Medications:     Current Discharge Medication List      START taking these medications    Details   oxyCODONE IR (ROXICODONE) 5 MG  "tablet Take 1-2 tablets (5-10 mg) by mouth every 4 hours as needed for other (pain control or improvement in physical function. Hold dose for analgesic side effects.)  Qty: 30 tablet, Refills: 0    Associated Diagnoses: S/P deep brain stimulator placement      senna-docusate (SENOKOT-S;PERICOLACE) 8.6-50 MG per tablet Take 2 tablets by mouth 2 times daily  Qty: 30 tablet, Refills: 0    Associated Diagnoses: S/P deep brain stimulator placement         CONTINUE these medications which have NOT CHANGED    Details   amLODIPine (NORVASC) 5 MG tablet Take 5 mg by mouth every morning       atorvastatin (LIPITOR) 20 MG tablet Take 20 mg by mouth every morning       carbidopa-levodopa (SINEMET)  MG per tablet Patient is taking 1.25 tablets every two hours up to 12 tablets per day.  Qty: 360 tablet, Refills: 3    Associated Diagnoses: Parkinson disease (H)      escitalopram (LEXAPRO) 10 MG tablet Take 10 mg by mouth every morning       pantoprazole (PROTONIX) 20 MG EC tablet Take 20 mg by mouth every morning       cholecalciferol (VITAMIN D-1000 MAX ST) 1000 UNITS TABS Take 2,000 Units by mouth every morning       Multiple Vitamin (THERAPEUTIC MULTIVITAMIN PO) Take 1 tablet by mouth every morning          BP (!) 116/104  Temp 98  F (36.7  C) (Oral)  Resp 21  Ht 1.6 m (5' 3\")  Wt 61.8 kg (136 lb 3.9 oz)  SpO2 95%  BMI 24.13 kg/m2    Exam:  General: Awake;  Alert, In No Acute Distress  Pulm: Breathing Comfortably on RA  Mental status: Oriented x 3  Cranial Nerves: Cranial Nerves II-XII Intact Bilaterally  Strength:                         Del                 Tr                   Bi                   WE                WF                 Gr  R                   5                    5                    5                    5                    5                    5  L                    5                    5                    5                    5                    5                    5                        HF "                 KE                 KF                  DF                 PF                  EHL  R                   5                    5                    5                    5                    5                    5  L                    5                    5                    5                    5                    5                    5     Sensory: Intact to Light Touch  INCISION: clean, dry, closed with absorbable sutures and dermabond         Discharge Instructions and Follow-Up:   Discharge diet: Regular   Discharge activity: You may advance activity as tolerated. No strenuous exercise or heay lifting greater than 10 lbs for 4 weeks or until seen and cleared in clinic.   Discharge follow-up: Follow-up with Dr. Murray Stearns MD in 2 weeks   Wound care: Ok to shower,however no scrubbing of the wound and no soaking of the wound, meaning no bathtubs or swimming pools. Pat dry only. Leave wound open to air.  Sutures are not absorbable and need to be removed in 2 weeks. If patient still at rehab by this time, the sutures may be removed by the rehab physician if he or she considers that the wound has healed completely.     Please call if you have:  1. increased pain, redness, drainage, swelling at your incision  2. fevers > 101.5 F degrees  3. with any questions or concerns.  You may reach the Neurosurgery clinic at [phone] during regular work hours. ER at [phone].    and ask for the Neurosurgery Resident on call at [phone], during off hours or weekends.         Discharge Disposition:   Discharged to home            STEFANO Cunningham, CNP  Department of Neurosurgery  Pager: [phone]

## 2018-08-08 NOTE — PROGRESS NOTES
S: no headache    O:  Exam:  General: Awake;  Alert, In No Acute Distress  Pulm: Breathing Comfortably on RA  Mental status: Oriented x 3  Cranial Nerves: Cranial Nerves II-XII Intact Bilaterally  Strength:      Del Tr Bi WE WF Gr  R 5 5 5 5 5 5  L 5 5 5 5 5 5     HF KE KF DF PF EHL  R 5 5 5 5 5 5  L 5 5 5 5 5 5    Sensory: Intact to Light Touch  INCISION: clean, dry    Assessment:   #s/p left DBS for parkinson s    Plan by problem:     Neuro:   Post-operative pain: pain controlled  Neurological exam frequency: q1h  Anti-epileptics: none  Sutures out: absorbable  Required imaging: Complete    Cardiovascular  Blood pressure goals: SBP < 140    Pulmonary:   Incentive spirometry     Gastrointestinal: advance diet to regular diet     Renal: thomason out     Heme:   No issues    Endocrine:   No issues     Infectious   Clindamycin x3 doses for zroaida-op prophylaxis    PT/OT: not indicated    DVT prophylaxis: Sequential compression devices     Ulcer prophylaxis: none indicated    DISPO:  Anticipate D/C Home 8/8    Barriers: none      Contact the neurosurgery resident on call with questions.    Dial * * * [phone]: Enter job code [id number] when prompted.    Aj Ayon MD  Neurosurgery PGY3

## 2018-08-08 NOTE — PROGRESS NOTES
Discharged to: Home by son's vehicle at 1230  Belongings: With patient  AVS (After Visit Summary) discussed with: Patient and son

## 2018-08-08 NOTE — PLAN OF CARE
Problem: Patient Care Overview  Goal: Plan of Care/Patient Progress Review  Notified Resident at 0430 AM regarding low urine output of 50 ml/2 hours X2.      Spoke with: Sent text page to Neuro surgery resident telling to call back if new orders    Orders were not obtained.    Comments: Provider did not call back and no new orders placed.

## 2018-08-10 NOTE — PROGRESS NOTES
Neurosurgery Discharge Coordination Note     Attending physician: Dr. Stearns  Surgery performed: DBS lead placement  Date of Discharge: 8/8/2018  Discharge to: Home     Current status: Left VM for patient to check on her post op status. Left my number and that of the neurosurgery resident on call should she have concerns over the weekend or after regular business hours. I will f/u with her if I do not hear back.     RN triage/on call number given: [phone]/ [phone]

## 2018-08-21 NOTE — IP AVS SNAPSHOT
MRN:[id number]                      After Visit Summary   8/21/2018    Gabi Manley    MRN: [id number]           Thank you!     Thank you for choosing [facility] for your care. Our goal is always to provide you with excellent care. Hearing back from our patients is one way we can continue to improve our services. Please take a few minutes to complete the written survey that you may receive in the mail after you visit with us. Thank you!        Patient Information     Date Of Birth          1940        About your hospital stay     You were admitted on:  August 21, 2018 You last received care in the:  Same Day Surgery [facility] [place]    You were discharged on:  August 21, 2018        Reason for your hospital stay       Deep brain stimulator generator implantation                  Who to Call     For medical emergencies, please call 911.  For non-urgent questions about your medical care, please call your primary care provider or clinic, [phone]  For questions related to your surgery, please call your surgery clinic        Attending Provider     Provider Murray Pimentel MD Neurosurgery       Primary Care Provider Office Phone # Fax #    Tracey Abdi [phone]       When to contact your care team       Please call or go to the closest Emergency Room if you have increased pain, redness, drainage, or swelling at your incision, temperature > 101.5 degrees Farenheit, changes in neurologic status (such as headache, lightheadedness, dizziness, confusion, or numbness, tingling, or weakness in your face, arms, or legs) or if you have any other questions or concerns.    You may reach the Neurosurgery clinic at [phone] during regular business hours. You can call the hospital  at [phone] and ask for the on-call Neurosurgery Resident at all other times.                  After Care Instructions     Activity       Do not do any bending, twisting,  strenuous exercise, or heavy lifting (greater than 10 pounds) for 4-6 weeks. Be careful and ask for assistance when walking or going up and down stairs. Avoid any activities that could result in trauma to the surgical wound. Do not drive within 3 months of having your last seizure or while using narcotics or other sedating medications, such as sleep aids, muscle relaxants, etc.            Diet       Follow this diet upon discharge: regular diet            Wound care and dressings       You should remove your dressings and bandages on post-operative day #2. You should then keep the wound undressed and open to air. You are allowed to take showers and get the wound wet starting on post-operative day #3 but you may not scrub or soak the wound or keep it submerged under water. If you do happen to get the wound wet, be sure to pat dry it rather than scrubbing it with a towel.                  Follow-up Appointments     Follow Up and recommended labs and tests       1) follow-up with Dr. Stearns in 2 weeks for wound check                  Your next 10 appointments already scheduled     Sep 05, 2018  3:00 PM CDT   (Arrive by 2:45 PM)   Return Visit with STEFANO Santos CNP   [facility] Neurosurgery ([facility] [place])    [address] [place] [id number] [phone]            Sep 14, 2018  7:00 AM CDT   (Arrive by 6:45 AM)   New Movement Disorder with STEFANO Villalobos CNP   [facility] Neurology ([facility] [place])    [address] [place] [id number] [phone]            Sep 18, 2018   Procedure with Murray Stearns MD   [facility], [place], Same Day Surgery (--)    [address] [facility] [id number] [phone]            Sep 18, 2018  8:40 AM CDT   CT HEAD W/O CONTRAST with UUCT1   [place] ([facility], [place])    [address] [facility] [address]    [phone]           Please bring any scans or X-rays taken at other hospitals, if similar tests were done. Also bring a list of your medicines, including vitamins, minerals and over-the-counter drugs. It is safest to leave personal items at home.  Be sure to tell your doctor:   If you have any allergies.   If there s any chance you are pregnant.   If you are breastfeeding.  You do not need to do anything special to prepare for this exam.  Please wear loose clothing, such as a sweat suit or jogging clothes. Avoid snaps, zippers and other metal. We may ask you to undress and put on a hospital gown.            Oct 03, 2018  3:00 PM CDT   (Arrive by 2:45 PM)   Return Visit with STEFANO Santos CNP   [facility] Neurosurgery ([facility])    [address] [place] [phone]   [phone]            Oct 19, 2018  7:00 AM CDT   (Arrive by 6:45 AM)   Return Movement Disorder with STEFANO Villalobos CNP   [facility] Neurology ([facility])    [address] [place] [phone]   [phone]            Oct 19, 2018 11:20 AM CDT   CT HEAD W/O CONTRAST with UCCT2   [facility] [place] [facility] ([facility])    [address] [place] [id number] [phone]           Please bring any scans or X-rays taken at other hospitals, if similar tests were done. Also bring a list of your medicines, including vitamins, minerals and over-the-counter drugs. It is safest to leave personal items at home.  Be sure to tell your doctor:   If you have any allergies.   If there s any chance you are pregnant.   If you are breastfeeding.  You do not need to do anything special to prepare for this exam.  Please wear loose clothing, such as a sweat suit or jogging clothes. Avoid snaps, zippers and other metal. We may ask you to undress and put on a hospital gown.              Further  instructions from your care team       [facility], [place]  Same-Day Surgery   Adult Discharge Orders & Instructions     For 24 hours after surgery    1. Get plenty of rest.  A responsible adult must stay with you for at least 24 hours after you leave the hospital.   2. Do not drive or use heavy equipment.  If you have weakness or tingling, don't drive or use heavy equipment until this feeling goes away.  3. Do not drink alcohol.  4. Avoid strenuous or risky activities.  Ask for help when climbing stairs.   5. You may feel lightheaded.  IF so, sit for a few minutes before standing.  Have someone help you get up.   6. If you have nausea (feel sick to your stomach): Drink only clear liquids such as apple juice, ginger ale, broth or 7-Up.  Rest may also help.  Be sure to drink enough fluids.  Move to a regular diet as you feel able.  7. You may have a slight fever. Call the doctor if your fever is over 100.5 F (37.7 C) (taken under the tongue) or lasts longer than 24 hours.  8. You may have a dry mouth, a sore throat, muscle aches or trouble sleeping.  These should go away after 24 hours.  9. Do not make important or legal decisions.   Call your doctor for any of the followin.  Signs of infection (fever, growing tenderness at the surgery site, a large amount of drainage or bleeding, severe pain, foul-smelling drainage, redness, swelling).    2. It has been over 8 to 10 hours since surgery and you are still not able to urinate (pass water).    3.  Headache for over 24 hours.      To contact a doctor, call Dr. Stearns's office @ [phone]  or:        [phone] and ask for the resident on call for Neurosurgery (answered 24 hours a day)      Emergency Department:    [facility]: [phone]       (TTY for hearing impaired: [phone])          Pending Results     No orders found from 2018 to 2018.            Admission Information     Date & Time Provider Department  "Dept. Phone    8/21/2018 Murray Stearns MD Same Day Surgery [facility] [place] [phone]      Your Vitals Were     Blood Pressure Temperature Respirations Height Weight Pulse Oximetry    137/85 98.1  F (36.7  C) 16 1.6 m (5' 3\") 60.3 kg (132 lb 15 oz) 94%    BMI (Body Mass Index)                   23.55 kg/m2           [employer] Information     [facility] gives you secure access to your electronic health record. If you see a primary care provider, you can also send messages to your care team and make appointments. If you have questions, please call your primary care clinic.  If you do not have a primary care provider, please call [phone] and they will assist you.        Care EveryWhere ID     This is your Care EveryWhere ID. This could be used by other organizations to access your [facility] medical records  [id number]        Equal Access to Services     ART GRAY : Alberto Borjas, katrin rivera, theresa rojas, nabil edwards . So [facility] [phone].    ATENCIÓN: Si habla español, tiene a concepcion disposición servicios gratuitos de asistencia lingüística. Anita al [phone].    We comply with applicable federal civil rights laws and [place] laws. We do not discriminate on the basis of race, color, national origin, age, disability, sex, sexual orientation, or gender identity.               Review of your medicines      CONTINUE these medicines which may have CHANGED, or have new prescriptions. If we are uncertain of the size of tablets/capsules you have at home, strength may be listed as something that might have changed.        Dose / Directions    carbidopa-levodopa  MG per tablet   Commonly known as:  SINEMET   This may have changed:    - how much to take  - how to take this  - when to take this  - additional instructions   Used for:  Parkinson disease (H)        Patient is taking 1.25 tablets every two hours up to 12 tablets per day. "   Quantity:  360 tablet   Refills:  3       * oxyCODONE IR 5 MG tablet   Commonly known as:  ROXICODONE   This may have changed:  Another medication with the same name was added. Make sure you understand how and when to take each.   Used for:  S/P deep brain stimulator placement        Dose:  5-10 mg   Take 1-2 tablets (5-10 mg) by mouth every 4 hours as needed for other (pain control or improvement in physical function. Hold dose for analgesic side effects.)   Quantity:  30 tablet   Refills:  0       * oxyCODONE IR 5 MG tablet   Commonly known as:  ROXICODONE   This may have changed:  You were already taking a medication with the same name, and this prescription was added. Make sure you understand how and when to take each.   Used for:  S/P deep brain stimulator placement        Dose:  5 mg   Take 1 tablet (5 mg) by mouth every 6 hours as needed for severe pain   Quantity:  24 tablet   Refills:  0       * Notice:  This list has 2 medication(s) that are the same as other medications prescribed for you. Read the directions carefully, and ask your doctor or other care provider to review them with you.      CONTINUE these medicines which have NOT CHANGED        Dose / Directions    amLODIPine 5 MG tablet   Commonly known as:  NORVASC        Dose:  5 mg   Take 5 mg by mouth every morning   Refills:  0       atorvastatin 20 MG tablet   Commonly known as:  LIPITOR        Dose:  20 mg   Take 20 mg by mouth every morning   Refills:  0       escitalopram 10 MG tablet   Commonly known as:  LEXAPRO        Dose:  10 mg   Take 10 mg by mouth every morning   Refills:  0       pantoprazole 20 MG EC tablet   Commonly known as:  PROTONIX        Dose:  20 mg   Take 20 mg by mouth every morning   Refills:  0       senna-docusate 8.6-50 MG per tablet   Commonly known as:  SENOKOT-S;PERICOLACE   Used for:  S/P deep brain stimulator placement        Dose:  2 tablet   Take 2 tablets by mouth 2 times daily   Quantity:  30 tablet   Refills:   0       THERAPEUTIC MULTIVITAMIN PO        Dose:  1 tablet   Take 1 tablet by mouth every morning   Refills:  0       VITAMIN D-1000 MAX ST 1000 units Tabs   Generic drug:  cholecalciferol        Dose:  2000 Units   Take 2,000 Units by mouth every morning   Refills:  0            Where to get your medicines      Some of these will need a paper prescription and others can be bought over the counter. Ask your nurse if you have questions.     Bring a paper prescription for each of these medications     oxyCODONE IR 5 MG tablet                Protect others around you: Learn how to safely use, store and throw away your medicines at www.disposemymeds.org.        Information about OPIOIDS     PRESCRIPTION OPIOIDS: WHAT YOU NEED TO KNOW   We gave you an opioid (narcotic) pain medicine. It is important to manage your pain, but opioids are not always the best choice. You should first try all the other options your care team gave you. Take this medicine for as short a time (and as few doses) as possible.    Some activities can increase your pain, such as bandage changes or therapy sessions. It may help to take your pain medicine 30 to 60 minutes before these activities. Reduce your stress by getting enough sleep, working on hobbies you enjoy and practicing relaxation or meditation. Talk to your care team about ways to manage your pain beyond prescription opioids.    These medicines have risks:    DO NOT drive when on new or higher doses of pain medicine. These medicines can affect your alertness and reaction times, and you could be arrested for driving under the influence (DUI). If you need to use opioids long-term, talk to your care team about driving.    DO NOT operate heavy machinery    DO NOT do any other dangerous activities while taking these medicines.    DO NOT drink any alcohol while taking these medicines.     If the opioid prescribed includes acetaminophen, DO NOT take with any other medicines that contain  acetaminophen. Read all labels carefully. Look for the word  acetaminophen  or  Tylenol.  Ask your pharmacist if you have questions or are unsure.    You can get addicted to pain medicines, especially if you have a history of addiction (chemical, alcohol or substance dependence). Talk to your care team about ways to reduce this risk.    All opioids tend to cause constipation. Drink plenty of water and eat foods that have a lot of fiber, such as fruits, vegetables, prune juice, apple juice and high-fiber cereal. Take a laxative (Miralax, milk of magnesia, Colace, Senna) if you don t move your bowels at least every other day. Other side effects include upset stomach, sleepiness, dizziness, throwing up, tolerance (needing more of the medicine to have the same effect), physical dependence and slowed breathing.    Store your pills in a secure place, locked if possible. We will not replace any lost or stolen medicine. If you don t finish your medicine, please throw away (dispose) as directed by your pharmacist. The [place] [employer]ution [employer] has more information about safe disposal: https://www.pca.[facility].mn.us/living-green/managing-unwanted-medications             Medication List: This is a list of all your medications and when to take them. Check marks below indicate your daily home schedule. Keep this list as a reference.      Medications           Morning Afternoon Evening Bedtime As Needed    amLODIPine 5 MG tablet   Commonly known as:  NORVASC   Take 5 mg by mouth every morning                                atorvastatin 20 MG tablet   Commonly known as:  LIPITOR   Take 20 mg by mouth every morning                                carbidopa-levodopa  MG per tablet   Commonly known as:  SINEMET   Patient is taking 1.25 tablets every two hours up to 12 tablets per day.                                escitalopram 10 MG tablet   Commonly known as:  LEXAPRO   Take 10 mg by mouth every morning                                 * oxyCODONE IR 5 MG tablet   Commonly known as:  ROXICODONE   Take 1-2 tablets (5-10 mg) by mouth every 4 hours as needed for other (pain control or improvement in physical function. Hold dose for analgesic side effects.)                                * oxyCODONE IR 5 MG tablet   Commonly known as:  ROXICODONE   Take 1 tablet (5 mg) by mouth every 6 hours as needed for severe pain                                pantoprazole 20 MG EC tablet   Commonly known as:  PROTONIX   Take 20 mg by mouth every morning                                senna-docusate 8.6-50 MG per tablet   Commonly known as:  SENOKOT-S;PERICOLACE   Take 2 tablets by mouth 2 times daily                                THERAPEUTIC MULTIVITAMIN PO   Take 1 tablet by mouth every morning                                VITAMIN D-1000 MAX ST 1000 units Tabs   Take 2,000 Units by mouth every morning   Generic drug:  cholecalciferol                                * Notice:  This list has 2 medication(s) that are the same as other medications prescribed for you. Read the directions carefully, and ask your doctor or other care provider to review them with you.

## 2018-08-21 NOTE — ANESTHESIA PREPROCEDURE EVALUATION
Anesthesia Evaluation     . Pt has had prior anesthetic. Type: General and MAC           ROS/MED HX    ENT/Pulmonary:  - neg pulmonary ROS     Neurologic:     (+)Parkinson's disease features: tremors, worse on right,     Cardiovascular:     (+) hypertension----. : . . . :. . Previous cardiac testing date:results:date: results:ECG reviewed date:7/11/2018 results:NSR date: results:          METS/Exercise Tolerance:  3 - Able to walk 1-2 blocks without stopping   Hematologic:     (+) History of blood clots pt is not anticoagulated, -      Musculoskeletal:         GI/Hepatic:     (+) GERD Asymptomatic on medication,       Renal/Genitourinary:  - ROS Renal section negative       Endo:  - neg endo ROS       Psychiatric:     (+) psychiatric history depression      Infectious Disease:  - neg infectious disease ROS       Malignancy:      - no malignancy   Other:    (+) No chance of pregnancy C-spine cleared: N/A, no H/O Chronic Pain,no other significant disability                    Physical Exam  Normal systems: cardiovascular and pulmonary    Airway   Mallampati: II  TM distance: >3 FB  Neck ROM: full    Dental   (+) partials  Comment: Upper and lower partials     Cardiovascular   Rhythm and rate: regular and normal      Pulmonary    breath sounds clear to auscultation    Other findings:     Basic Metabolic Panel (07/11/2018 10:47 AM)  Component Value Ref Range  Sodium 141 136 - 145 mmol/L  Potassium 4.4 3.5 - 5.0 mmol/L  Chloride 105 98 - 107 mmol/L  CO2 26 22 - 31 mmol/L  Anion Gap, Calculation 10 5 - 18 mmol/L  Glucose 78 70 - 125 mg/dL  Calcium 9.2 8.5 - 10.5 mg/dL  BUN 13 8 - 28 mg/dL  Creatinine 0.80 0.60 - 1.10 mg/dL  GFR MDRD Af Amer >60 >60 mL/min/1.73m2  GFR MDRD Non Af Amer >60 >60 mL/min/1.73m2        Results for STERN, ALEX A (MRN [id number]) as of 7/23/2018 14:40    7/23/2018 10:26  WBC: 5.3  Hemoglobin: 14.2  Hematocrit: 44.3  Platelet Count: 140 (L)  RBC Count: 4.61  MCV: 96  MCH: 30.8  MCHC: 32.1  RDW:  13.6  INR: 1.03  ABO: O  RH(D): Pos  Antibody Screen: Neg  Test Valid Only At: [facility]  Specimen Expires: 07/26/2018  Blood Bank Comment: Preadmission exte...                  Anesthesia Plan      History & Physical Review      ASA Status:  3 .    NPO Status:  > 8 hours    Plan for MAC with Intravenous induction. Maintenance will be TIVA.  Reason for MAC:  Deep or markedly invasive procedure (G8)  PONV prophylaxis:  Ondansetron (or other 5HT-3) and Dexamethasone or Solumedrol  Additional equipment: 2nd IV      Postoperative Care  Postoperative pain management:  IV analgesics, Oral pain medications and Multi-modal analgesia.      Consents  Anesthetic plan, risks, benefits and alternatives discussed with:  Patient..                          .

## 2018-08-21 NOTE — IP AVS SNAPSHOT
Same Day Surgery [address] [address]    Phone:  [phone]                                       After Visit Summary   8/21/2018    Gabi Manley    MRN: [id number]           After Visit Summary Signature Page     I have received my discharge instructions, and my questions have been answered. I have discussed any challenges I see with this plan with the nurse or doctor.    ..........................................................................................................................................  Patient/Patient Representative Signature      ..........................................................................................................................................  Patient Representative Print Name and Relationship to Patient    ..................................................               ................................................  Date                                            Time    ..........................................................................................................................................  Reviewed by Signature/Title    ...................................................              ..............................................  Date                                                            Time

## 2018-08-21 NOTE — H&P
NEUROSURGERY    HISTORY AND PHYSICAL EXAM UPDATE    Chief Complaint   Patient presents with     RECHECK       Ongoing Deep Brain Stimulation surgery; Parkinson's disease.  S/p left side deep brain stimulator placement, phase I, placement of left side deep brain stimulator electrode, target left globus pallidus internus, with microelectrode recording on 8/7/2018       HISTORY OF PRESENT ILLNESS  Ms. Gabi Manley returns today for her ongoing Deep Brain Stimulation surgery.  She is s/p left side deep brain stimulator placement, phase I, placement of left side deep brain stimulator electrode, target left globus pallidus internus, with microelectrode recording on 8/7/2018.  Patient tolerated the procedure well and there were no complications.  Her postoperative course was uneventful and her postoperative CT head was without any concerning findings.  She was discharged to home on POD#1.  Patient only complains of some mild headaches.  Otherwise, she denies any fevers, chills, nausea, vomiting, dizziness, weakness, numbness or seizure like activity.  She also had no issues with her incision and it has remained clean/dry and intact.  She feels that her PD symptoms are back to her baseline so her lesion effect was short lived.  She is now here for her phase II surgery.  She is also scheduled for the right side surgery in about a month.  Briefly, she is a 78 year old woman with a history of Idiopathic Parkinson s disease.  Her symptoms began in 2009 when she was unable to step up into her daughter s house.  She then developed resting tremor in her hands. She had good control of her symptoms for many years, but is now having increased side-effects.  She is now having motor fluctuations with wearing off and peak dose dyskinesia.  She also has freezing gait and cramps in her left leg.  She reports that her tremor is mostly on her right side.  Recently, Dr. Webb has changed her carbidopa/levodopa from long acting to immediate  release every 2 hours.  She is telling me that she feels worse with this and she thinks that her dyskinesia is worse.        Past Medical History:   Diagnosis Date     Depression      Dyslipidemia      GERD (gastroesophageal reflux disease)      Hypertension      Parkinson's disease (H)      Past Surgical History:   Procedure Laterality Date     OPTICAL TRACKING SYSTEM INSERTION DEEP BRAIN STIMULATION Left 8/7/2018    Procedure: OPTICAL TRACKING SYSTEM INSERTION DEEP BRAIN STIMULATION;  Stealth Assisted Left Deep Brain Stimulator Placement, Phase I, Placement Left Side Deep Brain Stimulator Electrode, Target Left Globus Pallidus Internus, With Microelectrode Recording;  Surgeon: Murray Stearns MD;  Location:  OR     Social History     Social History     Marital status:      Spouse name: N/A     Number of children: N/A     Years of education: N/A     Occupational History     Not on file.     Social History Main Topics     Smoking status: Never Smoker     Smokeless tobacco: Never Used     Alcohol use No     Drug use: No     Sexual activity: Not Currently     Other Topics Concern     Not on file     Social History Narrative     History reviewed. No pertinent family history.       Allergies   Allergen Reactions     No Clinical Screening - See Comments Shortness Of Breath     Erythromycin      Other reaction(s): Unknown     Ioxaglate Other (See Comments)     Angioedema     Paroxetine      Other reaction(s): Unknown     Penicillins      Facial swelling     Trazodone      Other reaction(s): Unknown     Vancomycin Hives     Current Facility-Administered Medications   Medication     clindamycin (CLEOCIN) infusion 900 mg     clindamycin (CLEOCIN) infusion 900 mg       Current Outpatient Prescriptions   Medication     carbidopa-levodopa (SINEMET)  MG per tablet     amLODIPine (NORVASC) 5 MG tablet     atorvastatin (LIPITOR) 20 MG tablet     cholecalciferol (VITAMIN D-1000 MAX ST) 1000 UNITS TABS      escitalopram (LEXAPRO) 10 MG tablet     pantoprazole (PROTONIX) 20 MG EC tablet     Multiple Vitamin (THERAPEUTIC MULTIVITAMIN PO)     ondansetron (ZOFRAN-ODT) 4 MG ODT tab     cetirizine (ZYRTEC) 10 MG tablet      No current facility-administered medications for this visit.        REVIEW OF SYSTEMS - updated 8/21/2018  General: Negative for chills/sweats/fever, difficulty sleeping, headache, recent fatigue, or weight loss.  Eyes: POSITIVE for blurred vision. Negative for crossed eyes, double vision, recent eye infections, vision flashes, or vision halos.  Ears/Nose/Mouth/Throat: POSITIVE for earache and sinus problems. Negative for bleeding gums, difficulty swallowing, ear discharge, hearing loss, hoarseness, nosebleeds, tinnitus.  Respiratory: POSITIVE for night sweats. Negative for chronic cough, coughing blood, shortness of breath, Tuberculosis, or wheezing.  Cardiovascular: POSITIVE for varicose veins. Negative for chest pain, dyspnea at night, heart murmur, palpitations, pacemaker, pacemaker, poor circulation, or swollen legs/feet.  Gastrointestinal: POSITIVE for increasing constipation and nausea. Negative for melena, hematochezia, chronic diarrhea, heartburn, Hepatitis A/B/C,  Liver Disease, or vomiting.   Genitourinary: POSITIVE for urinary incontinence and urgency. Negative for Urinary retention, genital discharge, or UTI.   Neurological: POSITIVE for headaches,  numbness of arms/legs, tingling in hands/arms/legs, memory problems. Negative for syncope or seizures.  Psychological: POSITIVE for anxiety, depression or restlessness. Negative for panic attacks.   Skin: POSITIVE for chronic skin itching and skin rashes/hives. Negative for color changes in hand/feet when cold, poor scarring, non-healing ulcers, unusual moles.  Musculoskeletal: POSITIVE for muscle tenderness in arms/legs. Negative for arthritis, joint swelling in hands/wrists/hips/knees/joints, or osteoporosis.  Endocrine: POSITIVE for intolerance  for warm rooms and rapid weight gain/loss. Negative for excessive thirst/hunger, loss of libido, multiple broken bones, galactorrhea, or thyroid issues.  Hematologic/Lymphatic: POSITIVE for significant fatigue. Negative for easy skin bruising, prolonged bleeding, tender glands/lymph nodes.  Allergies: POSITIVE for hay fever. Negative for asthma.      PHYSICAL EXAM  Temp: 97.8  F (36.6  C) Temp src: Oral BP: 110/87   Heart Rate: 68 Resp: 20 SpO2: 99 % O2 Device: None (Room air)      General: Awake, alert, oriented. Well nourished, well developed, no acute distress.  HEENT: Head normocephalic, atraumatic. No carotid bruit. Neck supple. Good range of motion. No palpable thyroid mass.  Heart: Regular rhythm and rate. No murmurs.  Lungs: Clear to auscultation and percussion bilaterally. No rhonchi, rales or wheeze.  Abdomen: Soft, non-tender, non-distended. No hepatosplenomegaly.  Extremity: Warm with no clubbing or cyanosis. No lower extremity edema.  Right frontal incision clean/dry and intact.    Neurological:  Awake, alert and oriented to date, time, place and person. Speech fluent.   Pupils equal, round, reactive to light.  Extraocular movement intact.  Visual fields are full on confrontation.  Hearing is grossly normal to finger rub.   Facial sensation intact.  Face symmetric.  Tongue midline.  Uvula elevates equally.    Motor: full strength throughout.  Sensation: intact to light touch and pinpoint.  Deep tendon reflexes: 1+ throughout. Negative for clonus. Negative for Blanchard's sign. No dysmetria.      ASSESSMENT   78 year old right handed female with a history of Parkinson's disease.  Tremor mostly in the right side.  Dyskinesia and motor fluctuations.  S/p left side deep brain stimulator placement, phase I, placement of left side deep brain stimulator electrode, target left globus pallidus internus, with microelectrode recording on 8/7/2018.    Patient is doing well.  She is here for her phase II  surgery.    We discussed phase II of DBS surgery, placement of the DBS generator/battery over the left chest wall under general anesthesia.  She is a bilateral candidate in a staged fashion, she is scheduled to return later for the phase I and II combined for the placement of the DBS electrode on the right side under MAC and microelectrode recording followed by connection to the previously implanted generator/battery.    She had very good questions regarding the surgical procedure and the risks involved.  Risks, benefits, alternative therapies were discussed with the patient, including but not limited to infection and bleeding (intracranial), injury to the brain, stroke, death, hardware failure and possible need for more surgeries.  Surgical procedure was discussed in detail.  All questions were answered, and she expressed understanding.       PLAN:  1.  Deep brain stimulator placement, phase II, placement of deep brain stimulator generator/battery over the left chest wall.

## 2018-08-22 NOTE — ANESTHESIA POSTPROCEDURE EVALUATION
Patient: Gabi Manley    Procedure(s):  Deep Brain Stimulator Placement, Phase II, Placement Of Deep Brain Stimulator Generator/Battery Over The Left Chest Wall - Wound Class: I-Clean    Diagnosis:Parkinson's Disease   Diagnosis Additional Information: No value filed.    Anesthesia Type:  MAC    Note:  Anesthesia Post Evaluation    Patient location during evaluation: PACU  Patient participation: Able to fully participate in evaluation  Level of consciousness: awake and alert  Pain management: adequate  Airway patency: patent  Cardiovascular status: acceptable  Respiratory status: acceptable  Hydration status: acceptable  PONV: none     Anesthetic complications: None          Last vitals:  Vitals:    08/21/18 1313 08/21/18 2000   BP: 110/87    Resp: 20 18   Temp: 36.6  C (97.8  F)    SpO2: 99%          Electronically Signed By: Tejas Pillai MD  August 21, 2018  8:09 PM

## 2018-08-22 NOTE — DISCHARGE INSTRUCTIONS
[facility]  Same-Day Surgery   Adult Discharge Orders & Instructions     For 24 hours after surgery    1. Get plenty of rest.  A responsible adult must stay with you for at least 24 hours after you leave the hospital.   2. Do not drive or use heavy equipment.  If you have weakness or tingling, don't drive or use heavy equipment until this feeling goes away.  3. Do not drink alcohol.  4. Avoid strenuous or risky activities.  Ask for help when climbing stairs.   5. You may feel lightheaded.  IF so, sit for a few minutes before standing.  Have someone help you get up.   6. If you have nausea (feel sick to your stomach): Drink only clear liquids such as apple juice, ginger ale, broth or 7-Up.  Rest may also help.  Be sure to drink enough fluids.  Move to a regular diet as you feel able.  7. You may have a slight fever. Call the doctor if your fever is over 100.5 F (37.7 C) (taken under the tongue) or lasts longer than 24 hours.  8. You may have a dry mouth, a sore throat, muscle aches or trouble sleeping.  These should go away after 24 hours.  9. Do not make important or legal decisions.   Call your doctor for any of the followin.  Signs of infection (fever, growing tenderness at the surgery site, a large amount of drainage or bleeding, severe pain, foul-smelling drainage, redness, swelling).    2. It has been over 8 to 10 hours since surgery and you are still not able to urinate (pass water).    3.  Headache for over 24 hours.      To contact a doctor, call Dr. Stearns's office @ [phone]  or:        [phone] and ask for the resident on call for Neurosurgery (answered 24 hours a day)      Emergency Department:    [facility]: [phone]       (TTY for hearing impaired: [phone])

## 2018-08-22 NOTE — ANESTHESIA CARE TRANSFER NOTE
Patient: Gabi Manley    Procedure(s):  Deep Brain Stimulator Placement, Phase II, Placement Of Deep Brain Stimulator Generator/Battery Over The Left Chest Wall - Wound Class: I-Clean    Diagnosis: Parkinson's Disease   Diagnosis Additional Information: No value filed.    Anesthesia Type:   MAC     Note:  Airway :Face Mask  Patient transferred to:PACU  Comments: VSS. Breathing spont with strong cough. Report to RN at bedside.Handoff Report: Identifed the Patient, Identified the Reponsible Provider, Reviewed the pertinent medical history, Discussed the surgical course, Reviewed Intra-OP anesthesia mangement and issues during anesthesia, Set expectations for post-procedure period and Allowed opportunity for questions and acknowledgement of understanding      Vitals: (Last set prior to Anesthesia Care Transfer)    CRNA VITALS  8/21/2018 1916 - 8/21/2018 1950      8/21/2018             Resp Rate (observed): (!)  3                Electronically Signed By: STEFANO Romano CRNA  August 21, 2018  7:50 PM

## 2018-08-22 NOTE — BRIEF OP NOTE
[facility], [place]    Brief Operative Note    Pre-operative diagnosis: Parkinson's Disease   Post-operative diagnosis same  Procedure: Procedure(s):  Deep Brain Stimulator Placement, Phase II, Placement Of Deep Brain Stimulator Generator/Battery Over The Left Chest Wall - Wound Class: I-Clean  Surgeon: Surgeon(s) and Role:     * Murray Stearns MD - Primary     * Matti Dickinson MD - Resident - Assisting  Anesthesia: General   Estimated blood loss: 2 mL  Drains: None  Specimens: * No specimens in log *  Findings:   Impedances within normal limits based on intra-operative interrogation.  Complications: None.  Implants:   1) Abbott, Emotify 7 REF [id number] SN [id number]; left chest   2) Abbott, 99 Fahrenheitity DBS System REF [id number] SN [id number]; connected to left intracranial lead  3) Garcia, Infinity DBS System REF [id number] SN [id number]; for future connection to right intracranial lead

## 2018-08-23 NOTE — OP NOTE
PATIENT NAME: ALEX STERN  YOB: 1940  MRN:   [id number]  ACCOUNT:  [id number]      DATE OF PROCEDURE:  08/21/2018     PREOPERATIVE DIAGNOSIS:   1.  Parkinson's disease  2.  Tremor, mostly on the right side  3.  S/p left side deep brain stimulator placement, phase I, placement of left side deep brain stimulator electrode, target left globus pallidus internus, with microelectrode recording on 8/7/2018    POSTOPERATIVE DIAGNOSIS:   1.  Parkinson's disease  2.  Tremor, mostly on the right side  3.  S/p left side deep brain stimulator placement, phase I, placement of left side deep brain stimulator electrode, target left globus pallidus internus, with microelectrode recording on 8/7/2018    PROCEDURES PERFORMED:    1.  Deep brain stimulator placement, phase II, placement of new deep brain stimulator generator/battery over the left chest wall.    2.  Placement of two extension wires and connection of the left side deep brain stimulator electrode, single electrode array, to the new generator/battery.    3.  Electrical interrogation and analysis of the left side deep brain stimulator system.    ATTENDING SURGEON:  Murray Stearns MD, PhD     ASSISTING SURGEON:  Matti Dickinson MD     ANESTHESIA:  General anesthesia and local anesthetic.    ESTIMATED BLOOD LOSS:  2 mL.     COMPLICATIONS:  None.     FINDINGS:  All impedance values were within normal limits.  No problems were found.    IMPLANTS:  [employer] Infinity 7 generator/battery over the left chest wall with 2 extension wires.  The left side is connected to the anterior portal of the Infinity 7 and right side is connected to the posterior portal of the Infinity 7.  1.  [employer] DBS generator/battery, model Infinity 7, REF 6662, SN [id number]; left chest   2.  [employer] DBS extension wire, REF 6371 SN [id number]; connected to the left intracranial lead  3.  [employer] DBS extensino wire, REF 6371 SN [id number]; for future connection to right intracranial lead     INDICATIONS  FOR PROCEDURE:  Ms. Gabi Manley returns today for her ongoing Deep Brain Stimulation surgery.  She is s/p left side deep brain stimulator placement, phase I, placement of left side deep brain stimulator electrode, target left globus pallidus internus, with microelectrode recording on 8/7/2018.  Patient tolerated the procedure well and there were no complications.  Her postoperative course was uneventful and her postoperative CT head was without any concerning findings.  She was discharged to home on POD#1.  Patient only complains of some mild headaches.  Otherwise, she denies any fevers, chills, nausea, vomiting, dizziness, weakness, numbness or seizure like activity.  She also had no issues with her incision and it has remained clean/dry and intact.  She feels that her PD symptoms are back to her baseline so her lesion effect was short lived.  She is now here for her phase II surgery.  She is also scheduled for the right side surgery in about a month.  Briefly, she is a 78 year old woman with a history of Idiopathic Parkinson s disease.  Her symptoms began in 2009 when she was unable to step up into her daughter s house.  She then developed resting tremor in her hands. She had good control of her symptoms for many years, but is now having increased side-effects.  She is now having motor fluctuations with wearing off and peak dose dyskinesia.  She also has freezing gait and cramps in her left leg.  She reports that her tremor is mostly on her right side.  Recently, Dr. Webb has changed her carbidopa/levodopa from long acting to immediate release every 2 hours.  She is telling me that she feels worse with this and she thinks that her dyskinesia is worse.  We discussed the phase II of DBS surgery, placement of the DBS generator/battery over the left chest wall under general anesthesia.  She is a bilateral candidate in a staged fashion, she is scheduled to return later for the phase I and II combined for the  placement of the DBS electrode on the right side under MAC and microelectrode recording followed by connection to the previously implanted generator/battery.  She had very good questions regarding the surgical procedure and the risks involved.  Risks, benefits, alternative therapies were discussed with the patient, including but not limited to infection and bleeding (intracranial), injury to the brain, stroke, death, hardware failure and possible need for more surgeries.  Surgical procedure was discussed in detail.  All questions were answered, and she expressed understanding.     DESCRIPTION OF PROCEDURE:  Risks and benefits of the procedure were explained and consent was obtained from the patient.  She was brought to the operating room and was positioned supine on the operating room table.  All pressure points were properly padded.  With the placement of endotracheal tube, general endotracheal anesthesia was induced.  Her head was turned to the right side, thus exposing the left parietal area of the head.  The previously implanted connector portion of the stimulating electrode from the left side could be palpated on the left side of the head.  Small area of the hair was removed over this palpable connector using a surgical hair clipper.  Then, the left side of the head, neck and the chest area was prepped and draped in the normal sterile fashion.  Local anesthetic was injected in the areas of intended incision at the left scalp and left chest wall as well as along the path of the intended tunneling.  Total of 27 mL of 1% Lidocaine with epinephrine mixed with 1/4% Marcaine plain, 50:50 mix, were used.  A timeout was performed confirming the patient's identity, procedure to be performed, site and side of the surgery and the administration of preoperative prophylactic antibiotic.    Attention was first turned to the patient's head.  A #10 blade was used to make a 2 cm incision at the distal end of the connector that  was palpable in her scalp.  Hemostasis was obtained with bipolar cautery.  The incision was carried down to the pericranium.  The buried connector protective cover was visible.  The mosquito was used to hold the protective cover and we gently pulled out the connector and thus part of the wire.  This was found to be fully intact.  At that point, a pocket was made using a Bre clamp down toward behind the ear into the nuchal line.  This was in preparation for the tunneling of the extension wires.     At that point, attention was turned to the left chest area.  A #10 blade was used to make a 6 cm incision on the left chest wall.  The #10 blade scalpel was used to finish dissection down to the proper plane of less than 1 cm below the skin.  We found a nice easily dissecting plane superficial to the pectoralis muscle.  Then blunt dissection technique with fingers and mosquito was used to establish a subcutaneous pocket about 3 fingerbreadths wide and deep enough to encompass the full length of the fingers.  Hemostasis was obtained.  The pocket was generously irrigated with antibiotic saline, meticulous hemostasis was obtained and sponges were placed within the pocket.     At this point, a tunneler with a plastic sheath was brought onto the field.  Then, a subcutaneous passage was tunneled from the head incision to the chest wall incision, taking care not to be too superficial to the skin, but within the correct plane and taking a course over the clavicle.  The tunneler was then pulled from the head incision, thus leaving behind the plastic sheath along the subcutaneous path.  Two extension wires, right and left side, were placed and passed within the plastic sheath from head to the chest area.  Subsequently, the plastic sheath was pulled out from the chest incision, leaving behind the tunneled extension wires.    We then proceeded to make the connection between the intracranial lead and the extension wire.  The  protective cover on the connector of the intracranial electrode was removed.  The contacts were cleaned.  Then, the connector was inserted into the extension wire that is designated as the left side and secured using a fastening screws.  The extension wire that is designated for the right side was protected by placing a dead end plug into the connection portal.  The right side extension wire was planned to be located relatively more superior and posterior to the left side extension wire, to differentiate their location.    The extension wires were gently pulled down at the chest wall area in order to seat the extension wire connections correctly in the scalp pocket and not directly under the incision.  They were placed slightly superior to the incision.   Again, the connector for the future right side is placed more superiorly and posteriorly relative to the already connected left side.  At this point, the extension wires were connected to the [employer] Infinity 7 generator/battery and secured with the torque screwdriver.  The current left side electrode from the left GPi connected to the extension wire was inserted into and connected to the anterior portal of the Infinity 7.  The extension wire for the future right side electrode was inserted into and connected to the posterior portal of the Infinity 7.  The sponges were removed from the pocket and the pocket was inspected for hemostasis.  The excess wires were then curled behind the generator/battery, taking care not to cross the wires, and the generator/battery along with the wires were placed in the pocket.     With the proper placement and connection of the deep brain stimulator system, single electrode array, electrical interrogation and analysis was performed for the left side DBS system.  All the impedance values were noted to be within normal.  No problems were found.    We began closing the wound.  The left parietal scalp incision was closed in the following  manner.  A thin layer of galea was reapproximated using 3-0 Vicryl sutures in an inverted interrupted fashion to cover the hardware and to reapproximate the layer.  The skin was reapproximated using a 4-0 Monocryl suture in a running fashion.  The chest wound was closed in the following manner.  The pocket capsule layer was closed with 3-0 Vicryl sutures in an inverted interrupted fashion. Subcutaneous tissue was closed with 3-0 Vicryl sutures in an inverted interrupted fashion and the dermal layer was closed with 3-0 Vicryl sutures in an inverted interrupted fashion.  The skin was closed with a 4-0 Monocryl suture in a subcuticular fashion.  Both incisions were cleaned with a wet and dry and reprepped with ChloraPrep.  Dermabond was then placed on both incisions.     Patient was extubated and taken to the recovery room in a stable condition.  At the end of the case, all counts including needle, sponge and instrument counts were correct x 2.  There were no complications.     IRickey M.D., Ph.D., Neurosurgery Attending, was present and scrubbed for the entire case and performed the key and critical portions of the case.      RICKEY LAWRENCE MD, PHD

## 2018-08-29 NOTE — OP NOTE
PATIENT NAME: ALEX STERN  YOB: 1940  MRN:   [id number]  ACCOUNT:  [id number]      DATE OF PROCEDURE:  08/07/2018    PREOPERATIVE DIAGNOSIS:    1.  Parkinson's disease.  2.  Tremor, mostly on the right side.  3.  Dyskinesia and motor fluctuations.    POSTOPERATIVE DIAGNOSIS:    1.  Parkinson's disease.  2.  Tremor, mostly on the right side.  3.  Dyskinesia and motor fluctuations.    PROCEDURES PERFORMED:   1.  Placement of CRW stereotactic headframe.   2.  Stereotactic neuronavigation planning and CT of head.   3.  Stereotactic neuronavigation using the [email or web address] system for surgical planning, targeting and approach. The target is left globus pallidus internus (GPi).   4.  Left side deep brain stimulator placement, phase I, placement of left side deep brain stimulator electrode, target is left globus pallidus internus (GPi).   5.  Use of intraoperative microelectrode recording.   6.  Use of intraoperative fluoroscopy.    ATTENDING SURGEON:  Murray Stearns MD, PhD     RESIDENT SURGEON:    1.  Kwan Champagne MD  2.  Aj Ayon MD     ANESTHESIA:  Monitored anesthesia care and local anesthetic.     ESTIMATED BLOOD LOSS:  5 mL.     COMPLICATIONS:  None.     FINDINGS:  Final electrode location, anterior Lenny Gun position, 1.6 mm below target.    IMPLANTS:    1.  Garcia DBS electrode, Infinity 0.5, model 6172ANS, S/N [id number].   2.  Abbott tawnya hole cover, model 6010ANS, Lot# [id number].    INDICATIONS FOR PROCEDURE:  Ms. Alex Stern is a 78 year old woman with a history of Idiopathic Parkinson s disease.  Her symptoms began in 2009 when she was unable to step up into her daughter s house.  She then developed resting tremor in her hands.  She had good control of her symptoms for many years, but is now having increased side-effects.  She is now having motor fluctuations with wearing off and peak dose dyskinesia.  She also has freezing gait and cramps in her left leg.  She reports that her tremor  is mostly on her right side.  Recently, Dr. Webb has changed her carbidopa/levodopa from long acting to immediate release every 2 hours.  She is telling me that she feels worse with this and she thinks that her dyskinesia is worse.  Her case was discussed at the Movement Disorder Consensus Group Meeting and she was considered to be a good candidate for DBS, staged bilateral, with electrodes in the GPi using Abbott system.  We discussed both phase I and II of DBS surgery.  We discussed that during phase I, we would place the DBS electrode on the left side under MAC and microelectrode recording.  She would then return later for the phase II with placement of the DBS generator/battery over the left chest wall under general anesthesia.  Since she is a bilateral candidate in a staged fashion, she would return about three weeks later for the phase I and II combined for the placement of the DBS electrode on the right side under MAC and microelectrode recording followed by connection to the previously implanted generator/battery.  Risks, benefits, alternative therapies were discussed with the patient, including but not limited to infection and bleeding (intracranial), injury to the brain, stroke, death, hardware failure and possible need for more surgeries.  Surgical procedure was discussed in detail.  All questions were answered, and she expressed understanding.     DESCRIPTION OF PROCEDURE:      CRW head frame placement and stereotactic neuronavigation.      Informed consent was obtained.  The patient was brought to the operating room and laid supine and kept on the gurney.  She was identified and a brief timeout was performed for the placement of the CRW head frame.  Her back was raised up so she was in a sitting position.  She was given sedation to make her comfortable.  The intended pin sites, two in the front and two in the back of the head, were cleaned and were injected with local anesthetic, 1% lidocaine with  epinephrine.  A total of 24 mL were used during this portion of the surgical case.  The CRW stereotactic head frame was placed onto her head with 4 pins for fixation.  Care was taken so that the fiducial box wound fit over the head and the frame.  Once the head frame was on, the fiducial box was easily placed without interference from her forehead.  The patient tolerated the procedure well and her sedation was lightened and she was awakened.      After the CRW stereotactic head frame was placed, she was taken directly to the CT scanner, at which time a CT scan of the head stereotactic protocol was obtained.  Subsequently, the patient was taken back up to the operating room where she was placed supine on the operative bed with the CRW head frame affixed to the bed as well.  Patient was in a slight sitting up position with the neck in a neutral position and she was made comfortable.  The bed was positioned so that it would be a beach chair reclining position.  All pressure points were well padded.  Care was taken to make sure that the neck was in neutral position and that the [place] head casarez device had room for manipulation in case more flexion or extension is needed throughout the case.     At this time, attention was turned to the neuro navigation imaging that was obtained.  The Stealth neuronavigation device was loaded with the CT head that had just been obtained.  The device also had an MRI of the head, stereotactic protocol, that was obtained prior to surgery.  CT of the head was merged with the previously obtained MRI of the brain.  The merge demonstrated good coherence/registration.  Then, using this merged image, neuronavigation was used to stereotactically target the left globus pallidus internus (GPi).  The technique used was the AC-PC line localization technique to target the GPi with adjustments made using the T1, T2 and SWI image sequences to target the GPi and identified the optic track.  Patient  also had 7T MRI images along which also identified the GPi and this information was available to guide the targeting as well.  The coordinates and the X, Y and Z as well as the ring and arc angles for the CRW head frame were obtained for the left side.  Once the coordinates were obtained, we adjusted the coordinates posteriorly by 2 mm so that the anterior Lenny Gun position would be the intended target location.  The entry into the skull, as well as the trajectory of the electrode were checked with a probe's eye view to avoid any sulci, ventricle or vascular structures.     The stereotactic coordinates for the left side was then transferred to the CR stereotactic targeting apparatus as well as the phantom base.  The coordinates were confirmed and double checked for accuracy.  Accuracy was also confirmed using phantom base.  The coordinates were X = -20.3, Y = +0.3, Z =+8.0, ring angle = 62.0 degrees anterior, and arc angle = 12.5 degrees to the left.     At this time, attention was turned back to the patient.  Using a hair clipper, her hair over the left frontal area was clipped.  A semicircular C-shaped incision was planned on the left side and this was marked.  Then, the surgical area was prepped and draped in routine surgical fashion.  We also prepped the area posterior to this as well as area towards the left parietal area.  The patient was also made comfortable.     Timeout was then performed confirming the patient's identity, procedure to be performed, side and site of surgery identified, imaging corresponding with the patient and administration of preoperative prophylactic antibiotic.    Left-sided electrode placement with microelectrode recording: Target left GPi.     The CRW targeting apparatus was attached to the head frame on top of the sterile drapes.  The entry point was marked on the scalp on the left side.  A C-shaped incision was made with a skin knife, after the area was injected with local  anesthetic, 1% Lidocaine with epinephrine and 1/4% Marcaine plain, 50:50 mix.  The area posterior to the incision was also injected as a pocket would be created.  Area posterior lateral, towards the left parietal area was also injected as the electrode connector will be tunneled here later.  Total of 14 mL of the above mentioned local anesthetic was used.  The incision was then further carried down to the pericranium.  Hemostasis was obtained using monopolar and bipolar cautery.  Thin layer of pericranial tissue was left behind on the scalp and the skin flap was reflected posteriorly.  Then, using a blunt dissection technique, a pocket was created posteriorly as well as a track that was made towards the left parietal area for later use.    At this time, the targeting apparatus was again positioned and the entry point to the skull was marked.  Area of the intended tawnya hole was cleaned and pericranial tissue removed.  Then using an acorn drill, tawnya hole was created over this entry point to expose the dura.  The area was vigorously irrigated and bone wax used to control the bone bleeding.  Dura was coagulated with bipolar cautery for hemostasis, and again no active bleeding was noted.     At this time, the electrode fixation cover was fixed to the skull using two screws.  Care was taken to overlap the pericranium over the edge of the cover to provide a smooth tissue transition.  Then, the electrode drive was attached to the targeting apparatus.  The entry into the dura was again checked.  It appeared that all positions of the Lenny Gun electrode casarez were accessible without any interference from the bone edge.  Then using a Bovie cautery and a #4 Penfield instrument, opening was made into the dura, as well as a small corticectomy.  No active bleeding was noted.    Dr. Galilea García and Dr. Venancio Enrique of the Neurology Department participated in the recording and neurological testing.  During the microelectrode  recording and testing, Dr. García and Dr. Enrique took detailed notes of the electrophysiologic data and neurologic exam as well as any stimulation effects.    At this time, the electrode guide tubes were inserted in the anterior, lateral and posterior Lenny Gun positions and they were individually advanced slowly until they were fully inserted at which point the tips would be well above the target.  No abnormal resistance was noted.  Duraseal was used to seal the entry site to provide a seal and to minimize cerebrospinal fluid leak and air entry.  Then, recording microelectrodes were brought in and placed within the guide tubes and they were connected for intraoperative recording.  The tips of the electrode were now 15 mm above target.  The patient was awakened.  Then, using a micro drive, the electrodes were slowly advanced towards the target, collecting microelectrode recording data for mapping the track.  The anterior track yielded approximately 4.5 mm of GPi with somatosensory response in leg and shoulder.  Tremor corresponding cells were also identified.  Optic track was clearly identified at approximately 1.4 mm below the ventral GPi border.  The posterior track yielded approximately 5.1 mm of GPi with somatosensory response in hip, shoulder and elbow.  Tremor corresponding cells were also identified.  Faint optic track was identified at approximately 1.4 mm below ventral GPi border.  The lateral track yielded approximately 2.48 mm of GPi with somatosensory response in the wrist.  Faint optic track was identified at 1.6 mm below ventral GPi border.  No internal capsule effects were seen with microstimulation.  There were internal capsular effects at 1.5 mA stimulation, 1.6 mm above the ventral GPi.      Based on the electrophysiology data collected, it was thought that the anterior Lenny Gun position may be the best electrode location.  The electrode drive was positioned to the depth of 1.6 mm below the target level.   Then, the recording microelectrodes were pulled out of the guide tubes.  The permanent DBS electrode, Abbott Infinity 0.5, was brought into the field and placed in the anterior guide tube with the tip being placed at 1.6 mm below the target depth, with the 2A contact facing anteriorly in terms of orientation.  The electrode demonstrated good impedance values.  The electrode was tested.  With 1- and 3+ configuration, test stimulation showed internal capsular effect threshold at 3.5 mA, 130 Hz and 60 microsecond pulse width.  Based on the results, it was thought that the current location may be the best location of the permanent electrode.    With the satisfactory testing of the electrode position and the stimulation parameters, the electrode was secured at this location.  The patient was again made comfortable.  The guide tubes were pulled up out of the brain.  Duraseal was again used to seal any opening.  The area was generously irrigated.  Hemostasis was also obtained.  Fluoroscopy was brought into the field to test that the electrode did not move with each manipulation.  The electrode tip was seen to be below the target, as expected.  The electrode clamp was applied to hold the electrode.  Then, the electrode stylet was removed. The electrode was then removed from the electrode casarez and the guide tubes were also removed.  The cap cover was finally placed and secured in place.  Fluoroscopy confirmed that the tip of the electrode did not change in position with each manipulation.  The directional marker, on fluoroscopy, also demonstrated that the contact 2A is facing anteriorly.    The connecting portion of the electrode was covered with a protective covering/dead-end connector, and this apparatus was inserted into the subgaleal space/pocket towards the left parietal area that was created at the beginning of the case.  The excess wire was also buried posteriorly in the previously created pocket.  Fluoroscopy  confirmed that the tip did not move.  The wound was then generously irrigated.  Meticulous hemostasis was obtained.    We began closing the wound.  The galeal layer was reapproximated using 3-0 Vicryl sutures in an inverted interrupted fashion and the skin was reapproximated using 4-0 Monocryl suture in a running fashion.  The wound was cleaned and dried and prepped with ChoraPrep.  Then, Dermabond was applied.    Removal of the head frame and end of procedure    First, the stereotactic targeting apparatus was removed.  Then, the sterile drapes were removed.  Subsequently, the patient was taken out of the CRW head frame.  The four pin sites were clean and dry and no active bleeding was noted.  Antibiotic ointment was applied to the pin sites and clean, dry dressing was applied to the two frontal pin sites.    Patient was further awakened and taken to the recovery room in a stable condition.  At the end of the case, all counts including needle, sponge and instrument counts were correct x 2.  There were no complications.    IRickey M.D., Ph.D., Neurosurgery Attending, was present and scrubbed for the entire case and performed the key and critical portions of the case.       RICKEY LAWRENCE MD, PHD

## 2018-09-05 NOTE — LETTER
9/5/2018       RE: Gabi Manley  [address]  [facility] [address]     Dear Colleague,    Thank you for referring your patient, Gabi Manley, to the [facility]UROS[facility]GERY at [facility]. Please see a copy of my visit note below.    NEUROSURGERY PROGRESS NOTE      REASON FOR VISIT: Routine postop wound check.      Procedure Date:   8/21/2018   PREOPERATIVE DIAGNOSIS:  Idiopathic Parkinson disease  PROCEDURE:  Deep Brain Stimulator (DBS) Generator/Battery Placement Over Left Chest Wall, Phase II  IMPLANTS:   1) [employer] Infinity 7 REF 6662 SN [id number]; left chest   2) [employer] Infinity DBS System REF 6371 SN [id number]; connected to left intracranial lead  3) [employer] Infinity DBS System REF 6371 SN [id number]; for future connection to right intracranial lead     Procedure Date: 8/7/2018  PREOPERATIVE DIAGNOSIS:    1.  Parkinson's disease.  2.  Tremor, mostly on the right side.  3.  Dyskinesia and motor fluctuations.  PROCEDURES PERFORMED:   1.  Placement of CRW stereotactic headframe.   2.  Stereotactic neuronavigation planning and CT of head.   3.  Stereotactic neuronavigation using the [employer] system for surgical planning, targeting and approach. The target is left globus pallidus internus (GPi).   4.  Left side deep brain stimulator placement, phase I, placement of left side deep brain stimulator electrode, target is left globus pallidus internus (GPi).   5.  Use of intraoperative microelectrode recording.   6.  Use of intraoperative fluoroscopy.   FINDINGS:  Final electrode location, anterior Lenny Gun position, 1.6 mm below target.  IMPLANTS:    1.  Garcia DBS electrode, Infinity 0.5, model 6172ANS, S/N [id number].   2.  Abbott tawnya hole cover, model 6010ANS, Lot# [id number]    SURGEON:  Murray Stearns MD, PhD     .   HPI:   Gabi Manley is a pleasant 78 year old female with idiopathic Parkinson Disease with diagnosis made in 2009. She developed resting  tremor in her hands and is having side effects from her medications. Given the difficulty with controlling her motor fluctuations, the patient was discussed at the Movement Disorders Consensus group where it was determined she would be a candidate for bilateral globus pallidus internus deep brain stimulator placement with plan to place her left sided lead first in a staged approach. After a discussion of the risks, benefits, and alternatives, the patient provided written and verbal consent to proceed with the above stated procedures by Dr. Stearns. The procedures went without incident. She recovered well and was discharged home on the same day in good condition after phase II of left DBS placement. Since then she presents for routine wound check and suture/staple removal.    The patient denies fever, chills, redness, swelling, and/ or drainage from incisions. She reports mild discomfort around the incision sites.      CURRENT MEDICATIONS:    Current Outpatient Prescriptions:      amLODIPine (NORVASC) 5 MG tablet, Take 5 mg by mouth every morning , Disp: , Rfl:      atorvastatin (LIPITOR) 20 MG tablet, Take 20 mg by mouth every morning , Disp: , Rfl:      carbidopa-levodopa (SINEMET)  MG per tablet, Patient is taking 1.25 tablets every two hours up to 12 tablets per day. (Patient taking differently: Take 1 tablet by mouth every 2 hours Patient is taking 1.25 tablets every two hours up to 12 tablets per day.), Disp: 360 tablet, Rfl: 3     cholecalciferol (VITAMIN D-1000 MAX ST) 1000 UNITS TABS, Take 2,000 Units by mouth every morning , Disp: , Rfl:      diclofenac (VOLTAREN) 1 % GEL topical gel, Apply 2 grams topically to area 4 times per day not to exceed 8 grams per day, Disp: , Rfl:      escitalopram (LEXAPRO) 10 MG tablet, Take 10 mg by mouth every morning , Disp: , Rfl:      Multiple Vitamin (THERAPEUTIC MULTIVITAMIN PO), Take 1 tablet by mouth every morning , Disp: , Rfl:      oxyCODONE IR (ROXICODONE) 5 MG  "tablet, Take 1 tablet (5 mg) by mouth every 6 hours as needed for severe pain, Disp: 24 tablet, Rfl: 0     oxyCODONE IR (ROXICODONE) 5 MG tablet, Take 1-2 tablets (5-10 mg) by mouth every 4 hours as needed for other (pain control or improvement in physical function. Hold dose for analgesic side effects.), Disp: 30 tablet, Rfl: 0     pantoprazole (PROTONIX) 20 MG EC tablet, Take 20 mg by mouth, Disp: , Rfl:      pantoprazole (PROTONIX) 20 MG EC tablet, Take 20 mg by mouth every morning , Disp: , Rfl:      senna-docusate (SENOKOT-S;PERICOLACE) 8.6-50 MG per tablet, Take 2 tablets by mouth 2 times daily, Disp: 30 tablet, Rfl: 0      ALLERGIES:  Allergies   Allergen Reactions     No Clinical Screening - See Comments Shortness Of Breath     Erythromycin      Other reaction(s): Unknown     Ioxaglate Other (See Comments)     Angioedema     Paroxetine      Other reaction(s): Unknown     Penicillins      Facial swelling     Trazodone      Other reaction(s): Unknown     Vancomycin Hives       PMH, SOC HIST, FAM HIST, PROBLEM LIST:  All reviewed in EPIC.      FOCUS EXAMINATION:  /72 (BP Location: Left arm)  Pulse 77  Temp 97.6  F (36.4  C) (Oral)  Ht 1.6 m (5' 3\")  Well developed well nourished female found seated comfortably in exam chair.  No apparent distress.  She is A&O X3.  Mood and affect WNL. Language and fund of knowledge intact.  Is able to grab the arm rest on the chair to sit and rise without assistance. She uses a walker for traveling.  She has a nicely healed incision below the left clavicle and the scalp. The incision was closed by Monocryl in subcuticular fashion that require no removal.      ASSESSMENT:  1. Parkinson's disease (H)    2. Status post deep brain stimulator placement    3. Visit for wound check    Gabi Manley is doing reasonably well.The wound is healthy without evidence of infection.      PLAN:  We discussed wound care.  *  Keep it to air.  *  May shower and shampoo with mild " soap/shampoo including the incision. No hair conditioners, hair treatments or skin cream for two more weeks.  *  May swim or bathe when all scabbing is gone from the incision.  *  Call our services if you develop fever, chills, redness, swelling, and/or drainage from incision.  We discussed activities.  *  We recommend she return to normal activities as tolerated.   We discussed follow up    *  The patient has the next, right-sided phase DBS placement scheduled with Dr. Stearns on 9/18/2018. She will return to clinic for wound check after that.   *  Patient will see Neurology for DBS programming after bilateral DBS placement. This is currently scheduled for 10/19/2018.    All the patient's questions have been answered and they demonstrate good understanding of the above.  The patient has our contact information and is aware that she should call if she has questions comments or concerns.     We appreciate the opportunity to be of service in the care of this pleasant patient.  Please do call if there is anything more we can do    Makenzie Lerma DNP, APRN, FNP-BC  [facility]   Department of Neurosurgery  Phone: [phone]  Fax: [phone]

## 2018-09-05 NOTE — MR AVS SNAPSHOT
After Visit Summary   9/5/2018    Gabi Manley    MRN: [id number]           Patient Information     Date Of Birth          1940        Visit Information        Provider Department      9/5/2018 3:00 PM Makenzie Lerma APRN CNP [facility]        Today's Diagnoses     Parkinson's disease (H)        Status post deep brain stimulator placement        Visit for wound check          Care Instructions    1. Our office will call and schedule a wound check for you after the next DBS placement.    2. Call [phone] for concerns or questions.          Follow-ups after your visit        Your next 10 appointments already scheduled     Sep 14, 2018  7:00 AM CDT   (Arrive by 6:45 AM)   New Movement Disorder with STEFANO Villalobos CNP [facility] ([facility])    [address]  3rd Floor  [facility] [id number] [phone]            Sep 18, 2018   Procedure with Murray Stearns MD   [facility], [place], Same Day Surgery (--)    [address] [address] [phone]            Sep 18, 2018  8:40 AM CDT   CT HEAD W/O CONTRAST with UUCT1   [facility] [place], CT ([facility] [place])    [address] [id number] [phone]           Please bring any scans or X-rays taken at other hospitals, if similar tests were done. Also bring a list of your medicines, including vitamins, minerals and over-the-counter drugs. It is safest to leave personal items at home.  Be sure to tell your doctor:   If you have any allergies.   If there s any chance you are pregnant.   If you are breastfeeding.  You do not need to do anything special to prepare for this exam.  Please wear loose clothing, such as a sweat suit or jogging clothes. Avoid snaps, zippers and other metal. We may ask you to undress and put on a hospital gown.            Oct 03, 2018  3:00 PM CDT   (Arrive by 2:45 PM)    Return Visit with STEFANO Santos CNP [facility] ([facility])    [address]  3rd [facility] [id number] [phone]            Oct 19, 2018  7:00 AM CDT   (Arrive by 6:45 AM)   Return Movement Disorder with STEFANO Villalobos CNP   [facility] ([facility])    [address]  3rd [facility] [id number] [phone]            Oct 19, 2018 11:20 AM CDT   CT HEAD W/O CONTRAST with UCCT2   [facility] CT ([facility])    [address] [address] [phone]           Please bring any scans or X-rays taken at other hospitals, if similar tests were done. Also bring a list of your medicines, including vitamins, minerals and over-the-counter drugs. It is safest to leave personal items at home.  Be sure to tell your doctor:   If you have any allergies.   If there s any chance you are pregnant.   If you are breastfeeding.  You do not need to do anything special to prepare for this exam.  Please wear loose clothing, such as a sweat suit or jogging clothes. Avoid snaps, zippers and other metal. We may ask you to undress and put on a hospital gown.              Who to contact     Please call your clinic at [phone] to:    Ask questions about your health    Make or cancel appointments    Discuss your medicines    Learn about your test results    Speak to your doctor            Additional Information About Your Visit        Attender Information     Attender gives you secure access to your electronic health record. If you see a primary care provider, you can also send messages to your care team and make appointments. If you have questions, please call your primary care clinic.  If you do not have a primary care provider, please call [phone] and they will assist you.      Attender is an electronic gateway that provides easy,  "online access to your medical records. With [employer], you can request a clinic appointment, read your test results, renew a prescription or communicate with your care team.     To access your existing account, please contact your [facility] or call [phone] for assistance.        Care EveryWhere ID     This is your Care EveryWhere ID. This could be used by other organizations to access your [facility] medical records  [id number]        Your Vitals Were     Pulse Temperature Height             77 97.6  F (36.4  C) (Oral) 1.6 m (5' 3\")          Blood Pressure from Last 3 Encounters:   09/05/18 134/72   08/21/18 124/86   08/08/18 116/66    Weight from Last 3 Encounters:   08/21/18 60.3 kg (132 lb 15 oz)   08/08/18 61.8 kg (136 lb 3.9 oz)   07/25/18 62.6 kg (138 lb)              Today, you had the following     No orders found for display         Today's Medication Changes          These changes are accurate as of 9/5/18 11:59 PM.  If you have any questions, ask your nurse or doctor.               These medicines have changed or have updated prescriptions.        Dose/Directions    carbidopa-levodopa  MG per tablet   Commonly known as:  SINEMET   This may have changed:    - how much to take  - how to take this  - when to take this  - additional instructions   Used for:  Parkinson disease (H)        Patient is taking 1.25 tablets every two hours up to 12 tablets per day.   Quantity:  360 tablet   Refills:  3                Primary Care Provider Office Phone # Fax #    Tracey Abdi [phone]       [address] [address]        Equal Access to Services     [facility] : Alberto Borjas, katrin rivera, nabil awad. So [facility] [phone].    ATENCIÓN: Si habla español, tiene a concepcion disposición servicios gratuitos de asistencia lingüística. Llame al " [phone].    We comply with applicable federal civil rights laws and [place] laws. We do not discriminate on the basis of race, color, national origin, age, disability, sex, sexual orientation, or gender identity.            Thank you!     Thank you for choosing [facility]  for your care. Our goal is always to provide you with excellent care. Hearing back from our patients is one way we can continue to improve our services. Please take a few minutes to complete the written survey that you may receive in the mail after your visit with us. Thank you!             Your Updated Medication List - Protect others around you: Learn how to safely use, store and throw away your medicines at www.disposemymeds.org.          This list is accurate as of 9/5/18 11:59 PM.  Always use your most recent med list.                   Brand Name Dispense Instructions for use Diagnosis    amLODIPine 5 MG tablet    NORVASC     Take 5 mg by mouth every morning        atorvastatin 20 MG tablet    LIPITOR     Take 20 mg by mouth every morning        carbidopa-levodopa  MG per tablet    SINEMET    360 tablet    Patient is taking 1.25 tablets every two hours up to 12 tablets per day.    Parkinson disease (H)       diclofenac 1 % Gel topical gel    VOLTAREN     Apply 2 grams topically to area 4 times per day not to exceed 8 grams per day        escitalopram 10 MG tablet    LEXAPRO     Take 10 mg by mouth every morning        * oxyCODONE IR 5 MG tablet    ROXICODONE    30 tablet    Take 1-2 tablets (5-10 mg) by mouth every 4 hours as needed for other (pain control or improvement in physical function. Hold dose for analgesic side effects.)    S/P deep brain stimulator placement       * oxyCODONE IR 5 MG tablet    ROXICODONE    24 tablet    Take 1 tablet (5 mg) by mouth every 6 hours as needed for severe pain    S/P deep brain stimulator placement       * pantoprazole 20 MG EC tablet    PROTONIX     Take 20 mg by mouth every  morning        * pantoprazole 20 MG EC tablet    PROTONIX     Take 20 mg by mouth        senna-docusate 8.6-50 MG per tablet    SENOKOT-S;PERICOLACE    30 tablet    Take 2 tablets by mouth 2 times daily    S/P deep brain stimulator placement       THERAPEUTIC MULTIVITAMIN PO      Take 1 tablet by mouth every morning        VITAMIN D-1000 MAX ST 1000 units Tabs   Generic drug:  cholecalciferol      Take 2,000 Units by mouth every morning        * Notice:  This list has 4 medication(s) that are the same as other medications prescribed for you. Read the directions carefully, and ask your doctor or other care provider to review them with you.

## 2018-09-06 NOTE — PROGRESS NOTES
[facility]:  Care Coordination Note     SITUATION   Gabi Manley is a 78 year old female who is receiving support for:  Clinic Care Coordination - Follow-up  .    BACKGROUND     Pt had left GPi DBS on 8/7/18 and battery placement on 8/21/2018, both by Dr. Stearns. She is scheduled for right GPi DBS on 9/18/18.     Discussed pre-op routine and requirements to include:  surgical procedure, post-op recovery and expectations, need for H&P (will be updated morning of surgery), NPO prior to OR, pre-op antibacterial showers, pain control and importance of follow-up visits.  Ample time was provided for patient questions and in-depth discussion of topics of heightened interest.  A four ounce bottle of antibacterial soap solution was given to patient at her previous PAC appt and pt confirms she has bottle. Discussed specific instructions for use.  Patient verbalized understanding of instructions.  Approximately 10 minutes spent with patient and family discussing and reviewing.      ASSESSMENT         PLAN          Nursing Interventions: Preop Education    Follow-up plan:  Surgery scheduled for 9/18/18 at 8:00 a.m., arrival time 6:00 a.m on unit 3C. Pt scheduled for post op follow up with AARON Lerma on 10/3/18 at 3:00 p.m       JOE ROBLES

## 2018-09-06 NOTE — PROGRESS NOTES
NEUROSURGERY PROGRESS NOTE      REASON FOR VISIT: Routine postop wound check.      Procedure Date:   8/21/2018   PREOPERATIVE DIAGNOSIS:  Idiopathic Parkinson disease  PROCEDURE:  Deep Brain Stimulator (DBS) Generator/Battery Placement Over Left Chest Wall, Phase II  IMPLANTS:   1) [employer] Infinity 7 REF 6662 SN [id number]; left chest   2) [employer] DBS System REF 6371 SN [id number]; connected to left intracranial lead  3) [employer] DBS System REF 6371 SN [id number]; for future connection to right intracranial lead     Procedure Date: 8/7/2018  PREOPERATIVE DIAGNOSIS:    1.  Parkinson's disease.  2.  Tremor, mostly on the right side.  3.  Dyskinesia and motor fluctuations.  PROCEDURES PERFORMED:   1.  Placement of CRW stereotactic headframe.   2.  Stereotactic neuronavigation planning and CT of head.   3.  Stereotactic neuronavigation using the Linki system for surgical planning, targeting and approach. The target is left globus pallidus internus (GPi).   4.  Left side deep brain stimulator placement, phase I, placement of left side deep brain stimulator electrode, target is left globus pallidus internus (GPi).   5.  Use of intraoperative microelectrode recording.   6.  Use of intraoperative fluoroscopy.   FINDINGS:  Final electrode location, anterior Lenny Gun position, 1.6 mm below target.  IMPLANTS:    1.  Garcia DBS electrode, Infinity 0.5, model 6172ANS, S/N [id number].   2.  Abbott tawnya hole cover, model 6010ANS, Lot# [id number]    SURGEON:  Murray Stearns MD, PhD     .   HPI:   Gabi Manley is a pleasant 78 year old female with idiopathic Parkinson Disease with diagnosis made in 2009. She developed resting tremor in her hands and is having side effects from her medications. Given the difficulty with controlling her motor fluctuations, the patient was discussed at the Movement Disorders Consensus group where it was determined she would be a candidate for bilateral globus pallidus  internus deep brain stimulator placement with plan to place her left sided lead first in a staged approach. After a discussion of the risks, benefits, and alternatives, the patient provided written and verbal consent to proceed with the above stated procedures by Dr. Stearns. The procedures went without incident. She recovered well and was discharged home on the same day in good condition after phase II of left DBS placement. Since then she presents for routine wound check and suture/staple removal.    The patient denies fever, chills, redness, swelling, and/ or drainage from incisions. She reports mild discomfort around the incision sites.      CURRENT MEDICATIONS:    Current Outpatient Prescriptions:      amLODIPine (NORVASC) 5 MG tablet, Take 5 mg by mouth every morning , Disp: , Rfl:      atorvastatin (LIPITOR) 20 MG tablet, Take 20 mg by mouth every morning , Disp: , Rfl:      carbidopa-levodopa (SINEMET)  MG per tablet, Patient is taking 1.25 tablets every two hours up to 12 tablets per day. (Patient taking differently: Take 1 tablet by mouth every 2 hours Patient is taking 1.25 tablets every two hours up to 12 tablets per day.), Disp: 360 tablet, Rfl: 3     cholecalciferol (VITAMIN D-1000 MAX ST) 1000 UNITS TABS, Take 2,000 Units by mouth every morning , Disp: , Rfl:      diclofenac (VOLTAREN) 1 % GEL topical gel, Apply 2 grams topically to area 4 times per day not to exceed 8 grams per day, Disp: , Rfl:      escitalopram (LEXAPRO) 10 MG tablet, Take 10 mg by mouth every morning , Disp: , Rfl:      Multiple Vitamin (THERAPEUTIC MULTIVITAMIN PO), Take 1 tablet by mouth every morning , Disp: , Rfl:      oxyCODONE IR (ROXICODONE) 5 MG tablet, Take 1 tablet (5 mg) by mouth every 6 hours as needed for severe pain, Disp: 24 tablet, Rfl: 0     oxyCODONE IR (ROXICODONE) 5 MG tablet, Take 1-2 tablets (5-10 mg) by mouth every 4 hours as needed for other (pain control or improvement in physical function. Hold dose  "for analgesic side effects.), Disp: 30 tablet, Rfl: 0     pantoprazole (PROTONIX) 20 MG EC tablet, Take 20 mg by mouth, Disp: , Rfl:      pantoprazole (PROTONIX) 20 MG EC tablet, Take 20 mg by mouth every morning , Disp: , Rfl:      senna-docusate (SENOKOT-S;PERICOLACE) 8.6-50 MG per tablet, Take 2 tablets by mouth 2 times daily, Disp: 30 tablet, Rfl: 0      ALLERGIES:  Allergies   Allergen Reactions     No Clinical Screening - See Comments Shortness Of Breath     Erythromycin      Other reaction(s): Unknown     Ioxaglate Other (See Comments)     Angioedema     Paroxetine      Other reaction(s): Unknown     Penicillins      Facial swelling     Trazodone      Other reaction(s): Unknown     Vancomycin Hives       PMH, SOC HIST, FAM HIST, PROBLEM LIST:  All reviewed in EPIC.      FOCUS EXAMINATION:  /72 (BP Location: Left arm)  Pulse 77  Temp 97.6  F (36.4  C) (Oral)  Ht 1.6 m (5' 3\")  Well developed well nourished female found seated comfortably in exam chair.  No apparent distress.  She is A&O X3.  Mood and affect WNL. Language and fund of knowledge intact.  Is able to grab the arm rest on the chair to sit and rise without assistance. She uses a walker for traveling.  She has a nicely healed incision below the left clavicle and the scalp. The incision was closed by Monocryl in subcuticular fashion that require no removal.      ASSESSMENT:  1. Parkinson's disease (H)    2. Status post deep brain stimulator placement    3. Visit for wound check    Gabi Manley is doing reasonably well.The wound is healthy without evidence of infection.      PLAN:  We discussed wound care.  *  Keep it to air.  *  May shower and shampoo with mild soap/shampoo including the incision. No hair conditioners, hair treatments or skin cream for two more weeks.  *  May swim or bathe when all scabbing is gone from the incision.  *  Call our services if you develop fever, chills, redness, swelling, and/or drainage from incision.  We " discussed activities.  *  We recommend she return to normal activities as tolerated.   We discussed follow up    *  The patient has the next, right-sided phase DBS placement scheduled with Dr. Stearns on 9/18/2018. She will return to clinic for wound check after that.   *  Patient will see Neurology for DBS programming after bilateral DBS placement. This is currently scheduled for 10/19/2018.    All the patient's questions have been answered and they demonstrate good understanding of the above.  The patient has our contact information and is aware that she should call if she has questions comments or concerns.     We appreciate the opportunity to be of service in the care of this pleasant patient.  Please do call if there is anything more we can do    Makenzie Lerma DNP, APRN, FNP-BC  [facility]   Department of Neurosurgery  Phone: [phone]  Fax: [phone]

## 2018-09-06 NOTE — PATIENT INSTRUCTIONS
1. Our office will call and schedule a wound check for you after the next DBS placement.    2. Call [phone] for concerns or questions.

## 2018-09-11 NOTE — OP NOTE
Stereotaxic Neurosurgery Neurophysiology Summary    Name: Gabi Manley   : 1940  MRN: [facility] [id number]  Surgery Date: 2018  Surgery Performed:  Left GPi DBS lead placement  Neurologist: Ever García MD, PhD  Ordering Physician: Murray Stearns MD, PhD    Diagnosis: Parkinson s disease    Target Structure: Globus Pallidus internus (GPi)    Target Side:  Left    Procedure   The surgical field was prepared as per the neurosurgeon's note. Microelectrodes were attached to the stereotactic frame and lowered through the brain with a calibrated microdrive. Neuronal activity was recorded along each track. Single units were isolated and somatosensory/motor responses (SSR) were determined.     Functional Physiologic Mapping:  3.0 Hours    Mapping Equipment: [employer]    Microelectrode Mapping    Electrophysiological Mapping: The target was physiologically mapped using 1 pass (3 total track).    Pass 1    Mapping: Three tracks - Anterior, Lateral and Posterior BenGun, starting 15 mm above target depth.   Anterior track ~ 4.5 mm interpreted as GPi; SSR s tested in GPi showed responses to shoulder flexion and hip flexion/extension. A flashlight was used to induce light evoked potentials in order to localize optic tract. Negative response at 0.9 mm below ventral border of GPi. However, positive response for OT was noted at 1.4 mm below the interpreted GPi ventral border and an unequivocal response noted 1.65 mm below the interpreted GPi ventral border.  Posterior track ~ 5.1 mm interpreted as GPi; SSR s tested in GPi showed responses to hip, shoulder and elbow flexion/ext. A flashlight was used to induce light evoked potentials in order to localize optic tract, which was identified at 1.4 mm below GPi ventral border, albeit less clear compared to the Anterior track.  Lateral track ~ 2.5 mm interpreted as GPi w/ minimal SSR s noted during UE/wrist extension/flexion. A positive response to  light evoked potentials was noted at 1.4 mm below the interpreted ventral border of GPi, indicative of OT localization.    Microstimulation: Posterior track - microstimulation at 2.5 and 2.0 mm below the ventral border of GPi showed no signs of internal capsule stimulation up to 100 A. No further microstimulation performed.    Ring electrode stimulation: Posterior track - stim at target (0.0), muscle contractions noted at 1.5 mA, consistent with stimulation of the posterior limb of the internal capsule.     Lead placement   Anterior BenGun track (original target location) with placement depth set to 1.6 mm below target depth. Intention was for the top of contact 3 to be placed across the dorsal border of GPi - which was ~2.9 mm above the original target depth.    Macrostimulation (DBS lead)  Lead: [employer] Infinity 0.5 [contacts 1-4]    *Pt. exhibited minimal tremor, rigidity or bradykinesia during lead testing.    Stimulation Parameters: 1(-) 3abc(+) 130 Hz, 60 s:   Outcome: Phosphenes noted at 2.75 mA. Muscle contractions noted in the tongue at 3.5 mA, consistent with stimulation of the posterior limb of the internal capsule.    Comments  Lead placed such that contact 3 is across the dorsal border of GPi. Contacts 2 and 3 are likely good contacts for reducing symptoms. Tremor, rigidity and bradykinesia control were not assessed during lead stimulation testing as pt. did not exhibit signs. Overall a good map with sufficient GPi cells and motor SSR response.    Ever García MD, PhD

## 2018-09-13 NOTE — TELEPHONE ENCOUNTER
I called patient to confirm her appointment tomorrow for initial programming at 7am at the [facility]. I reviewed the medication instructions, she will hold her carbidopa-levodopa after 7pm this evening.     Bre Pineda RN, MPH  Research Nurse, Movement Disorders

## 2018-09-13 NOTE — TELEPHONE ENCOUNTER
FUTURE VISIT INFORMATION      FUTURE VISIT INFORMATION:    Date: 09/15/2018     Time: 7:00 am     Location: [facility]  REFERRAL INFORMATION:    Referring provider:  Self      Referring providers clinic:      Reason for visit/diagnosis  Initial programming of her Deep Brain Stimulation first side-Per message from Yuliana    NOTES (FOR ALL VISITS) STATUS DETAILS   OFFICE NOTE from referring provider N/A Bre Pineda RN Wed Jul 25, 2018 4:09 PM   Patient closely followed in Clinical Core research study. Please contact Bre Pineda RN, MPH with any movement disorder coordination questions.        OFFICE NOTE from other specialist N/A    DISCHARGE SUMMARY from hospital Internal  Care Everywhere  09/18/22018, 08/21/2018, 08/07/2018, 12/07/2012   DISCHARGE REPORT from the ER Care Everywhere 01/26/2017, 04/29/2016, 11/21/2014, 09/17/2014   OPERATIVE REPORT Internal  Care Everywher  09/18/2018, 08/21/2018, 08/07/2018, 05/20/2016   MEDICATION LIST Internal    IMAGING  (FOR ALL VISITS)     EMG Internal 04/26/2016   EEG N/A    ECT N/A    MRI (HEAD, NECK, SPINE) Internal  PACS  03/12/2018, 07/20/2016   CT (HEAD, NECK, SPINE) Internal  PACS    08/07/2018   OTHER     XR Skull Port 1/3 Views   PACS  Internal  08/07/2018

## 2018-09-14 NOTE — PATIENT INSTRUCTIONS
September 14, 2018    Dear Ms. Gabi Manley,    Thank you for coming today.  During your visit, we have discussed the following:     __ Your DBS electrical system function (impedance) is normal. The battery life is also good.  __ Your Left Brain (Right Body) DBS has been turned ON & Programmed.      Amplitude:  2.5 mA       Patient :  Upper Limit: 2.5 volts  Lower Limit: 0.0 volts       __ Please call with questions or if you experience side effects.    __ Stay on the same antiparkinsonian medications.     To contact our clinic, you may call [phone] or use [employer] message.     It's good to see you!  I'll see you on October 19th.      STEFANO Salas, CNP  [facility] [[email or web address]] Neurology Clinic

## 2018-09-14 NOTE — MR AVS SNAPSHOT
After Visit Summary   9/14/2018    Gabi Manley    MRN: [id number]           Patient Information     Date Of Birth          1940        Visit Information        Provider Department      9/14/2018 7:00 AM Bhavana Anton APRN CNP [facility]        Today's Diagnoses     S/P deep brain stimulator placement          Care Instructions    September 14, 2018    Dear Ms. Gabi Manley,    Thank you for coming today.  During your visit, we have discussed the following:     __ Your DBS electrical system function (impedance) is normal. The battery life is also good.  __ Your Left Brain (Right Body) DBS has been turned ON & Programmed.      Amplitude:  2.5 mA       Patient :  Upper Limit: 2.5 volts  Lower Limit: 0.0 volts       __ Please call with questions or if you experience side effects.    __ Stay on the same antiparkinsonian medications.     To contact our clinic, you may call [phone] or use [employer] message.     It's good to see you!  I'll see you on October 19th.      STEFANO Salas, CNP  [facility]              Follow-ups after your visit        Your next 10 appointments already scheduled     Sep 18, 2018   Procedure with Murray Stearns MD   [facility], [place], Same Day Surgery (--)    [address] [facility] [id number] [phone]            Sep 18, 2018  8:40 AM CDT   CT HEAD W/O CONTRAST with UUCT1   [facility], [place], [place] ([facility] [place])    [address] [facility] [id number] [phone]           How do I prepare for my exam? (Food and drink instructions) No Food and Drink Restrictions.  How do I prepare for my exam? (Other instructions) You do not need to do anything special to prepare for this exam. For a sinus scan: Use your nose spray (nasal decongestant spray) as directed.  What should I wear: Please wear loose clothing, such as a sweat suit or jogging clothes. Avoid snaps, zippers and  other metal. We may ask you to undress and put on a hospital gown.  How long does the exam take: Most scans take less than 20 minutes.  What should I bring: Please bring any scans or X-rays taken at other hospitals, if similar tests were done. Also bring a list of your medicines, including vitamins, minerals and over-the-counter drugs. It is safest to leave personal items at home.  Do I need a : No  is needed.  What do I need to tell my doctor? Be sure to tell your doctor: * If you have any allergies. * If there s any chance you are pregnant. * If you are breastfeeding.  What should I do after the exam: No restrictions, You may resume normal activities.  What is this test: A CT (computed tomography) scan is a series of pictures that allows us to look inside your body. The scanner creates images of the body in cross sections, much like slices of bread. This helps us see any problems more clearly.  Who should I call with questions: If you have any questions, please call the Imaging Department where you will have your exam. Directions, parking instructions, and other information is available on our website, [email or web address].            Oct 03, 2018  3:00 PM CDT   (Arrive by 2:45 PM)   Return Visit with STEFANO Santos CNP [facility] ([facility])    [address] [place] [id number] [phone]            Oct 19, 2018  7:00 AM CDT   (Arrive by 6:45 AM)   Return Movement Disorder with STEFANO Villalobos CNP   [facility]rology ([facility])    [address] [place] [id number] [phone]            Oct 19, 2018 11:20 AM CDT   CT HEAD W/O CONTRAST with UCCT2   [facility] ([facility])    [address] [place] [id number] [phone]           How do I prepare for my exam? (Food and drink instructions) No  Food and Drink Restrictions.  How do I prepare for my exam? (Other instructions) You do not need to do anything special to prepare for this exam. For a sinus scan: Use your nose spray (nasal decongestant spray) as directed.  What should I wear: Please wear loose clothing, such as a sweat suit or jogging clothes. Avoid snaps, zippers and other metal. We may ask you to undress and put on a hospital gown.  How long does the exam take: Most scans take less than 20 minutes.  What should I bring: Please bring any scans or X-rays taken at other hospitals, if similar tests were done. Also bring a list of your medicines, including vitamins, minerals and over-the-counter drugs. It is safest to leave personal items at home.  Do I need a : No  is needed.  What do I need to tell my doctor? Be sure to tell your doctor: * If you have any allergies. * If there s any chance you are pregnant. * If you are breastfeeding.  What should I do after the exam: No restrictions, You may resume normal activities.  What is this test: A CT (computed tomography) scan is a series of pictures that allows us to look inside your body. The scanner creates images of the body in cross sections, much like slices of bread. This helps us see any problems more clearly.  Who should I call with questions: If you have any questions, please call the Imaging Department where you will have your exam. Directions, parking instructions, and other information is available on our website, [email or web address].              Who to contact     Please call your clinic at [phone] to:    Ask questions about your health    Make or cancel appointments    Discuss your medicines    Learn about your test results    Speak to your doctor            Additional Information About Your Visit        [employer] Information     [employer] gives you secure access to your electronic health record. If you see a primary care provider, you can also send messages to your care team  "and make appointments. If you have questions, please call your primary care clinic.  If you do not have a primary care provider, please call [phone] and they will assist you.      [email or web address] is an electronic gateway that provides easy, online access to your medical records. With [email or web address], you can request a clinic appointment, read your test results, renew a prescription or communicate with your care team.     To access your existing account, please contact your [facility] or call [phone] for assistance.        Care EveryWhere ID     This is your Care EveryWhere ID. This could be used by other organizations to access your [place] medical records  [id number]        Your Vitals Were     Pulse Height BMI (Body Mass Index)             68 1.6 m (5' 3\") 23.93 kg/m2          Blood Pressure from Last 3 Encounters:   09/14/18 114/81   09/05/18 134/72   08/21/18 124/86    Weight from Last 3 Encounters:   09/14/18 61.3 kg (135 lb 1.6 oz)   08/21/18 60.3 kg (132 lb 15 oz)   08/08/18 61.8 kg (136 lb 3.9 oz)              Today, you had the following     No orders found for display         Today's Medication Changes          These changes are accurate as of 9/14/18 10:36 AM.  If you have any questions, ask your nurse or doctor.               These medicines have changed or have updated prescriptions.        Dose/Directions    carbidopa-levodopa  MG per tablet   Commonly known as:  SINEMET   This may have changed:    - how much to take  - how to take this  - when to take this  - additional instructions   Used for:  Parkinson disease (H)        Patient is taking 1.25 tablets every two hours up to 12 tablets per day.   Quantity:  360 tablet   Refills:  3       senna-docusate 8.6-50 MG per tablet   Commonly known as:  SENOKOT-S;PERICOLACE   This may have changed:    - how much to take  - how to take this  - when to take this  - additional instructions   Used for:  S/P deep brain stimulator " placement   Changed by:  Bhavana Anton APRN CNP        Takes it as needed.   Refills:  0         Stop taking these medicines if you haven't already. Please contact your care team if you have questions.     diclofenac 1 % Gel topical gel   Commonly known as:  VOLTAREN   Stopped by:  Bhavana Anton APRN CNP                    Primary Care Provider Office Phone # Fax #    Tracey Abdi AARON [phone]       [address] [address]        Equal Access to Services     [facility] Hospital[facility]INO : Hadii aad ku hadasho Soomaali, waaxda luqadaha, qaybta kaalmada adeegyada, waxay idiin haydannielle edwards . So [facility] [place] [phone].    ATENCIÓN: Si habla español, tiene a concepcion disposición servicios gratuitos de asistencia lingüística. [facility] [phone].    We comply with applicable federal civil rights laws and [place] laws. We do not discriminate on the basis of race, color, national origin, age, disability, sex, sexual orientation, or gender identity.            Thank you!     Thank you for choosing [facility]  for your care. Our goal is always to provide you with excellent care. Hearing back from our patients is one way we can continue to improve our services. Please take a few minutes to complete the written survey that you may receive in the mail after your visit with us. Thank you!             Your Updated Medication List - Protect others around you: Learn how to safely use, store and throw away your medicines at www.disposemymeds.org.          This list is accurate as of 9/14/18 10:36 AM.  Always use your most recent med list.                   Brand Name Dispense Instructions for use Diagnosis    amLODIPine 5 MG tablet    NORVASC     Take 5 mg by mouth every morning        atorvastatin 20 MG tablet    LIPITOR     Take 20 mg by mouth every morning        carbidopa-levodopa  MG per tablet    SINEMET    360 tablet    Patient is taking 1.25 tablets every two  hours up to 12 tablets per day.    Parkinson disease (H)       escitalopram 10 MG tablet    LEXAPRO     Take 10 mg by mouth every morning        * oxyCODONE IR 5 MG tablet    ROXICODONE    30 tablet    Take 1-2 tablets (5-10 mg) by mouth every 4 hours as needed for other (pain control or improvement in physical function. Hold dose for analgesic side effects.)    S/P deep brain stimulator placement       * oxyCODONE IR 5 MG tablet    ROXICODONE    24 tablet    Take 1 tablet (5 mg) by mouth every 6 hours as needed for severe pain    S/P deep brain stimulator placement       pantoprazole 20 MG EC tablet    PROTONIX     Take 20 mg by mouth        senna-docusate 8.6-50 MG per tablet    SENOKOT-S;PERICOLACE     Takes it as needed.    S/P deep brain stimulator placement       THERAPEUTIC MULTIVITAMIN PO      Take 1 tablet by mouth every morning        VITAMIN D-1000 MAX ST 1000 units Tabs   Generic drug:  cholecalciferol      Take 2,000 Units by mouth every morning        * Notice:  This list has 2 medication(s) that are the same as other medications prescribed for you. Read the directions carefully, and ask your doctor or other care provider to review them with you.

## 2018-09-14 NOTE — PROGRESS NOTES
"PATIENT: Gabi Manley    : 1940    GERBER: 2018    REASON FOR VISIT: Left hemisphere monopolar review & initial deep brain stimulation (DBS) programming for Parkinson's disease.     HPI:  Ms. Gabi Manley is a 78 year old right - handed female who came to the [facility] neurology clinic accompanied by her daughter-in-law for DBS initial programming.  She underwent Left Globus Pallidus Internus (Gpi) DBS lead placement on 2018 and left chest infraclavicular Abbott Infinity 7 generator placement on 2018 by Dr. Stearns.     She was diagnosed with PD in .  Symptom started with Rt body difficulty with mobility.       Lesion effect: She noticed improvement in right hand tremor for a couple of days, which came back; and sustained improvement in freezing of gait & turning in bed, which are still improved.  She reports urinary incontinency improved for a couple of days & \"it came back.\"    Denies any side effect post-surgery including infection, mood, gait, cognitive or speech difficulty.     Pre-programming antiparkinsonian medications:     PD Medications 6 am 8 am 10 am 12 pm 2 pm 4 pm 6 pm 8 pm   Sinemet 25/100 mg  1.25 1.25 1.25 1.25 1.25 1.25 1.25 1.25       Last antiparkinsonian dose taken: Yesterday, , at 6 pm.      CURRENT MEDICATIONS:  Medication Sig     amLODIPine (NORVASC) 5 MG  Take 5 mg by mouth every morning      atorvastatin (LIPITOR) 20 MG tablet Take 20 mg by mouth every morning      carbidopa-levodopa (SINEMET)  MG per tablet Patient is taking 1.25 tablets every two hours up to 12 tablets per day.     cholecalciferol (VITAMIN D-1000 MAX ST) 1000 UNITS  Take 2,000 Units by mouth every morning      escitalopram (LEXAPRO) 10 MG  Take 10 mg by mouth every morning      Multiple Vitamin (THERAPEUTIC MULTIVITAMIN PO) Take 1 tablet by mouth every morning      oxyCODONE IR (ROXICODONE) 5 MG tablet Take 1 tablet (5 mg) by mouth every 6 hours as needed for severe pain     oxyCODONE IR " "(ROXICODONE) 5 MG tablet Take 1-2 tablets (5-10 mg) by mouth every 4 hours as needed for other (pain control or improvement in physical function. Hold dose for analgesic side effects.)     pantoprazole (PROTONIX) 20 MG EC tablet Take 20 mg by mouth     senna-docusate (SENOKOT-S;PERICOLACE) 8.6-50 MG per tablet Takes it as needed.       PHYSICAL EXAM:  Vital Signs:  Blood pressure 114/81, pulse 68, height 1.6 m (5' 3\"), weight 61.3 kg (135 lb 1.6 oz).  Body mass index is 23.93 kg/(m^2).    General:  Ms. Manley is a pleasant female who is well-groomed and well-developed sitting comfortably in the exam room without any distress.  Affect is appropriate.  She likes to joke & laugh.  She has pleasant and appropriate sense of humor.     Incision Site:  Left chest & scalp x2 sites look dry and healing nicely.     MDS Part II  -- Over the last week -- 0: Normal -- 1: Slight -- 2: Mild -- 3: Moderate -- 4: Severe     2.1 Speech: 0   2.2 Saliva and droolin   2.3 Chewing and swallowin   2.4 Eating tasks: 1   2.5 Dressin   2.6 Hygiene:  1   2.7 Handwritin   2.8 Doing hobbies and other activities:  2   2.9 Turning in bed:  1   2.10 Tremor:  3   2.11 Getting out of bed/car/deep chair:   2   2.12 Walking and balance: 3   2.13 Freezin     Total:    17       PAST MEDICAL HISTORY, PAST SURGICAL HISTORY, FAMILY HISTORY AND SOCIAL HISTORY:  Reviewed in Epic.  She reports no new changes.      MOVEMENT DISORDERS ASSESSMENT: UPDRS 3   UPDRS Values 2018   Time: 7:36 AM 9:24 AM 10:26 AM   Medication Off Off On   R Brain DBS: None None None   L Brain DBS: Off On On   Speech 2 2 2   Facial Expression 2 2 2   Rigidity Neck 2 1 1   Rigidity RUE 1 0 0   Rigidity LUE 2 2 1   Rigidity RLE 1 0 0   Rigidity LLE 2 1 0   Finger Taps R 1 1 1   Finger Taps L 3 3 2   Hand Mvt R 1 1 1   Hand Mvt L 3 3 2   Pron-/Supinate R 2 1 1   Pron-/Supinate L 3 3 1   Toe Tap R 2 2 1   Toe Tap L 2 2 2   Leg Agility R 0 0 0 "   Leg Agility L 3 3 2   Arise From Chair 1 1 1   Gait 2 2 2   Gait Freezing 2 1 0   Postural Stability 3 3 3   Posture 2 2 2   Global Spont Mvt 2 2 1   Postural Tremor RUE 1 0 0   Postural Tremor LUE 1 1 0   Kinetic Tremor RUE 1 0 0   Kinetic Tremor LUE 1 0 1   Rest Tremor RUE 3 1 0   Rest Tremor LUE 3 2 1   Rest Tremor RLE 1 1 0   Rest Tremor LLE 1 0 0   Rest Tremor Lip/Jaw 1 0 0   Rest Tremor Constancy 4 4 1   Total Right 14 7 4   Total Left 24 20 12   Axial Total 18 16 14   Total 61 47 31          DBS Interrogation & Analysis:    Implanted generator:  Left chest Abbott Infinity 7, REF [id number], SN [id number]  Lead placement:  Left Globus Pallidus Internus (Gpi).      Left Brain  Therapy On:  0%  Battery Service Life:  OK.  > 3.00 volts.    See scanned report for impedance details.      MONOPOLAR REVIEW  Clinical Core Programming Form    Please complete upon monopolar review and any subsequent  mini-monopolar reviews   Please avoid extra spaces at ends of cells.    Generator Serial Number  ex: [id number] Lead Hemisphere  ex: Left or Right Lead Region  ex: STN, GPi, or thalamus Programmers Initials  ex: AS, SC   [id number] Left Gpi TO     Enter original value in top row  then only enter if value changes **No blank Cells**  -NA (not assessed)  -NC (no change from setting above)  -NN (none noted) If applicable   Contacts  Cpos, 2abcneg Ampl  0.5 Ampl unit  V/mA Pulse Width (ms)  60 Rate (Hz)  130 Ramp  (s)  8 Effect on Symptoms  *No blanks* Side Effects  *No blanks* Imped (ohms) Current (mA)   Cpos, 1neg 0.0 mA 60 130 8 RUE rest tremor = 3.  RLE rest tremor =1.  Finger tapping = 1. Hand opening & closing = 1. Hand pronation & supination = 2.  NN      1.0     NC NN      2.0     Rt hand rest tremor = 1.   Finger tapping = 0. Hand opening & closing = 1. Hand pronation & supination = 1. NN      2.5     No RUE & RLE rest tremor.  Hand opening & closing and hand pronation & supination slightly slow.  NN      3.0     No change.  NN       "3.5     She got slight breakthrough tremors in RUE when she talked, then subsided.   NN      4.0     Tremor in RUE returned = 2 - 3. NN      5.0     Same as above.  Rt facial pulling.  Dysarthria. \"My mouth doesn't feel right.\"     Cpos, 2abcneg 0.0 mA 60 130 8 RUE rest tremor = 2.  RLE rest tremor = 1.  Finger tapping = 1. Hand opening & closing = 1. Hand pronation & supination = 2.  NN      1.0     No change.  NN      2.0     No rest tremor in RUE & very slight in RLE.  No other changes. NN      2.5     RUE rest tremor = 0.  RLE rest tremor = 0.  Finger tapping = 1. Hand opening & closing = 1. Hand pronation & supination = 1. NN      3.0     RUE rest tremor = 0.  RLE rest tremor = 0.  Finger tapping = 1. Hand opening & closing = 1. Hand pronation & supination = 1.    Went to the bathroom.  After she got back, she had slight breakthrough tremor when she talked .  NN      3.5     No change.  NN      4.0     Tremor in RUE & RLE recurred.  She reported a headache. Pointing at Rt side of head, she stated  \"It felt like a ball hit me.\"      4.6      NC      0.0      Turned off.  HA didn't go away.   Amplitude slowly increased.  No change up until 4.5, then HA worsened at 5.0 volts.      5.0      HA worse.      Cpos, 3abcneg 0.0 mA 60 130 8 RUE rest tremor = 3.  RLE rest tremor = 1.  Finger tapping = 2. Hand opening & closing = 1. Hand pronation & supination = 2.  Pt reported headache is \"fading.\"       1.0     No change.  NC      2.0     RUE rest tremor = 1.  RLE rest tremor = 1.  Finger tapping = 2. Hand opening & closing = 1. Hand pronation & supination = 1.  NC      2.5     RUE rest tremor = 1.  RLE rest tremor = 0.  Finger tapping = 2. Hand opening & closing = 1. Hand pronation & supination = 1.  NC      3.0     No change except when holding hand across her stomach, tremor recurred.  NC      3.5     No tremors.  Hand movements uncharged.  NC      4.0     No change.  NC      4.5     No change.  NC      5.0      Rt " "eyelid & facial twitching.      Cpos, 4neg 0.0 mA 60 130 8 RUE rest tremor = 3.  RLE rest tremor = 1.  Finger tapping = 2. Hand opening & closing = 2. Hand pronation & supination = 2.  \"Still has very little headaches.\"      1.0     NC Mild continuous RLE dyskinesias.       2.0     RUE rest tremor = 2.  RLE rest tremor = 1.  Finger tapping = 2. Hand opening & closing = 2. Hand pronation & supination = 2.  NC      2.5     No change.  NC      3.0     No change.  NC      3.5     No change.  NC      4.0     Tremor & hand movements worsen.   NC      5.0     No change. NC      6.0     No change.  Persistent Rt facial pulling. Uncontrollable laughter.  But unable to replicate laughter when DBS turned off & turned back to 6.0 mA.  Facial pulling recurred with DBS turned off & back to 6.0 mA.                                                                                                               __  Monopolar survey showed good Rt body tremor & bradykinesia control.  Most likely, contact 4, the dorsal contact, is in the María due to RLE dyskinesias being present from 1.0 mA all the way to 6.0 mA at the threshold.  In contact 4, pt didn't have robust tremor control or improvement in hand movements.   Based on the monopolar review, patient was programmed as below: -       Left Gpi  Cpos, 2abcneg  Amplitude:  2.5 mA  PW:  60 msec  Rate:  130 Hz  Therapy impedance:  925 Ohms       Patient :  Upper Limit: 2.5 mA  Lower Limit: 0.0 mA         __  Pt took Sinemet 25/100 mg 1.25 tabs at 9:25 am.  She waited 60 minutes.  No side effects.  She continued having the slight headache.     __ Went over patient controller with pt & her daughter-in-law.  They were shown how to turn DBS on & off and adjust amplitude.  Pt was able to demonstrate independently.    __  Instructed to stay on the same antiparkinsonian medications.     __ Instructed to call if there is any side effect from today's programming or worsening symptoms. "     __ Her 2nd side surgery is scheduled on 9/18.2018.  She'll return to see me on Oct 19th for the 2nd side Monopolar Review & DBS programming.      The total amount of time spent with patient in DBS programming was 210 minutes.     STEFANO Salas, CNP  [facility]

## 2018-09-14 NOTE — Clinical Note
9/14/2018       RE: Gabi Manley  [address] [place] [id number]     Dear Colleague,    Thank you for referring your patient, Gabi Manley, to the [facility] NEUROLOG[facility] at [facility]. Please see a copy of my visit note below.    No notes on file    Again, thank you for allowing me to participate in the care of your patient.      Sincerely,    STEFANO Hernandez CNP

## 2018-09-18 NOTE — ANESTHESIA CARE TRANSFER NOTE
Patient: Gabi Manley    Procedure(s):  Stealth Assisted Right Deep Brain Stimulator Placement, Phase I And II Combined, Placement Right Deep Brain Stimulator Electrode, Target Left Globus Pallidus Internus, With Microelectrode Recording And Connection to Existing Generator/Battery - Wound Class: I-Clean    Diagnosis: Parkinson's Disease   Diagnosis Additional Information: No value filed.    Anesthesia Type:   MAC     Note:  Airway :Room Air  Patient transferred to:PACU  Comments: VSS. Breathing spont. Report to RN at bedside.Handoff Report: Identifed the Patient, Identified the Reponsible Provider, Reviewed the pertinent medical history, Discussed the surgical course, Reviewed Intra-OP anesthesia mangement and issues during anesthesia, Set expectations for post-procedure period and Allowed opportunity for questions and acknowledgement of understanding      Vitals: (Last set prior to Anesthesia Care Transfer)    CRNA VITALS  9/18/2018 1440 - 9/18/2018 1517      9/18/2018             Temp 2: 33.8  C (92.8  F)                Electronically Signed By: STEFANO Romano CRNA  September 18, 2018  3:17 PM

## 2018-09-18 NOTE — IP AVS SNAPSHOT
[address] [address]    Phone:  [phone]                                       After Visit Summary   9/18/2018    Gabi Manley    MRN: [id number]           After Visit Summary Signature Page     I have received my discharge instructions, and my questions have been answered. I have discussed any challenges I see with this plan with the nurse or doctor.    ..........................................................................................................................................  Patient/Patient Representative Signature      ..........................................................................................................................................  Patient Representative Print Name and Relationship to Patient    ..................................................               ................................................  Date                                   Time    ..........................................................................................................................................  Reviewed by Signature/Title    ...................................................              ..............................................  Date                                               Time          22EPIC Rev 08/18

## 2018-09-18 NOTE — ANESTHESIA POSTPROCEDURE EVALUATION
Patient: Gabi Manley    Procedure(s):  Stealth Assisted Right Deep Brain Stimulator Placement, Phase I And II Combined, Placement Right Deep Brain Stimulator Electrode, Target Left Globus Pallidus Internus, With Microelectrode Recording And Connection to Existing Generator/Battery - Wound Class: I-Clean    Diagnosis:Parkinson's Disease   Diagnosis Additional Information: No value filed.    Anesthesia Type:  MAC    Note:  Anesthesia Post Evaluation    Patient location during evaluation: PACU  Patient participation: Able to fully participate in evaluation  Level of consciousness: awake and alert  Pain management: adequate  Airway patency: patent  Cardiovascular status: hemodynamically stable  Respiratory status: acceptable  Hydration status: acceptable  PONV: none     Anesthetic complications: None          Last vitals:  Vitals:    09/18/18 1545 09/18/18 1600 09/18/18 1615   BP: 140/82 129/70 123/74   Pulse:      Resp: 14 10 14   Temp: 36.7  C (98  F)     SpO2: 99% 98% 98%         Electronically Signed By: Mili Vides MD  September 18, 2018  4:42 PM

## 2018-09-18 NOTE — H&P
NEUROSURGERY    HISTORY AND PHYSICAL EXAM UPDATE    Chief Complaint   Patient presents with     RECHECK         Ongoing Deep Brain Stimulation surgery; Parkinson's disease.  S/p left side deep brain stimulator placement, phase I, placement of left side deep brain stimulator electrode, target left globus pallidus internus, with microelectrode recording on 8/7/2018 and s/p deep brain stimulator placement, phase II, placement of deep brain stimulator generator/battery over the left chest wall on 8/21/2018.       HISTORY OF PRESENT ILLNESS  Ms. Gabi Manley returns today for her ongoing Deep Brain Stimulation surgery.  She is s/p left side deep brain stimulator placement, phase I, placement of left side deep brain stimulator electrode, target left globus pallidus internus, with microelectrode recording on 8/7/2018 and s/p deep brain stimulator placement, phase II, placement of deep brain stimulator generator/battery over the left chest wall on 8/21/2018.  Patient tolerated the procedure well and there were no complications.  Her phase I postoperative course was uneventful and her postoperative CT head was without any concerning findings.  She was discharged to home on POD#1.  Patient only complains of some mild headaches.  Her phase II postoperative course was also uneventful and she has been healing well from both surgeries.  Otherwise, she denies any fevers, chills, nausea, vomiting, dizziness, weakness, numbness or seizure like activity.  She also had no issues with her incisions and they have remained clean/dry and intact.  Her PD symptoms did come back to her baseline so her lesion effect was short lived.  She had her left side DBS stimulation turned on 9/14/2018 and her symptoms are now well controlled on the right side.  She is very pleased with the DBS at this time.  Briefly, she is a 78 year old woman with a history of Idiopathic Parkinson s disease.  Her symptoms began in 2009 when she was unable to step up into  her daughter s house.  She then developed resting tremor in her hands. She had good control of her symptoms for many years, but is now having increased side-effects.  She is now having motor fluctuations with wearing off and peak dose dyskinesia.  She also has freezing gait and cramps in her left leg.  She reports that her tremor is mostly on her right side.  Recently, Dr. Webb has changed her carbidopa/levodopa from long acting to immediate release every 2 hours.  She is telling me that she feels worse with this and she thinks that her dyskinesia is worse.      Past Medical History:   Diagnosis Date     Depression      Dyslipidemia      GERD (gastroesophageal reflux disease)      Hypertension      Parkinson's disease (H)      Past Surgical History:   Procedure Laterality Date     IMPLANT DEEP BRAIN STIMULATION GENERATOR / BATTERY Left 8/21/2018    Procedure: IMPLANT DEEP BRAIN STIMULATION GENERATOR / BATTERY;  Deep Brain Stimulator Placement, Phase II, Placement Of Deep Brain Stimulator Generator/Battery Over The Left Chest Wall;  Surgeon: Murray Stearns MD;  Location: [facility] OR     OPTICAL TRACKING SYSTEM INSERTION DEEP BRAIN STIMULATION Left 8/7/2018    Procedure: OPTICAL TRACKING SYSTEM INSERTION DEEP BRAIN STIMULATION;  Stealth Assisted Left Deep Brain Stimulator Placement, Phase I, Placement Left Side Deep Brain Stimulator Electrode, Target Left Globus Pallidus Internus, With Microelectrode Recording;  Surgeon: Murray Stearns MD;  Location: [facility] OR     Social History     Social History     Marital status:      Spouse name: N/A     Number of children: N/A     Years of education: N/A     Occupational History     Not on file.     Social History Main Topics     Smoking status: Never Smoker     Smokeless tobacco: Never Used     Alcohol use No     Drug use: No     Sexual activity: Not Currently     Other Topics Concern     Not on file     Social History Narrative     History reviewed. No  pertinent family history.     Allergies   Allergen Reactions     No Clinical Screening - See Comments Shortness Of Breath     Erythromycin      Other reaction(s): Unknown     Ioxaglate Other (See Comments)     Angioedema     Paroxetine      Other reaction(s): Unknown     Penicillins      Facial swelling     Trazodone      Other reaction(s): Unknown     Vancomycin Hives     Current Facility-Administered Medications   Medication     clindamycin (CLEOCIN) infusion 900 mg     clindamycin (CLEOCIN) infusion 900 mg     dexmedetomidine (PRECEDEX) 200 mcg in sodium chloride 0.9 % 50 mL infusion     lactated ringers infusion     lidocaine (LMX4) cream     lidocaine 1 % 1 mL     sodium chloride (PF) 0.9% PF flush 3 mL     sodium chloride (PF) 0.9% PF flush 3 mL     Current Outpatient Prescriptions   Medication     carbidopa-levodopa (SINEMET)  MG per tablet     amLODIPine (NORVASC) 5 MG tablet     atorvastatin (LIPITOR) 20 MG tablet     cholecalciferol (VITAMIN D-1000 MAX ST) 1000 UNITS TABS     escitalopram (LEXAPRO) 10 MG tablet     pantoprazole (PROTONIX) 20 MG EC tablet     Multiple Vitamin (THERAPEUTIC MULTIVITAMIN PO)     ondansetron (ZOFRAN-ODT) 4 MG ODT tab     cetirizine (ZYRTEC) 10 MG tablet       No current facility-administered medications for this visit.        REVIEW OF SYSTEMS - updated 9/18/2018  General: Negative for chills/sweats/fever, difficulty sleeping, headache, recent fatigue, or weight loss.  Eyes: POSITIVE for blurred vision. Negative for crossed eyes, double vision, recent eye infections, vision flashes, or vision halos.  Ears/Nose/Mouth/Throat: POSITIVE for earache and sinus problems. Negative for bleeding gums, difficulty swallowing, ear discharge, hearing loss, hoarseness, nosebleeds, tinnitus.  Respiratory: POSITIVE for night sweats. Negative for chronic cough, coughing blood, shortness of breath, Tuberculosis, or wheezing.  Cardiovascular: POSITIVE for varicose veins. Negative for chest  pain, dyspnea at night, heart murmur, palpitations, pacemaker, pacemaker, poor circulation, or swollen legs/feet.  Gastrointestinal: POSITIVE for increasing constipation and nausea. Negative for melena, hematochezia, chronic diarrhea, heartburn, Hepatitis A/B/C,  Liver Disease, or vomiting.   Genitourinary: POSITIVE for urinary incontinence and urgency. Negative for Urinary retention, genital discharge, or UTI.   Neurological: POSITIVE for headaches,  numbness of arms/legs, tingling in hands/arms/legs, memory problems. Negative for syncope or seizures.  Psychological: POSITIVE for anxiety, depression or restlessness. Negative for panic attacks.   Skin: POSITIVE for chronic skin itching and skin rashes/hives. Negative for color changes in hand/feet when cold, poor scarring, non-healing ulcers, unusual moles.  Musculoskeletal: POSITIVE for muscle tenderness in arms/legs. Negative for arthritis, joint swelling in hands/wrists/hips/knees/joints, or osteoporosis.  Endocrine: POSITIVE for intolerance for warm rooms and rapid weight gain/loss. Negative for excessive thirst/hunger, loss of libido, multiple broken bones, galactorrhea, or thyroid issues.  Hematologic/Lymphatic: POSITIVE for significant fatigue. Negative for easy skin bruising, prolonged bleeding, tender glands/lymph nodes.  Allergies: POSITIVE for hay fever. Negative for asthma.      PHYSICAL EXAM  Temp: 97.9  F (36.6  C) Temp src: Oral BP: (!) 150/113 Pulse: 59   Resp: 12 SpO2: 98 % O2 Device: None (Room air)       General: Awake, alert, oriented. Well nourished, well developed, no acute distress.  HEENT: Head normocephalic, atraumatic. No carotid bruit. Neck supple. Good range of motion. No palpable thyroid mass.  Heart: Regular rhythm and rate. No murmurs.  Lungs: Clear to auscultation and percussion bilaterally. No rhonchi, rales or wheeze.  Abdomen: Soft, non-tender, non-distended. No hepatosplenomegaly.  Extremity: Warm with no clubbing or cyanosis. No  lower extremity edema.  Left frontal incision clean/dry and intact.  Left parietal incision clean/dry and intact.  Left chest wall incision clean/dry and intact.     Neurological:  Awake, alert and oriented to date, time, place and person. Speech fluent.   Pupils equal, round, reactive to light.  Extraocular movement intact.  Visual fields are full on confrontation.  Hearing is grossly normal to finger rub.   Facial sensation intact.  Face symmetric.  Tongue midline.  Uvula elevates equally.    Motor: full strength throughout.  Sensation: intact to light touch and pinpoint.  Deep tendon reflexes: 1+ throughout. Negative for clonus. Negative for Blanchard's sign. No dysmetria.      ASSESSMENT   78 year old right handed female with a history of Parkinson's disease.  Tremor mostly in the right side.  Dyskinesia and motor fluctuations.  S/p left side deep brain stimulator placement, phase I, placement of left side deep brain stimulator electrode, target left globus pallidus internus, with microelectrode recording on 8/7/2018.  S/p deep brain stimulator placement, phase II, placement of deep brain stimulator generator/battery over the left chest wall on 8/21/2018.    Patient is doing well.  She is here for her right side DBS surgery, phase I and II combined.    She is a bilateral candidate in a staged fashion, she is now here for the phase I and II combined for the placement of the DBS electrode on the right side under MAC and microelectrode recording followed by connection to the previously implanted generator/battery.  Her left side system has been turned on and she is getting effective benefit from the stimulation.    Risks, benefits, alternative therapies were discussed with the patient, including but not limited to infection and bleeding (intracranial), injury to the brain, stroke, death, hardware failure and possible need for more surgeries.  Surgical procedure was discussed in detail.  All questions were answered,  and she expressed understanding.       PLAN:  1.  Right side deep brain stimulator placement, phase I and II combined, placement of right side deep brain stimulator electrode, target right globus pallidus internus, with microelectrode recording and connection to the existing generator/battery.

## 2018-09-18 NOTE — IP AVS SNAPSHOT
MRN:[id number]                      After Visit Summary   9/18/2018    Gabi Manley    MRN: [id number]           Thank you!     Thank you for choosing [facility] for your care. Our goal is always to provide you with excellent care. Hearing back from our patients is one way we can continue to improve our services. Please take a few minutes to complete the written survey that you may receive in the mail after you visit with us. Thank you!        Patient Information     Date Of Birth          1940        Designated Caregiver       Most Recent Value    Caregiver    Will someone help with your care after discharge? yes    Name of designated caregiver thuy goodwin     Phone number of caregiver patient doesn't know     Caregiver address patient doesn't know, living with him after surgery       About your hospital stay     You were admitted on:  September 18, 2018 You last received care in the:  [address] [facility]    You were discharged on:  September 19, 2018        Reason for your hospital stay       You underwent placement of a deep brain stimulator and generator placement                  Who to Call     For medical emergencies, please call 911.  For non-urgent questions about your medical care, please call your primary care provider or clinic, [phone]  For questions related to your surgery, please call your surgery clinic        Attending Provider     Provider Murray Pimentel MD Neurosurgery       Primary Care Provider Office Phone # Fax #    Tracey Abdi [phone]      After Care Instructions     Activity       Your activity upon discharge:  Do not do any bending, twisting, strenuous exercise, or heavy lifting (greater than 10 pounds) for 4-6 weeks. Be careful and ask for assistance when walking or going up and down stairs. Avoid any activities that could result in trauma to the surgical wound. Do not drive within 3 months of having your last seizure or  while using narcotics or other sedating medications, such as sleep aids, muscle relaxants, etc.            Diet       Follow this diet upon discharge: Orders Placed This Encounter      Regular Diet Adult            Discharge Instructions       You underwent surgery place a  deep brain stimulator  by Murray Stearns MD, PhD      - Your sutures are absorbable.     - You will have follow up scheduled with the physician assistants and/or nurse practitioners in our clinic 2 weeks after your surgery.  If you live far away, you may see your primary care doctor for a wound check at 2 weeks.     - If you have not heard from our clinic about your follow up visit by 3-4 days following your discharge, please call our clinic at [phone] to schedule an appointment with the Neurosurgery teams.     After discharge, your activity restrictions are:   -We encourage short frequent walks, increasing as tolerated.  - No driving until you are seen in clinic and cleared by your neurosurgeon.  If you have had a seizure, you may not drive for at least 3 months according to [place] law.    - No strenuous activity.  - No lifting more than 10 pounds until you are seen in clinic (a gallon of milk weighs approximately 8 pounds)    Wound care  - You are ok to shower, but do not soak your incisions. Pat them dry if they get damp.   - Avoid coloring your hair or permanent styling until cleared by your surgeon  - No baths, hot tubs or pools for 4-6 weeks after surgery.       Call if you have any of the followin. Temperature greater than 101.5 F.   2. Any redness, swelling or discharge from the wound.   3. Any new weakness, numbness or altered mental status.  4. Worsening pain that is not improving with the pain medications you were prescribed.     Call [phone] or after 5:00 pm or on weekends call [phone] and ask for the neurosurgery resident on call. Thank You.            Wound care and dressings       Instructions to care for  your wound at home:   You should remove your dressings and bandages on post-operative day #2. You should then keep the wound undressed and open to air. You are allowed to take showers and get the wound wet starting on post-operative day #3 but you may not scrub or soak the wound or keep it submerged under water. If you do happen to get the wound wet, be sure to pat dry it rather than scrubbing it with a towel.                  Follow-up Appointments     Adult [facility] Follow-up and recommended labs and tests       Follow up with Makenzie Lerma CNP on 10/3/2018 as scheduled    Appointments on [facility] and/or [facility] (with [facility] or [facility] provider or service). Call [phone] if you haven't heard regarding these appointments within 7 days of discharge.                  Your next 10 appointments already scheduled     Oct 03, 2018  3:00 PM CDT   (Arrive by 2:45 PM)   Return Visit with STEFANO Santos CNP [facility] Neurosurgery ([facility])    [address] [place] [address] [id number] [phone]            Oct 19, 2018  7:00 AM CDT   (Arrive by 6:45 AM)   Return Movement Disorder with STEFANO Villalobos CNP   [facility] Neurology ([facility])    [address] [place] [address] [id number] [phone]            Oct 19, 2018 11:20 AM CDT   CT HEAD W/O CONTRAST with UCCT2   [facility] Imaging [place] [facility] ([facility])    [address] [place] [id number] [phone]           How do I prepare for my exam? (Food and drink instructions) No Food and Drink Restrictions.  How do I prepare for my exam? (Other instructions) You do not need to do anything special to prepare for this exam. For a sinus scan: Use your nose spray (nasal decongestant spray) as directed.  What should I wear: Please wear loose clothing, such as a sweat suit or jogging clothes. Avoid snaps,  "zippers and other metal. We may ask you to undress and put on a hospital gown.  How long does the exam take: Most scans take less than 20 minutes.  What should I bring: Please bring any scans or X-rays taken at other hospitals, if similar tests were done. Also bring a list of your medicines, including vitamins, minerals and over-the-counter drugs. It is safest to leave personal items at home.  Do I need a : No  is needed.  What do I need to tell my doctor? Be sure to tell your doctor: * If you have any allergies. * If there s any chance you are pregnant. * If you are breastfeeding.  What should I do after the exam: No restrictions, You may resume normal activities.  What is this test: A CT (computed tomography) scan is a series of pictures that allows us to look inside your body. The scanner creates images of the body in cross sections, much like slices of bread. This helps us see any problems more clearly.  Who should I call with questions: If you have any questions, please call the Imaging Department where you will have your exam. Directions, parking instructions, and other information is available on our website, [email or web address].              Pending Results     Date and Time Order Name Status Description    9/19/2018 1238 Urine Culture Aerobic Bacterial In process             Statement of Approval     Ordered          09/19/18 0823  I have reviewed and agree with all the recommendations and orders detailed in this document.  EFFECTIVE NOW     Approved and electronically signed by:  Ptaito Castillo APRN CNP             Admission Information     Date & Time Provider Department Dept. Phone    9/18/2018 Murray Stearns MD [address] [facility] [place] [phone]      Your Vitals Were     Blood Pressure Pulse Temperature Respirations Height Weight    125/76 59 98.3  F (36.8  C) (Oral) 16 1.6 m (5' 3\") 61.9 kg (136 lb 7.4 oz)    Pulse Oximetry BMI (Body Mass Index)                97% " 24.17 kg/m2          [employer] Information     [employer] gives you secure access to your electronic health record. If you see a primary care provider, you can also send messages to your care team and make appointments. If you have questions, please call your primary care clinic.  If you do not have a primary care provider, please call [phone] and they will assist you.        Care EveryWhere ID     This is your Care EveryWhere ID. This could be used by other organizations to access your [facility] medical records  [id number]        Equal Access to Services     ART GRAY : Hadii vinita ku hadasho Soomaali, waaxda luqadaha, qaybta kaalmada adeegyajewell, nabil ramsay. So [facility] [phone].    ATENCIÓN: Si habla español, tiene a concepcion disposición servicios gratuitos de asistencia lingüística. Llame al [phone].    We comply with applicable federal civil rights laws and [place] laws. We do not discriminate on the basis of race, color, national origin, age, disability, sex, sexual orientation, or gender identity.               Review of your medicines      CONTINUE these medicines which may have CHANGED, or have new prescriptions. If we are uncertain of the size of tablets/capsules you have at home, strength may be listed as something that might have changed.        Dose / Directions    oxyCODONE IR 5 MG tablet   Commonly known as:  ROXICODONE   This may have changed:    - reasons to take this  - Another medication with the same name was removed. Continue taking this medication, and follow the directions you see here.   Used for:  S/P deep brain stimulator placement        Dose:  5-10 mg   Take 1-2 tablets (5-10 mg) by mouth every 4 hours as needed   Quantity:  30 tablet   Refills:  0         CONTINUE these medicines which have NOT CHANGED        Dose / Directions    amLODIPine 5 MG tablet   Commonly known as:  NORVASC        Dose:  5 mg   Take 5 mg by mouth every morning   Refills:  0        atorvastatin 20 MG tablet   Commonly known as:  LIPITOR        Dose:  20 mg   Take 20 mg by mouth every morning   Refills:  0       carbidopa-levodopa  MG per tablet   Commonly known as:  SINEMET        Patient is taking 1.25 tablets every two hours up to 12 tablets per day.   Quantity:  360 tablet   Refills:  3       escitalopram 10 MG tablet   Commonly known as:  LEXAPRO        Dose:  10 mg   Take 10 mg by mouth every morning   Refills:  0       pantoprazole 20 MG EC tablet   Commonly known as:  PROTONIX        Dose:  20 mg   Take 20 mg by mouth   Refills:  0       senna-docusate 8.6-50 MG per tablet   Commonly known as:  SENOKOT-S;PERICOLACE   Used for:  S/P deep brain stimulator placement        Takes it as needed.   Refills:  0       THERAPEUTIC MULTIVITAMIN PO        Dose:  1 tablet   Take 1 tablet by mouth every morning   Refills:  0       VITAMIN D-1000 MAX ST 1000 units Tabs   Generic drug:  cholecalciferol        Dose:  2000 Units   Take 2,000 Units by mouth every morning   Refills:  0            Where to get your medicines      Some of these will need a paper prescription and others can be bought over the counter. Ask your nurse if you have questions.     Bring a paper prescription for each of these medications     oxyCODONE IR 5 MG tablet                Protect others around you: Learn how to safely use, store and throw away your medicines at www.disposemymeds.org.        Information about OPIOIDS     PRESCRIPTION OPIOIDS: WHAT YOU NEED TO KNOW   We gave you an opioid (narcotic) pain medicine. It is important to manage your pain, but opioids are not always the best choice. You should first try all the other options your care team gave you. Take this medicine for as short a time (and as few doses) as possible.    Some activities can increase your pain, such as bandage changes or therapy sessions. It may help to take your pain medicine 30 to 60 minutes before these activities. Reduce your stress by  getting enough sleep, working on hobbies you enjoy and practicing relaxation or meditation. Talk to your care team about ways to manage your pain beyond prescription opioids.    These medicines have risks:    DO NOT drive when on new or higher doses of pain medicine. These medicines can affect your alertness and reaction times, and you could be arrested for driving under the influence (DUI). If you need to use opioids long-term, talk to your care team about driving.    DO NOT operate heavy machinery    DO NOT do any other dangerous activities while taking these medicines.    DO NOT drink any alcohol while taking these medicines.     If the opioid prescribed includes acetaminophen, DO NOT take with any other medicines that contain acetaminophen. Read all labels carefully. Look for the word  acetaminophen  or  Tylenol.  Ask your pharmacist if you have questions or are unsure.    You can get addicted to pain medicines, especially if you have a history of addiction (chemical, alcohol or substance dependence). Talk to your care team about ways to reduce this risk.    All opioids tend to cause constipation. Drink plenty of water and eat foods that have a lot of fiber, such as fruits, vegetables, prune juice, apple juice and high-fiber cereal. Take a laxative (Miralax, milk of magnesia, Colace, Senna) if you don t move your bowels at least every other day. Other side effects include upset stomach, sleepiness, dizziness, throwing up, tolerance (needing more of the medicine to have the same effect), physical dependence and slowed breathing.    Store your pills in a secure place, locked if possible. We will not replace any lost or stolen medicine. If you don t finish your medicine, please throw away (dispose) as directed by your pharmacist. The [place] Pollution Control Agency has more information about safe disposal: https://www.pca.[email or web address]/living-green/managing-unwanted-medications             Medication List: This is  a list of all your medications and when to take them. Check marks below indicate your daily home schedule. Keep this list as a reference.      Medications           Morning Afternoon Evening Bedtime As Needed    amLODIPine 5 MG tablet   Commonly known as:  NORVASC   Take 5 mg by mouth every morning   Last time this was given:  5 mg on 9/19/2018  8:54 AM                                atorvastatin 20 MG tablet   Commonly known as:  LIPITOR   Take 20 mg by mouth every morning   Last time this was given:  20 mg on 9/19/2018  8:54 AM                                carbidopa-levodopa  MG per tablet   Commonly known as:  SINEMET   Patient is taking 1.25 tablets every two hours up to 12 tablets per day.   Last time this was given:  1 tablet on 9/19/2018 12:03 PM                                escitalopram 10 MG tablet   Commonly known as:  LEXAPRO   Take 10 mg by mouth every morning   Last time this was given:  10 mg on 9/19/2018  8:54 AM                                oxyCODONE IR 5 MG tablet   Commonly known as:  ROXICODONE   Take 1-2 tablets (5-10 mg) by mouth every 4 hours as needed   Last time this was given:  5 mg on 9/19/2018  1:55 AM                                pantoprazole 20 MG EC tablet   Commonly known as:  PROTONIX   Take 20 mg by mouth   Last time this was given:  20 mg on 9/19/2018  8:53 AM                                senna-docusate 8.6-50 MG per tablet   Commonly known as:  SENOKOT-S;PERICOLACE   Takes it as needed.   Last time this was given:  1 tablet on 9/19/2018  8:54 AM                                THERAPEUTIC MULTIVITAMIN PO   Take 1 tablet by mouth every morning                                VITAMIN D-1000 MAX ST 1000 units Tabs   Take 2,000 Units by mouth every morning   Last time this was given:  2,000 Units on 9/19/2018  8:54 AM   Generic drug:  cholecalciferol

## 2018-09-18 NOTE — ANESTHESIA PREPROCEDURE EVALUATION
Anesthesia Evaluation     .             ROS/MED HX    ENT/Pulmonary:       Neurologic:     (+)Parkinson's disease     Cardiovascular:     (+) hypertension----. : . . . :. .       METS/Exercise Tolerance:     Hematologic:         Musculoskeletal:         GI/Hepatic:     (+) GERD       Renal/Genitourinary:         Endo:         Psychiatric:         Infectious Disease:         Malignancy:         Other:                                    Anesthesia Plan      History & Physical Review  History and physical reviewed and following examination; no interval change.    ASA Status:  3 .    NPO Status:  > 8 hours    Plan for MAC with Intravenous induction. Maintenance will be TIVA.  Reason for MAC:  Deep or markedly invasive procedure (G8)  PONV prophylaxis:  Ondansetron (or other 5HT-3)  Additional equipment: 2nd IV      Postoperative Care  Postoperative pain management:  IV analgesics.      Consents  Anesthetic plan, risks, benefits and alternatives discussed with:  Patient..          Anesthesiology Attending Note    HPI: Gabi Manley is a 78 year old female who  has a past medical history of Depression; Dyslipidemia; GERD (gastroesophageal reflux disease); Hypertension; and Parkinson's disease (H).  has a past surgical history that includes Optical Tracking System Insertion Deep Brain Stimulation (Left, 8/7/2018) and Implant Deep Brain Stimulation Generator / Battery (Left, 8/21/2018). with a Parkinson's Disease  scheduled for Procedure(s):  Stealth Assisted Right Deep Brain Stimulator Placement, Phase I And II Combined, Placement Right Deep Brain Stimulator Electrode, Target Left Globus Pallidus Internus, With Microelectrode Recording And Connection Tto Existing Generator/Battery - Wound Class: I-Clean.      PMHx/PSHx/ROS:  Past Medical History:   Diagnosis Date     Depression      Dyslipidemia      GERD (gastroesophageal reflux disease)      Hypertension      Parkinson's disease (H)        Past Surgical History:    Procedure Laterality Date     IMPLANT DEEP BRAIN STIMULATION GENERATOR / BATTERY Left 8/21/2018    Procedure: IMPLANT DEEP BRAIN STIMULATION GENERATOR / BATTERY;  Deep Brain Stimulator Placement, Phase II, Placement Of Deep Brain Stimulator Generator/Battery Over The Left Chest Wall;  Surgeon: Murray Stearns MD;  Location: [facility] OR     OPTICAL TRACKING SYSTEM INSERTION DEEP BRAIN STIMULATION Left 8/7/2018    Procedure: OPTICAL TRACKING SYSTEM INSERTION DEEP BRAIN STIMULATION;  Stealth Assisted Left Deep Brain Stimulator Placement, Phase I, Placement Left Side Deep Brain Stimulator Electrode, Target Left Globus Pallidus Internus, With Microelectrode Recording;  Surgeon: Murray Stearns MD;  Location: [facility] OR       Soc Hx:   Social History   Substance Use Topics     Smoking status: Never Smoker     Smokeless tobacco: Never Used     Alcohol use No         Allergies:   Allergies   Allergen Reactions     No Clinical Screening - See Comments Shortness Of Breath     Erythromycin      Other reaction(s): Unknown     Ioxaglate Other (See Comments)     Angioedema     Paroxetine      Other reaction(s): Unknown     Penicillins      Facial swelling     Trazodone      Other reaction(s): Unknown     Vancomycin Hives       Meds:   Prescriptions Prior to Admission   Medication Sig Dispense Refill Last Dose     amLODIPine (NORVASC) 5 MG tablet Take 5 mg by mouth every morning    9/18/2018 at 0500     atorvastatin (LIPITOR) 20 MG tablet Take 20 mg by mouth every morning    9/18/2018 at 0500     carbidopa-levodopa (SINEMET)  MG per tablet Patient is taking 1.25 tablets every two hours up to 12 tablets per day. 360 tablet 3 9/18/2018 at 0500     cholecalciferol (VITAMIN D-1000 MAX ST) 1000 UNITS TABS Take 2,000 Units by mouth every morning    Past Month at Unknown time     escitalopram (LEXAPRO) 10 MG tablet Take 10 mg by mouth every morning    9/18/2018 at 0500     Multiple Vitamin (THERAPEUTIC MULTIVITAMIN PO) Take  1 tablet by mouth every morning    Past Week at Unknown time     oxyCODONE IR (ROXICODONE) 5 MG tablet Take 1 tablet (5 mg) by mouth every 6 hours as needed for severe pain 24 tablet 0 Past Month at Unknown time     oxyCODONE IR (ROXICODONE) 5 MG tablet Take 1-2 tablets (5-10 mg) by mouth every 4 hours as needed for other (pain control or improvement in physical function. Hold dose for analgesic side effects.) 30 tablet 0 Past Month at Unknown time     pantoprazole (PROTONIX) 20 MG EC tablet Take 20 mg by mouth   9/18/2018 at 0500     senna-docusate (SENOKOT-S;PERICOLACE) 8.6-50 MG per tablet Takes it as needed.  0 Past Week at Unknown time       No current outpatient prescriptions on file.         Labs:  BMP:  Recent Labs   Lab Test  08/08/18   0330   NA  139   POTASSIUM  3.3*   CHLORIDE  106   CO2  26   BUN  13   CR  0.63   GLC  108*   HELGA  8.6     CBC:   Recent Labs   Lab Test  09/18/18   0721   WBC  4.8   RBC  4.40   HGB  13.5   HCT  42.3   MCV  96   MCH  30.7   MCHC  31.9   RDW  14.0   PLT  127*     Coags:  Recent Labs   Lab Test  07/23/18   1026   INR  1.03       Vital Signs:  T 97.9  P 59  /113  R 12  SpO2 98%    Anesthetic Assessment and Plan:    78 year old female who  has a past medical history of Depression; Dyslipidemia; GERD (gastroesophageal reflux disease); Hypertension; and Parkinson's disease (H). to OR for Procedure(s):  Stealth Assisted Right Deep Brain Stimulator Placement, Phase I And II Combined, Placement Right Deep Brain Stimulator Electrode, Target Left Globus Pallidus Internus, With Microelectrode Recording And Connection Tto Existing Generator/Battery - Wound Class: I-Clean    NPO status adequate.    ASA 3    Plan for MAC versus GA, standard monitors, large bore IVs, PONV prophylaxis. Antibiotics per primary service.    Prior to anesthetic I have examined the patient, reviewed the medical history and pertinent results, and discussed the ssessment and plan with the anesthesia and  surgery teams.  Anesthetic risks discussed with the patient, consent in chart.      Mili Vides MD  Anesthesiology Attending  09/18/18                   .

## 2018-09-18 NOTE — OR NURSING
Writer spoke with Dr. Vides (anesthesia) and she states patient may transfer out of PACU, she will enter anesthesia sign out.

## 2018-09-19 NOTE — PROVIDER NOTIFICATION
Notified Resident at 2100 PM regarding Sinemet.      Spoke with: Neurosurgery resident Dr. Dickinson    Orders were obtained.    Comments: Notified provider that pt only takes Sinemet in her waking hours- 0600 to 2000. RN to hold sinemet over night.

## 2018-09-19 NOTE — DISCHARGE SUMMARY
[facility] Discharge Summary and Instructions    Gabi Manley MRN# [id number]   Age: 78 year old YOB: 1940     Date of Admission:  9/18/2018  Date of Discharge::  9/19/2018  Admitting Physician:  Murray Stearns MD  Discharge Physician:  Murray Stearns MD          Admission Diagnoses:   Parkinson's disease (H) [G20]          Discharge Diagnosis:   Parkinson's disease (H) [G20]          Procedures:   9/18/2018  Stealth Assisted Right Deep Brain Stimulator Placement, Phase I And II Combined, Placement Right Deep Brain Stimulator Electrode, Target Left Globus Pallidus Internus, With Microelectrode Recording And Connection to Existing Generator/Battery           Brief History of Illness:   78 F with Parkinson s disease s/p stage I and II DBS on the left side in August 2018, presenting for right sided stage I and II.           Hospital Course:   Patient underwent above-mentioned procedure on 9/18. The operation was uncomplicated and she was admitted to the surgical ICU for routine post operative cares. Head CT and skull x-rays revealed good placement of lead with no acute findings. On post operative day 1 she was doing well, ambulating, voiding without a thomason, eating a regular diet, pain was well controlled and therefore she was discharged home. She was started on bactrim for UTI.          Discharge Medications:     Current Discharge Medication List      START taking these medications    Details   sulfamethoxazole-trimethoprim (BACTRIM DS/SEPTRA DS) 800-160 MG per tablet Take 1 tablet by mouth 2 times daily  Qty: 6 tablet, Refills: 0    Associated Diagnoses: Infective urethritis         CONTINUE these medications which have CHANGED    Details   oxyCODONE IR (ROXICODONE) 5 MG tablet Take 1-2 tablets (5-10 mg) by mouth every 4 hours as needed  Qty: 30 tablet, Refills: 0    Associated Diagnoses: S/P deep brain stimulator placement         CONTINUE these medications which have NOT  CHANGED    Details   amLODIPine (NORVASC) 5 MG tablet Take 5 mg by mouth every morning       atorvastatin (LIPITOR) 20 MG tablet Take 20 mg by mouth every morning       carbidopa-levodopa (SINEMET)  MG per tablet Patient is taking 1.25 tablets every two hours up to 12 tablets per day.  Qty: 360 tablet, Refills: 3      cholecalciferol (VITAMIN D-1000 MAX ST) 1000 UNITS TABS Take 2,000 Units by mouth every morning       escitalopram (LEXAPRO) 10 MG tablet Take 10 mg by mouth every morning       Multiple Vitamin (THERAPEUTIC MULTIVITAMIN PO) Take 1 tablet by mouth every morning       pantoprazole (PROTONIX) 20 MG EC tablet Take 20 mg by mouth      senna-docusate (SENOKOT-S;PERICOLACE) 8.6-50 MG per tablet Takes it as needed.  Refills: 0    Associated Diagnoses: S/P deep brain stimulator placement                     Discharge Instructions and Follow-Up:   Discharge diet: Regular   Discharge activity: You may advance activity as tolerated. No strenuous exercise or heay lifting greater than 10 lbs for 4 weeks or until seen and cleared in clinic.   Discharge follow-up: Follow-up with Makenzie Lerma CNP in 2 weeks   Wound care: Ok to shower,however no scrubbing of the wound and no soaking of the wound, meaning no bathtubs or swimming pools. Pat dry only. Leave wound open to air.  Sutures are absorbable.     Please call if you have:  1. increased pain, redness, drainage, swelling at your incision  2. fevers > 101.5 F degrees  3. with any questions or concerns.  You may reach the Neurosurgery clinic at [phone] during regular work hours. ER at [phone].    and ask for the Neurosurgery Resident on call at [phone], during off hours or weekends.         Discharge Disposition:   Discharged to home        STEFANO Cunningham, CNP  Department of Neurosurgery  Pager: [phone]

## 2018-09-19 NOTE — PLAN OF CARE
Problem: Surgery Nonspecified (Adult)  Goal: Signs and Symptoms of Listed Potential Problems Will be Absent, Minimized or Managed (Surgery Nonspecified)  Signs and symptoms of listed potential problems will be absent, minimized or managed by discharge/transition of care (reference Surgery Nonspecified (Adult) CPG).    09/19/18 0518   Surgery Nonspecified   Problems Assessed (Surgery) all   Problems Present (Surgery) postoperative nausea and vomiting       Problem: Patient Care Overview  Goal: Plan of Care/Patient Progress Review  Outcome: No Change  Remains hemodynamically stable. Given hydralazine x1 for SBP >140; BP responded well. Did c/o indigestion and had emesis x1. Zofran given and pt requested Tums. Notified Neuro/Surg and awaiting orders. Pt unable to empty bladder and had bladder scan of 300 at [facility]. Straight cath for 325. Pain controlled with scheduled APAP and PRN oxycodone. Q1hour neuro checks remain stable/intact. Continuing to monitor.

## 2018-09-19 NOTE — PROGRESS NOTES
Admitted/transferred from: PACU  Reason for admission/transfer: post op cares  Patient status upon admission/transfer:   Interventions: frequent neuro checks, vital signs, medication management  Plan:   2 RN skin assessment: completed by Roberto Cook RN and Nan Velarde RN  Result of skin assessment and interventions/actions: no pressure ulcers, slight bruising, skin tag on back, post-surgical incisions  Height, weight, drug calc weight: not done, will inform next shift to complete  Patient belongings: arrived from PACU at 2145  MDRO education (if applicable): n/a

## 2018-09-19 NOTE — PLAN OF CARE
Problem: Patient Care Overview  Goal: Plan of Care/Patient Progress Review  Outcome: Improving  Writer took care of patient from [id number].  Neuro intact. Pain controlled with tylenol and oxycodone. Sinus bradycardia, SBP below goal of <140. Satting mid 90s on room air. IPI 9-10, ETCO2 40s. Tolerating sips of water with meds. Due to void, pt will get up to commode at 2300.   PIV. NS 75mL/hr.

## 2018-09-19 NOTE — PROGRESS NOTES
Neurosurgery Progress Note    S: feeling ok    O:  Exam:  General: Awake;  Alert, In No Acute Distress  Pulm: Breathing Comfortably on nasal cannula  Mental status: Oriented x 3  Cranial Nerves: Cranial Nerves II-XII Intact Bilaterally  Strength:      Del Tr Bi WE WF Gr  R 5 5 5 5 5 5  L 5 5 5 5 5 5     HF KE KF DF PF EHL  R 5 5 5 5 5 5  L 5 5 5 5 5 5    Pronator Drift: Absent  Sensory: Intact to Light Touch  Reflexes: No Hyperreflexia, Blanchard s or Clonus Present; Toes Down-Going Bilaterally  INCISION: clean, dry    Assessment:   #Parkinson s disease s/p right sided stage I and II    Plan by problem:     Neuro:   Post-operative pain: tylenol and oxycodone PRN  Neurological exam frequency: Q1 then Q2 hrs post-op  Sutures out: absorbable  Required imaging: pending head CT and shunt series    Cardiovascular: blood pressure goals: SBP < 140     Pulmonary: Incentive spirometry     Gastrointestinal: advance diet to regular    Renal: discontinue thomason; monitoring I/O s     Heme: no issues    Endocrine: no issues    Infectious no issues; perioperative prophylactic Ancef    PT/OT: pending    DVT prophylaxis: Sequential compression devices     Ulcer prophylaxis: none indicated    DISPO:  Anticipate D/C Home 9/19    Barriers: none      -----------------------------------  Tip Simeon MD, MS  Neurosurgery PGY-2

## 2018-09-19 NOTE — PLAN OF CARE
Problem: Patient Care Overview  Goal: Plan of Care/Patient Progress Review  Outcome: Improving  AVSS on RA. A+OX4, neuros intact. Mild headache controlled with scheduled tylenol. Denies nausea. Regular diet with good appetite, good fluid intake. Incisions head c/d/i. Having post-void residuals at >150mL, MD notified. Straight cath for 200mL and UA/UC sent. Discussed results of bladder scans and UA/UC with NP JEFF Cunningham to discharge to home and follow up with primary on urinary retention. Up with SBA steady on feet.     Patient discharged to home from  with son at 1440. Wheelchair transport to front door with transport staff. AVS discussed in full with patient and son Sen with [employer]. All belongings sent with patient.

## 2018-09-21 NOTE — PROGRESS NOTES
[facility]: Post-Discharge Note  SITUATION                                                      Admission:           Discharge:        BACKGROUND                                                          ASSESSMENT                    PLAN                                                      Outpatient Plan:     Future Appointments  Date Time Provider Department Center   10/3/2018 3:00 PM Makenzie Lerma APRN CNP [facility]   10/19/2018 7:00 AM Bhavana Anton APRN CNP [facility]   10/19/2018 11:20 AM [address]           JOE ROBLES RN

## 2018-09-21 NOTE — PROGRESS NOTES
Left VM for patient to check on her post-op status. Will f/u if I do not hear back. Left my direct number as well as that of the neurosurgery resident on call, should she have issues or concerns over the weekend/after regular business hours.

## 2018-10-01 NOTE — IP AVS SNAPSHOT
"    [address] [facility]: [phone]                                              INTERAGENCY TRANSFER FORM - PHYSICIAN ORDERS   10/1/2018                    Hospital Admission Date: 10/1/2018  ALXE STERN   : 1940  Sex: Female        Attending Provider: (none)    Allergies:  No Clinical Screening - See Comments, Erythromycin, Ioxaglate, Paroxetine, Penicillins, Trazodone, Vancomycin    Infection:  None   Service:  INTERNAL MED    Ht:  1.6 m (5' 3\")   Wt:  61.9 kg (136 lb 7.4 oz)   Admission Wt:  --    BMI:  24.17 kg/m 2   BSA:  1.66 m 2            Patient PCP Information     Provider PCP Type    Tracey Abdi NP General      ED Clinical Impression     Diagnosis Description Comment Added By Time Added    Pyelonephritis [N12] Pyelonephritis  Cristal Chou MD 10/1/2018 10:18 PM      Hospital Problems as of 10/4/2018              Priority Class Noted POA    Pyelonephritis Medium  10/2/2018 Yes      Non-Hospital Problems as of 10/4/2018              Priority Class Noted    HLD (hyperlipidemia) Medium  2017    Allergic rhinitis Medium  2018    Carpal tunnel syndrome of left wrist Medium  2018    Other diseases of lung, not elsewhere classified Medium  2018    Esophageal reflux Medium  2018    Female stress incontinence Medium  2018    HTN (hypertension) Medium  2018    Parkinson's disease (H) Medium  2018    Vitamin D deficiency Medium  2018      Code Status History     Date Active Date Inactive Code Status Order ID Comments User Context    10/4/2018  3:58 PM  Full Code [id number]  Esdras Childs MD Outpatient    10/2/2018 12:51 AM 10/4/2018  3:58 PM Full Code [id number]  Misael Tillman Inpatient    2018  9:35 AM 10/2/2018 12:51 AM Full Code [id number]  Patito Castillo APRN CNP Outpatient    2018 12:25 AM 2018  9:35 AM Full Code [id number]  Tip Simeon MD Inpatient         Medication Review      START taking        Dose / " Directions Comments    ciprofloxacin 500 MG tablet   Commonly known as:  CIPRO   Indication:  Pyelonephritis   Used for:  Pyelonephritis        Dose:  500 mg   Take 1 tablet (500 mg) by mouth every 12 hours   Quantity:  9 tablet   Refills:  0          CONTINUE these medications which have NOT CHANGED        Dose / Directions Comments    amLODIPine 5 MG tablet   Commonly known as:  NORVASC        Dose:  5 mg   Take 5 mg by mouth every morning   Refills:  0        atorvastatin 20 MG tablet   Commonly known as:  LIPITOR        Dose:  20 mg   Take 20 mg by mouth every morning   Refills:  0        carbidopa-levodopa  MG per tablet   Commonly known as:  SINEMET        Patient is taking 1.25 tablets every two hours up to 12 tablets per day.   Quantity:  360 tablet   Refills:  3        escitalopram 10 MG tablet   Commonly known as:  LEXAPRO        Dose:  10 mg   Take 10 mg by mouth every morning   Refills:  0        oxyCODONE IR 5 MG tablet   Commonly known as:  ROXICODONE   Used for:  S/P deep brain stimulator placement        Dose:  5-10 mg   Take 1-2 tablets (5-10 mg) by mouth every 4 hours as needed   Quantity:  30 tablet   Refills:  0        pantoprazole 20 MG EC tablet   Commonly known as:  PROTONIX        Dose:  20 mg   Take 20 mg by mouth   Refills:  0        senna-docusate 8.6-50 MG per tablet   Commonly known as:  SENOKOT-S;PERICOLACE   Used for:  S/P deep brain stimulator placement        Takes it as needed.   Refills:  0        VITAMIN D-1000 MAX ST 1000 units Tabs   Generic drug:  cholecalciferol        Dose:  2000 Units   Take 2,000 Units by mouth every morning   Refills:  0          STOP taking     THERAPEUTIC MULTIVITAMIN PO                   Summary of Visit     Reason for your hospital stay       You were admitted due to pyelonephritis, which is a UTI that spreads to your kidney and then blood stream. We gave you a medication called ceftriaxone with improvement in your infection, and will discharge  you on a medication called ciprofloxacin.             After Care     Activity       Your activity upon discharge: activity as tolerated       Advance Diet as Tolerated       Follow this diet upon discharge: Orders Placed This Encounter      Combination Diet Regular Diet Adult             Referrals     Home Care OT Referral for Hospital Discharge       Occupational Therapy- Evaluate and Treat.       Home Care PT Referral for Hospital Discharge       Physical Therapy- Evaluate and Treat.       Home care nursing referral       [facility]  Phone  [phone]  Fax  [phone]    RN skilled nursing visit. RN to assess vital signs and weight, respiratory and cardiac status, pain level and activity tolerance, hydration, nutrition and bowel status and home safety.  RN to teach medication management.              MD face to face encounter       Documentation of Face to Face and Certification for Home Health Services    I certify that patient: Gabi Manley is under my care and that I, or a nurse practitioner or physician's assistant working with me, had a face-to-face encounter that meets the physician face-to-face encounter requirements with this patient on: October 4, 2018.    This encounter with the patient was in whole, or in part, for the following medical condition, which is the primary reason for home health care: pyelonephritis    I certify that, based on my findings, the following services are medically necessary home health services: Nursing, Occupational Therapy and Physical Therapy.    My clinical findings support the need for the above services because: Nurse is needed: To provide assessment and oversight required in the home to assure adherence to the medical plan due to: recent infection leading to hospitalization, medication review.., Occupational Therapy Services are needed to assess and treat cognitive ability and address ADL safety due to impairment in activity tolerance and endurance in ADLs. and  Physical Therapy Services are needed to assess and treat the following functional impairments: generalized weakness, home safety eval    Further, I certify that my clinical findings support that this patient is homebound (i.e. absences from home require considerable and taxing effort and are for medical reasons or [employer] services or infrequently or of short duration when for other reasons) because: Requires assistance of another person or specialized equipment to access medical services because patient: Range of motion limitations prevents ability to exit home safely...    Based on the above findings. I certify that this patient is confined to the home and needs intermittent skilled nursing care, physical therapy and/or speech therapy.  The patient is under my care, and I have initiated the establishment of the plan of care.  This patient will be followed by a physician who will periodically review the plan of care.  Physician/Provider to provide follow up care: Tracey Abdi    Attending hospital physician (the Medicare certified PEC provider): Luis Villatla MD  Physician Signature: See electronic signature associated with these discharge orders.  Date: 10/4/2018                  Your next 10 appointments already scheduled     Oct 19, 2018  7:00 AM CDT   (Arrive by 6:45 AM)   Return Movement Disorder with STEFANO Villalobos [facility] ([facility])    [address] [place] [id number] [phone]            Oct 19, 2018 11:20 AM CDT   CT HEAD W/O CONTRAST with UCCT2   [facility] ([facility])    [address] [place] [id number] [phone]           How do I prepare for my exam? (Food and drink instructions) No Food and Drink Restrictions.  How do I prepare for my exam? (Other instructions) You do not need to do anything special to prepare for this exam. For a sinus scan:  Use your nose spray (nasal decongestant spray) as directed.  What should I wear: Please wear loose clothing, such as a sweat suit or jogging clothes. Avoid snaps, zippers and other metal. We may ask you to undress and put on a hospital gown.  How long does the exam take: Most scans take less than 20 minutes.  What should I bring: Please bring any scans or X-rays taken at other hospitals, if similar tests were done. Also bring a list of your medicines, including vitamins, minerals and over-the-counter drugs. It is safest to leave personal items at home.  Do I need a : No  is needed.  What do I need to tell my doctor? Be sure to tell your doctor: * If you have any allergies. * If there s any chance you are pregnant. * If you are breastfeeding.  What should I do after the exam: No restrictions, You may resume normal activities.  What is this test: A CT (computed tomography) scan is a series of pictures that allows us to look inside your body. The scanner creates images of the body in cross sections, much like slices of bread. This helps us see any problems more clearly.  Who should I call with questions: If you have any questions, please call the Imaging Department where you will have your exam. Directions, parking instructions, and other information is available on our website, [email or web address].              Follow-Up Appointment Instructions     Future Labs/Procedures    Adult [facility] Follow-up and recommended labs and tests     Comments:    Follow up with primary care provider, Tracey Abdi, within 7 days for hospital follow- up.  No follow up labs or test are needed.      Neurology as previously scheduled for Parkinson's and DBS follow-up.    Appointments on [place] and/or [facility] (with [facility] or [facility] provider or service). Call [phone] if you haven't heard regarding these appointments within 7 days of discharge.      Follow-Up Appointment Instructions     Adult [facility] Follow-up and  recommended labs and tests       Follow up with primary care provider, Tracey Abdi, within 7 days for hospital follow- up.  No follow up labs or test are needed.      Neurology as previously scheduled for Parkinson's and DBS follow-up.    Appointments on [place] and/or [facility] (with [facility] or [facility] provider or service). Call [phone] if you haven't heard regarding these appointments within 7 days of discharge.             Statement of Approval     Ordered          10/04/18 5699  I have reviewed and agree with all the recommendations and orders detailed in this document.  EFFECTIVE NOW     Approved and electronically signed by:  Esdras Childs MD

## 2018-10-01 NOTE — IP AVS SNAPSHOT
[address] [address]    Phone:  [phone]                                       After Visit Summary   10/1/2018    Gabi Manley    MRN: [id number]           After Visit Summary Signature Page     I have received my discharge instructions, and my questions have been answered. I have discussed any challenges I see with this plan with the nurse or doctor.    ..........................................................................................................................................  Patient/Patient Representative Signature      ..........................................................................................................................................  Patient Representative Print Name and Relationship to Patient    ..................................................               ................................................  Date                                   Time    ..........................................................................................................................................  Reviewed by Signature/Title    ...................................................              ..............................................  Date                                               Time          22EPIC Rev 08/18

## 2018-10-01 NOTE — IP AVS SNAPSHOT
MRN:[id number]                      After Visit Summary   10/1/2018    Gabi Manley    MRN: [id number]           Thank you!     Thank you for choosing [facility] for your care. Our goal is always to provide you with excellent care. Hearing back from our patients is one way we can continue to improve our services. Please take a few minutes to complete the written survey that you may receive in the mail after you visit with us. Thank you!        Patient Information     Date Of Birth          1940        Designated Caregiver       Most Recent Value    Caregiver    Will someone help with your care after discharge? yes    Name of designated caregiver Osman    Phone number of caregiver     Caregiver address [place], [place]      About your hospital stay     You were admitted on:  October 2, 2018 You last received care in the:  [address] [facility]    You were discharged on:  October 4, 2018        Reason for your hospital stay       You were admitted due to pyelonephritis, which is a UTI that spreads to your kidney and then blood stream. We gave you a medication called ceftriaxone with improvement in your infection, and will discharge you on a medication called ciprofloxacin.                  Who to Call     For medical emergencies, please call [phone].  For non-urgent questions about your medical care, please call your primary care provider or clinic, [phone]          Attending Provider     Provider Specialty    Cristal Chou MD Emergency Medicine    Pernell Carnes DO Internal Medicine       Primary Care Provider Office Phone # Fax #    Tracey Abdi [phone]       When to contact your care team       Call your primary doctor if you have any of the following: temperature greater than 100.4F,  increased shortness of breath, or increased abdominal pain.                  After Care Instructions     Activity       Your activity upon discharge: activity as tolerated             Advance Diet as Tolerated       Follow this diet upon discharge: Orders Placed This Encounter      Combination Diet Regular Diet Adult                  Follow-up Appointments     Adult [facility] Follow-up and recommended labs and tests       Follow up with primary care provider, Tracey Abdi, within 7 days for hospital follow- up.  No follow up labs or test are needed.      Neurology as previously scheduled for Parkinson's and DBS follow-up.    Appointments on [place] and/or [facility] (with [facility] or [facility] provider or service). Call [phone] if you haven't heard regarding these appointments within 7 days of discharge.                  Your next 10 appointments already scheduled     Oct 19, 2018  7:00 AM CDT   (Arrive by 6:45 AM)   Return Movement Disorder with STEFANO Villalobos [facility] ([facility] [place])    [address]  3rd Floor  [place] [id number] [phone]            Oct 19, 2018 11:20 AM CDT   CT HEAD W/O CONTRAST with UCCT2   [facility] ([facility] [place])    [address]  1st Floor  [place] [id number] [phone]           How do I prepare for my exam? (Food and drink instructions) No Food and Drink Restrictions.  How do I prepare for my exam? (Other instructions) You do not need to do anything special to prepare for this exam. For a sinus scan: Use your nose spray (nasal decongestant spray) as directed.  What should I wear: Please wear loose clothing, such as a sweat suit or jogging clothes. Avoid snaps, zippers and other metal. We may ask you to undress and put on a hospital gown.  How long does the exam take: Most scans take less than 20 minutes.  What should I bring: Please bring any scans or X-rays taken at other hospitals, if similar tests were done. Also bring a list of your medicines, including vitamins, minerals and over-the-counter drugs. It is safest to leave personal  items at home.  Do I need a : No  is needed.  What do I need to tell my doctor? Be sure to tell your doctor: * If you have any allergies. * If there s any chance you are pregnant. * If you are breastfeeding.  What should I do after the exam: No restrictions, You may resume normal activities.  What is this test: A CT (computed tomography) scan is a series of pictures that allows us to look inside your body. The scanner creates images of the body in cross sections, much like slices of bread. This helps us see any problems more clearly.  Who should I call with questions: If you have any questions, please call the Imaging Department where you will have your exam. Directions, parking instructions, and other information is available on our website, [email or web address][email or web address].              Additional Services     Home Care OT Referral for Hospital Discharge       Occupational Therapy- Evaluate and Treat.            Home Care PT Referral for Hospital Discharge       Physical Therapy- Evaluate and Treat.            Home care nursing referral       [facility]  Phone  [phone]  Fax  [phone]    RN skilled nursing visit. RN to assess vital signs and weight, respiratory and cardiac status, pain level and activity tolerance, hydration, nutrition and bowel status and home safety.  RN to teach medication management.                  Pending Results     Date and Time Order Name Status Description    10/4/2018 0103 Blood culture Preliminary     10/3/2018 0514 Blood culture Preliminary     10/3/2018 0514 Blood culture Preliminary     10/2/2018 1515 Blood culture Preliminary     10/1/2018 2054 Blood culture Preliminary             Statement of Approval     Ordered          10/04/18 1559  I have reviewed and agree with all the recommendations and orders detailed in this document.  EFFECTIVE NOW     Approved and electronically signed by:  Esdras Childs MD             Admission Information     Date & Time  "Provider Department Dept. Phone    10/1/2018 Pernell Carnes DO [address] [facility] [place] [phone]      Your Vitals Were     Blood Pressure Temperature Respirations Height Pulse Oximetry BMI (Body Mass Index)    155/88 (BP Location: Left arm) 96.5  F (35.8  C) (Oral) 16 1.6 m (5' 3\") 99% 24.17 kg/m2      [facility] Information     [facility] gives you secure access to your electronic health record. If you see a primary care provider, you can also send messages to your care team and make appointments. If you have questions, please call your primary care clinic.  If you do not have a primary care provider, please call [phone] and they will assist you.        Care EveryWhere ID     This is your Care EveryWhere ID. This could be used by other organizations to access your [facility] medical records  [id number]        Equal Access to Services     ART GRAY AH: Alberto Borjas, katrin rivera, theresa rojas, nabil edwards . So [facility] [phone].    ATENCIÓN: Si habla español, tiene a concepcion disposición servicios gratuitos de asistencia lingüística. Llgerald al [phone].    We comply with applicable federal civil rights laws and [place] laws. We do not discriminate on the basis of race, color, national origin, age, disability, sex, sexual orientation, or gender identity.               Review of your medicines      START taking        Dose / Directions    ciprofloxacin 500 MG tablet   Commonly known as:  CIPRO   Indication:  Pyelonephritis        Dose:  500 mg   Take 1 tablet (500 mg) by mouth every 12 hours   Quantity:  9 tablet   Refills:  0         CONTINUE these medicines which have NOT CHANGED        Dose / Directions    amLODIPine 5 MG tablet   Commonly known as:  NORVASC        Dose:  5 mg   Take 5 mg by mouth every morning   Refills:  0       atorvastatin 20 MG tablet   Commonly known as:  LIPITOR        Dose:  20 mg   Take 20 mg by mouth every morning "   Refills:  0       carbidopa-levodopa  MG per tablet   Commonly known as:  SINEMET        Patient is taking 1.25 tablets every two hours up to 12 tablets per day.   Quantity:  360 tablet   Refills:  3       escitalopram 10 MG tablet   Commonly known as:  LEXAPRO        Dose:  10 mg   Take 10 mg by mouth every morning   Refills:  0       oxyCODONE IR 5 MG tablet   Commonly known as:  ROXICODONE   Used for:  S/P deep brain stimulator placement        Dose:  5-10 mg   Take 1-2 tablets (5-10 mg) by mouth every 4 hours as needed   Quantity:  30 tablet   Refills:  0       pantoprazole 20 MG EC tablet   Commonly known as:  PROTONIX        Dose:  20 mg   Take 20 mg by mouth   Refills:  0       senna-docusate 8.6-50 MG per tablet   Commonly known as:  SENOKOT-S;PERICOLACE   Used for:  S/P deep brain stimulator placement        Takes it as needed.   Refills:  0       VITAMIN D-1000 MAX ST 1000 units Tabs   Generic drug:  cholecalciferol        Dose:  2000 Units   Take 2,000 Units by mouth every morning   Refills:  0         STOP taking     THERAPEUTIC MULTIVITAMIN PO                Where to get your medicines      These medications were sent to [facility] [place], [place] - [address]  [address] [address]     Phone:  [phone]     ciprofloxacin 500 MG tablet                Protect others around you: Learn how to safely use, store and throw away your medicines at www.disposemymeds.org.        ANTIBIOTIC INSTRUCTION     You've Been Prescribed an Antibiotic - Now What?  Your healthcare team thinks that you or your loved one might have an infection. Some infections can be treated with antibiotics, which are powerful, life-saving drugs. Like all medications, antibiotics have side effects and should only be used when necessary. There are some important things you should know about your antibiotic treatment.      Your healthcare team may run tests before you start taking an  antibiotic.    Your team may take samples (e.g., from your blood, urine or other areas) to run tests to look for bacteria. These test can be important to determine if you need an antibiotic at all and, if you do, which antibiotic will work best.      Within a few days, your healthcare team might change or even stop your antibiotic.    Your team may start you on an antibiotic while they are working to find out what is making you sick.    Your team might change your antibiotic because test results show that a different antibiotic would be better to treat your infection.    In some cases, once your team has more information, they learn that you do not need an antibiotic at all. They may find out that you don't have an infection, or that the antibiotic you're taking won't work against your infection. For example, an infection caused by a virus can't be treated with antibiotics. Staying on an antibiotic when you don't need it is more likely to be harmful than helpful.      You may experience side effects from your antibiotic.    Like all medications, antibiotics have side effects. Some of these can be serious.    Let you healthcare team know if you have any known allergies when you are admitted to the hospital.    One significant side effect of nearly all antibiotics is the risk of severe and sometimes deadly diarrhea caused by Clostridium difficile (C. Difficile). This occurs when a person takes antibiotics because some good germs are destroyed. Antibiotic use allows C. diificile to take over, putting patients at high risk for this serious infection.    As a patient or caregiver, it is important to understand your or your loved one's antibiotic treatment. It is especially important for caregivers to speak up when patients can't speak for themselves. Here are some important questions to ask your healthcare team.    What infection is this antibiotic treating and how do you know I have that infection?    What side effects  might occur from this antibiotic?    How long will I need to take this antibiotic?    Is it safe to take this antibiotic with other medications or supplements (e.g., vitamins) that I am taking?     Are there any special directions I need to know about taking this antibiotic? For example, should I take it with food?    How will I be monitored to know whether my infection is responding to the antibiotic?    What tests may help to make sure the right antibiotic is prescribed for me?      Information provided by:  www.cdc.gov/getsmart  U.S. Department of Health and Human Services  Centers for disease Control and Prevention  National Center for Emerging and Zoonotic Infectious Diseases  Division of Healthcare Quality Promotion             Medication List: This is a list of all your medications and when to take them. Check marks below indicate your daily home schedule. Keep this list as a reference.      Medications           Morning Afternoon Evening Bedtime As Needed    amLODIPine 5 MG tablet   Commonly known as:  NORVASC   Take 5 mg by mouth every morning   Last time this was given:  5 mg on 10/4/2018  8:33 AM                                atorvastatin 20 MG tablet   Commonly known as:  LIPITOR   Take 20 mg by mouth every morning   Last time this was given:  20 mg on 10/4/2018  8:33 AM                                carbidopa-levodopa  MG per tablet   Commonly known as:  SINEMET   Patient is taking 1.25 tablets every two hours up to 12 tablets per day.   Last time this was given:  10/2/2018  6:07 AM                                ciprofloxacin 500 MG tablet   Commonly known as:  CIPRO   Take 1 tablet (500 mg) by mouth every 12 hours   Last time this was given:  500 mg on 10/4/2018  8:33 AM                                escitalopram 10 MG tablet   Commonly known as:  LEXAPRO   Take 10 mg by mouth every morning   Last time this was given:  10 mg on 10/4/2018  8:33 AM                                oxyCODONE IR 5  MG tablet   Commonly known as:  ROXICODONE   Take 1-2 tablets (5-10 mg) by mouth every 4 hours as needed                                pantoprazole 20 MG EC tablet   Commonly known as:  PROTONIX   Take 20 mg by mouth   Last time this was given:  20 mg on 10/4/2018  8:33 AM                                senna-docusate 8.6-50 MG per tablet   Commonly known as:  SENOKOT-S;PERICOLACE   Takes it as needed.                                VITAMIN D-1000 MAX ST 1000 units Tabs   Take 2,000 Units by mouth every morning   Generic drug:  cholecalciferol

## 2018-10-02 PROBLEM — N12 PYELONEPHRITIS: Status: ACTIVE | Noted: 2018-01-01

## 2018-10-02 NOTE — PLAN OF CARE
"Problem: Patient Care Overview  Goal: Plan of Care/Patient Progress Review  Outcome: No Change  /69  Temp 101.4  F (38.6  C) (Oral)  Resp 20  Ht 1.6 m (5' 3\")  SpO2 95%  BMI 24.17 kg/m2     Pt admitted from ED at 0030 for pyelonephritis work up. Pt A&O x4, VSS on RA except elevated temp. MD notified and no intervention at this time. Voiding spontaneously, incontinent. Straight cathed once for UA. No BM during shift. Tolerating reg diet. Denies nausea. Complaining of abdominal pain upon moving or urinating. Received PRN Tylenol at 640. Old incisions on top of head BARRY. PIV SL. Levaquin given at outside hospital, IV Rocephin ordered . Patient has been resting comfortably through night. Will continue to monitor and follow POC.  "

## 2018-10-02 NOTE — PLAN OF CARE
Problem: Patient Care Overview  Goal: Plan of Care/Patient Progress Review  AVSS. Triggered SIRS, LA 0.8. Pt c/o bladder spasm/discomfort, unresolved by urination, MDs paged. Pt denies burning with urination, has known UTI. Pt tolerating regular diet, no nausea. PIV to TKO for IV abx. Pt Aof1 to bathroom at start of shift, has since become weaker and is Aof2 to commode, hx Parkinsons. Surgical sites on R and L parietal lobes c/d/i. Continue to monitor pain.

## 2018-10-02 NOTE — ED PROVIDER NOTES
"  History     Chief Complaint   Patient presents with     Wound Infection     HPI  Gabi Manley is a 78 year old female with a history of Parkinson's disease s/p stage I and II deep brain stimulator placement on left in August and right on 09/18/18, as well as HTN, GERD, dyslipidemia, and depression who presents as a transfer from [facility] for further evaluation and management of bacteremia.     Per chart review, she was seen and evaluated in the Emergency Department at [facility] earlier today for abdominal pain at which time UA, CT of the abdomen/pelvis, and CT of the head were performed. UA showed evidence of UTI, along w/ findings on CT (see below). Recommendation was made for patient to undergo lumbar puncture to rule out infection of her recently placed DBS. Patient preferred transfer here to the [place] for consultation with her primary Neurosurgery Team prior to a lumbar puncture being performed and to see if this was warranted. Prior to transfer, the patient was treated with levofloxacin per Care Everywhere and Tylenol.     Patient complains over the past couple of days she has had diffuse abdominal pain as well as urinary incontinence. She reports she always feels clammy, but has not measured a temperature. She denies chest pain, shortness of breath, or vomiting. She does admit last night around 11:30 PM she had a \"slow\" fall during which she slid slowly out of bed and became \"jammed\" between her bed and the wall. She denies head injury from the fall. No pain, no other injuries, was not down for prolonged period of time. No pain. No other symptoms or complaints at this time. Nothing else noted by family. Please see ROS for further details.    CT Abdomen/Pelvis @ [facility] (10/01/18) Impression:  1.  Nonspecific inflammatory stranding in right upper quadrant inseparable from kidney, gallbladder, portions of liver, and hepatic flexure. Differential would include right-sided " pyelonephritis, less likely cholecystitis or focal colitis. Either oral   and IV enhanced CT scan or ultrasound right upper quadrant recommended for further evaluation.    CT Head @ [facility] (10/01/18) Impression:  1.  Bifrontal approach DBS leads with tip in the expected location of the subthalamic nuclei bilaterally. Small amount of white matter hypoattenuation adjacent to these lesions likely suggest a combination of edema and evolving gliosis given the   relatively recent placement of these leads. No superimposed acute intracranial abnormality with and no intracranial hemorrhage and no extra-axial fluid collection. No CT findings to suggest an acute infarct.    I have reviewed the Medications, Allergies, Past Medical and Surgical History, and Social History in the [employer] system.  Past Medical History:   Diagnosis Date     Depression      Dyslipidemia      GERD (gastroesophageal reflux disease)      Hypertension      Parkinson's disease (H)        Past Surgical History:   Procedure Laterality Date     IMPLANT DEEP BRAIN STIMULATION GENERATOR / BATTERY Left 8/21/2018    Procedure: IMPLANT DEEP BRAIN STIMULATION GENERATOR / BATTERY;  Deep Brain Stimulator Placement, Phase II, Placement Of Deep Brain Stimulator Generator/Battery Over The Left Chest Wall;  Surgeon: Murray Stearns MD;  Location: [facility] OR     OPTICAL TRACKING SYSTEM INSERTION DEEP BRAIN STIMULATION Left 8/7/2018    Procedure: OPTICAL TRACKING SYSTEM INSERTION DEEP BRAIN STIMULATION;  Stealth Assisted Left Deep Brain Stimulator Placement, Phase I, Placement Left Side Deep Brain Stimulator Electrode, Target Left Globus Pallidus Internus, With Microelectrode Recording;  Surgeon: Murray Stearns MD;  Location: [facility] OR     OPTICAL TRACKING SYSTEM INSERTION DEEP BRAIN STIMULATION Right 9/18/2018    Procedure: OPTICAL TRACKING SYSTEM INSERTION DEEP BRAIN STIMULATION;  Stealth Assisted Right Deep Brain Stimulator Placement, Phase I And II  Combined, Placement Right Deep Brain Stimulator Electrode, Target Left Globus Pallidus Internus, With Microelectrode Recording And Connection to Existing Generator/Battery;  Surgeon: Murray Stearns MD;  Location: [facility] OR       No family history on file.    Social History   Substance Use Topics     Smoking status: Never Smoker     Smokeless tobacco: Never Used     Alcohol use No       No current facility-administered medications for this encounter.      Current Outpatient Prescriptions   Medication     amLODIPine (NORVASC) 5 MG tablet     atorvastatin (LIPITOR) 20 MG tablet     carbidopa-levodopa (SINEMET)  MG per tablet     cholecalciferol (VITAMIN D-1000 MAX ST) 1000 UNITS TABS     escitalopram (LEXAPRO) 10 MG tablet     Multiple Vitamin (THERAPEUTIC MULTIVITAMIN PO)     pantoprazole (PROTONIX) 20 MG EC tablet     senna-docusate (SENOKOT-S;PERICOLACE) 8.6-50 MG per tablet     oxyCODONE IR (ROXICODONE) 5 MG tablet     sulfamethoxazole-trimethoprim (BACTRIM DS/SEPTRA DS) 800-160 MG per tablet        Allergies   Allergen Reactions     No Clinical Screening - See Comments Shortness Of Breath     Erythromycin      Other reaction(s): Unknown     Ioxaglate Other (See Comments)     Angioedema     Paroxetine      Other reaction(s): Unknown     Penicillins      Facial swelling     Trazodone      Other reaction(s): Unknown     Vancomycin Hives       Review of Systems   Constitutional: Negative for chills, diaphoresis and fever.   HENT: Negative for ear pain, sore throat, tinnitus, trouble swallowing and voice change.    Eyes: Negative for pain and visual disturbance.   Respiratory: Negative for cough and shortness of breath.    Cardiovascular: Negative for chest pain, palpitations and leg swelling.   Gastrointestinal: Positive for abdominal pain. Negative for blood in stool, constipation, diarrhea, nausea and vomiting.   Endocrine: Negative for polydipsia and polyuria.   Genitourinary: Negative for dysuria,  "frequency, hematuria and urgency.        Positive for urinary incontinence   Musculoskeletal: Negative for back pain and neck pain.   Skin: Negative for color change and rash.   Allergic/Immunologic: Negative for immunocompromised state.   Neurological: Negative for dizziness, weakness, light-headedness, numbness and headaches.   Hematological: Negative for adenopathy. Does not bruise/bleed easily.   Psychiatric/Behavioral: Negative for dysphoric mood. The patient is not nervous/anxious.        Physical Exam   BP: (!) 121/111  Heart Rate: 88  Temp: 98.7  F (37.1  C)  Resp: 14  Height: 160 cm (5' 3\")  SpO2: 93 %      Physical Exam  CONSTITUTIONAL: Well-developed and well-nourished. Awake and alert. Non-toxic appearance. No acute distress.   HENT:   - Head: Normocephalic and atraumatic. Scars appear to be healing well.   - Ears: Hearing and external ear grossly normal.   - Nose: Nose normal. No rhinorrhea. No epistaxis.   - Mouth/Throat: MMM  EYES: Conjunctivae and lids are normal. No scleral icterus.   NECK: Normal range of motion and phonation normal. Neck supple.  No tracheal deviation, no stridor. No edema or erythema noted.  CARDIOVASCULAR: Normal rate, regular rhythm and no appreciable abnormal heart sounds.  PULMONARY/CHEST: Normal work of breathing. No accessory muscle usage or stridor. No respiratory distress.  No appreciable abnormal breath sounds.  ABDOMEN: Soft, non-distended. No tenderness to palpation currently. No rigidity, rebound or guarding.   MUSCULOSKELETAL: Extremities warm and seemingly well perfused. No edema or calf tenderness. No apparent injuries, pain or swelling.  NEUROLOGIC: Awake, alert. Not disoriented. Does have resting tremor. No seizure activity. Does seem to have minor troubles w/ short-term memory.   SKIN: Skin is warm and dry. No rash noted. No diaphoresis. No pallor. No apparent traumatic findings.   PSYCHIATRIC: Normal mood and affect. Speech and behavior normal. Thought " processes linear. Cognition and memory are normal.     ED Course     ED Course     Procedures       8:57 PM  The patient was seen and examined by Dr. Chou in Room 19.            Labs Ordered and Resulted from Time of ED Arrival Up to the Time of Departure from the ED - No data to display  f       Assessments & Plan (with Medical Decision Making)   IMPRESSION: 78 year old female w/ PMH notable for Parkinson's disease who recently underwent deep brain stimulator placement on 8/21/18 with optical tracking system insertion of the DBS on 9/18/18 with additional PMH notable for HTN, hyperlipidemia, GERD, depression, who was sent here from [facility] ED for admission and Neurosurgery consult after being found to have UTI/pyelonephritis as described further above in HPI/ROS.  Clinically, patient appears nontoxic, NAD.  Vitals grossly WNL here though did have a slight temperature at [facility] which improved with Tylenol.  Has improvement of her abdominal pain here and is looking nontoxic, no obvious meningeal findings and her surgical incisions look quite well.    As part of her referral here there was discussion about whether or not she would need an LP but with her obvious UTI on urine studies, CT scan showing inflammation around the kidney as well I think the likelihood of a completely independent infection to be causing abdominal pain as her presenting symptoms to be of lower likelihood.  Discussed this with Neurosurgery to see if they have height of index of suspicion where they would recommend LP otherwise I recommend we try to see further risk of the procedure if we have another clear cause of symptoms and the continue to monitor clinically which I discussed with the patient and her family.  They are in agreement.    PLAN: We will get some laboratory studies here and repeat cultures so we have them in our facility to be able to follow.  She is Guy received ABX but will continue to monitor, will contact  Neurosurgery for further recommendations.    RESULTS:  - Labs: Repeat labs ordered here acknowledge, not yet drawn, please see Care Everywhere for OSH laboratory studies.  - OSH Labs: Normal creatinine, , K3.4, CL 96, T bili 1.6, D bili 0.5, no elevation of AST or ALT, WBC was 14.4, Hgb 12.6, UA showed  WBCs, 5-10 RBCs, many bacteria, only 0-5 squamous cells, positive nitrates, large leuks, no ketones  - Urine: No sample yet provided  - Imaging: Written preliminary reports reviewed. See OSH CT A/P and Head    INTERVENTIONS:   - Neurosurgery consult, no acute recommendations  - Abx given at OSH     RE-EVALUATION:  - Pt continues to do well here in the ED, no acute issues or apparent concerning changes in vitals or clinical appearance.     DISCUSSIONS:  - w/ Neurosurgery: They have seen and evaluated the patient here in the ED.  They do not request any additional imaging or testing at this time.  They do not think that she needs emergent LP but can be admitted to Medicine service for pyelonephritis and they can follow as needed if consulted by the admitting team but otherwise no immediate recommendation from their standpoint.  - w/ IM: Reviewed case.  They will admit for further evaluation and management.  - w/ Patient: I have reviewed the available findings, plan with the patient. She expressed understanding and agreement with this plan. All questions answered to the best of our ability at this time.       DISPOSITION/PLANNING:  - IMPRESSION: UTI/pyelonephritis  - DISPOSITION: Admit to IM for further evaluation and management  --- Pending: laboratory studies here (please see OSH labs and imaging for results from earlier today)  - RECOMMENDATIONS: Conservative symptom management, strict return instructions      ______________________________________________________________________________    - I have reviewed the available nursing notes.    New Prescriptions    No medications on file       Final diagnoses:    None   I, Elza Lewis, am serving as a trained medical scribe to document services personally performed by Cristal Chou MD, based on the provider's statements to me.   I, Cristal Chou MD, was physically present and have reviewed and verified the accuracy of this note documented by Elza Lewis.      10/1/2018   [facility], [place], EMERGENCY DEPARTMENT     Cristal Chou MD  10/03/18 [id number]

## 2018-10-02 NOTE — ED NOTES
[facility], [place]   ED Nurse to Floor Handoff     Gabi Manley is a 78 year old female who speaks English and lives with family members,  in a home  They arrived in the ED by ambulance from emergency room ([facility]    ED Chief Complaint: Wound Infection    ED Dx;   Final diagnoses:   Pyelonephritis         Needed?: No    Allergies:   Allergies   Allergen Reactions     No Clinical Screening - See Comments Shortness Of Breath     Erythromycin      Other reaction(s): Unknown     Ioxaglate Other (See Comments)     Angioedema     Paroxetine      Other reaction(s): Unknown     Penicillins      Facial swelling     Trazodone      Other reaction(s): Unknown     Vancomycin Hives   .  Past Medical Hx:   Past Medical History:   Diagnosis Date     Depression      Dyslipidemia      GERD (gastroesophageal reflux disease)      Hypertension      Parkinson's disease (H)       Baseline Mental status: WDL and mild dementia- hx parkinsons  Current Mental Status changes: at basesline    Infection present or suspected this encounter: yes urinary and other Blood and cultures pending  Sepsis suspected: No  Isolation type: No active isolations     Activity level - Baseline/Home:  Stand with Assist  Activity Level - Current:   Stand with Assist    Bariatric equipment needed?: No    In the ED these meds were given: Medications - No data to display    Drips running?  No    Home pump  No    Current LDAs  Peripheral IV 10/01/18 Right Upper forearm (Active)   Number of days:0       Incision/Surgical Site 08/07/18 Right Head (Active)   Number of days:55       Incision/Surgical Site 09/18/18 Bilateral Forehead (Active)   Number of days:13       Incision/Surgical Site 09/18/18 Right;Upper Head (Active)   Number of days:13       Incision/Surgical Site 09/18/18 Left Head (Active)   Incision Assessment WDL 10/1/2018  7:47 PM   Dressing Intervention Open to air / No Dressing 10/1/2018  7:47 PM   Number of  "days:13       Labs results: Labs Ordered and Resulted from Time of ED Arrival Up to the Time of Departure from the ED - No data to display    Imaging Studies: No results found for this or any previous visit (from the past 24 hour(s)).    Recent vital signs:   BP (!) 140/93  Temp 99.2  F (37.3  C) (Oral)  Resp 20  Ht 1.6 m (5' 3\")  SpO2 97%  BMI 24.17 kg/m2    Cardiac Rhythm: Normal Sinus  Pt needs tele? TBD by admitting MD  Skin/wound Issues: Recent brain surgery, incision BARRY no drainage    Code Status: Full Code    Pain control: pt had none    Nausea control: pt had none    Abnormal labs/tests/findings requiring intervention:     Family present during ED course? Yes   Family Comments/Social Situation comments: Son at bedside    Tasks needing completion: JESUS/STEFANY Matias, RN    [phone] [facility] ED      "

## 2018-10-02 NOTE — ED NOTES
Bed: ED19  Expected date: 10/1/18  Expected time: 7:25 PM  Means of arrival:   Comments:  Gabi Manley  [birth year] from [facility] needs LP for Possible infection of brain stimulator.  Seen in ED with UTI and given rocephein, concern for bacteremia with possible seeding of recent DBS.  Dr. Hernandes of neurosurgery aware.  Patient has not had LP yet.  Will require admission.

## 2018-10-02 NOTE — H&P
[facility], [place]    Internal Medicine History and Physical - [facility] Service       Date of Admission:  10/1/2018      Chief Complaint   Abdominal pain and dysuria    History is obtained from the patient    History of Present Illness   Gabi Manley is a 78 year old female  who has a history of depression, HLD, HTN, GERD, PE and Parkinson's disease s/p  BL DBS placement who presents as a transfer for UTI / pyelonephritis.    Patient admitted 9/18-9/19 for right-sided deep brain stimulator placement.  During that hospitalization she was started on Bactrim for E. coli UTI (of note sensitivities from 9/19/2018 show TMP-SMX resistance).  Discharged in stable condition to assisted living facility.  Presented to [facility] emergency department 10/1/2018 following several days of urinary incontinence, stool incontinence, and abdominal pain.  Patient later endorsed marked dysuric symptoms. Was noted to be febrile to 102.4 with mild diffuse tenderness of the abdomen. White count was elevated at 14.4 and CRP elevated at 18.8 with a normal lactate.  UA showed  whites with a positive leukocyte esterase / nitrites and many bacteria.  CT abdomen and pelvis showed nonspecific inflammatory stranding in the right upper quadrant with concern for right-sided pyelonephritis.  Patient did have a slightly worse headache and (given recent surgery) there was concern for possible DBS seeding. Case was discussed with the [facility] neurosurgery team who recommended transfer for better evaluation.  Given 500 mg Levaquin at 1800 prior to transfer. In the [facility] emergency department, patient afebrile and hemodynamically stable.  Redemonstrated leukocytosis on laboratory evaluation.  Neurosurgery evaluated patient in the ED and deferred LP, additional imaging and testing.  Admitted for pyelonephritis.    Review of Systems   The 10 point Review of Systems is negative other than noted in  the [facility] or here.     Past Medical History    I have reviewed this patient's medical history and updated it with pertinent information if needed.   Past Medical History:   Diagnosis Date     Depression      Dyslipidemia      GERD (gastroesophageal reflux disease)      Hypertension      Parkinson's disease (H)         Past Surgical History   I have reviewed this patient's surgical history and updated it with pertinent information if needed.  Past Surgical History:   Procedure Laterality Date     IMPLANT DEEP BRAIN STIMULATION GENERATOR / BATTERY Left 8/21/2018    Procedure: IMPLANT DEEP BRAIN STIMULATION GENERATOR / BATTERY;  Deep Brain Stimulator Placement, Phase II, Placement Of Deep Brain Stimulator Generator/Battery Over The Left Chest Wall;  Surgeon: Murray Stearns MD;  Location: [facility] OR     OPTICAL TRACKING SYSTEM INSERTION DEEP BRAIN STIMULATION Left 8/7/2018    Procedure: OPTICAL TRACKING SYSTEM INSERTION DEEP BRAIN STIMULATION;  Stealth Assisted Left Deep Brain Stimulator Placement, Phase I, Placement Left Side Deep Brain Stimulator Electrode, Target Left Globus Pallidus Internus, With Microelectrode Recording;  Surgeon: Murray Stearns MD;  Location: [facility] OR     OPTICAL TRACKING SYSTEM INSERTION DEEP BRAIN STIMULATION Right 9/18/2018    Procedure: OPTICAL TRACKING SYSTEM INSERTION DEEP BRAIN STIMULATION;  Stealth Assisted Right Deep Brain Stimulator Placement, Phase I And II Combined, Placement Right Deep Brain Stimulator Electrode, Target Left Globus Pallidus Internus, With Microelectrode Recording And Connection to Existing Generator/Battery;  Surgeon: Murray Stearns MD;  Location:  OR        Social History   Social History   Substance Use Topics     Smoking status: Never Smoker     Smokeless tobacco: Never Used     Alcohol use No       Family History   Son with multiple myeloma.    Prior to Admission Medications   Prior to Admission Medications   Prescriptions Last Dose Informant  Patient Reported? Taking?   Multiple Vitamin (THERAPEUTIC MULTIVITAMIN PO)   Yes Yes   Sig: Take 1 tablet by mouth every morning    amLODIPine (NORVASC) 5 MG tablet   Yes Yes   Sig: Take 5 mg by mouth every morning    atorvastatin (LIPITOR) 20 MG tablet   Yes Yes   Sig: Take 20 mg by mouth every morning    carbidopa-levodopa (SINEMET)  MG per tablet   Yes Yes   Sig: Patient is taking 1.25 tablets every two hours up to 12 tablets per day.   cholecalciferol (VITAMIN D-1000 MAX ST) 1000 UNITS TABS   Yes Yes   Sig: Take 2,000 Units by mouth every morning    escitalopram (LEXAPRO) 10 MG tablet   Yes Yes   Sig: Take 10 mg by mouth every morning    oxyCODONE IR (ROXICODONE) 5 MG tablet   No No   Sig: Take 1-2 tablets (5-10 mg) by mouth every 4 hours as needed   pantoprazole (PROTONIX) 20 MG EC tablet   Yes Yes   Sig: Take 20 mg by mouth   senna-docusate (SENOKOT-S;PERICOLACE) 8.6-50 MG per tablet   Yes Yes   Sig: Takes it as needed.   sulfamethoxazole-trimethoprim (BACTRIM DS/SEPTRA DS) 800-160 MG per tablet   No No   Sig: Take 1 tablet by mouth 2 times daily      Facility-Administered Medications: None     Allergies   Allergies   Allergen Reactions     No Clinical Screening - See Comments Shortness Of Breath     Erythromycin      Other reaction(s): Unknown     Ioxaglate Other (See Comments)     Angioedema     Paroxetine      Other reaction(s): Unknown     Penicillins      Facial swelling     Trazodone      Other reaction(s): Unknown     Vancomycin Hives       Physical Exam   Vital Signs: Temp: 99.2  F (37.3  C) Temp src: Oral BP: (!) 140/93   Heart Rate: 78 Resp: 20 SpO2: 97 % O2 Device: None (Room air)    Weight: 0 lbs 0 oz    General: AAOx3, NAD. Minimal facial movement.  HEENT: Oral mucosa moist and non-erythematous, PERRL, EOM intact. Neck supple without LAD.  CV: RRR, normal S1S2, no murmur, clicks, rubs  Resp: Clear to auscultation bilaterally, no wheezes, rhonchi  Abd: Soft, right-sided tenderness to  palpation in the upper and lower quadrant.  Extremities: Radial and pedal pulses intact and symmetric, no pedal edema  Neuro: Bilateral resting, pill-rolling tremor with resolution with purposeful movement. Cog-wheeling rigidity bilaterally.    Assessment & Plan   Gabi Manley is a 78 year old female  who has a history of depression, HLD, HTN, GERD, PE and Parkinson's disease s/p  BL DBS placement who presents as a transfer for UTI / pyelonephritis.    # Pyelonephritis  Patient with fevers, dysuric symptoms and right-sided abdominal pain.  White count & CRP grossly elevated.  UA with white some large leukoesterase and many bacteria.  CT imaging and the above findings consistent with pyelonephritis.  Suspect untreated E. coli infection given Bactrim prescribed on discharge with resistant organism on subsequent culture growth.  Status post levofloxacin at outside hospital ED.  - Continue levofloxacin 750 mg (underdosed at OSH) q48h this evening  - Neurosurgery evaluated in ED; low concern for bacteremia or seeding  - Monitor kidney function; BMP qam   - HDS no role for fluids at present; encouraged po intake  - UA; f/u BCX and UCX    # Parkinson's Disease s/p recent BL DBS placement  Neurosurgery evaluated in ED; low concern for bacteremia or seeding.  - Continue pta sinemet    Chronic Medical Conditions  #HTN: continue pta amlodipine  #HLD: continue pta atorvastatin  #Depression: continue pta lexapro  #GERD: continue PTA protonix    Diet:  Regular  Fluids: encouraged po intake  DVT Prophylaxis: SCDs; address anticoagulation in AM given recent procedure  Code Status: Full Code    Disposition Plan   Expected discharge: 2 - 3 days; recommended to prior living arrangement once adequate pain management/ tolerating PO medications and antibiotic plan established.     Entered: Misael Tillman 10/01/2018, 11:20 PM   Information in the above section will display in the discharge planner report.    The patient was discussed  with Dr. Trice Tillman MD  Internal Medicine - Dermatology PGY 1  [phone]  Please see sticky note for cross cover information      Data  Ct Abdomen Pelvis Without Oral Without Iv Contrast    Result Date: 10/1/2018  CT ABDOMEN PELVIS WO ORAL WO IV CONTRAST 10/1/2018 6:03 PM INDICATION: Abdominal pain, fevers, and diarrhea. TECHNIQUE: Routine CT abdomen and pelvis without oral or IV contrast. Multiplanar reformation images (MPR). Dose reduction techniques were used. COMPARISON: 01/26/2017 and additional exams dating back to 11/21/2014 FINDINGS: Images degraded due to patient's arm position. LUNG BASES: Stable fibroatelectasis. ABDOMEN: Nonspecific inflammatory change in right upper quadrant inseparable from portions of right kidney, gallbladder, liver and hepatic flexure. Small presumed hepatic cyst not significantly changed with noncontrast images of liver otherwise unremarkable. No renal stones or hydronephrosis either kidney. Noncontrast images of gallbladder, spleen, adrenal glands and pancreas unremarkable for age. Diffuse extensive atherosclerotic plaque without significant aneurysm. No adenopathy. PELVIS: Pelvic organs unremarkable. Normal appendix. MUSCULOSKELETAL: Stable benign-appearing lesion left iliac wing. Degenerative disease.     CONCLUSION: 1. Nonspecific inflammatory stranding in right upper quadrant inseparable from kidney, gallbladder, portions of liver, and hepatic flexure. Differential would include right-sided pyelonephritis, less likely cholecystitis or focal colitis. Either oral and IV enhanced CT scan or ultrasound right upper quadrant recommended for further evaluation.    Ct Head Without Contrast    Result Date: 10/1/2018  [facility] RADIOLOGY EXAM: CT HEAD WO CONTRAST LOCATION: [facility] DATE/TIME: 10/1/2018 6:01 PM INDICATION: Fevers, recent deep brain stimulator implant COMPARISON: 9/17/2014 head CT and 7/20/2016 brain MRI. TECHNIQUE: Routine without IV contrast.  Multiplanar reformats. Dose reduction techniques were used. FINDINGS: INTRACRANIAL CONTENTS: No intracranial hemorrhage, extraaxial collection, or mass effect. No CT evidence of acute infarct. Bilateral frontal approach deep brain stimulator leads in place with tip in the expected location of the subthalamic nuclei bilaterally. White matter hypoattenuation adjacent to these leads as they traverse the bilateral frontal lobes is more pronounced on the right and likely reflects a combination of edema and evolving gliosis given the relatively recent placement of the leads. Mild generalized volume loss. No hydrocephalus. Small amount of calcification at the distal internal carotid arteries bilaterally. OSSEOUS STRUCTURES/SOFT TISSUES: Bifrontal tawnya holes relate to the bifrontal approach DBS leads. Calvarium otherwise unremarkable. VISUALIZED ORBITS/SINUSES/MASTOIDS: No significant orbital abnormality. No significant paranasal sinus mucosal disease. No significant middle ear or mastoid effusion.     CONCLUSION: 1. Bifrontal approach DBS leads with tip in the expected location of the subthalamic nuclei bilaterally. Small amount of white matter hypoattenuation adjacent to these lesions likely suggest a combination of edema and evolving gliosis given the relatively recent placement of these leads. No superimposed acute intracranial abnormality with and no intracranial hemorrhage and no extra-axial fluid collection. No CT findings to suggest an acute infarct.

## 2018-10-02 NOTE — PROGRESS NOTES
"S: generally feeling unwell.      O:  Exam:  General: Awake;  Alert, In No Acute Distress  Pulm: Breathing Comfortably on room air  Mental status: Oriented x 3  Cranial Nerves: Cranial Nerves II-XII Intact Bilaterally  Strength: moves all extremities antigravity  Pronator Drift: Absent  Sensory: Intact to Light Touch  No nuchal rigidity  INCISION: right frontal scalp incision clean, dry, intact with dermabond in place; left subscapular incision clean, dry, intact with dermabond in place; neither incision has significant erythema, swelling, or drainage, no evidence of infection    Assessment:   #UTI with possible pyelonephritis    Recommendations:       - Antibiotics for UTI/pyelonephritis      - Blood cultures to evaluate for bacteremia      - No indication for lumbar puncture as no evidence of meningitis     Chino \"Lavon\" MD Teena   Neurosurgery, PGY-2    Please contact neurosurgery resident on call with questions.    Dial * * [phone], enter [phone] when prompted.       "

## 2018-10-02 NOTE — PROVIDER NOTIFICATION
Purpose of Notification: Temp 101.4 on arrival to   Notified Person: Dr Carnes, [id number]  Notification Time: 0130  Notification Interaction: Phone conversation  Orders Obtained: OK to straight cath for UA, monitor temp-no intervention at this time as has already received antibiotics  Comments:

## 2018-10-02 NOTE — PROGRESS NOTES
[facility], [place]    Internal Medicine Progress Note - [facility] 4 Service    Main Plans for Today   - follow-up renal US  - continue Abx     Assessment & Plan   Gabi Manley is a 78 year old female with history of Parkinson's s/p DBS placement recently (most recent procedure of three completed 9/18/2018), HTN/HLD, who was admitted 10/1 with pyelonephritis.    # Pyelonephritis  Recently Rxed for E. Coli UTI with Bactrim, but E. Coli was resistant. Admitted with fall, abdominal pain, fecal/urinary incontinence. UA suggestive of infection, WBC at OSH 14.4  - ceftriaxone  - urine and blood cultures pending   - renal US pending    # ISMA  Admit creatinine 1.01 mg/dl, from baseline of 0.8-0.9 mg/dl.  - 1L LR slowly today + aggressive PO intake  - trend    CHRONIC PROBLEMS  # HTN: hold amlodipine for now, restart once patient improves  # HLD: pta atorvastatin  # Parkinson's: restart carbidopa-levodopa, senna-docusate PRN  # GERD: restart PPI  # Depression: pta escitalopram    Diet: Combination Diet Regular Diet Adult  Fluids: Taking PO  Lines: PIV  Medel Catheter: not present    DVT Prophylaxis: Pneumatic Compression Devices  Code Status: Full Code  Disposition Plan   Expected discharge: 2 - 3 days, recommended to TBD once antibiotic plan established and evaluted by PT.     Entered: Esdras Childs MD 10/02/2018, 11:47 AM   Information in the above section will display in the discharge planner report.      The patient's care was discussed with the Attending Physician, Dr. Eckert and Patient.    Esdras Childs  [facility] 4  Pager: [phone]  Please see sticky note for cross cover information    Interval History   Overnight doing well on antibiotics, feels somewhat better from yesterday. Burning with urination has resolved though continues to have some mild back pain. No additional fevers, chills, nausea, vomiting, diarrhea.     Physical Exam   Vital Signs: Temp: 96.5  F (35.8  C)  Temp src: Axillary BP: 125/62   Heart Rate: 69 Resp: 18 SpO2: 98 % O2 Device: None (Room air)    Weight: 0 lbs 0 oz  General Appearance: Laying in bed, tired appearing, no acute distress  Respiratory: CTAB  Cardiovascular: RRR, no extra sounds  GI: Mild suprapubic tenderness  Skin: No rash on exposed skin  Ext: No RIGO  Back: Right CVA tenderness     Data   Reviewed    Physician Attestation   Luis CLANCY saw this patient with the resident and agree with the resident s findings and plan of care as documented in the resident s note    I personally reviewed vital signs, medications, labs and imaging.    Luis Villalta  Date of Service (when I saw the patient): 10/02/18

## 2018-10-02 NOTE — PROVIDER NOTIFICATION
Notified Resident at 1400 PM regarding critical results read back.      Spoke with: Esdras Childs paged    Orders were not obtained.    Comments: R arm blood culture from 10/1 2231 grew gram negative rods.

## 2018-10-02 NOTE — ED TRIAGE NOTES
Ambulance transfer from [facility] for suspected infection of DBS, also treated with levaquin and tylenol for UTI.

## 2018-10-02 NOTE — CONSULTS
Consult Date:  10/01/2018      HISTORY OF PRESENT ILLNESS:  Ms. Manley is a 78-year-old female with a history of Parkinson's disease, status post phase 1 placement of left deep brain stimulator, target in the left globus pallidus internus performed 2018, phase II placement of the stimulator generator battery over left chest wall 2018 and phase I and II placement of electrode in the right globus pallidus internus with connection to the previous array performed 2018 who presents on transfer from [facility] for UTI and possible pyelonephritis.      Ms. Manley had been diagnosed with a UTI during her previous hospitalization, which grew E. coli resistant to Bactrim.  Unfortunately, she had been discharged on Bactrim prior to susceptibility testing being complete on the urinalysis.  She subsequently presented to [facility] for evaluation of abdominal pain and was found to have a persistent UTI.  CT of her abdomen and pelvis was also performed which demonstrated inflammation in the right upper quadrant, possibly suggesting pyelonephritis.      Ms. Manley notes that over the past several days she had been feeling less well.  She also had a minor fall resulting in some mechanical neck pain.  She denies any new headaches or nausea or vomiting.  She also denies any light sensitivities.  She has not had any chills.      PAST MEDICAL HISTORY:   1.  Hypertension.   2.  Parkinson's disease.   3.  Carpal tunnel syndrome.   4.  Urinary incontinence.   5.  PE.   6.  Pulmonary nodule.   1.  GERD.   2.  Acid reflux.      PAST SURGICAL HISTORY:  Is indicated in the HPI as well as the followin.  Fallopian tube ligation.   2.  Breast biopsy.   3.  Carpal tunnel release, left.      CURRENT MEDICATIONS:   1.  Vitamin D.   2.  Sinemet.   3.  Allegra.   4.  Lexapro.   5.  Flonase.   6.  Atorvastatin.   7.  Amlodipine.   8.  Voltaren cream.   9.  Protonix.      SOCIAL HISTORY:  She currently lives in  [place].  She has no current or prior tobacco use, no alcohol use, no illicit drug use.      PHYSICAL EXAMINATION:   GENERAL:  This is an elderly female lying in a hospital bed, mildly uncomfortable.   MENTAL STATUS:  She is alert and oriented x 3, is a good historian.   NEUROLOGIC:  Cranial nerves II-XII are intact.     STRENGTH:  Appears full throughout, definitely moves all extremities antigravity though somewhat fatigued during strength testing.   SENSATION:  Intact to light touch throughout.     She has resting tremor, right greater than left.   SKIN:  Incisions right frontal scalp incision clean, dry, intact with Dermabond in place.  No evidence of erythema, swelling or any discharge from the incision.  The left subclavicular battery pocket incision is clean, dry, intact.  No significant erythema, swelling or drainage.  No evidence of infection at either incision.   NECK:  Range of motion is somewhat limited due to some mild pain in her paraspinous muscles after her fall several days ago.  She can touch her chin to her chest.      LABORATORY DATA:  BMP demonstrates a sodium of 134, potassium 3.4, chloride of 96, creatinine 0.95.  UA is positive with moderate blood, positive nitrites, large leukocyte esterase,  white cells, 5-10 red cells with many bacteria present.  CBC demonstrates a white blood cell count of 14.4.      IMAGING:  CT of the chest, abdomen and pelvis was obtained at the outside hospital, which demonstrated nonspecific inflammatory changes, right upper quadrant in the vicinity of the right kidney, gallbladder and liver.      ASSESSMENT:  Ms. Manley is a 78-year-old female with persistent UTI and possible  pyelonephritis after inadequate treatment of E. coli UTI at the end of September.  She has no evidence of meningitis nor any evidence of wound infection.  We would recommend further medical workup including blood cultures and IV antibiotics for the known urinary tract infection.  There is  no indication for lumbar puncture at this time.      RECOMMENDATIONS:   1.  Admit to Medicine.   2.  Medical management of UTI and evaluation for bacteremia.   3.  Would recommend against lumbar puncture at this time given no evidence of meningitis.         RICKEY LAWRENCE MD       As dictated by NAJANA PEÑALOZA MD, PHD      Please contact neurosurgery resident on call with questions.    Dial * * [phone], enter [phone] when prompted.           D: 10/02/2018   T: 10/02/2018   MT: NTS      Name:     ALEX STERN   MRN:      [id number]        Account:       [id number]   :      1940           Consult Date:  10/01/2018      Document: [id number]

## 2018-10-03 NOTE — PROGRESS NOTES
[facility], [place]    Internal Medicine Progress Note - [facility] 4 Service    Main Plans for Today   - continue Abx   - ceftriaxone -> ciprofloxacin today based upon susceptibilities     Assessment & Plan   Gabi Manley is a 78 year old female with history of Parkinson's s/p DBS placement recently (most recent procedure of three completed 9/18/2018), HTN/HLD, who was admitted 10/1 with pyelonephritis, found to have E. Coli bacteremia.    # Pyelonephritis, improving  Recently Rxed for E. Coli UTI with Bactrim, but E. Coli was resistant. Admitted with fall, abdominal pain, fecal/urinary incontinence. WBC at OSH 14.4. Blood culture w/ E. Coli. Urine culture negative, but received Abx prior to admission here.  - ceftriaxone -> ciprofloxacin today based upon susceptibilities   - trending blood cultures    # ISMA, improving  Admit creatinine 1.01 mg/dl, from baseline of 0.8-0.9 mg/dl.  - 1L LR again slowly today + aggressive PO intake  - trend    CHRONIC PROBLEMS  # HTN: hold amlodipine for now, restart once patient improves  # HLD: pta atorvastatin  # Parkinson's: pta carbidopa-levodopa, senna-docusate PRN  # GERD: pta PPI  # Depression: pta escitalopram    Diet: Combination Diet Regular Diet Adult  Fluids: Taking PO  Lines: PIV  Medel Catheter: not present    DVT Prophylaxis: Pneumatic Compression Devices  Code Status: Full Code  Disposition Plan   Expected discharge: 2 - 3 days, recommended to transitional care unit once safe disposition plan/ TCU bed available.     Entered: Esdras Childs MD 10/03/2018, 12:51 PM   Information in the above section will display in the discharge planner report.      The patient's care was discussed with the Attending Physician, Dr. Eckert and Patient.    Esdras Childs  [facility] 4  Pager: [phone]  Please see sticky note for cross cover information    Interval History   Had a fever overnight, though downtrending. Patient a bit tired but  otherwise says her energy is improved from admission. Mild ongoing abdominal pain though no nausea, vomiting, diarrhea.    Physical Exam   Vital Signs: Temp: 96.8  F (36  C) Temp src: Oral BP: 117/71   Heart Rate: 68 Resp: 16 SpO2: 99 % O2 Device: None (Room air)    Weight: 0 lbs 0 oz  General Appearance: Laying in bed, tired appearing, no acute distress  Respiratory: CTAB  Cardiovascular: RRR, no extra sounds  GI: Mild suprapubic, RLL, and RUQ tenderness w/o guarding; there is some rebound in RUQ  Skin: No rash on exposed skin  Ext: No RIGO    Data   Reviewed  Physician Attestation   Luis CLANCY saw this patient with the resident and agree with the resident s findings and plan of care as documented in the resident s note    I personally reviewed vital signs, medications, labs and imaging.    Luis Villalta  Date of Service (when I saw the patient): 10/03/18

## 2018-10-03 NOTE — DISCHARGE SUMMARY
[facility] [place]    Internal Medicine Discharge Summary- [facility] Service    Date of Admission:  10/1/2018  Date of Discharge:  10/4/2018  Discharging Attending Provider: Luis Villalta  Discharge Team: [facility] 4    Discharge Diagnoses   # E. Coli Pyelonephritis    Follow-ups Needed After Discharge   PCP: follow-up in 1 week    Hospital Course   Gabi Manley is a 78 year old female with history of Parkinson's s/p DBS placement recently (most recent procedure of three completed 9/18/2018), HTN/HLD, who was admitted as a transfer from an OSH ED 10/1 with pyelonephritis..    # Pyelonephritis w/ E. Coli bacteremia  The patient was recently admitted at the [facility] for DBS procedure during which she was found to have an E. Coli UTI. She was discharged on 9/18 with Bactrim; however, her urine culture ultimately grew out E. Coli resistant to Bactrim. She was seen at an outside ED on 10/1 with haven fallen, with associated abdominal pain, fecal/urinary incontinence. WBC was 14.4 and her UA appeared consistent with infection. CT showed inflammatory changes in the right upper quadrant suggestive of pyelonephritis. She was given levofloxacin and transferred to the [facility]. Here she was started on ceftriaxone. Blood culture grew out w/ E. Coli sensitive to fluroquinolones, so she was transitioned to ciprofloxacin on 10/3. Interestingly, her urine negative was ultimately negative; we presume this is because she received antibiotics at the OSH prior to collection of her urine. The patient's leukocytosis improved as did her fatigue and abdominal pain.  - continue ciprofloxacin for antibiotic course of 7 days (through Oct 8)  - will discharge home with PT/OT     # ISMA  Admit creatinine 1.01 mg/dl, from baseline of 0.8-0.9 mg/dl. Renal US negative for hydronephrosis. Improved with treatment of pyelonephritis and gentle IV fluids.    # Parkinson's  The patient will need to follow-up with neurology as  previously scheduled for turning on her right side DBS and adjusting her left side DBS (scheduled for 10/19).    Consultations This Hospital Stay   PHYSICAL THERAPY ADULT IP CONSULT  OCCUPATIONAL THERAPY ADULT IP CONSULT  VASCULAR ACCESS CARE ADULT IP CONSULT     Code Status   Full Code     The patient was discussed with Dr. Luis Childs MD  [facility]  Pager: [phone]  ______________________________________________________________________    Physical Exam   Vital Signs: Temp: 97.2  F (36.2  C) Temp src: Oral BP: 141/69   Heart Rate: 65 Resp: 16 SpO2: 99 % O2 Device: None (Room air)    Weight: 0 lbs 0 oz  General Appearance:            Laying in bed, tired appearing, no acute distress  Respiratory: CTAB  Cardiovascular: RRR, no extra sounds  GI: Mild RUQ tenderness w/o guarding; no rebound  Skin: No rash on exposed skin  Ext:                No RIGO    Significant Results and Procedures   Most Recent 3 CBC's:  Recent Labs   Lab Test  10/04/18   0723  10/03/18   0648  10/01/18   2231   WBC  5.1  6.8  13.0*   HGB  12.0  11.9  12.7   MCV  93  96  96   PLT  120*  116*  129*     Most Recent 3 BMP's:  Recent Labs   Lab Test  10/04/18   0723  10/03/18   1546  10/03/18   0648  10/01/18   2231   NA  141   --   138  137   POTASSIUM  3.7  4.3  3.3*  3.5   CHLORIDE  105   --   103  101   CO2  28   --   29  29   BUN  8   --   12  15   CR  0.71   --   0.87  1.01   ANIONGAP  8   --   5  7   HELGA  8.8   --   8.3*  8.8   GLC  102*   --   105*  111*     Most Recent 2 LFT's:  Recent Labs   Lab Test  10/04/18   0723  10/01/18   2231   AST  34  16   ALT  15  7   ALKPHOS  118  76   BILITOTAL  0.7  1.3     Most Recent 6 Bacteria Isolates From Any Culture (See EPIC Reports for Culture Details):  Recent Labs   Lab Test  10/04/18   0723  10/03/18   0648  10/03/18   0647  10/02/18   1633  10/02/18   0150  10/01/18   2239   CULT  PENDING  No growth after 22 hours  No growth after 22 hours  No growth after 2 days   <1000 colonies/mL  urogenital robin    No growth after 3 days     Most Recent ESR & CRP:  Recent Labs   Lab Test  10/04/18   0723  10/01/18   2231   SED   --   19   CRP  180.0*  190.0*   ,   Results for orders placed or performed during the hospital encounter of 10/01/18   US Renal Complete    Narrative    EXAMINATION: US RENAL COMPLETE, 10/2/2018 11:03 AM     COMPARISON: None.    HISTORY: Pyelonephritis, evaluate for abscess    FINDINGS:    Right kidney: Measures 9.9 cm in length. Parenchyma is abnormally  echogenic and of normal thickness without identifiable fluid  collection or mass. No hydronephrosis.    Left kidney: Measures 9.5 cm in length. Parenchyma is of normal  thickness and echogenicity. No identifiable fluid collection or mass.  No hydronephrosis.     Bladder: Unremarkable.        Impression    IMPRESSION:  No identifiable fluid collection or mass in or around kidneys.    I have personally reviewed the examination and initial interpretation  and I agree with the findings.    ROSANNE ARITA MD       Pending Results   These results will be followed up by Esdras Childs  Unresulted Labs Ordered in the Past 30 Days of this Admission     Date and Time Order Name Status Description    10/3/2018 0514 Blood culture Preliminary     10/3/2018 0514 Blood culture Preliminary     10/2/2018 1515 Blood culture Preliminary     10/1/2018 2054 Blood culture Preliminary     10/1/2018 2054 Blood culture Preliminary              Primary Care Physician   Tracey Abdi    Discharge Disposition   Discharged to home  Condition at discharge: Stable    Discharge Orders   No discharge procedures on file.  Discharge Medications   Current Discharge Medication List      START taking these medications    Details   ciprofloxacin (CIPRO) 500 MG tablet Take 1 tablet (500 mg) by mouth every 12 hours  Qty: 9 tablet, Refills: 0    Associated Diagnoses: Pyelonephritis         CONTINUE these medications which have NOT CHANGED    Details   amLODIPine  (NORVASC) 5 MG tablet Take 5 mg by mouth every morning       atorvastatin (LIPITOR) 20 MG tablet Take 20 mg by mouth every morning       carbidopa-levodopa (SINEMET)  MG per tablet Patient is taking 1.25 tablets every two hours up to 12 tablets per day.  Qty: 360 tablet, Refills: 3      cholecalciferol (VITAMIN D-1000 MAX ST) 1000 UNITS TABS Take 2,000 Units by mouth every morning       escitalopram (LEXAPRO) 10 MG tablet Take 10 mg by mouth every morning       oxyCODONE IR (ROXICODONE) 5 MG tablet Take 1-2 tablets (5-10 mg) by mouth every 4 hours as needed  Qty: 30 tablet, Refills: 0    Associated Diagnoses: S/P deep brain stimulator placement      pantoprazole (PROTONIX) 20 MG EC tablet Take 20 mg by mouth      senna-docusate (SENOKOT-S;PERICOLACE) 8.6-50 MG per tablet Takes it as needed.  Refills: 0    Associated Diagnoses: S/P deep brain stimulator placement         STOP taking these medications       Multiple Vitamin (THERAPEUTIC MULTIVITAMIN PO) Comments:   Reason for Stopping:             Allergies   Allergies   Allergen Reactions     No Clinical Screening - See Comments Shortness Of Breath     Erythromycin      Other reaction(s): Unknown     Ioxaglate Other (See Comments)     Angioedema     Paroxetine      Other reaction(s): Unknown     Penicillins      Facial swelling     Trazodone      Other reaction(s): Unknown     Vancomycin Hives

## 2018-10-03 NOTE — PLAN OF CARE
Problem: Patient Care Overview  Goal: Plan of Care/Patient Progress Review  Discharge Planner PT   Patient plan for discharge: open to TCU stay with discussion  Current status: PT 7B: PT eval complete and treatment provided. Pt slowly ambulated 90 ft with FWW, CGA displaying shuffling gait and reduced step length. Pt required MARIZA supine<>sit, CGA sit<>stand for safe transfers. Educated pt in activity goals to promote strengthening and prevent falls during hospital stay. Pt instructed also in seated LE strength ex.   Barriers to return to prior living situation: pt ambulating less than household safe gait distances, pt requires MARIZA for bed mobility, displays mild imbalance and requires assist for out of bed mobility  Recommendations for discharge: TCU  Rationale for recommendations: due to below baseline functional level and impaired safety, pt is not safe to return to home alone       Entered by: Yumiko Yip 10/03/2018 12:28 PM

## 2018-10-03 NOTE — PROGRESS NOTES
NEUROSURGERY    Patient is well know to me who underwent series of DBS surgeries.  She was admitted on 10/1/2018 for UTI/Bacteremia.  She was scheduled to be seen at our [facility] clinic today for wound check and suture/Dermabond removal.  Since she is an inpatient, I visited her to remove the Dermabond, remaining Monocryl sutures and check her wound.    Patient states that she is feeling better.  She worked with PT and there were no issues.  She felt that she was weaker than before but is getting better.    All of her incisions were inspected by me.    1.  Left frontal incision - healing well.  Only small area with Dermabond left.  Most of the Monocryl sutures had dissolved/absorbed.  The area was cleaned and all of the Dermabond was removed.  Remaining Monocryl sutures were also removed.  The incision looked clean/dry and intact.  2.  Left parietal incisions - healing well.  Dermabond and Monocryl sutures still remained.  They were removed without any difficulty.  The incision looked clean/dry and intact.  3.  Right frontal incision - healing well.  Dermabond and Monocryl sutures still remained.  They were removed without any difficulty.  The incision looked clean/dry and intact.  4.  Left chest wall incision - healing well.  No Dermabond remaining.  No sutures to remove.    All the incisions looked good and are healing well.  No concern for infection.    Patient will need to follow up with Neurology as scheduled for turning on her right side DBS and adjusting her left side DBS.    Murray Stearns MD, PhD.  Neurosurgery Attending.

## 2018-10-03 NOTE — PLAN OF CARE
Problem: Patient Care Overview  Goal: Plan of Care/Patient Progress Review    Discharge Planner OT   Patient plan for discharge: rehab  Current status: Pt min A for supine>sit, and CGA for sit>stand. Ambulated hallway for 200 feet with CGA and FWW, and stood at sink for 5+ min for ADLs. Tolerated all activity well with VSS. Mild STM and attention issues noted. Baseline UE tremors and FM deficits apparent, but pt reports improvement in R-sided tremors since DBS.   Barriers to return to prior living situation: decreased ADL independence and activity tolerance, limited assist at home, cognition  Recommendations for discharge: TCU  Rationale for recommendations: Increase functional endurance and ADL independence at rehab       Entered by: Alvaro Vegas 10/03/2018 5:35 PM

## 2018-10-03 NOTE — PHARMACY-ADMISSION MEDICATION HISTORY
Admission medication history interview status for the 10/1/2018 admission is complete. See Epic admission navigator for allergy information, pharmacy, prior to admission medications and immunization status.     Medication history interview sources:  Patient, Chart Review    Changes made to PTA medication list (reason)  Added: None  Deleted: None  Changed: None    Additional medication history information (including reliability of information, actions taken by pharmacist):  -Pt was a reliable historian for her medication history  -Confirmed medication doses and directions with her most recent discharge summaries from [facility]      Prior to Admission medications    Medication Sig Last Dose Taking? Auth Provider   amLODIPine (NORVASC) 5 MG tablet Take 5 mg by mouth every morning  10/1/2018 at AM Yes Reported, Patient   atorvastatin (LIPITOR) 20 MG tablet Take 20 mg by mouth every morning  10/1/2018 at AM Yes Reported, Patient   carbidopa-levodopa (SINEMET)  MG per tablet Patient is taking 1.25 tablets every two hours up to 12 tablets per day. 10/1/2018 at Unknown time Yes Bhavana Anton APRN CNP   cholecalciferol (VITAMIN D-1000 MAX ST) 1000 UNITS TABS Take 2,000 Units by mouth every morning  10/1/2018 at AM Yes Reported, Patient   escitalopram (LEXAPRO) 10 MG tablet Take 10 mg by mouth every morning  10/1/2018 at AM Yes Reported, Patient   Multiple Vitamin (THERAPEUTIC MULTIVITAMIN PO) Take 1 tablet by mouth every morning  10/1/2018 at Unknown time Yes Reported, Patient   oxyCODONE IR (ROXICODONE) 5 MG tablet Take 1-2 tablets (5-10 mg) by mouth every 4 hours as needed Past Week at Unknown time Yes Patito Castillo APRN CNP   pantoprazole (PROTONIX) 20 MG EC tablet Take 20 mg by mouth 10/1/2018 at AM Yes Reported, Patient   senna-docusate (SENOKOT-S;PERICOLACE) 8.6-50 MG per tablet Takes it as needed. Past Month at Unknown time Yes Bhavana Anton APRN CNP         Medication history completed  by: Aryan Medel, Pharmacy Intern

## 2018-10-03 NOTE — PLAN OF CARE
Problem: Patient Care Overview  Goal: Plan of Care/Patient Progress Review  Outcome: No Change  Vital signs:  Temp: 99.5  F (37.5  C) Temp src: Oral BP: 143/83   Heart Rate: 86 Resp: 16 SpO2: 92 % O2 Device: None (Room air)       0263-9763. Tmax 100.8 after PRN tylenol given, provider notified, blood cultures ordered. Other VSS on RA. A&O x4, however forgetful. Bed alarm on for safety. Left PIV NS TKO. Rocephin given per orders. Left head incision with liquid bandage, BARRY, no drainage. Pt c/o pain, one time dose tramadol given with relief. Up with Ax2 and walker, voiding adequate amounts. +BS, passing flatus, 1 BM this shift. Denies nausea. Sleeping most of the night.     Addendum: Temp recheck 98.7 at 0640.

## 2018-10-03 NOTE — PROVIDER NOTIFICATION
Bronchitis: Care Instructions  Your Care Instructions    Bronchitis is inflammation of the bronchial tubes, which carry air to the lungs. The tubes swell and produce mucus, or phlegm. The mucus and inflamed bronchial tubes make you cough. You may have trouble breathing. Most cases of bronchitis are caused by viruses like those that cause colds. Antibiotics usually do not help and they may be harmful. Bronchitis usually develops rapidly and lasts about 2 to 3 weeks in otherwise healthy people. Follow-up care is a key part of your treatment and safety. Be sure to make and go to all appointments, and call your doctor if you are having problems. It's also a good idea to know your test results and keep a list of the medicines you take. How can you care for yourself at home? · Take all medicines exactly as prescribed. Call your doctor if you think you are having a problem with your medicine. · Get some extra rest.  · Take an over-the-counter pain medicine, such as acetaminophen (Tylenol), ibuprofen (Advil, Motrin), or naproxen (Aleve) to reduce fever and relieve body aches. Read and follow all instructions on the label. · Do not take two or more pain medicines at the same time unless the doctor told you to. Many pain medicines have acetaminophen, which is Tylenol. Too much acetaminophen (Tylenol) can be harmful. · Take an over-the-counter cough medicine that contains dextromethorphan to help quiet a dry, hacking cough so that you can sleep. Avoid cough medicines that have more than one active ingredient. Read and follow all instructions on the label. · Breathe moist air from a humidifier, hot shower, or sink filled with hot water. The heat and moisture will thin mucus so you can cough it out. · Do not smoke. Smoking can make bronchitis worse. If you need help quitting, talk to your doctor about stop-smoking programs and medicines. These can increase your chances of quitting for good.   When should you call Pt's temp 100.1 at 0400, PRN Tylenol given x1, temp recheck 100.8. Provider notified.   for help? Call 911 anytime you think you may need emergency care. For example, call if:  · You have severe trouble breathing. Call your doctor now or seek immediate medical care if:  · You have new or worse trouble breathing. · You cough up dark brown or bloody mucus (sputum). · You have a new or higher fever. · You have a new rash. Watch closely for changes in your health, and be sure to contact your doctor if:  · You cough more deeply or more often, especially if you notice more mucus or a change in the color of your mucus. · You are not getting better as expected. Where can you learn more? Go to http://marie-coleman.info/. Enter H333 in the search box to learn more about \"Bronchitis: Care Instructions. \"  Current as of: March 25, 2017  Content Version: 11.3  © 6897-1284 [employer]. Care instructions adapted under license by [employer] (which disclaims liability or warranty for this information). If you have questions about a medical condition or this instruction, always ask your healthcare professional. [place] [address] any warranty or liability for your use of this information. Swimmer's Ear: Care Instructions  Your Care Instructions    Swimmer's ear (otitis externa) is inflammation or infection of the ear canal. This is the passage that leads from the outer ear to the eardrum. Any water, sand, or other debris that gets into the ear canal and stays there can cause swimmer's ear. Putting cotton swabs or other items in the ear to clean it can also cause this problem. Swimmer's ear can be very painful. But you can treat the pain and infection with medicines. You should feel better in a few days. Follow-up care is a key part of your treatment and safety. Be sure to make and go to all appointments, and call your doctor if you are having problems.  It's also a good idea to know your test results and keep a list of the medicines you take.  How can you care for yourself at home? Cleaning and care  · Use antibiotic drops as your doctor directs. · Do not insert ear drops (other than the antibiotic ear drops) or anything else into the ear unless your doctor has told you to. · Avoid getting water in the ear until the problem clears up. Use cotton lightly coated with petroleum jelly as an earplug. Do not use plastic earplugs. · Use a hair dryer set on low to carefully dry the ear after you shower. · To ease ear pain, hold a warm washcloth against your ear. · Take pain medicines exactly as directed. ¨ If the doctor gave you a prescription medicine for pain, take it as prescribed. ¨ If you are not taking a prescription pain medicine, ask your doctor if you can take an over-the-counter medicine. Inserting ear drops  · Warm the drops to body temperature by rolling the container in your hands. Or you can place it in a cup of warm water for a few minutes. · Lie down, with your ear facing up. · Place drops inside the ear. Follow your doctor's instructions (or the directions on the label) for how many drops to use. Gently wiggle the outer ear or pull the ear up and back to help the drops get into the ear. · It's important to keep the liquid in the ear canal for 3 to 5 minutes. When should you call for help? Call your doctor now or seek immediate medical care if:  · You have a new or higher fever. · You have new or worse pain, swelling, warmth, or redness around or behind your ear. · You have new or increasing pus or blood draining from your ear. Watch closely for changes in your health, and be sure to contact your doctor if:  · You are not getting better after 2 days (48 hours). Where can you learn more? Go to http://marie-coleman.info/. Enter C706 in the search box to learn more about \"Swimmer's Ear: Care Instructions. \"  Current as of: July 29, 2016  Content Version: 11.3  © 1885-3209 [employer], [facility].  Care instructions adapted under license by [employer] (which disclaims liability or warranty for this information). If you have questions about a medical condition or this instruction, always ask your healthcare professional. [address] any warranty or liability for your use of this information.

## 2018-10-03 NOTE — PLAN OF CARE
"/71 (BP Location: Right arm)  Temp 96.8  F (36  C) (Oral)  Resp 16  Ht 1.6 m (5' 3\")  SpO2 99%  BMI 24.17 kg/m2     Neuro: A&O X 4, slow to respond  Cardiac: VSS  Respiratory: lung sounds clear bilaterally, 99% on RA  GI/: AUOP, no bm, +gas, +bs  Diet/Appetite: tolerating regular diet, no n/v reported  Activity: ambulated in room with A X 1 and walker  Pain: denies    K replaced per protocol for K of 3.3.  Recheck ordered for 1600.      Plan: Continue to monitor closely and follow POC, progress towards baseline    "

## 2018-10-03 NOTE — PROGRESS NOTES
10/03/18 1718   Quick Adds   Type of Visit Initial Occupational Therapy Evaluation   Living Environment   Lives With alone   Living Arrangements apartment  (senior apartment)   Home Accessibility no concerns  (walk in shower)   Number of Stairs to Enter Home 0   Number of Stairs Within Home 0   Transportation Available (metro mobility, does not drive)   Self-Care   Dominant Hand right   Usual Activity Tolerance good   Current Activity Tolerance fair   Regular Exercise yes   Activity/Exercise Type walking   Exercise Amount/Frequency daily   Equipment Currently Used at Home grab bar;shower chair  (lift chair, toilet frame, 4WW, reacher)   Functional Level Prior   Ambulation 1-->assistive equipment   Transferring 1-->assistive equipment   Toileting 1-->assistive equipment   Bathing 1-->assistive equipment   Dressing 0-->independent   Cognition 0 - no cognition issues reported   Fall history within last six months yes   Number of times patient has fallen within last six months 1   Prior Functional Level Comment Pt normally I or mod I in most ADLs. Uses 4WW for mobility, shower chair for bathing, and toilet frame. Sets up her own medications, and now has assist for managing her finances. Her apt provides all meals in dining area, and completes housework.    General Information   Referring Physician Esdras Childs MD   Patient/Family Goals Statement Return to PLOF   Additional Occupational Profile Info/Pertinent History of Current Problem 78 year old female with history of Parkinson's s/p DBS placement recently (most recent procedure of three completed 9/18/2018), HTN/HLD, who was admitted 10/1 with pyelonephritis, found to have E. Coli bacteremia.   Precautions/Limitations fall precautions   General Info Comments New DBS placed 2 weeks ago. Hx Parkinson's, with tremors being primary deficit.   Cognitive Status Examination   Cognitive Comment No issues at baseline per pt, and does not endorse confusion currently.  Some mild attention and STM issues noted during eval, will monitor.   Visual Perception   Visual Acuity Cataracts, needs new presciption, but could read whiteboard at distance.   Coordination   Coordination Comments Reports some gross and fine motor issues when she is tired. Mild FM issues noted during eval. Tremors impacting coordination as well, but pt reports much decreased tremors in RUE since DBS programmed.   Transfer Skill: Sit to Stand   Level of [place]: Sit/Stand contact guard   Physical Assist/Nonphysical Assist: Sit/Stand set-up required;supervision;nonverbal cues (demo/gestures);1 person assist   Assistive Device for Transfer: Sit/Stand rolling walker   Lower Body Dressing   Level of [place]: Dress Lower Body stand-by assist   Physical Assist/Nonphysical Assist: Dress Lower Body set-up required;supervision;1 person assist   Grooming   Level of [place]: Grooming minimum assist (75% patients effort)   Physical Assist/Nonphysical Assist: Grooming set-up required;supervision;verbal cues;nonverbal cues (demo/gestures);1 person assist   Clinical Impression   Criteria for Skilled Therapeutic Interventions Met yes, treatment indicated   OT Diagnosis Decreased I in ADLs due to deconditioning   Assessment of Occupational Performance 3-5 Performance Deficits   Identified Performance Deficits dressing, toileting, bathing, functional endurance, cognition   Clinical Decision Making (Complexity) Low complexity   Therapy Frequency 5 times/wk   Predicted Duration of Therapy Intervention (days/wks) 2 weeks   Anticipated Discharge Disposition Transitional Care Facility   Risks and Benefits of Treatment have been explained. Yes   Patient, Family & other staff in agreement with plan of care Yes   Total Evaluation Time   Total Evaluation Time (Minutes) 10

## 2018-10-03 NOTE — PLAN OF CARE
Problem: Patient Care Overview  Goal: Plan of Care/Patient Progress Review  Outcome: No Change  VSS - Q4h vitals, A&Ox4, forgetful/slow to respond. Blood cultures + for IV abx continued. Pt c/o abdominal discomfort and pain, well controlled w/PRN Tylenol, aqua-k pump ordered. Incisions on head healing, BARRY. Up w/asst x2, shuffles feet. Pt up to bathroom x1, dribbling output, last BM today. Poor appetite, encouraged intake. Plan to continue monitoring for s/sx's of infection, encourage activity - PT/OT consult placed. Notify [facility] 4 w/changes and concerns.     Edilberto Alonso, RN  [phone]

## 2018-10-03 NOTE — PROGRESS NOTES
Neurosurgery Progress Note    S: feeling better, tremors have improved    O:  Exam:  General: Awake;  Alert, In No Acute Distress  Pulm: Breathing Comfortably on room air  Mental status: Oriented x 3  Cranial Nerves: Cranial Nerves II-XII Intact Bilaterally  Strength: moves all extremities antigravity  Pronator Drift: Absent  Sensory: Intact to Light Touch  No nuchal rigidity  INCISION: right frontal scalp incision clean, dry, intact with dermabond in place; left subscapular incision clean, dry, intact with dermabond in place; neither incision has significant erythema, swelling, or drainage, no evidence of infection    Assessment:   #UTI with possible pyelonephritis    Recommendations:       - Antibiotics for UTI/pyelonephritis/bacteremia      - No indication for lumbar puncture as no evidence of meningitis      - Neurosurgery team will sign off. Call [phone] with questions.    -----------------------------------  Tip Simeon MD, MS  Neurosurgery PGY-2

## 2018-10-03 NOTE — PROGRESS NOTES
Care Coordinator Progress Note    Admission Date/Time:  10/1/2018  Attending MD:  Pernell Carnes DO    Data  Chart reviewed, discussed with interdisciplinary team.   Patient was admitted for: Pyelonephritis.    Concerns with insurance coverage for discharge needs: None.  Current Living Situation: Patient lives in a senior living facility  Support System: Supportive and Involved  Services Involved: assistance with meals, laundry, and cleaning  Transportation at Discharge: Family or friend will provide, [employer]  Transportation to Medical Appointments:   - Name of caregiver: sonzamzam/ [employer]  Barriers to Discharge: dependent with mobility/activities of daily living and medical clearance     Assessment  Patient is a 78 year old female with past medical history of Parkinson's s/p DBS placement 9/18, HTN, HLD, currently admitted with pyelonephritis.  Patient is currently on IV abx awaiting for cultures to determine abx plan.  Patient is currently needing assist of 2 and walker to transfers.  PT/OT has been consulted.  Met with patient at bedside to introduce RNCC role and discuss discharge planning.  Patient reports that she lives at a independent senior living facility in [facility].  She reports they provide all meals, assistance with laundry and cleaning weekly.  She reports that she can get assisted living cares at her facility if needed.  Called and spoke with Shahzad(P: [phone] option #1) at [facility] who reported that the patient can get AL services including assistance with transfers(1 assist) and incontinence cares(urine only).  They also can contract with home care for skilled nursing and PT/OT services if needed.  Will follow PT/OT recommendations and make appropriate referrals as needed.  Patient reports her son, Osman, can assist with transport at discharge.  She often uses [employer] for transportation to clinic appointments.  RNCC to  continue to follow and assist with discharge planning.            Plan  Anticipated Discharge Date:  TBD  Anticipated Discharge Plan:  Pending PT/OT recs, anticipate home with increased services     Shalini Ojeda, RNCC  [phone]

## 2018-10-03 NOTE — PROGRESS NOTES
10/03/18 1039   Quick Adds   Type of Visit Initial PT Evaluation   Living Environment   Lives With alone   Living Arrangements apartment  (not AL, but could have services if needed per SW/CC)   Home Accessibility no concerns   Transportation Available car;family or friend will provide   Living Environment Comment pt lives alone in IND senior apt   Self-Care   Dominant Hand right   Usual Activity Tolerance good   Current Activity Tolerance fair   Equipment Currently Used at Home walker, rolling   Activity/Exercise/Self-Care Comment Pt walks to meals 3x/day long distances using her 4WW.    Functional Level Prior   Ambulation 1-->assistive equipment   Transferring 1-->assistive equipment   Toileting 0-->independent   Bathing 0-->independent   Dressing 0-->independent   Eating 0-->independent   Communication 0-->understands/communicates without difficulty   Swallowing 0-->swallows foods/liquids without difficulty   Cognition 0 - no cognition issues reported   Fall history within last six months yes   Number of times patient has fallen within last six months 2   Which of the above functional risks had a recent onset or change? transferring;ambulation;toileting;bathing;dressing   Prior Functional Level Comment At baseline pt VAUGHN with mobility using her 4WW for transfers and walking.    General Information   Onset of Illness/Injury or Date of Surgery - Date 10/01/18   Referring Physician Esdras Childs MDNone   Patient/Family Goals Statement To get strong, feel better.    Pertinent History of Current Problem (include personal factors and/or comorbidities that impact the POC) Gabi Manley is a 78 year old female with history of Parkinson's s/p DBS placement recently (most recent procedure of three completed 9/18/2018), HTN/HLD, who was admitted 10/1 with pyelonephritis.   Precautions/Limitations fall precautions   General Info Comments activity: up with assist   Cognitive Status Examination   Level of Consciousness  alert   Follows Commands and Answers Questions 100% of the time   Personal Safety and Judgment intact;impaired  (pt forgetful, displays slightly reduced safety)   Pain Assessment   Patient Currently in Pain No   Posture    Posture Comments slightly reduced upright posture   Range of Motion (ROM)   ROM Comment no major concerns noted   Strength   Strength Comments shoulder flex 5/5 elbow flex/ext 5/5,  5/5, hip flex 4/5 B, knee ext 4+/5 B, ankle DF 4/5 B   Bed Mobility   Bed Mobility Comments MARIZA supine<>sit, slow and effortful for pt especially supine>sit   Transfer Skills   Transfer Comments CGA-SBA sit<>stand, FWW   Gait   Gait Comments Ambulated with FWW with slowed shuffling gait, reduced dorothy and step length. Pt required CGA.    Balance   Balance Comments CGA-SBA for standing dynamic balance tasks, requires UE support intermittently to steady self   Coordination   Coordination Comments slightly slowed response time noted with coordination tasks   General Therapy Interventions   Planned Therapy Interventions bed mobility training;gait training;neuromuscular re-education;ROM;strengthening;stretching;transfer training;home program guidelines;progressive activity/exercise   Clinical Impression   Criteria for Skilled Therapeutic Intervention yes, treatment indicated   PT Diagnosis impaired mobilty   Influenced by the following impairments reduced strength, ROM, balance, safety, activity tolerance   Functional limitations due to impairments below baseline bed mobility, transfers, gait   Clinical Presentation Evolving/Changing   Clinical Presentation Rationale current deficits, medical needs yet to be addressed   Clinical Decision Making (Complexity) Moderate complexity   Therapy Frequency` other (see comments)  (6x/wk)   Predicted Duration of Therapy Intervention (days/wks) 1 week   Anticipated Discharge Disposition Transitional Care Facility   Risk & Benefits of therapy have been explained Yes   Patient,  "Family & other staff in agreement with plan of care Yes   [facility] AM-PAC TM \"6 Clicks\"   2016, Trustees of [facility], under license to [employer].  All rights reserved.   6 Clicks Short Forms Basic Mobility Inpatient Short Form   [facility] AM-PAC  \"6 Clicks\" V.2 Basic Mobility Inpatient Short Form   1. Turning from your back to your side while in a flat bed without using bedrails? 3 - A Little   2. Moving from lying on your back to sitting on the side of a flat bed without using bedrails? 3 - A Little   3. Moving to and from a bed to a chair (including a wheelchair)? 3 - A Little   4. Standing up from a chair using your arms (e.g., wheelchair, or bedside chair)? 3 - A Little   5. To walk in hospital room? 3 - A Little   6. Climbing 3-5 steps with a railing? 2 - A Lot   Basic Mobility Raw Score (Score out of 24.Lower scores equate to lower levels of function) 17   Total Evaluation Time   Total Evaluation Time (Minutes) 9     "

## 2018-10-04 NOTE — PLAN OF CARE
Problem: Patient Care Overview  Goal: Plan of Care/Patient Progress Review  Outcome: Improving  AVSS, afebrile. Lungs sound clear bilaterally, 99% on RA. Denies pain/nausea. Given tylenol x 1 for headache. Good oral intake. No BM this shift. Good UO. Ambulated in room with Ax1 with walker. K+ recheck result was 4.3. PIV SL. Continue with cares and update MD with any changes.

## 2018-10-04 NOTE — PROGRESS NOTES
Care Coordinator - Discharge Planning    Admission Date/Time:  10/1/2018  Attending MD:  Pernell Carnes DO     Data  Date of initial CC assessment:  10/3  Chart reviewed, discussed with interdisciplinary team.   Patient was admitted for:   1. Depression, unspecified depression type    2. Pyelonephritis    3. Hyperlipidemia LDL goal <100    4. Parkinson's disease (H)    5. Benign essential hypertension    6. S/P deep brain stimulator placement    7. Constipation, unspecified constipation type    8. Gastroesophageal reflux disease with esophagitis    9. Hypovitaminosis D         Assessment   Full assessment completed in previous note.  Patient is stable to discharge today and PT session this afternoon now clearing patient to return home with home care.    Coordination of Care and Referrals: Spoke with patient and provided options of home care. Patient interested in [facility] and referral initiated for RN, PT, OT.  Patient reports one of her adult children could provide transportation this evening at 6:30PM    RNCC updated Dr. Childs with [facility].        Plan  Anticipated Discharge Date:  today  Anticipated Discharge Plan:  home    Bre Shelton RN Care Coordinator

## 2018-10-04 NOTE — PLAN OF CARE
"Problem: Patient Care Overview  Goal: Plan of Care/Patient Progress Review  Outcome: Improving  /75  Temp 96.4  F (35.8  C) (Oral)  Resp 16  Ht 1.6 m (5' 3\")  SpO2 100%  BMI 24.17 kg/m2   Afebrile, frequent, urgency with urination, on ciprofaxin, incision discomfort, up with assist of 1 and a walker, small hard stool, tolerating diet, no nausea, cont with POC      "

## 2018-10-04 NOTE — PROGRESS NOTES
[facility]  Met with pt to discuss plans for HC.  Pt to be discharged home 10/4 and has agreed to have [facility] follow with services of SN, PT and OT. Patient care support center processing referral.  Pt verbalized understanding that initial visit is scheduled for  1 to 2 days after discharge.   Pt has 24 hour phone number for [facility] for any questions or concerns.    Thank you  Eva Naik RN, BSN  [facility] Liaison  [facility]  [phone]

## 2018-10-04 NOTE — PROGRESS NOTES
[facility], [place]    Internal Medicine Progress Note - [facility] 4 Service    Main Plans for Today   - dispo planning    Assessment & Plan   Gabi Manley is a 78 year old female with history of Parkinson's s/p DBS placement recently (most recent procedure of three completed 9/18/2018), HTN/HLD, who was admitted 10/1 with pyelonephritis, found to have E. Coli bacteremia.    # Pyelonephritis, improving  Recently Rxed for E. Coli UTI with Bactrim, but E. Coli was resistant. Admitted with fall, abdominal pain, fecal/urinary incontinence. WBC at OSH 14.4. Blood culture w/ E. Coli. Urine culture negative, but received Abx prior to admission here.  - continue ciprofloxacin  - trending blood cultures    # ISMA, resolved.  Admit creatinine 1.01 mg/dl, from baseline of 0.8-0.9 mg/dl.    CHRONIC PROBLEMS  # HTN: hold amlodipine for now, restart on discharge  # HLD: pta atorvastatin  # Parkinson's: pta carbidopa-levodopa, senna-docusate PRN  # GERD: pta PPI  # Depression: pta escitalopram    Diet: Combination Diet Regular Diet Adult  Fluids: Taking PO  Lines: PIV  Medel Catheter: not present    DVT Prophylaxis: Pneumatic Compression Devices  Code Status: Full Code  Disposition Plan   Expected discharge: Tomorrow, recommended to transitional care unit once safe disposition plan/ TCU bed available.     Entered: Esdras Childs MD 10/04/2018, 3:02 PM   Information in the above section will display in the discharge planner report.      The patient's care was discussed with the Attending Physician, Dr. Eckert and Patient.    Esdras Childs  [facility] 4  Pager: [phone]  Please see sticky note for cross cover information    Interval History   Overnight doing well. Some persistent abdominal pain. Working with PT/OT. Frustrated with delays with her Parkinson's treatments.    Physical Exam   Vital Signs: Temp: 96.4  F (35.8  C) Temp src: Oral BP: 137/75   Heart Rate: 71 Resp: 16 SpO2: 100 %  O2 Device: None (Room air)    Weight: 0 lbs 0 oz  General Appearance: Laying in bed, tired appearing, no acute distress  Respiratory: CTAB  Cardiovascular: RRR, no extra sounds  GI: Mild RUQ tenderness w/o guarding; no rebound  Skin: No rash on exposed skin  Ext: No RIGO    Data   Reviewed  Physician Attestation   Luis CLANCY saw this patient with the resident and agree with the resident s findings and plan of care as documented in the resident s note    I personally reviewed vital signs, medications, labs and imaging.    Luis Villalta  Date of Service (when I saw the patient): 10/4/18

## 2018-10-04 NOTE — PLAN OF CARE
Problem: Patient Care Overview  Goal: Plan of Care/Patient Progress Review  Discharge Planner PT   Patient plan for discharge:  Home with home PT/OT  Current status: Pt transfers with SBA, completed BSC transfer and use of toilet/pericares with SBA. Pt ambulated >300 ft in hallway with 4WW, SBA. Pt without concerns for discharge to home, agreeable to home PT/OT.    Barriers to return to prior living situation: none  Recommendations for discharge: home with home PT/OT   Rationale for recommendations:  assess mobility in home environment/home safety eval       Entered by: Elizabet Love 10/04/2018 3:01 PM

## 2018-10-04 NOTE — PROGRESS NOTES
Social Work Services Progress Note    Hospital Day: 4  Date of Initial Social Work Evaluation:  10/3/18- please see for details  Collaborated with:  Chart review, team rounds, medical team, [facility] (p. [phone], f. [phone])    Data:  Pt is a 78 year old female admitted from home/transfer from OSH due to UTI/pyeloneprhtis. TCU is recommended for pt and a referral has been made. Received update from pt's medical team that pt is stable for discharge and will be on oral abx.     It should be noted that if pt gets accepted to a TCU today this would be a private pay TCU stay for pt as she has not had a 3 night inpatient stay yet.     Intervention:  CHARISSE contacted [facility] for admissions inquiring about status of referral, waiting to hear back. Kilo called back reporting pt is accepted for today after 5pm. Due to pt not having a 3 night stay facility would require a $3,000 down payment via check or card. Facility will hold bed for pt until tomorrow.     CHARISSE met with pt and pt's daughter in law Johnson in pt's room prior to update from [facility]. CHARISSE explained that plan is unknown as SW is waiting for call from [facility]. CHARISSE explained that if pt is accepted to [facility] today, being that she is medically ready for discharge, and has not had a 3 night stay, pt's TCU stay would be private pay. Elizabeth reported that family will transport pt at discharge.     SW returned to pt's room after receiving update from [facility]. Pt presented as frustrated, stating it is hard to get answers here, and talked about SW meeting with her then not coming back for 16 hours then only having a partial answer. SW explained that it can take time to hear back from TCUs and explained SW working hours, but validated pt's frustrations. SW explained pt's acceptance at TCU and down payment required. Pt reported she can't afford the down payment. Pt reported she hasn't  worked with PT or OT yet today but reported she can move fine. CHARISSE explained that PT/OT will be contacted to assess pt's needs today. SW inquired about pt increasing her services at her building, as discussed with RNCC. Pt reported she has tried getting information about this from her facility but she hasn't been able to get pricing information or any other information. SW explained that RNCC will be contacted about this.     CHARISSE contacted RNCC Bre and explained pt's situation with discharge planning and asked that she contact pt's facility to inquire about increasing services, what this would require and what it would cost. CHARISSE was updated by Bre that pt would need to be assessed for services, which would not be able to happen today.     CHARISSE spoke with PT staff and explained pt's situation and was updated after PT saw pt and was informed they are recommending a discharge to home with home PT/OT and feel pt doesn't need increased services. CHARISSE informed RNCC Bre of this and she will see pt about home care- please see RNCC documentation for details.     Assessment:  See bedside RN, PT/OT, medical team notes    Plan:    Anticipated Disposition:  TBD    Barriers to d/c plan:  Pt can't afford to privately pay for TCU    Follow Up:  TBD, SW will continue to follow as appropriate    LISA Benitez, MSW  7B   [phone] (pager) [phone]  10/4/2018

## 2018-10-05 NOTE — PLAN OF CARE
Problem: Patient Care Overview  Goal: Plan of Care/Patient Progress Review  Occupational Therapy Discharge Summary    Reason for therapy discharge:    Discharged to home.    Progress towards therapy goal(s). See goals on Care Plan in [facility] electronic health record for goal details.  Goals partially met.  Barriers to achieving goals:   discharge from facility.    Therapy recommendation(s):    Continued therapy is recommended.  Rationale/Recommendations:  Had previously recommended TCU, but pt not seen by discharging therapist on date of discharge.

## 2018-10-05 NOTE — PLAN OF CARE
Problem: Patient Care Overview  Goal: Plan of Care/Patient Progress Review  Outcome: Adequate for Discharge Date Met: 10/04/18  Patient's PIV was removed, discharge teaching performed, all questions addressed, vitally stable patient was discharged.

## 2018-10-05 NOTE — PLAN OF CARE
Problem: Patient Care Overview  Goal: Plan of Care/Patient Progress Review  Physical Therapy Discharge Summary    Reason for therapy discharge:    Discharged to home.    Progress towards therapy goal(s). See goals on Care Plan in [facility] electronic health record for goal details.  Goals partially met.  Barriers to achieving goals:   discharge from facility.    Therapy recommendation(s):    Assist from family at home

## 2018-10-12 NOTE — TELEPHONE ENCOUNTER
2nd side initial programming appointment needed to be rescheduled due to Eileen Anton being out of the clinic 10/19. Appointment rescheduled to 10:20 am with Dr. Yanes on same day. CT scan rescheduled for 2:20pm.    I called to notify patient but received message that number was unable to accept calls at this time. I will try again next week and also send a [place] message, as I know she often uses [employer].    Bre Pineda, RN, MPH  Research Nurse, Movement Disorders

## 2018-10-14 NOTE — OP NOTE
PATIENT NAME: ALEX STERN  YOB: 1940  MRN:   [id number]  ACCOUNT:  [id number]      DATE OF PROCEDURE:  09/18/2018    PREOPERATIVE DIAGNOSIS:   1.  Parkinson's disease  2.  Tremor, mostly on the right side  3.  S/p left side deep brain stimulator placement, phase I, placement of left side deep brain stimulator electrode, target left globus pallidus internus, with microelectrode recording on 8/7/2018.  4.  S/p deep brain stimulator placement, phase II, placement of new deep brain stimulator generator/battery over left chest wall on 8/21/2018.    POSTOPERATIVE DIAGNOSIS:   1.  Parkinson's disease  2.  Tremor, mostly on the right side  3.  S/p left side deep brain stimulator placement, phase I, placement of left side deep brain stimulator electrode, target left globus pallidus internus, with microelectrode recording on 8/7/2018.  4.  S/p deep brain stimulator placement, phase II, placement of new deep brain stimulator generator/battery over left chest wall on 8/21/2018.    PROCEDURE PERFORMED:  1.  Placement of CRW stereotactic head frame.  2.  Stereotactic neuronavigation planning CT of the head.  3.  Stereotactic neuronavigation using [employer] system for surgical planning, targeting and approach: target is right globus pallidus internus.  4.  Right side deep brain stimulator placement, phase I, placement of right side deep brain stimulator electrode: target is right globus pallidus internus.  5.  Use of intraoperative microelectrode recording.  6.  Use of intraoperative fluoroscopy.  7.  Deep brain stimulator placement, phase II, connection of the right side deep brain stimulator electrode, single array, to the existing generator/battery.  8.  Electrical interrogation and analysis of deep brain stimulator system.    ATTENDING SURGEON:  Murray Stearns MD, PhD.    ASSISTANT SURGEON:  None.  No residents were available.    ANESTHESIA:  Monitored anesthesia care and local anesthetic.    ESTIMATED BLOOD  LOSS:  10 mL.    COMPLICATIONS:  None.    FINDINGS:  Successful testing, electrode connected to preexisting extension wire.  Final electrode location, 2 mm lateral move of X-Y stage, anterior Lenny Gun position, 1.1 mm below the target.  All impedances within normal.  No problems found.    IMPLANT:  1.  Abbott DBS tawnya hole cover, Guardian, REF# [id number], Lot# [id number].  2.  Garcia DBS electrode, Infinity 0.5, REF# [id number], S/N [id number].    INDICATIONS FOR PROCEDURE:  Ms. Gabi Manley returns today for her ongoing Deep Brain Stimulation surgery.  She is s/p left side deep brain stimulator placement, phase I, placement of left side deep brain stimulator electrode, target left globus pallidus internus, with microelectrode recording on 8/7/2018 and s/p deep brain stimulator placement, phase II, placement of deep brain stimulator generator/battery over the left chest wall on 8/21/2018.  Patient tolerated the procedure well and there were no complications.  Her phase I postoperative course was uneventful and her postoperative CT head was without any concerning findings.  She was discharged to home on POD#1.  Patient only complains of some mild headaches.  Her phase II postoperative course was also uneventful and she has been healing well from both surgeries.  Otherwise, she denies any fevers, chills, nausea, vomiting, dizziness, weakness, numbness or seizure like activity.  She also had no issues with her incisions and they have remained clean/dry and intact.  Her PD symptoms did come back to her baseline so her lesion effect was short lived.  She had her left side DBS stimulation turned on 9/14/2018 and her symptoms are now well controlled on the right side.  She is very pleased with the DBS at this time.  Briefly, she is a 78 year old woman with a history of Idiopathic Parkinson s disease.  Her symptoms began in 2009 when she was unable to step up into her daughter s house.  She then developed resting tremor in her hands. She had  good control of her symptoms for many years, but is now having increased side-effects.  She is now having motor fluctuations with wearing off and peak dose dyskinesia.  She also has freezing gait and cramps in her left leg.  She reports that her tremor is mostly on her right side.  Recently, Dr. Webb has changed her carbidopa/levodopa from long acting to immediate release every 2 hours.  She is telling me that she feels worse with this and she thinks that her dyskinesia is worse.  She is a bilateral candidate in a staged fashion, she is now here for the phase I and II combined for the placement of the DBS electrode on the right side under MAC and microelectrode recording followed by connection to the previously implanted generator/battery.  Her left side system has been turned on and she is getting effective benefit from the stimulation.  Risks, benefits, alternative therapies were discussed with the patient, including but not limited to infection and bleeding (intracranial), injury to the brain, stroke, death, hardware failure and possible need for more surgeries.  Surgical procedure was discussed in detail.  All questions were answered, and she expressed understanding.     DESCRIPTION OF PROCEDURE:    CRW head frame placement and stereotactic neuronavigation.     The patient was brought to the operating room and placed in a sitting position in her bed.  Brief timeout was performed for placement of the CRW head frame.  She was given sedation to make her comfortable.  Intended pin sites, two in the front and two in the back of the head, were cleaned and were injected with local anesthetic, 2% Lidocaine with epinephrine.  Total of 24 mL were used.  Then, CRW stereotactic head frame was placed onto her head with the four pins for fixation.  Care was taken so that the fiducial box would fit over the head and the frame.  Also, posterior left pin site was carefully chosen to stay away from the buried extension  wires.  Once the head frame was on, the fiducial box was easily placed without interference from her forehead.  The patient tolerated the procedure well and her sedation was lightened and she was awakened.     After the CRW stereotactic head frame was placed, she was taken directly to the CT scanner, at which time CT of the head stereotactic protocol was obtained.  Subsequently, the patient was taken back up to the operating room where she was placed supine on the operative bed with the CRW head frame affixed to the bed as well.  Patient was in a slight sitting up position with the neck in a neutral position and she was made comfortable.  The bed was positioned so that it would be a beach chair reclining position (head up, legs down, body trendelenburg).  All pressure points were well padded.  Care was taken to make sure that the neck was in neutral position and that the [place] head casarez device had room for manipulation in case more flexion or extension is needed throughout the case.    At this time, attention was turned to the neuro navigation imaging that was obtained.  The Stealth neuronavigation device was loaded with the CT head that had just been obtained.  The device also had an MRI of the head, stereotactic protocol, that was obtained prior to surgery.  We also utilized MRI brain 7 Mini alpha protocol along with the Stealth MRI to assist with the localization and targeting of the globus pallidus internus.  CT of the head was merged with the previously obtained MRI of the brain.  The merge demonstrated good coherence/registration.  Then, using this merged image, neuronavigation was used to stereotactically target the right globus pallidus internus (GPi).  The technique used was the AC-PC line localization technique to target the GPi using stereotactic coordinates, adjustment with available 7T alpha MRI images, and the X, Y and Z as well as the ring and arc angles for the CRW head frame were obtained for  the right side.  The entry into the skull, as well as the trajectory of the electrode were checked with a probe's eye view to avoid any sulci, ventricle or vascular structures.       The stereotactic coordinates for the right side was then transferred to the [facility] stereotactic targeting apparatus as well as the phantom base.  The coordinates were confirmed and double checked for accuracy.  Accuracy was also confirmed using phantom base.  We also moved the target by 2 mm posteriorly so that the anterior Lenny Gun would be the target.  The coordinates were X = +14.9, Y = +5.8, Z =+8.6, ring angle = 66.0 degrees anterior, and arc angle = 10.5 degrees to the right.     At this time, attention was turned to the patient.  Using a hair clipper, her hair over the right frontal area was clipped using the surgical clipper.  A semicircular incision was planned on the right side and this was marked.  Then, the surgical area was prepped and draped in routine surgical fashion.  We also prepped the area posterior to this as well as area towards the left parietal area where the previously implanted connector portion of the extension wire is located.  The patient was also made comfortable.  Medel catheter was also placed.    Timeout was then performed confirming the patient's identity, procedure to be performed, side and site of surgery identified, imaging corresponding with the patient and administration of preoperative prophylactic antibiotic.    Right-sided electrode placement with microelectrode recording: Target right GPi.     The CRW targeting apparatus was attached to the head frame on top of the sterile drapes.  The entry point was marked on the scalp on the right side.  A C-shaped incision was made with a skin knife, after the area was injected with local anesthetic, 1% Lidocaine with epinephrine and 1/2% Marcaine plain, 50:50 mix.  The area posterior to the incision was also injected as a pocket would be created.  Area  posterior medial, towards the left parietal area where the previously buried extension wire is located, was also injected as the electrode connector will be tunneled here later.  Total of 18 mL of the above mentioned local anesthetic was used.  The incision was then further carried down to the pericranium.  Hemostasis was obtained using bipolar cautery.  Thin layer of pericranial tissue was left behind on the scalp and the skin flap was reflected posteriorly.  Then, using a blunt dissection technique, a pocket was created posteriorly as well as a tract made towards the left parietal area for later use.    At this time, the targeting apparatus was again positioned and the entry point to the skull was marked.  Area of the intended bur hole was cleaned and pericranial tissue removed.  Then using an acorn drill, bur hole was created over this entry point to expose the dura.  The area was vigorously irrigated and bone wax used to control the bone bleeding.  Dura was coagulated with bipolar cautery for hemostasis, and again no active bleeding was noted.     At this time, the electrode fixation cover was fixed to the skull using two screws.  Care was taken to overlap the pericranium over the edge of the cover to provide a smooth tissue transition.  Then, the electrode drive was attached to the targeting apparatus.  The entry into the dura was again checked.  It appeared that all positions of the Lenny Gun electrode casarez were accessible without any interference from the bone edge.  Then using a Bovie cautery and a #4 Penfield instrument, opening was made into the dura, as well as a small corticectomy.  No active bleeding was noted.    Dr. Ever García and Dr. Venancio Enrique of the Neurology Department participated in the recording and neurological testing.  During the microelectrode recording and testing, Dr. García and Dr. Enrique took detailed notes of the electrophysiologic data and neurologic exam as well as any stimulation  effects.    At this time, the electrode guides were inserted in the anterior, lateral and posterior Lenny Gun positions and they were advanced slowly until they were fully inserted at which point the tips would be well above the target.  No abnormal resistance was noted.  Duraseal was used to seal the entry site to provide a seal and to minimize cerebrospinal fluid leak and air entry.  Then, recording microelectrodes were brought in and placed within the guide tubes and they were connected for intraoperative recording.  The tips of the electrode were now 15 mm above target.  The patient was awakened.  Then, using a micro drive, the electrodes were slowly advanced towards the target, collecting microelectrode recording data for mapping the tract.  Throughout the tract, good quality expected recording was observed.  As the electrode was advanced, striatum, then globus pallidus externus followed by globus pallidus internus activity were noted.  The borders or transition points were easily identified.  The anterior electrode recordings identified approximately 5.9 mm of GPi with somatosensory response activity in the knee and equivocal upper extremity.  No optic tract was identified.  No internal capsular effects were also noted with micro and ring stimulation.  The lateral electrode recordings identified approximately 5.3 mm of GPi with somatosensory response activity related to shoulder and hip.  Optic tract was identified at 2.0 to 2.3 mm below GPi.  The posterior electrode recordings identified no activity that was consistent with GPi.  No optic tract was identified.  Internal capsular effects were noted with ring stimulation at target depth with 2.0 mA stimulation.    Based on the electrophysiological data collected, it was decided that we would need to explore the area lateral of the current Lenny Gun location.    The microelectrode recording continued.  The electrodes were then pulled back to the initial position, 15  mm above the target, with the micro drive and then pulled out of the guide tubes.  Then, the guide tubes were pulled out of the brain.  Then, the X-Y frame was shifted 2.0 mm laterally for a frame shift.   Again, the electrode guides were inserted in the anterior, lateral and posterior Lenny Gun positions and they were advanced slowly until they were fully inserted at which point the tips would be well above the target.  No abnormal resistance was noted.  Duraseal was used to seal the entry site to provide a seal and to minimize cerebrospinal fluid leak and air entry.  Then, recording microelectrodes were brought in and placed within the guide tubes and they were connected for intraoperative recording.  The tips of the electrode were now 15 mm above target.  Patient remained awake.  Then, using a micro drive, the electrodes were slowly advanced towards the target, collecting microelectrode recording data for mapping the tract.  Throughout the tract, good quality expected recording was observed.  As the electrode was advanced, striatum, then globus pallidus externus followed by globus pallidus internus activity were noted.  The borders or transition points were easily identified.  The anterior electrode recordings identified approximately 5.7 mm of GPi with somatosensory response activity in the shoulder.  No optic tract activity could be heard but phosphenes were noted at 0/7 mm below GPi border.  The lateral electrode recordings identified approximately 4.4 mm to 4.9 mm of GPi with somatosensory response activity related to the wrist.  No optic tract activity was heard.  The posterior electrode recordings identified 3.9 mm of GPi with somatosensory response activity related to the shoulder and wrist.  No optic tract activity was heard.  Internal capsular effects were noted with micro stimulation with 90 microamp stimulation.    Based on the mapping data, it was decided that the current anterior Lenny Gun position may  be a good placement for the permanent electrode.  The electrode drive was then positioned to the desired position with the micro drive and the electrodes pulled out of the guide tubes.  The permanent DBS electrode was brought into the field and placed in the anterior guide tube with the tip being placed at 1.1 mm below the target depth.  The electrode demonstrated good impedance values.  The electrode was tested and the stimulation yielded the followin- 3+ with internal capsule threshold at 7.0 mA, with patient reporting pulling at the back of the neck.  There was decrease in tremor and rigidity noted starting at 1.0 mA..    With the satisfactory testing of the electrode position and the stimulation parameters, the electrode was secured at this location.  The patient was again made comfortable.  The guide tubes were removed.  Duraseal was again used to seal any opening.  The area was generously irrigated.  Hemostasis was also obtained.  Fluoroscopy was brought into the field to test that the electrode did not move with each manipulation.  The electrode tip was seen to be below the target, as expected.  The electrode clamp was applied to hold the electrode.  Then, the electrode stylet was removed.  The electrode was then removed from the electrode casarez.  The cap cover was finally placed and secured in place.  Fluoroscopy confirmed that the tip of the electrode did not change in position with each manipulation.    Connection of the right side deep brain stimulator electrode, single electrode array, to the generator/battery.    At this time, attention was turned to the left parietal area where the previously buried connector portion of the extension wire, intended for this right side, was located.  This area was also injected with local anesthetic that was previously mentioned.  Using a #10 blade, 3 cm incision was made over the buried connector.  Care was taken to only expose the connector and not harm the  hardware around it.  The connector was visualized.    Then, using a tunneler made out of the electrode protector which came with the packaging, a passage was created between the right frontal incision and left parietal incision.  Using this, the newly placed right side DBS electrode was tunneled to the extension wire connector site.  Then the tunneler was also pulled out, thus leaving the tunneled right side wire.  The connection was cleaned.  Then, the newly placed right GPi electrode was connected to the previously buried extension wire and secured with a screw .  Then, the connector was buried within the left parietal pocket.  From the right frontal incision, excess wire was placed posterior to the incision, in the pocket previously created.  Final fluoroscopy image confirmed that the tip did not move.  Both wounds were then generously irrigated.    With the proper placement and connection of the deep brain stimulator system, electrical interrogation and analysis was performed.  All the impedance values were noted to be within normal.    We began closing the wound.  The right frontal incision was reapproximated first.  The galeal layer was reapproximated using 3-0 Vicryl sutures in an inverted interrupted fashion and the skin was reapproximated using 4-0 Monocryl suture in a running fashion.  The wound was cleaned and dried, reprepped with ChoraPrep and Dermabond was applied.  Attention was then turned to the left parietal area.  The galeal layer was reapproximated using 3-0 Vicryl sutures in an inverted interrupted fashion and the skin was reapproximated using 4-0 Monocryl suture in a running fashion.  The wound was cleaned and dried, reprepped with ChoraPrep and Dermabond was applied.    Removal of the head frame and end of procedure.    First, the stereotactic targeting apparatus was removed.  Then, the sterile drapes were removed.  Subsequently, the patient was taken out of the [facility] head frame.  The four  pin sites were clean and dry and no active bleeding was noted.  Antibiotic ointment was applied to the pin sites.    Patient was further awakened and taken to the recovery room in a stable condition.  At the end of the case, all counts including needle, sponge and instrument counts were correct x 2.  There were no complications.      Rickey CLANCY M.D., Ph.D., Neurosurgery Attending, was present and scrubbed for the entire case and performed the entire case.      RICKEY LAWRENCE MD, PHD

## 2018-10-15 NOTE — TELEPHONE ENCOUNTER
Was able to leave voicemail with changed appointment time instructions and asked patient to return my call to confirm.     10/15/18 Bre Pineda, RN, MPH  Research Nurse, Movement Disorders'

## 2018-10-17 NOTE — TELEPHONE ENCOUNTER
"Spoke with patient on 10/17/18 2:50pm and confirmed 10:20 appt with Dr. Yanes on 10/19/18 and CT scan following. Reviewed holding carbidopa-levodopa for 12 hours prior to appointment, bringing her meds, and bringing her .     Patient states she has been having different nurses and therapists visit her apartment following her hospital stay as she did not qualify for any insurance coverage for the rehab facility. When asked how she has been doing since, patient states, \"I\"m better, but not back to normal\". She has noticed a decrease in her strength.      Bre Pineda, RN, MPH  Research Nurse, Movement Disorders    "

## 2018-10-19 NOTE — MR AVS SNAPSHOT
After Visit Summary   10/19/2018    Gaib Manley    MRN: [id number]           Patient Information     Date Of Birth          1940        Visit Information        Provider Department      10/19/2018 10:20 AM Cesar Yanes MD [facility] Neurology        Care Instructions    We completed programming for the right brain today.    Please come back to see Eileen 11/16/18          Follow-ups after your visit        Your next 10 appointments already scheduled     Oct 19, 2018  2:20 PM CDT   CT HEAD W/O CONTRAST with UCCT2   [facility] CT ([facility])    [address] [address] [phone]           How do I prepare for my exam? (Food and drink instructions) No Food and Drink Restrictions.  How do I prepare for my exam? (Other instructions) You do not need to do anything special to prepare for this exam. For a sinus scan: Use your nose spray (nasal decongestant spray) as directed.  What should I wear: Please wear loose clothing, such as a sweat suit or jogging clothes. Avoid snaps, zippers and other metal. We may ask you to undress and put on a hospital gown.  How long does the exam take: Most scans take less than 20 minutes.  What should I bring: Please bring any scans or X-rays taken at other hospitals, if similar tests were done. Also bring a list of your medicines, including vitamins, minerals and over-the-counter drugs. It is safest to leave personal items at home.  Do I need a : No  is needed.  What do I need to tell my doctor? Be sure to tell your doctor: * If you have any allergies. * If there s any chance you are pregnant. * If you are breastfeeding.  What should I do after the exam: No restrictions, You may resume normal activities.  What is this test: A CT (computed tomography) scan is a series of pictures that allows us to look inside your body. The scanner creates images of the body in cross sections,  much like slices of bread. This helps us see any problems more clearly.  Who should I call with questions: If you have any questions, please call the Imaging Department where you will have your exam. Directions, parking instructions, and other information is available on our website, [email or web address].              Who to contact     Please call your clinic at [phone] to:    Ask questions about your health    Make or cancel appointments    Discuss your medicines    Learn about your test results    Speak to your doctor            Additional Information About Your Visit        Project [email or web address] digital Information     [employer] gives you secure access to your electronic health record. If you see a primary care provider, you can also send messages to your care team and make appointments. If you have questions, please call your primary care clinic.  If you do not have a primary care provider, please call [phone] and they will assist you.      [employer] is an electronic gateway that provides easy, online access to your medical records. With [employer], you can request a clinic appointment, read your test results, renew a prescription or communicate with your care team.     To access your existing account, please contact your [facility] or call [phone] for assistance.        Care EveryWhere ID     This is your Care EveryWhere ID. This could be used by other organizations to access your [facility] medical records  [id number]        Your Vitals Were     Pulse Pulse Oximetry Breastfeeding?             65 97% No          Blood Pressure from Last 3 Encounters:   10/19/18 114/65   10/04/18 155/88   09/19/18 125/76    Weight from Last 3 Encounters:   09/19/18 61.9 kg (136 lb 7.4 oz)   09/14/18 61.3 kg (135 lb 1.6 oz)   08/21/18 60.3 kg (132 lb 15 oz)              Today, you had the following     No orders found for display         Today's Medication Changes          These changes are accurate as of  10/19/18  1:27 PM.  If you have any questions, ask your nurse or doctor.               Stop taking these medicines if you haven't already. Please contact your care team if you have questions.     ciprofloxacin 500 MG tablet   Commonly known as:  CIPRO   Stopped by:  Cesar Yanes MD                    Primary Care Provider Office Phone # Fax #    Tracey Abdi [phone]       [address] [address]        Equal Access to Services     JULIANNE GRAY : Hadii aad ku hadasho Soomaali, waaxda luqadaha, qaybta kaalmada adeegyada, waxay idiin hayaan adeeg kharash labrooke . So [facility] [phone].    ATENCIÓN: Si habla español, tiene a concepcion disposición servicios gratuitos de asistencia lingüística. [facility] [phone].    We comply with applicable federal civil rights laws and [place] laws. We do not discriminate on the basis of race, color, national origin, age, disability, sex, sexual orientation, or gender identity.            Thank you!     Thank you for choosing [facility] NEUROLOGY  for your care. Our goal is always to provide you with excellent care. Hearing back from our patients is one way we can continue to improve our services. Please take a few minutes to complete the written survey that you may receive in the mail after your visit with us. Thank you!             Your Updated Medication List - Protect others around you: Learn how to safely use, store and throw away your medicines at www.disposemymeds.org.          This list is accurate as of 10/19/18  1:27 PM.  Always use your most recent med list.                   Brand Name Dispense Instructions for use Diagnosis    ACETAMIN PO      Take 500 mg by mouth as needed        amLODIPine 5 MG tablet    NORVASC     Take 5 mg by mouth every morning        atorvastatin 20 MG tablet    LIPITOR     Take 20 mg by mouth every morning        carbidopa-levodopa  MG per tablet    SINEMET    360 tablet    Patient is  taking 1.25 tablets every two hours up to 12 tablets per day.        escitalopram 10 MG tablet    LEXAPRO     Take 10 mg by mouth every morning        oxyCODONE IR 5 MG tablet    ROXICODONE    30 tablet    Take 1-2 tablets (5-10 mg) by mouth every 4 hours as needed    S/P deep brain stimulator placement       pantoprazole 20 MG EC tablet    PROTONIX     Take 20 mg by mouth        senna-docusate 8.6-50 MG per tablet    SENOKOT-S;PERICOLACE     Takes it as needed.    S/P deep brain stimulator placement       VITAMIN D-1000 MAX ST 1000 units Tabs   Generic drug:  cholecalciferol      Take 2,000 Units by mouth every morning

## 2018-10-19 NOTE — PROGRESS NOTES
[facility] Neurology  Movement Disorders Clinic    Gabi Manley  YOB: 1940  MRN: [id number]    Reason for visit: Follow up for Parkinson disease, DBS programming    Deep brain stimulation:   Device: Abbott Infinity   Lead location: Left GPi implanted 8/7/2018, Right GPi implanted 9/18/2018  IPG: Located left chest Abbott Infinity 7, REF [id number], SN [id number], Implanted 8/21/2018      History of Present Illness:    Gabi Manley is a 78 year old right handed woman with Parkinson disease s/p bilateral GPi DBS who returns to clinic for initial programming of the second side (right GPi). She was last seen in clinic 9/14/2018 at which time she completed monopolar survey and initial programming for the left GPi (for right body). In the interim, she was implanted with DBS for the right GPi. At the end of her hospital stay she was diagnosed with a UTI and discharged with Bactrim. Unfortunately, her urinary tract infection progressed to pyelonephritis. She was treated for E coli infection resistant to Bactrim, requiring another short hospitalization. She has felt fatigued and slower since the infection.     She responded well to programming the first side (left GPi for right body). She had practically no shaking on the right side. At this moment she is very uncomfortable off meds and has some tremor on both sides. She feels very weak off meds as well.      MEDICATIONS:  PD Medications 6 am 8 am 10 am 12 pm 2 pm 4 pm 6 pm 8 pm   Sinemet 25/100 mg  1.25 1.25 1.25 1.25 1.25 1.25 1.25 1.25     Her last dose of Sinemet was 8pm last night.     She denied any new medications currently. She finished ciprofloxacin, no longer taking.     Oxycodone 5 mg prn - took first two days after DBS surgery, has not taken since  Sennat 8.6/50 mg bid  Protonix 20 mg daily  Lexapro 20 mg daily  Vitamin D 1000 mg daily  Atorvastatin 20 mg daily  Amlodipine 5 mg daily  Tylenol 500 mg 2 tabs as needed - on average twice per week for  headache      PAST MEDICAL HISTORY:  Reviewed and updated in Epic and with patient    Recently treated for pyelonephritis with ciprofloxacin. She has felt greater fatigue and general slowness since she was diagnosed and treated. She has yet to see her PCP since discharge.    No other new medical problems    SOCIAL HISTORY:      Review of Systems:  12 point review of systems completed. Pertinent positives and negatives above and in HPI    Physical Exam:    Vitals: /65 (BP Location: Right arm, Patient Position: Chair, Cuff Size: Adult Regular)  Pulse 65  SpO2 97%  Breastfeeding? No  General: Cooperative, no acute distress. Became increasingly fatigued with programming.   HEENT: Normocephalic and nontraumatic, sclera white, moist mucous membranes  Cardiac: Regular rate and rhythm.   Respiratory: Nonlabored breathing  Extremities: Distal pulses intact. No UE or LE edema.   Skin: Incision sites are healing well over scalp and left chest. No rashes.     NEUROLOGIC:  MENTAL STATUS: Fully alert, attentive and oriented.  SPEECH: Mild hypophonia, improved with stimulation and medication.  FACIAL EXPRESSION: Mild hypomimia    CRANIAL NERVES: EOM full. Saccades intact. No apraxia of eyelid opening. Left palpebral fissure slightly asymmetric, not related to stimulation (seen off stim). No other facial asymmetry. No dysarthria.    MOTOR:   INVOLUNTARY MOVEMENTS - Resting tremor fluctuated through exam, from 2 to 3 in the bilateral hands off meds and off stimulation to 1 bilaterally with on stim & on meds. See UPDRS  AGILITY - Bradykinesia was mild on the right, moderate on the left prior to programming. Improved to mild bilaterally on stimulation and on medication. It is notable that movements were limited by her fatigue.   TONE - Slight axial and mild appendicular rigidity on baseline exam - on bilateral stimulation appendicular tone was normal.     COORDINATION: No dysmetria with FNF  GAIT: Able to rise from chair with  arms crossed over chest but with multiple attempts. Posture stooped. Asymmetric stride. Required walker to steady balance. Pull test abnormal, postural instability with gentle pull at shoulders.       UDPRS Part I and II    Part I    -- Over the last week -- 0: Normal -- 1: Slight -- 2: Mild -- 3: Moderate -- 4: Severe  1.1 Cognitive Impairment: 0   1.2 Hallucinations and psychosis: 0   1.3 Depressed mood: 0   1.4 Anxious mood: 0   1.5 Apathy:  2   1.6 Features of DDS:  0   1.7 Sleep problems:  1   1.8 Daytime sleepiness:  1   1.9 Pain and other sensations:  0   1.10 Urinary problems:  4   1.11 Constipation problems:   0   1.12 Lightheadedness on standin   1.13 Fatigue:  2     Total:    10       Part II    -- Over the last week -- 0: Normal -- 1: Slight -- 2: Mild -- 3: Moderate -- 4: Severe   2.1 Speech: 1   2.2 Saliva and droolin   2.3 Chewing and swallowin   2.4 Eating tasks: 0   2.5 Dressin   2.6 Hygiene:  1   2.7 Handwritin   2.8 Doing hobbies and other activities:  2   2.9 Turning in bed:  2   2.10 Tremor:  3   2.11 Getting out of bed/car/deep chair:   2   2.12 Walking and balance: 3   2.13 Freezin     Total:    19 (17)     Total Part I and II:    MDS-UPDRS Part III  1229pm - she took carbidopa/levodopa 25/100 mg 1.25 tabs   At 1 pm she took an additional 1/2 tab  Third exam completed 1:15 pm     0: Normal -- 1: Slight -- 2: Mild -- 3: Moderate -- 4: Severe      UPDRS Values 10/19/2018 10/19/2018 10/19/2018   Medication Off Off On   R Brain DBS: None On On   L Brain DBS: On On On   Speech 2 0 0   Facial Expression 2 2 1   Rigidity Neck 1 1 1   Rigidity RUE 0 1 0   Rigidity LUE 2 1 0   Rigidity RLE 1 0 0   Rigidity LLE 1 0 0   Finger Taps R 1 2 2   Finger Taps L 3 2 2   Hand Mvt R 1 2 2   Hand Mvt L 3 3 1   Pron-/Supinate R 1 1 1   Pron-/Supinate L 3 3 1   Toe Tap R 2 2 2   Toe Tap L 3 2 2   Leg Agility R 0 0 0   Leg Agility L 3 3 1   Arise From Chair 1 1 1   Gait 3 3 3   Gait  "Freezing 0 0 0   Postural Stability 3 3 3   Posture 2 2 2   Global Spont Mvt 1 1 1   Postural Tremor RUE 0 0 0   Postural Tremor LUE 0 0 0   Kinetic Tremor RUE 0 0 0   Kinetic Tremor LUE 1 1 1   Rest Tremor RUE 2 1 2   Rest Tremor LUE 2 1 1   Rest Tremor RLE 0 1 1   Rest Tremor LLE 1 0 0   Rest Tremor Lip/Jaw 0 0 0   Rest Tremor Constancy 3 3 1   Total Right 8 10 10   Total Left 22 16 9   Axial Total 15 13 12   Total 48 42 32       PROCEDURE - DBS PROGRAMMING RECORD    Battery status: >3.00 v  ON/OFF: Left GPi ON, right GPi OFF  Implanted generator: Abbott Infinity 7  Lead site(s):Left GPi 8/7/2018, Right GPi 9/18/2018  Impedance Check: No problems found     Group (A,B,C,D) Left Brain       GPi    Right Brain  GPi       Initial Final Initial Final   Amplitude (mA)  2.5  2.5 [range 0- 2.5 mA]  OFF  3.0 [range 0- 3.0 mA]   Pulse width (usec)  60  60    60   Freq (Hz)  130  130    130   Contacts: C   +  +    +   Contacts: 4 (12)           Contacts: 3 (11)           Contacts: 2 (10)  abc-  abc-       Contacts: 1 (9)        -           Clinical Core Study Programming Form  Please complete upon initial monopolar review and any subsequent \"mini-monopolar reviews\".    Please avoid any extra spaces at the end of cells.    Generator Serial No.   Lead Hemisphere   Lead Region    Initials      [id number] Bilateral (programming right today) Bilateral GPi (programming right today)  S.C., A.S.        Enter value only with a change No blank cells No blank cells   Contacts   Ampl   Ampl Unit  V or mA Pulse Width (ms)     Rate (Hz)   Ramp (s)   Effect on Symptoms   NA not assessed  NC no change from setting above  NN none noted Side Effects  NA  NC  NN      Cpos,9neg  0.5  mA  60  130  8  Slight improvement in LUE bradykinesia, hand opening 2-3  NN   Cpos,9neg  1.0  mA  60  130  8  Improvement LUE hand opening 2  NN   Cpos,9neg  1.5  mA  60  130  8  NC  NN   Cpos,9neg  2.0  mA  60  130  8  NC  NN   Cpos,9neg  2.5  mA  60  130  " 8  LUE rest tremor improved (2 to 1). LUE slightly reduced rigidity, still 2. Hand opening 2, but slightly better.   NN   Cpos,9neg  3.0  mA  60  130  8   LUE rigidity reduced from baseline (1.5). Hand opening/closing improved 1. Rest tremor absent.  NN   Cpos,9neg  3.5  mA  60  130  8  NC  NN   Cpos,9neg  4.0  mA  60  130  8  LUE reduced rigidity 1. Rest tremor absent. Hand opening/closing 1.   NN   Cpos,9neg  4.5  mA  60  130  8  Rest tremor absent. Hand opening/closing 2.   NN   OFF  0.0  mA  0   0   0  Return of tremor    Cpos,9neg  5.0   60  130  8  Hand opening closing worse - 2, she is also tired, exhausted off meds  NN    OFF  0  0 0  8  Return of tremor 3    Cpos,10abc  0.5   60  130  8  Tremor suppression 1.   NN   Cpos,10abc  1.0     8  LUE rigidity 2. Tremor 3.   NN   Cpos,10abc  2.0   60  130  8  LUE rigidity 2. Tremor 3.  NN   Cpos,10abc  2.5   60  130  8  Tremor suppression 1.   NN   Cpos,10abc  3.0   60  130  8  Tremor suppression 1. Hand opening closing 2 - she is tired.   NN    Cpos.11abc  0.5   60  130  8  NN  NN   Cpos, 11abc  1.0   60  130  8  NN  NN   Cpos, 11abc  2.0   60  130  8  Tremor better  NN   Cpos,11abc  3.0   60  130  8  Hand opening closing 2, she is exhausted. Tremor nearly absent.   NN       IMPRESSION:  1. Parkinson disease  2. Fatigue, related to PD and exacerbated by recent infectious illness, pyelonephritis    Gabi Manley is a 78 year old woman with Parkinson disease s/p bilateral GPi DBS. We attempted to complete the monopolar review today, but in the setting of continued recovery from pyelonephritis and a long interval off meds it proved too fatiguing. We ended the session with a quick review of the most ventral contacts - which provided good tremor suppression. We chose the final settings based on good tremor suppression and improvement of bradykinesia. Rating hand and finger movements became less reliable as the monopolar survey progressed, due to fatigue.     PLAN:  - Final  settings for right GPi: C+, 9- at 3 mA, 60 msec, 130 Hz  - No changes were made to the left GPi parameters except for adding patient parameters 0 to 2.5 mA  - Continue current medication regimen, Sinemet 25/100 mg 1.25 tabs q2h while awake, for now  - Scheduled return with Eileen Anton Nov 17 at 7am for DBS programming and to complete the monopolar review    Patient seen and discussed with Dr. Joaquín Horton MD  Movement Disorders Fellow    Patient seen and examined with Dr. Horton and I agree with the assessment and plan as outlined.  I was present throughout, participated in, and closely supervised the neurostimulator programming.  (Analysis of neurostimulator, complex DBS with programming 2 hours, 95978 x1, 95979 x2.    Cesar Yanes PhD, MD

## 2018-10-19 NOTE — NURSING NOTE
Chief Complaint   Patient presents with     RECHECK     [facility] RETURN MOVEMENT DISORDER       Joseline Adler, EMT

## 2018-11-01 NOTE — OP NOTE
Stereotaxic Neurosurgery Neurophysiology Summary    Name: Gabi Manley   : 1940  MRN: [facility] [id number]  Surgery Date: 2018  Surgery Performed:  Right GPi DBS lead placement  Neurologist: Ever García MD, PhD  Ordering Physician: Murray Stearns MD, PhD    Diagnosis: Parkinson s disease    Target Structure: Globus Pallidus internus (GPi)    Target Side:  Right    Procedure   The surgical field was prepared as per the neurosurgeon's note. Microelectrodes were attached to the stereotactic frame and lowered through the brain with a calibrated microdrive. Neuronal activity was recorded along each track. Single units were isolated and somatosensory/motor responses (SSR) were determined.     Functional Physiologic Mapping:  3.0 Hours    Mapping Equipment: [employer]    Microelectrode Mapping    Electrophysiological Mapping: The target was physiologically mapped using 2 passes (6 total tracks).    Pass 1    Mapping: Three tracks - Anterior, Lateral and Posterior BenGun, starting 15 mm above target depth.   Anterior track ~ 5.9 mm interpreted as GPi; SSR s tested in GPi showed responses to knee flexion/extension and equivocal upper extremity. A flashlight was used to induce light evoked potentials in order to localize optic tract. An equivocal response noted 2.0 mm below the interpreted GPi ventral border.  Posterior track - No GPi cells appreciated. A flashlight was used to induce light evoked potentials in order to localize optic tract. Signs for optic track were negative.  Lateral track ~ 5.3 mm interpreted as GPi w/ SSR s noted in shoulder and during internal rotation of the hip. Negative response for optic track at 1.5 mm below ventral GPi border, equivocal response for optic track at 2.0 mm below ventral GPi border and an uneqivocal positive response to light evoked potentials noted at 2.3 mm below the interpreted ventral border of GPi, indicative of OT  localization.    Microstimulation: Anterior track - microstimulation at 3.0 mm below the ventral border of GPi showed no signs of internal capsule stimulation up to 100 A.  Posterior track - microstimulation at 3.0 mm below the ventral border of GPi showed no signs of internal capsule stimulation up to 100 A. No further microstimulation performed.    Ring electrode stimulation: Anterior track - stim at target (0.0), negative for signs of stimulation of the posterior limb of the internal capsule up to 2.5 mA.   Posterior track - stim at target (0.0), muscle contractions noted in eyelid and biceps/forearm at 2.0 and 2.5 mA, consistent with stimulation of the posterior limb of the internal capsule.     ** 2.0 mm lateral XY stage shift prior to pass #2 **    Pass 2    Mapping: Three tracks - Anterior, Lateral and Posterior BenGun, starting 15 mm above target depth.   Anterior track ~ 5.7 mm interpreted as GPi; SSR s tested in GPi showed responses in shoulder from multiple cells. A flashlight was used to induce light evoked potentials in order to localize optic tract. Negative response for optic track.  Posterior track ~ 3.9 mm interpreted as GPi w/ SSR s noted in shoulder and during wrist flexion/extension. Response for OT not tested.  Lateral track ~ 4.4-4.9 mm interpreted as GPi w/ SSR s noted during wrist flexion/extension. Negative response for optic track.    Microstimulation: Anterior track - microstimulation produced phosphenes as reported by the patient. No signs of internal capsule stimulation noted up to 100 A.  Posterior track - microstimulation showed signs of internal capsule stimulation at 90  A at approximately 3.0 mm below ventral GPi border. No further microstimulation performed.    Ring electrode stimulation: Not performed during pass 2.      Lead placement   Anterior BenGun track (original target location) with placement depth set to 1.1 mm below target depth. Intention was for the top of contact 3 to  be placed at the dorsal border of GPi - which was ~4.4 mm above the original target depth.    Macrostimulation (DBS lead)  Lead: Abbott Infinity 0.5 [contacts 1-4]    Stimulation Parameters: 1(-) 3abc(+) 130 Hz, 60 s:   Outcome: Tremor and rigidity reduced starting at 1.0 mA with continued reduction in both up to 3.0 mA. Muscle contractions noted on the back of the neck at 7.0 mA, consistent with stimulation of the posterior limb of the internal capsule.    Comments  Overall good map with abundant GPi cells and motor somatosensory responses. Reduction of tremor and rigidity at low current with sufficiently high thresholds. Contacts 2 and 3 are likely good contacts for reducing symptoms.     Ever García MD, PhD

## 2018-11-15 NOTE — TELEPHONE ENCOUNTER
"Pt confirmed the appointment for tomorrow at 7am (repeat initial programming for 2nd side) and we reviewed holding her carbidopa-levodopa after 7pm tonight, bringing her meds and .     She did say she went to the doctor yesterday (she was advised to go to the walkin clinic as her primary doctor didn't have openings) as she was having UTI symptoms again. She's been put on Septra for 3 days. She said she \"didn't let this one go on for very long\" meaning she caught it before it got severe like last time (pyelonephritis).     I did give her the option of rescheduling if she wasn't feeling strong enough for tomorrow, but she insisted we keep the appointment, and explained to me that although she's not back to her baseline strength yet, she is feeling stronger than she was last time with Dr. Yanes.     STEFANO Benavides, CNP notified of conversation.    Bre Pineda, RN, MPH  Research Nurse, Movement Disorders    "

## 2018-11-16 NOTE — Clinical Note
"2018       RE: Gabi Manley   [address]  [facility] [address]     Dear Colleague,    Thank you for referring your patient, Gabi Manley, to the [facility] NEUROLOGY at [facility]. Please see a copy of my visit note below.      PATIENT: Gabi Manley    : 1940    GERBER: 2018    REASON FOR VISIT: Right hemisphere monopolar review & initial deep brain stimulation (DBS) programming for {Dx:322374}.     HPI:  Ms. Gabi Manley is a 78 year old *** handed *** female who came to the [facility] neurology clinic accompanied by her *** for DBS initial programming.  She is s/p Bilateral Globus Pallidus Interus (Gpi) DBS lead placement; Left Gpi on 2018 & Right .    Post last visit,she feels a little stronger.  A few days ago, she noticed urinary urgency and frequency & was told that she has UTI.  She is on oral antibiotic for 2 days & feeling a little better.     She reports after the right Gpi initial programming, the right hand tremor was completely gone.  Even the left hand was good.  In the last 2 days, tremor came back as she is dealing with UTI.     Her PCP has increased he Lexapro recently from 10 mg to 15 mg.    She had UTI and systemic inflammatory response syndrome on 10/1/2018 & was in the hospital for 2 days.  She     Pre-programming antiparkinsonian medications:     PD Medications 6 am 8 am 10 am 12 pm 2 pm 4 pm 6 pm 8 pm   Sinemet 25/100 mg  1.25 1.25 1.25 1.25 1.25 1.25 1.25 1.25                  Last antiparkinsonian dose taken:  Last evening,  at 6 pm.      PHYSICAL EXAM:  Vital Signs:  Blood pressure 136/81, pulse 67, height 1.6 m (5' 3\"), weight 60.9 kg (134 lb 3.2 oz), not currently breastfeeding. Body mass index is 23.77 kg/(m^2).    Ms. Manley is a pleasant *** female who is {General:[id number]} sitting comfortably in the exam room without any distress.  Affect is appropriate.      MDS Part II  -- Over the last week -- 0: Normal -- " "1: Slight -- 2: Mild -- 3: Moderate -- 4: Severe     2.1 Speech: 2   2.2 Saliva and droolin   2.3 Chewing and swallowin   2.4 Eating tasks: 0   2.5 Dressin   2.6 Hygiene:  0   2.7 Handwriting:  3   2.8 Doing hobbies and other activities:  3   2.9 Turning in bed:  1   2.10 Tremor:  2   2.11 Getting out of bed/car/deep chair:   2   2.12 Walking and balance: 2   2.13 Freezin     Total:    17         MOVEMENT DISORDERS ASSESSMENT:  UPDRS 3      DBS Interrogation & Analysis:    Implanted generator:  Left chest: Abbot Infinity 7  Lead placement:  Bilateral Globus Pallidus Interus (Gpi).    Device: Abbott Infinity   Lead location: Left GPi implanted 2018, Right GPi implanted 2018  IPG: Located left chest Abbott Infinity 7, REF [id number], SN [id number], Implanted 2018    Left Gpi  Battery Service Life:  2.96 volts.    Left Gpi  C+, 2abc-  Amplitude:  2.5 mA  PW:  60 msec  Rate:  130 Hz  Therapy impedance:   987 Ohms    Patient :  Upper Limit: 2.5 mA  Lower Limit: 0.0 mA    Right Gpi  Cpos, 9abc-  Amplitude:   3.0 mA  PW:  60 msec  Rate:  130 Hz  Therapy impedance:  9 75 Ohms    Patient :  Upper Limit: 2.5 mA  Lower Limit: 0.0 mA    All impedanced are normal.    See scanned report for impedance details.      MONOPOLAR REVIEW  Right Hemisphere    Clinical Core Study Programming Form  Please complete upon initial monopolar review and any subsequent \"mini-monopolar reviews\".    Please avoid any extra spaces at the end of cells.    Generator Serial No.  ex: [id number] Lead Hemisphere  Ex: Right Lead Region  Ex: STN  Initials  Ex: LS, KD   [id number] Right Gpi TO         Enter value only with a change No blank cells No blank cells   Contacts  Cpos, 2bcneg Ampl  0.5 Ampl Unit  V or mA Pulse Width (ms)  60   Rate (Hz)  130 Ramp (s)  8 Effect on Symptoms   NA not assessed  NC no change from setting above  NN none noted Side Effects  NA  NC  NN     Cpos, 11abcneg 0.0 mA 60 130 8 Rest " "tremor in LUE = 3; LLE = 2; Finger tapping = 2; Hand opening and closing = 1.    NN    1.0     Rest tremor in LLE = 0.  No other changes.  NN    2.0     Postural tremor = 1.  Rest tremor in LUE 2 & intermittent.  NN    2.5     No change.  NN    3.0     Slight & intermittent LUE rest tremor.    Finger tapping & hand opening and closing = 1.  NN    3.5     Rare tremor in LUE.  No other changes.  NN    4.0     No tremor  NN    4.5     NC  NN    5.0     NC NN    5.5     NC NN    6.0     NC NN    6.5     NC NN    7.0     NC NN   Cpos,12abcneg 0.0 mA 60 130 8 Rest tremor in LUE = 3; LLE = 2; Finger tapping = 3; Hand opening and closing = 1.  NN    1.0     NC NN    2.0     NC NN    2.5     Rest tremor in LUE = 2; LLE = 0; Finger tapping = 3; Hand opening and closing = 1. NN    3.0     NC NN    3.5     NC NN    4.0     Rest tremor in LUE = worse intermittently; Finger tapping = 3; Hand opening and closing = 1. LLE dyskinesias.    4.5      Slow speech, slightly dysarthric, \"harder to think.  LLE dyskinesia very subtle & intermittent.        __  Monopolar survey showed ***.   Based on the monopolar review, patient was programmed as below: -       Left Brain  *** +, *** -  Volts:  *** volts  PW:  *** msec  Rate:  *** Hz  Therapy impedance:  *** Ohms  Therapy Current:  *** mA   Right Brain  *** +, *** -  Volts:  *** volts  PW:  *** msec  Rate:  *** Hz  Therapy impedance:  *** Ohms  Therapy Current:  *** mA   Patient :  Upper Limit: *** volts  Lower Limit: *** volts   Patient :  Upper Limit: *** volts  Lower Limit: *** volts       __  Pt took Sinemet 25/100 mg 1.5 tabs at 9:25 am.  She waited *** minutes.  No side effects.    __ Went over patient controller with pt & ***.  *** were shown how to check battery, turn DBS on & off, and adjust voltage.  Pt was able to demonstrate independently.    __  Instructed to try reducing her medication as tolerated.     __ Instructed to call if there is any side effect from " today's programming or worsening symptoms.     __ Will return for DBS programming follow up in 1 month; in 6 months for OFF/ON DBS & Med Motor assessment & videotaping.     The total amount of time spent with patient in DBS programming was *** minutes.     STEFANO Salas, CNP  [facility]       Again, thank you for allowing me to participate in the care of your patient.      Sincerely,    STEFANO Hernandez CNP

## 2018-11-16 NOTE — PATIENT INSTRUCTIONS
November 16, 2018    Dear Ms. Gabi Hicksd,    Thank you for coming today.  During your visit, we have discussed the following:     __  Your DBS electrical system function (impedance) is normal. The battery life is also good.  __  Your Right body DBS is set at 3.0 mA  __  Your Left body DBS is set at 3.5 mA  __  After Thanksgiving reduce your Sinemet (Carbidopa/Levodopa) from 1.25 to 1 tablet every 2 hours.  __  Call Bre Pineda RN on Dec 7th to give her on update in how you're doing.   __  Please call if your symptoms worsen or you experience side effects.  __  For your next DBS programming visits, come ON medication.     To contact our clinic, you may call [phone] or use [employer] message.     It's good to see you!      I'll see you in 6 weeks.      STEFANO Salas, CNP  [facility]

## 2018-11-16 NOTE — PROGRESS NOTES
"PATIENT: Gabi Manley    : 1940    GERBER: 2018    REASON FOR VISIT:  To complete the right hemisphere monopolar review & deep brain stimulation (DBS) programming for Parkinson's disease.     HPI:  Ms. Gabi Manley is a 78 year old right - handed  female who came to the [facility] neurology clinic accompanied by her son to complete the right side Globus Pallidus Interus (Gpi) monopolar review.  She is s/p Bilateral Gpi DBS lead placement; Left Gpi on 2018 & Right on 2018.    Her last visit was on 10/19/2018 for monopolar review and initial programming of the Rt Gpi.  During that visit, due to excessive tiredness, her visit was cut short.  She was still recovering from  UTI and pyelonephritis.   She is here today to finish the Rt Gpi monopolar review.      About a week ago, she started having increased urinary urgency and frequency and was diagnosed with UTI.  She is on oral antibiotic for 2 days.   She had seen a urologist in the past for overactive bladder and was offered implantation of stimulation, which she declined.  Today, she reports feeling a little stronger; however, she is not back to her baseline.      She reports that after the left Gpi initial programming, the right hand tremor was completely gone.  Also, after her last initial programming of right Gpi, the left hand was good.  In the last 2 days, tremors have come back in both hands as she has been fighting urinary tract infection.      Her PCP has increased he Lexapro recently from 10 mg to 15 mg.  Mood is stable.     Post DBS programming, she hasn't reduced her antiparkinsonian medications.     Pre-programming antiparkinsonian medications:     PD Medications 6 am 8 am 10 am 12 pm 2 pm 4 pm 6 pm 8 pm   Sinemet 25/100 mg  1.25 1.25 1.25 1.25 1.25 1.25 1.25 1.25       Last antiparkinsonian dose taken:  Last evening,  at 6 pm.      PHYSICAL EXAM:  Vital Signs:  Blood pressure 136/81, pulse 67, height 1.6 m (5' 3\"), " weight 60.9 kg (134 lb 3.2 oz). Body mass index is 23.77 kg/(m^2).    Ms. Manley is a pleasant female who is well-groomed, well-developed and thin sitting comfortably in the exam room without any distress.  Affect is appropriate.      MDS Part II  -- Over the last week -- 0: Normal -- 1: Slight -- 2: Mild -- 3: Moderate -- 4: Severe     2.1 Speech: 2   2.2 Saliva and droolin   2.3 Chewing and swallowin   2.4 Eating tasks: 0   2.5 Dressin   2.6 Hygiene:  0   2.7 Handwriting:  3   2.8 Doing hobbies and other activities:  3   2.9 Turning in bed:  1   2.10 Tremor:  2   2.11 Getting out of bed/car/deep chair:   2   2.12 Walking and balance: 2   2.13 Freezin     Total:    17         MOVEMENT DISORDERS ASSESSMENT:  UPDRS 3   UPDRS Values 2018   Time: 7:48 AM 9:16 AM 10:24 AM   Medication Off Off On   R Brain DBS: On On On   L Brain DBS: On On On   Speech 1 1 1   Facial Expression 1 1 1   Rigidity Neck 2 2 1   Rigidity RUE 0 0 0   Rigidity LUE 1 1 0   Rigidity RLE 0 0 0   Rigidity LLE 0 0 0   Finger Taps R 1 1 1   Finger Taps L 2 1 2   Hand Mvt R 2 1 1   Hand Mvt L 2 1 1   Pron-/Supinate R 2 1 1   Pron-/Supinate L 2 1 1   Toe Tap R 0 1 0   Toe Tap L 0 1 1   Leg Agility R 0 0 0   Leg Agility L 1 0 0   Arise From Chair 0 1 0   Gait 1 2 1   Gait Freezing 1 1 0   Postural Stability 3 3 3   Posture 2 2 2   Global Spont Mvt 1 2 1   Postural Tremor RUE 2 0 0   Postural Tremor LUE 2 1 0   Kinetic Tremor RUE 1 1 1   Kinetic Tremor LUE 1 1 1   Rest Tremor RUE 2 2 2   Rest Tremor LUE 2 2 2   Rest Tremor RLE 1 0 0   Rest Tremor LLE 0 0 0   Rest Tremor Lip/Jaw 0 0 0   Rest Tremor Constancy 4 1 1   Total Right 11 7 6   Total Left 13 9 8   Axial Total 12 15 10   Total 40 32 25     DBS Interrogation & Analysis:    Implanted generator:  Left chest: Abbot Infinity 7  Lead placement:  Bilateral Globus Pallidus Interus (Gpi).    Battery Service Life:  2.96 volts.    Left Gpi  C+, 2abc-  Amplitude:   "2.5 mA  PW:  60 msec  Rate:  130 Hz  Therapy impedance:  987 Ohms    Patient :  Upper Limit: 2.5 mA  Lower Limit: 0.0 mA    Right Gpi  C+, 9abc-  Amplitude:  3.0 mA  PW:  60 msec  Rate:  130 Hz  Therapy impedance:  975 Ohms    Patient :  Upper Limit: 3.0 mA  Lower Limit: 0.0 mA    All impedances are normal.    See scanned report for impedance details.      MONOPOLAR REVIEW  Right Hemisphere    Clinical Core Study Programming Form  Please complete upon initial monopolar review and any subsequent \"mini-monopolar reviews\".    Please avoid any extra spaces at the end of cells.    Generator Serial No.  ex: [id number] Lead Hemisphere  Ex: Right Lead Region  Ex: STN  Initials  Ex: LS, KD   [id number] Right Gpi TO         Enter value only with a change No blank cells No blank cells   Contacts  Cpos, 2bcneg Ampl  0.5 Ampl Unit  V or mA Pulse Width (ms)  60   Rate (Hz)  130 Ramp (s)  8 Effect on Symptoms   NA not assessed  NC no change from setting above  NN none noted Side Effects  NA  NC  NN     Cpos, 11abcneg 0.0 mA 60 130 8 Rest tremor in LUE = 3; LLE = 2.  Finger tapping = 2. Hand opening & closing = 1. NN    1.0     Rest tremor in LLE = 0.  No other changes.  NN    2.0     Rest tremor in LUE = 2 & intermittent.  NN    2.5     NC  NN    3.0     Slight & intermittent LUE rest tremor.    Finger tapping & hand opening and closing = 1.  NN    3.5     Rare tremor in LUE.  No other changes.  NN    4.0     No tremor  NN    4.5     NC  NN    5.0     NC NN    5.5     NC NN    6.0     NC NN    6.5     NC NN    7.0     NC NN   Cpos,12abcneg 0.0 mA 60 130 8 Rest tremor in LUE = 3; LLE = 2; Finger tapping = 3; Hand opening and closing = 1.  NN    1.0     NC NN    2.0     NC NN    2.5     Rest tremor in LUE = 2; LLE = 0; Finger tapping = 3; Hand opening and closing = 1. NN    3.0     NC NN    3.5     NC NN    4.0     Rest tremor in LUE = worse intermittently; Finger tapping = 3; Hand opening and closing = 1. " "Subtle to slight LLE dyskinesias.    4.5      Slow speech, slightly dysarthric, \"harder to think.  LLE dyskinesia very subtle & intermittent.        __  Patient tolerated monopolar review.  Contact 11 showed complete tremor suppression with a wide therapeutic window; therefore, final settings was chosen as below.  __   Since pt had some right hand tremor and the plan is to slowly reduce her Levodopa, Left Gpi current was increased from 2.5 to 3.0 mA.    Left Gpi  C+, 2abc-  Amplitude: 3.00 mA  PW:  60 msec  Rate:  130 Hz    Therapy impedance:  987 Ohms   Right Gpi  C +, 11abc -  Current:  3.5 mA  PW:  60 msec  Rate:  130 Hz    Therapy impedance:  962 Ohms     Patient :  Upper Limit: 3.00 mA  Lower Limit: 0.00 mA   Patient :  Upper Limit: 3.50 mA  Lower Limit: 0.00 mA     __  Pt took Sinemet 25/100 mg 1.5 tabs at 9:25 am.  She waited 45 minutes.  No side effects.      __  I went over patient controller with pt & her son.  Patient is not interested to make adjustments in her DBS.  She was shown how to turn DBS on & off.   She will call if she needs to adjust her amplitude.    __  The following instruction was given:   __  After Thanksgiving reduce your Sinemet (Carbidopa/Levodopa) from 1.25 to 1 tablet every 2 hours.  __  Call Bre Pineda RN on Dec 7th to give her on update in how you're doing.   __  Please call if your symptoms worsen or you experience side effects.  __  For your next DBS programming visits, come ON medication.     __  Will return in 6 weeks.   __  After her DBS and medications are optimized, she'll see Dr. Webb.   __  In regards to her frequent UTI, I encouraged her to see a urologist.     The total amount of time spent with patient in DBS programming was 210 minutes.     STEFANO Salas, CNP  [facility]     "

## 2018-11-20 NOTE — TELEPHONE ENCOUNTER
Patient called to say she does not feel comfortable reducing her medications yet as she is still having tremor, mostly at night (both sides). She noted some tremor on both sides during breakfast today, but only for a short time. She states that this tremor is not very bothersome. She denied dyskinesias.    I let her know that we would like her to wait until after Thanksgiving before determining if she would like to reduce her dosage from 1.25 to 1 tablets, giving her new settings (from Friday) more time to settle in. I also let her know that there is the option to reduce her dosage on some dosage times, that she doesn't need to reduce all doses at once. I explained that this is a trial and error process, and the best way to help this along is to make notes of when she is having symptoms throughout the day in regards to her medication times.     Patient appreciated the call and agreed with this plan.She will contact me next week and we will discuss whether or not to reduce medications and how her symptoms have responded.    Bre Pineda RN, MPH  Research Nurse, Movement Disorders

## 2018-12-10 NOTE — TELEPHONE ENCOUNTER
"Patient called to give a symptom update; Eileen Anton had requested this after she reduced her meds after Thanksgiving.    Patient states she is now taking 1 pill at each dose (8 pills per day), reduced from the previous of 1.25 pills at each dose.  She feels she is doing well on this dose. She says she does notice a small amount of tremor (usually at night) but it is not bothersome. I asked her to take note of any worsening of tremor and to pay attention to which side it occurs on.   She has not made any adjustments to the DBS on either side with her patient .     She stated, \"My head is clear, but I thought I'd get more strength\", referring to the DBS. I asked her about how she's been doing regarding her hospital admission for kidney infection and her rehabilitation:    She said she visited a urologist to discuss the frequent urinary burning she experiences, but the urologist said that it was not due to an infection but rather a common hormonal issue with aging, so she was put on a intravaginal cream to use 2x a week (she thinks it might be estradiol cream but wasn't sure). She said she has been using this for 2 weeks and it is improved but not totally better.    I thanked her for the update and I encouraged her to call us with any more updates on her PD symptoms. All questions answered.    Bre Pineda RN, MPH  Research Nurse, Movement Disorders    "

## 2018-12-11 NOTE — TELEPHONE ENCOUNTER
Great.  Thank you for the update.  Since she opted not to adjust her DBS, I didn't give her parameters to increase her amplitude.    I'll have her stay on the same dose of Sinemet for now.  Reducing the dose further might worsen her tremors.  Thank you.

## 2018-12-12 NOTE — TELEPHONE ENCOUNTER
I called patient to reschedule 12/21/18 appointment with Eileen Anton to 1/8/19 due to provider conflict. Patient understanding and aware of change. No further questions.    Bre Pineda RN, MPH  Research Nurse, Movement Disorders

## 2018-12-14 NOTE — TELEPHONE ENCOUNTER
"Pt called this morning to report that she is having increased tenderness and pain \"where the wires are\". She describes it as an ache and itchy, \"goes from the top of my head down through the neck\". She reiterated that these sites have always been somewhat tender, but they have increased in the last day or so. Before, she would touch the area and it would be tender, but now she can feel the aching without touching it.     She said now she feels that she needs to take pain medication (Tylenol) for it. She states that both sides are bothering her, but probably more on the left side.     I will consult with her providers and call her back today with any instructions.    Bre Pineda RN, MPH  Research Nurse, Movement Disorders    "

## 2018-12-14 NOTE — TELEPHONE ENCOUNTER
Dr. Stearns has been in surgery today so I have been unable to reach him about this issue, however I spoke Yris Cummings RN and Dr. Cesar Yanes and we all agreed that the patient should be seen in Dr. Stearns's clinic on Monday. If over the weekend the patient should have worsening symptoms, or any redness, swelling, fever, or discharge, patient should visit the emergency room as that could be a sign of infection.     I called patient with this plan and she is comfortable with waiting until Monday's appointment. I reminded her that she can take her medications as usual for this appointment. She said that the tenderness/aches have not gotten any worse from when we spoke this morning.     All questions answered and patient appreciated the call.    Bre Pineda RN, MPH  Research Nurse, Movement Disorders

## 2018-12-17 NOTE — PROGRESS NOTES
HISTORY AND PHYSICAL EXAM    Chief Complaint   Patient presents with     RECHECK     DBS SITE PAIN       HISTORY OF PRESENT ILLNESS  We saw Ms. Gabi Manley today in Neurosurgery Clinic today to assess her tenderness at the generator/battery site.  She is a 78 year old woman with a history of Idiopathic Parkinson s disease.  Her symptoms began in 2009 when she was unable to step up into her daughter s house.  She then developed resting tremor in her hands (right side more than left).  She had good control of her symptoms for many years, but they progressed to motor fluctuations and peak-dose dyskinesia.  She also had freezing gait and cramps in her left leg.  She is s/p bilateral GPi DBS lead placement - left GPi on 8/7/2018 and right on 9/18/2018.  After her surgery, she had an episode of UTI which worsened to pyelonephritis and bacteremia due to the antibiotic resistance.  She was subsequently discharged from the hospital with treatment and she recovered.  Her incisions have been monitored throughout due to concern for infection but all the incisions were healing well.  After programming of the left GPi, her right hand tremor was completely gone.  After her last initial programming of the right GPi, the left hand showed improvement.  The tenderness over her battery site is worsening over the past three days.  It does not feel electrical.  The ache is constant, which was not the case before, even without palpation.  There is no surrounding tenderness or pectoralis tenderness.  She denies fever or burning sensations.  She reports itchiness on her neck bilaterally.       Past Medical History:   Diagnosis Date     Depression      Dyslipidemia      GERD (gastroesophageal reflux disease)      Hypertension      Parkinson's disease (H)        Past Surgical History:   Procedure Laterality Date     IMPLANT DEEP BRAIN STIMULATION GENERATOR / BATTERY Left 8/21/2018    Procedure: IMPLANT DEEP BRAIN STIMULATION GENERATOR /  BATTERY;  Deep Brain Stimulator Placement, Phase II, Placement Of Deep Brain Stimulator Generator/Battery Over The Left Chest Wall;  Surgeon: Murray Stearns MD;  Location: [facility] OR     OPTICAL TRACKING SYSTEM INSERTION DEEP BRAIN STIMULATION Left 8/7/2018    Procedure: OPTICAL TRACKING SYSTEM INSERTION DEEP BRAIN STIMULATION;  Stealth Assisted Left Deep Brain Stimulator Placement, Phase I, Placement Left Side Deep Brain Stimulator Electrode, Target Left Globus Pallidus Internus, With Microelectrode Recording;  Surgeon: Murray Stearns MD;  Location: [facility] OR     OPTICAL TRACKING SYSTEM INSERTION DEEP BRAIN STIMULATION Right 9/18/2018    Procedure: OPTICAL TRACKING SYSTEM INSERTION DEEP BRAIN STIMULATION;  Stealth Assisted Right Deep Brain Stimulator Placement, Phase I And II Combined, Placement Right Deep Brain Stimulator Electrode, Target Left Globus Pallidus Internus, With Microelectrode Recording And Connection to Existing Generator/Battery;  Surgeon: Murray Stearns MD;  Location: [facility] OR       No family history on file.    Social History     Socioeconomic History     Marital status:      Spouse name: Not on file     Number of children: Not on file     Years of education: Not on file     Highest education level: Not on file   Social Needs     Financial resource strain: Not on file     Food insecurity - worry: Not on file     Food insecurity - inability: Not on file     Transportation needs - medical: Not on file     Transportation needs - non-medical: Not on file   Occupational History     Not on file   Tobacco Use     Smoking status: Never Smoker     Smokeless tobacco: Never Used   Substance and Sexual Activity     Alcohol use: No     Drug use: No     Sexual activity: Not Currently   Other Topics Concern     Parent/sibling w/ CABG, MI or angioplasty before 65F 55M? Not Asked   Social History Narrative     Not on file          Allergies   Allergen Reactions     No Clinical Screening -  See Comments Shortness Of Breath     Erythromycin Unknown     Other reaction(s): Unknown     Ioxaglate Other (See Comments) and Unknown     Angioedema  Angioedema     Paroxetine Unknown     Other reaction(s): Unknown     Penicillins      Facial swelling  Facial swelling     Trazodone Unknown     Other reaction(s): Unknown     Vancomycin Hives       Current Outpatient Medications   Medication     amLODIPine (NORVASC) 5 MG tablet     atorvastatin (LIPITOR) 20 MG tablet     carbidopa-levodopa (SINEMET)  MG per tablet     cholecalciferol (VITAMIN D-1000 MAX ST) 1000 UNITS TABS     escitalopram (LEXAPRO) 10 MG tablet     pantoprazole (PROTONIX) 20 MG EC tablet     senna-docusate (SENOKOT-S;PERICOLACE) 8.6-50 MG per tablet     No current facility-administered medications for this visit.        REVIEW OF SYSTEMS:  General: Negative for chills/sweats/fever, difficulty sleeping, headache, recent fatigue, or weight gain/loss.  Eyes: POSITIVE for blurred vision. Negative for crossed eyes, double vision, recent eye infections, vision flashes, or vision halos.  Ears/Nose/Mouth/Throat: POSITIVE for earache and sinus problems. Negative for bleeding gums, difficulty swallowing, ear discharge, hearing loss, hoarseness, nosebleeds, or tinnitus.  Respiratory: POSITIVE for night sweats. Negative for chronic cough, coughing blood, shortness of breath, Tuberculosis, or wheezing.  Cardiovascular: POSITIVE for varicose veins. Negative for chest pain, dyspnea at night, heart murmur, palpitations, pacemaker, pacemaker, poor circulation, or swollen legs/feet.  Gastrointestinal: POSITIVE for increasing constipation and nausea. Negative for melena, hematochezia, chronic diarrhea, heartburn, Hepatitis A/B/C, Liver Disease, or vomiting.   Genitourinary: Negative for Urinary retention, genital discharge, urinary incontinence, prostate problems, urgency, or UTI.   Neurological: POSITIVE for headaches, numbness of arms/legs, tingling in  "hands/arms/lefs, and memory problems. Negative for syncope or seizures.  Psychological: POSITIVE for anxiety, depression, and restlessness. Negative for panic attacks.  Skin: POSITIVE for chronic skin itching and skin rashes/hives. Negative for color changes in hand/feet when cold, poor scarring, non-healing ulcers, unusual moles.  Musculoskeletal: POSITIVE for muscle tenderness in arms/legs. Negative for arthritis, joint swelling in hands/wrists/hips/knees/joints, or osteoporosis.  Endocrine: POSITIVE for ntolerance for warm rooms and rapid weight gain/loss. Negative for excessive thirst/hunger, loss of libido, multiple broken bones, galactorrhea, or thyroid issues.  Hematologic/Lymphatic: POSITIVE for significant fatigue. Negative for easy skin bruising, prolonged bleeding, tender glands/lymph nodes.  Allergies: POSITIVE for hay fever. Negative for asthma.      PHYSICAL EXAM  /75 (BP Location: Right arm)   Pulse 71   Ht 1.6 m (5' 3\")   SpO2 98%   BMI 23.77 kg/m      General: Awake, alert, oriented. Well nourished, well developed, She is not in any acute distress.  HEENT: Head normocephalic, atraumatic. No carotid bruit. Neck supple. Good range of motion. No palpable thyroid mass.  Heart: Regular rhythm and rate. No murmurs.  Lungs: Clear to auscultation and percussion bilaterally. No rhonchi, rales or wheeze.  Abdomen: Soft, non-tender, non-distended. No hepatosplenomegaly.  Extremity: Warm with no clubbing or cyanosis. No lower extremity edema.  Incisions:  Bifrontal scalp incision is healing well. Left parieto-occipital area scalp incision is healing well. Left chest wall incision is healing well. There is no wound breakdown, fluid collection, or surrounding erythema. The skin over the generator/battery is tender to light touch.  There is no tenderness around the generator/battery and movement of her left arm does not illicit any pain.  Only the touch causes pain.  Some tenderness to her neck but it is " "generalized.  Not specifically located along the extension wire.  In fact, she complains more so of neck pain in her right side which has no extension wire.    Neurological  Awake, alert and oriented to date, time, place and person. Speech fluent.   Pupils equal, round, reactive to light.  Extraocular movement intact.  Visual Fields are full on confrontation.  Hearing is grossly normal to finger rub.   Facial sensation intact.  Face symmetric.  Tongue midline.  Uvula elevates equally.    Motor: full strength throughout.  Sensation: intact to light touch and pinpoint.  Deep tendon reflexes: 1+ throughout. Negative for clonus. Negative for Blanchard's sign. No dysmetria.      ASSESSMENT  78 year old right handed female with a history of Parkinson's disease. Tremor mostly in the right side. Dyskinesia and motor fluctuations.  S/p left GPi implanted 7/7/2018, right GPi implanted 9/18/2018.   S/p left chest wall Abbott Infinity 7 IPG on 8/21/2018.  UTI and pyelonephritis and bacteremia.    Patient presents with worsening tenderness over the generator/battery site.  It has been worsening for the past 3 days.  On exam, there is no redness, warmth, or fluid collection.  She reports constant pain which is not electrical but it is worse with touch.  Interestingly, movement of her left arm does not cause pain.  Also, the right side neck pain is not the side of the extension wire placement.  Given that all the incisions are healing well with no palpable fluid collection and no redness and no pain with movement of the left arm, I am not concerned about an infection of the device.  It is unusual for her to complain of pain, even with the light touch at the skin overlying the generator/battery.  It does not appear to be under any tension in terms of the skin being stretched.  However, I did tell her that it may be tender now because it is healing and that the generator/battery is \"stretching\" and putting pressure on the skin from " the inside.  However, I do not see any focal point of concern where the wound may breakdown.    We briefly discussed one option of surgically exploring the pocket and perhaps repositioning the generator/battery and enlarging the pocket.  However, I did tell her that this would not guarantee that the pain wound not return.    I suggested that we apply Lidoderm patch, as the pain is specifically at the skin overlying the generator/battery.  This would give her local pain control until the pocket completely heals.  She is in agreement with this plan.      PLAN  1.  Try Lidoderm patch for a month and see if there is relief.    2.  Follow-up in month if symptoms persist. If worsens (redness, drainage, swelling), notify us right away. Scheduled to see STEFANO Benavides CNP, on 1/8/2019.     I, Joseluis Orozco, am serving as a scribe to document services personally performed by Murray Stearns MD, PhD, based upon my observations and the provider's statements to me. All documentation has been reviewed and edited by the aforementioned doctor prior to being entered into the official medical record.    I, Murray Stearns, attest that above named individual is acting in scribe capacity, has observed my performance of the services and has documented them in accordance with my direction. The documentation recorded by the scribe accurately reflects the service I personally performed and the decisions made by me. The document was also partially recorded by me and the entire document was edited by me as well.

## 2018-12-17 NOTE — LETTER
12/17/2018       RE: Gabi Manley  [address] [place] [id number]     Dear Colleague,    Thank you for referring your patient, Gabi Manley, to the [facility] NEUROSURGERY at [facility]. Please see a copy of my visit note below.    HISTORY AND PHYSICAL EXAM    Chief Complaint   Patient presents with     RECHECK     DBS SITE PAIN       HISTORY OF PRESENT ILLNESS  We saw Ms. Gabi Manley today in Neurosurgery Clinic today to assess her tenderness at the generator/battery site.  She is a 78 year old woman with a history of Idiopathic Parkinson s disease.  Her symptoms began in 2009 when she was unable to step up into her daughter s house.  She then developed resting tremor in her hands (right side more than left).  She had good control of her symptoms for many years, but they progressed to motor fluctuations and peak-dose dyskinesia.  She also had freezing gait and cramps in her left leg.  She is s/p bilateral GPi DBS lead placement - left GPi on 8/7/2018 and right on 9/18/2018.  After her surgery, she had an episode of UTI which worsened to pyelonephritis and bacteremia due to the antibiotic resistance.  She was subsequently discharged from the hospital with treatment and she recovered.  Her incisions have been monitored throughout due to concern for infection but all the incisions were healing well.  After programming of the left GPi, her right hand tremor was completely gone.  After her last initial programming of the right GPi, the left hand showed improvement.  The tenderness over her battery site is worsening over the past three days.  It does not feel electrical.  The ache is constant, which was not the case before, even without palpation.  There is no surrounding tenderness or pectoralis tenderness.  She denies fever or burning sensations.  She reports itchiness on her neck bilaterally.       Past Medical History:   Diagnosis Date     Depression      Dyslipidemia       GERD (gastroesophageal reflux disease)      Hypertension      Parkinson's disease (H)        Past Surgical History:   Procedure Laterality Date     IMPLANT DEEP BRAIN STIMULATION GENERATOR / BATTERY Left 8/21/2018    Procedure: IMPLANT DEEP BRAIN STIMULATION GENERATOR / BATTERY;  Deep Brain Stimulator Placement, Phase II, Placement Of Deep Brain Stimulator Generator/Battery Over The Left Chest Wall;  Surgeon: Murray Stearns MD;  Location: [facility] OR     OPTICAL TRACKING SYSTEM INSERTION DEEP BRAIN STIMULATION Left 8/7/2018    Procedure: OPTICAL TRACKING SYSTEM INSERTION DEEP BRAIN STIMULATION;  Stealth Assisted Left Deep Brain Stimulator Placement, Phase I, Placement Left Side Deep Brain Stimulator Electrode, Target Left Globus Pallidus Internus, With Microelectrode Recording;  Surgeon: Murray Stearns MD;  Location: [facility] OR     OPTICAL TRACKING SYSTEM INSERTION DEEP BRAIN STIMULATION Right 9/18/2018    Procedure: OPTICAL TRACKING SYSTEM INSERTION DEEP BRAIN STIMULATION;  Stealth Assisted Right Deep Brain Stimulator Placement, Phase I And II Combined, Placement Right Deep Brain Stimulator Electrode, Target Left Globus Pallidus Internus, With Microelectrode Recording And Connection to Existing Generator/Battery;  Surgeon: Murray Stearns MD;  Location: [facility] OR       No family history on file.    Social History     Socioeconomic History     Marital status:      Spouse name: Not on file     Number of children: Not on file     Years of education: Not on file     Highest education level: Not on file   Social Needs     Financial resource strain: Not on file     Food insecurity - worry: Not on file     Food insecurity - inability: Not on file     Transportation needs - medical: Not on file     Transportation needs - non-medical: Not on file   Occupational History     Not on file   Tobacco Use     Smoking status: Never Smoker     Smokeless tobacco: Never Used   Substance and Sexual Activity      "Alcohol use: No     Drug use: No     Sexual activity: Not Currently   Other Topics Concern     Parent/sibling w/ CABG, MI or angioplasty before 65F 55M? Not Asked   Social History Narrative     Not on file          Allergies   Allergen Reactions     No Clinical Screening - See Comments Shortness Of Breath     Erythromycin Unknown     Other reaction(s): Unknown     Ioxaglate Other (See Comments) and Unknown     Angioedema  Angioedema     Paroxetine Unknown     Other reaction(s): Unknown     Penicillins      Facial swelling  Facial swelling     Trazodone Unknown     Other reaction(s): Unknown     Vancomycin Hives       Current Outpatient Medications   Medication     amLODIPine (NORVASC) 5 MG tablet     atorvastatin (LIPITOR) 20 MG tablet     carbidopa-levodopa (SINEMET)  MG per tablet     cholecalciferol (VITAMIN D-1000 MAX ST) 1000 UNITS TABS     escitalopram (LEXAPRO) 10 MG tablet     pantoprazole (PROTONIX) 20 MG EC tablet     senna-docusate (SENOKOT-S;PERICOLACE) 8.6-50 MG per tablet     No current facility-administered medications for this visit.      Allergies: POSITIVE for hay fever. Negative for asthma.      PHYSICAL EXAM  /75 (BP Location: Right arm)   Pulse 71   Ht 1.6 m (5' 3\")   SpO2 98%   BMI 23.77 kg/m       General: Awake, alert, oriented. Well nourished, well developed, She is not in any acute distress.  HEENT: Head normocephalic, atraumatic. No carotid bruit. Neck supple. Good range of motion. No palpable thyroid mass.  Heart: Regular rhythm and rate. No murmurs.  Lungs: Clear to auscultation and percussion bilaterally. No rhonchi, rales or wheeze.  Abdomen: Soft, non-tender, non-distended. No hepatosplenomegaly.  Extremity: Warm with no clubbing or cyanosis. No lower extremity edema.  Incisions:  Bifrontal scalp incision is healing well. Left parieto-occipital area scalp incision is healing well. Left chest wall incision is healing well. There is no wound breakdown, fluid collection, " or surrounding erythema. The skin over the generator/battery is tender to light touch.  There is no tenderness around the generator/battery and movement of her left arm does not illicit any pain.  Only the touch causes pain.  Some tenderness to her neck but it is generalized.  Not specifically located along the extension wire.  In fact, she complains more so of neck pain in her right side which has no extension wire.    Neurological  Awake, alert and oriented to date, time, place and person. Speech fluent.   Pupils equal, round, reactive to light.  Extraocular movement intact.  Visual Fields are full on confrontation.  Hearing is grossly normal to finger rub.   Facial sensation intact.  Face symmetric.  Tongue midline.  Uvula elevates equally.    Motor: full strength throughout.  Sensation: intact to light touch and pinpoint.  Deep tendon reflexes: 1+ throughout. Negative for clonus. Negative for Blanchard's sign. No dysmetria.      ASSESSMENT  78 year old right handed female with a history of Parkinson's disease. Tremor mostly in the right side. Dyskinesia and motor fluctuations.  S/p left GPi implanted 7/7/2018, right GPi implanted 9/18/2018.   S/p left chest wall Abbott Infinity 7 IPG on 8/21/2018.  UTI and pyelonephritis and bacteremia.    Patient presents with worsening tenderness over the generator/battery site.  It has been worsening for the past 3 days.  On exam, there is no redness, warmth, or fluid collection.  She reports constant pain which is not electrical but it is worse with touch.  Interestingly, movement of her left arm does not cause pain.  Also, the right side neck pain is not the side of the extension wire placement.  Given that all the incisions are healing well with no palpable fluid collection and no redness and no pain with movement of the left arm, I am not concerned about an infection of the device.  It is unusual for her to complain of pain, even with the light touch at the skin overlying  "the generator/battery.  It does not appear to be under any tension in terms of the skin being stretched.  However, I did tell her that it may be tender now because it is healing and that the generator/battery is \"stretching\" and putting pressure on the skin from the inside.  However, I do not see any focal point of concern where the wound may breakdown.    We briefly discussed one option of surgically exploring the pocket and perhaps repositioning the generator/battery and enlarging the pocket.  However, I did tell her that this would not guarantee that the pain wound not return.    I suggested that we apply Lidoderm patch, as the pain is specifically at the skin overlying the generator/battery.  This would give her local pain control until the pocket completely heals.  She is in agreement with this plan.      PLAN  1.  Try Lidoderm patch for a month and see if there is relief.    2.  Follow-up in month if symptoms persist. If worsens (redness, drainage, swelling), notify us right away. Scheduled to see STEFAON Benavides CNP, on 1/8/2019.     I, Joseluis Orozco, am serving as a scribe to document services personally performed by Murray Stearns MD, PhD, based upon my observations and the provider's statements to me. All documentation has been reviewed and edited by the aforementioned doctor prior to being entered into the official medical record.    I, Murray Stearns, attest that above named individual is acting in scribe capacity, has observed my performance of the services and has documented them in accordance with my direction. The documentation recorded by the scribe accurately reflects the service I personally performed and the decisions made by me. The document was also partially recorded by me and the entire document was edited by me as well.     Again, thank you for allowing me to participate in the care of your patient.      Sincerely,    Murray Stearns MD      "

## 2018-12-18 NOTE — TELEPHONE ENCOUNTER
I called patient to follow-up on her symptoms from this morning. She said her neck is feeling much better after the one time dose of Tylenol, and the IPG site is no longer hurting today.     She decided to wait until tomorrow to try the Lidoderm patch since she didn't get it until 1pm and wanted to give it a full 12 hours on her skin tomorrow.    She asked if she can have cataract surgery in early February. I advised her to make a copy of her DBS ID card and have her provider call for any special instructions concerning cautery they might use. I let her know we would walk her through putting her device in surgery mode at her next visit with Eileen on 1/8.    I also scheduled her 6 month follow-up neuropsych visit for [facility] for Friday March 1st and have notified Dr. Allison Guadarrama.    Patient will call if any other concerns arise.    Bre Pineda RN, MPH  Research Nurse, Movement Disorders

## 2018-12-18 NOTE — TELEPHONE ENCOUNTER
Patient called this morning 12/18 to report that she has significant neck pain that is a change from yesterday.    I called patient and she described it as stiffness in the back of her neck, not on the sides. She is able to turn her head to the side but it is difficult. She just took Tylenol 15 minutes ago and is waiting for it to take effect.     Today her son is picking up the lidocaine patches that Dr. Stearns prescribed yesterday and bringing them to her. Since Dr. Stearns yesterday was confident it was not an infectious process,  I asked her to call me back this afternoon to let me know if the patch and Tylenol helped. If the pain worsens significantly today then we will figure out another plan in case she needs to be seen.     Patient agrees to plan and appreciated the call.    Bre Pineda RN, MPH  Research Nurse, Movement Disorders

## 2019-01-01 ENCOUNTER — TELEPHONE (OUTPATIENT)
Dept: NEUROLOGY | Facility: CLINIC | Age: 79
End: 2019-01-01

## 2019-01-01 ENCOUNTER — DOCUMENTATION ONLY (OUTPATIENT)
Dept: NEUROLOGY | Facility: CLINIC | Age: 79
End: 2019-01-01

## 2019-01-02 NOTE — TELEPHONE ENCOUNTER
"Patient's son, Aaron called to inform me that Gabi was found unresponsive at her apartment by another family member and brought to [facility] by paramedics.  At this point they are not sure what has happened, but they are requesting that we fax the most recent healthcare directive to the unit at [facility]. Unsure about HIPAA requirements, I did fax over a SOY which was signed and faxed back immediately, and the healthcare directive was faxed to \"Isis-charge nurse of [employer]\" at [phone] at [facility].    I walked the son through how to turn off her stimulator so that they could have an EEG done. I asked that they call back if she ends up needing an MRI, because we may need to do some extra updates before getting into MRI mode.     Yuliana Menezes RN and Dr. Huang updated. Unable to see any notes from [facility] in care everywhere, however she did recently seek care with her primary doctor with more UTI symptoms. Will work to get records and will call family tomorrow to check in.    Bre Pineda, RN, MPH  Research Nurse, Movement Disorders        "

## 2019-01-03 NOTE — PROGRESS NOTES
I called patient's son (Arvind) today for an update and unfortunately he informed me that Ms. Manley had passed today at [facility]. I expressed my sincere condolences to he and his family.    He said that she had donated her body to science and has been working with the body bequest program with the [facility]. I then spoke with Bequest representative Cristal at  and will email her the safety documents regarding the [employer] DBS system (which cannot be cremated). I gave Cristal my direct number in case she has further questions.    Records have been requested for both care everywhere and from [facility] today via fax to complete research documenation, will await response.     Neurology and neurosurgery providers notified.     Bre Pineda, RN, MPH  Research Nurse, Movement Disorders

## 2019-01-31 ENCOUNTER — COMMUNICATION - HEALTHEAST (OUTPATIENT)
Dept: ALLERGY | Facility: CLINIC | Age: 79
End: 2019-01-31

## 2020-03-11 ENCOUNTER — HEALTH MAINTENANCE LETTER (OUTPATIENT)
Age: 80
End: 2020-03-11

## 2020-06-11 NOTE — MR AVS SNAPSHOT
After Visit Summary   11/16/2018    Gabi Manley    MRN: [id number]           Patient Information     Date Of Birth          1940        Visit Information        Provider Department      11/16/2018 7:00 AM Bhavana Anton APRN CNP [facility] Neurology        Care Instructions    November 16, 2018    Dear Ms. Gabi Manley,    Thank you for coming today.  During your visit, we have discussed the following:     __  Your DBS electrical system function (impedance) is normal. The battery life is also good.  __  Your Right body DBS is set at 3.0 mA  __  Your Left body DBS is set at 3.5 mA  __  After Thanksgiving reduce your Sinemet (Carbidopa/Levodopa) from 1.25 to 1 tablet every 2 hours.  __  Call Bre Pineda RN on Dec 7th to give her on update in how you're doing.   __  Please call if your symptoms worsen or you experience side effects.  __  For your next DBS programming visits, come ON medication.     To contact our clinic, you may call [phone] or use [email or web address] message.     It's good to see you!      I'll see you in 6 weeks.      STEFANO Salas CNP  [facility]              Follow-ups after your visit        Your next 10 appointments already scheduled     Dec 21, 2018  9:20 AM CST   (Arrive by 9:05 AM)   Deep Brain Stimulation with STEFANO Villalobos CNP   [facility]rology ([facility])    [address] [place] [id number] [phone]              Who to contact     Please call your clinic at [phone] to:    Ask questions about your health    Make or cancel appointments    Discuss your medicines    Learn about your test results    Speak to your doctor            Additional Information About Your Visit        [employer] Information     [email or web address] gives you secure access to your electronic health record. If you see a primary care provider, you can also send messages to your care team and make appointments. If you have  Attempted visit, several  times called, no phone answer. Arrived at pt's residency , no one attended to the door bell. This writer called again, daughter answered the phone, states she is at work, reschedule the visit   "questions, please call your primary care clinic.  If you do not have a primary care provider, please call [phone] and they will assist you.      PawSpot is an electronic gateway that provides easy, online access to your medical records. With P[employer]Spot, you can request a clinic appointment, read your test results, renew a prescription or communicate with your care team.     To access your existing account, please contact your [facility] or call [phone] for assistance.        Care EveryWhere ID     This is your Care EveryWhere ID. This could be used by other organizations to access your [facility] medical records  [id number]        Your Vitals Were     Pulse Height BMI (Body Mass Index)             67 1.6 m (5' 3\") 23.77 kg/m2          Blood Pressure from Last 3 Encounters:   11/16/18 136/81   10/19/18 114/65   10/04/18 155/88    Weight from Last 3 Encounters:   11/16/18 60.9 kg (134 lb 3.2 oz)   09/19/18 61.9 kg (136 lb 7.4 oz)   09/14/18 61.3 kg (135 lb 1.6 oz)              Today, you had the following     No orders found for display         Today's Medication Changes          These changes are accurate as of 11/16/18 10:46 AM.  If you have any questions, ask your nurse or doctor.               These medicines have changed or have updated prescriptions.        Dose/Directions    escitalopram 10 MG tablet   Commonly known as:  LEXAPRO   This may have changed:  how much to take   Changed by:  Bhavana Anton, STEFANO CNP        Dose:  15 mg   Take 1.5 tablets (15 mg) by mouth every morning   Refills:  0                Primary Care Provider Office Phone # Fax #    Tracey Abdi [phone]       [address] [address]        Equal Access to Services     ART GRAY : Alberto Borjas, katrin rivera, nabil awad. So [facility] [phone].    ATENCIÓN: Si habla " español, tiene a concepcion disposición servicios gratuitos de asistencia lingüística. Anita wheatley [phone].    We comply with applicable federal civil rights laws and [place] laws. We do not discriminate on the basis of race, color, national origin, age, disability, sex, sexual orientation, or gender identity.            Thank you!     Thank you for choosing [facility] NEUROLOGY  for your care. Our goal is always to provide you with excellent care. Hearing back from our patients is one way we can continue to improve our services. Please take a few minutes to complete the written survey that you may receive in the mail after your visit with us. Thank you!             Your Updated Medication List - Protect others around you: Learn how to safely use, store and throw away your medicines at www.disposemymeds.org.          This list is accurate as of 11/16/18 10:46 AM.  Always use your most recent med list.                   Brand Name Dispense Instructions for use Diagnosis    amLODIPine 5 MG tablet    NORVASC     Take 5 mg by mouth every morning        atorvastatin 20 MG tablet    LIPITOR     Take 20 mg by mouth every morning        carbidopa-levodopa  MG per tablet    SINEMET    360 tablet    Patient is taking 1.25 tablets every two hours up to 12 tablets per day.        escitalopram 10 MG tablet    LEXAPRO     Take 1.5 tablets (15 mg) by mouth every morning        pantoprazole 20 MG EC tablet    PROTONIX     Take 20 mg by mouth        senna-docusate 8.6-50 MG per tablet    SENOKOT-S;PERICOLACE     Takes it as needed.    S/P deep brain stimulator placement       sulfamethoxazole-trimethoprim 800-160 MG per tablet    BACTRIM DS/SEPTRA DS     Take 1 tablet by mouth        VITAMIN D-1000 MAX ST 1000 units Tabs   Generic drug:  cholecalciferol      Take 2,000 Units by mouth every morning

## 2021-01-03 ENCOUNTER — HEALTH MAINTENANCE LETTER (OUTPATIENT)
Age: 81
End: 2021-01-03

## 2021-05-30 VITALS — WEIGHT: 140 LBS | BODY MASS INDEX: 23.9 KG/M2 | HEIGHT: 64 IN

## 2021-05-31 VITALS — BODY MASS INDEX: 27.12 KG/M2 | WEIGHT: 154.3 LBS

## 2021-05-31 VITALS — BODY MASS INDEX: 26.33 KG/M2 | WEIGHT: 151 LBS

## 2021-05-31 VITALS — WEIGHT: 154 LBS | BODY MASS INDEX: 27.06 KG/M2

## 2021-06-01 VITALS — BODY MASS INDEX: 24.62 KG/M2 | WEIGHT: 139 LBS

## 2021-06-01 VITALS — WEIGHT: 134 LBS | BODY MASS INDEX: 23.74 KG/M2

## 2021-06-01 VITALS — WEIGHT: 137 LBS | BODY MASS INDEX: 24.08 KG/M2

## 2021-06-01 VITALS — WEIGHT: 139 LBS | HEIGHT: 64 IN | BODY MASS INDEX: 23.73 KG/M2

## 2021-06-01 VITALS — WEIGHT: 148.6 LBS | BODY MASS INDEX: 26.12 KG/M2

## 2021-06-02 ENCOUNTER — RECORDS - HEALTHEAST (OUTPATIENT)
Dept: ADMINISTRATIVE | Facility: CLINIC | Age: 81
End: 2021-06-02

## 2021-06-02 VITALS — WEIGHT: 130 LBS | BODY MASS INDEX: 23.03 KG/M2

## 2021-06-02 VITALS — BODY MASS INDEX: 24.09 KG/M2 | WEIGHT: 136 LBS

## 2021-06-02 VITALS — BODY MASS INDEX: 23.88 KG/M2 | WEIGHT: 134.8 LBS

## 2021-06-02 VITALS — WEIGHT: 136.8 LBS | BODY MASS INDEX: 24.23 KG/M2

## 2021-06-02 VITALS — WEIGHT: 131 LBS | HEIGHT: 64 IN | BODY MASS INDEX: 22.36 KG/M2

## 2021-06-03 ENCOUNTER — RECORDS - HEALTHEAST (OUTPATIENT)
Dept: ADMINISTRATIVE | Facility: CLINIC | Age: 81
End: 2021-06-03

## 2021-06-09 NOTE — PROGRESS NOTES
Physical Therapy  Physical Therapy  Movement Disorders  Medicare Certification    Medical Diagnosis: Parkinson's Disease    Treatment Diagnosis: Postural instability, bradykinesia, decreased activity tolerance    Referring MD: Dr. Yovany Lester   Onset Date: 3/1/2017  Start of Care: 3/16/2017    Assessment: Patient has diagnosis of Parkinson's Disease x8 years. Patient last participated in physical therapy at [facility] starting in July 2016. Since then, patient has demonstrated declines in LE strength, balance, and activity tolerance as indicated by scores on 30 second sit to stand, Mini-BEST, and 6 minute walk test. Patient scored 21/28 on Mini-BEST indicating that the patient is at an increased risk of falling. Patient is below age/gender norms for number of stands in 30 seconds, gait speed, and 6 minute walk test. Patient has recently started a new medication within the last 2 weeks (Amantadine), which patient complains has lead to increased feelings of weakness, worsening tremor, night terrors, and blurred vision. Patient was advised to consult with neurologist about these symptoms. Patient would benefit from skilled 1:1 PT to address deficits and optimize function due to progressive nature of disease and to decrease falls risk.     Prognostic Indicators: Rehabilitation Potential Good  Impairment: Acitivity Tolerance, Balance, Bradykinesia/Hypokinesia, Motor Planning/Coordination, Postural/Gait Instability, Range of Motion and Gait    Functional Goals to be met within 15 session (including evaluation)  Patient will show improved efficiency and quality of movement, improved gait and postural stability for increased safety, decreased fall risk when performing ADL's, IADL's, household &/or community ambulation as measured by:   \   -Cog/Motor TUG </= 12 seconds,    -30 Second Chair Stand >/= 12 stands,    -Balance Assessment: >/= 25/28 on Mini-BEST,    -Gait speed >/= 1.20 m/second and # steps in 6 meters </= 10  steps,    -Patient will ambulate >/= 1,400 feet in 6 minute with RPD</= 5 to indicate improved functional activity tolerance for participating in social events &/or household/community ambulation,   -Patient will be mod I/SBA with HEP  Patient Functional Goal: Improve LE strength and be able to walk better for longer distances in order to be able to do some of her own shopping.   Goals were established with the patient: yes    Plan of Care  Communication with: Referral Source and Patient, Patient / Family / Instruction: Etiology of diagnosis or presented symtoms, Treatment plan/rationale and Home Exercise Program, Big/PWR Techniques, Therapeutic Exercise, Mobility / Transfer Training, Gait Training, Neuro Re-ed / Balance, Neuromuscular reeducation and Stair training    Frequency/Duration 2x/week.  Up to 15 visits (including evaluation)    MEDICARE PATIENTS:  [facility] # [id number]  Provider #   Certification Dates: from 3/16/2017 to 6/9/2017    Makenzie Disla, SPT  SPT under direct supervision of PT with PT directing treatment and plan of care.  Byron Peña, PT/DPT    Physician Recommendation:  1. I certify the need for these services furnished within this plan and while under my care. I agree with the therapist's recommendation for plan of care.    2. If there is any recommendation for modification of therapy plan, please indicate below.      Physician's Signature (Printed):  _____________________________

## 2021-06-09 NOTE — PROGRESS NOTES
"Physical Therapy  PHYSICAL THERAPY  MOVEMENT DISORDER:  INITIAL EVALUATION    SUBJECTIVE INFORMATION    Diagnosis: Parkinson's Disease  Date of diagnosis: August 2009    Date of last Neurologist visit: Dr. Yovany Lester 3/1/2017  Treatment Diagnosis: Postural instability, bradykinesia, decreased activity tolerance  Precautions/pmh: Controlled hypertension, started on new medication two weeks ago (Amantadine) and have noticed blurred vision, lightheadedness, night terrors and increased tremor since starting this medication. Pt instructed to contact doctor about these symptoms. Pt reports not driving anymore.   First movement disorder symptom presentation: noticed general weakness in the legs  Date: Over one year prior to diagnosis  Non-motor symptoms: Visual impairments, Early mild cognitive impairment, Orthostatic hypotension, Sleep disturbances and Fatigue    Time of Evaluation: 10:00       Time of last PD med: 6:00 am        Time of next PD med: 12:00 pm  On/Off presentation/symptoms: Feel more weak and can't walk as well so I don't feel comfortable going to the store or running errands anymore. Patient reports feeling \"off\" more than \"on\" as of recently.   History of PD specific PT? Yes: When and where?: [facility], July 2017    Pain: No    Living Situation:Other: Assisted living   Caregiver Support: assisted living staff   Equipment: Raised toilet seated, grab bars in bathroom, shower/tub chair  Current Activity Level: BIG exercises 1-2x/week, Wednesdays fitness class at assisted living  Chief Mobility Complaint: Feeling more weak as of recently   Patient's Functional Goal: \"Want to get stronger and be able to walk better so I can run errands and do some of my own shopping.\"    Fall history:None    Freezing: Often - about once a day. Patient reports increased episodes of freezing as of recently.       OBJECTIVE INFORMATION    Bradykinesia and Hypokinesia (Combining slowness, hesitency, decreased arm swing, small " amplitude and poverty of movement in general):  Moderate    Dyskinesia:No    Posture: Slightly flexed posture, rounded shoulder     Postural Stability: Posterior tug test (Response to sudden posterior displacement produced by pull on shoulders while patient is erect, with eyes open and feet slightly apart.  Patient is prepared.)  Absence of postural reponse: would fall if not caught by examiner    Transitional Movements  Sit?Stand:   Modified Independent       Sit? Supine:   Not Tested  360 degree turn:  7 steps    TUG(Timed Up and Go): 10.00 seconds    (>13 sec:  Falls Risk)      Divided Attention   Cognitive TU.71 seconds (Task):  Counting backwards by 3's starting at 70 (70, 67, 64)    Strength   30 Second Chair Stand:  # 9  No UE support    Age/Gender Norm: 13    Balance      Eyes open Eyes closed     Romberg (sec) >30      Semi-Romberg (sec)       Sharpened Romberg (sec)      Left LE Right LE     Single Leg Stance (sec) 11.44 15.85      Balance Assessment: Mini-BEST 21/28 (<23/28)    Balance Assessment: Mini-BEST 21/28 (<23/28)   1) Sit <> Stand: (2) normal   2) Rise to Toes: (2) normal   3) Stand on One Leg: (2) normal L: 11.44 seconds R: 15.85 seconds  4) Compensatory Stepping Correction-Forward: (1) moderate   5) Compensatory Stepping Correction-Backward: (1) moderate   6) Compensatory Stepping Correction-Lateral: (0) severe L: (1) moderate R: (0) severe   7) Eyes Open, Firm Surface: (2) normal   8) Eyes Closed, Foam Surface: (2) normal   Moderate increase in ant/post and lateral sway  9) Incline, Eyes Closed: (2) normal    Mild increase in ant/post sway  10) Change in Gait Speed: (2) normal   11) Walk with Head Turns-Horizontal: (1) moderate  Significant path deviation toward R  12) Walk with Pivot Turns: (2) normal (3 steps)  13) Step Over Obstacles: (1) moderate (slows speed significantly)  14) TUG with Dual Tasks: (1) moderate  TUG:  10.00 seconds Cognitive TU.79 seconds    Motor  Planning / Coordination   Four Square Step Test: Not tested due to time limitation. Mini-BEST already indicates patient as being falls risk.     Gait   Preferred Gait Speed  6 meters in 5.88 seconds Meters/second: 1.02 Age/Gender Norm:   1.32 meters/second  # steps in 6 meters: 11  Assistive Device: none  Gait Speed Fall risk:  Med Falls Risk (0.6-1.2 m/s)  Gait Analysis: Slightly flexed posture    Fast Gait Speed  6 meters in 4.74 seconds Meters/second: 1.26 Age/Gender Norm:   1.71 meters/second  # steps in 6 meters: 9  Assistive Device: none  Gait Analysis: Slightly flexed posture    Activity Tolerance   6 minute walk test:  1,018 feet (patient walked with 4WW during testing today)    RPD:  8/10   Age/Gender Norm: 1,527    Total OP Minutes: 60 minutes     Rx Units: 1 Evaluation

## 2021-06-10 NOTE — PROGRESS NOTES
Physical Therapy  Physical Therapy  Movement Disorder Treatment Note    Date: 2017   Visit #: 7/15 - MEDICARE (certification dates: 3/16/2017 to 2017) Reassessment completed today (2017)  Rx Units: 2 TE, 2 NMR  Total OP Minutes: 58    Pain:  Yes  Location: Bilateral knees, Ratin-5/10 Intervention: Rest PRN    Subjective: Patient has no significant complaints today. She reports she is doing well and that she has been fitting in her exercises every day. She reports her off periods are becoming further apart and are fewer in frequency.     Objective:   Therapeutic Exercise:  Total Minutes: 28  Sustained Movements (sitting)  4reps  x 1-2 sets      Exercise   Reps   Sets   Effort     1A Floor to Ceiling With Flicking 6 1 10     1B Side to Side With Flicking 6 1 10     Comments:  1A: Demonstrates excellent effort and loud counting. Provided verbal cues for big posture.   1B: Provided verbal cues to move out big and to end big. Pt demonstrated increased difficulty with movement when led by R hand.    Other:   Nustep x8 minutes, resistance level 4 Average METS 2.7, SPM 84    Performed 6 minute walk test: pt ambulated 1,578 feet, RPD = 8   -Pt demonstrates excellent pace and step amplitude throughout test.     Sit to stands   -Performed x10 without use of UE   -Demonstrates improved speed and amplitude of movement with this exercise    Neuro-Reeducation/Balance:  Total Minutes: 30   Multidirectional Repetitive Movements.  8-16reps  x 1-2 sets    Step and Reach:   UE support: none  Chairs Nearby: 2C      Exercise   Reps   Sets   Effort     2A Forward Step and Reach 10 2 10     2B Sideward Step and Reach 10 2 10     2C Backward Step and Reach 10 2 10     Comments:   2A: Excellent step amplitude. Provided verbal cues for big open hands, which improved amplitude of pt's hand movement.   2B: Excellent step amplitude. Verbal cues for big step back.   2C: 1 episode of freezing that was resolved with a verbal cue for  a big step forward. Demonstrates excellent effort throughout. Following 2nd set, pt reported feeling wobbly and agreed to a seated rest break.    Rock and Reach:  UE support:  Chairs Nearby: 2D, 2E      Exercise   Reps   Sets   Effort     2D Left leg Forward/Backward 20 2 10     2D Right leg Forward/Backward 20 2 10     2E Side to Side 6 1 10     Comments:   2D: Excellent weight shifting and amplitude of arm swing with this exercise.   2E: Demonstrates excellent amplitude of movement but experienced several minor LOB's throughout exercise. Pt rested in chair due to feelings of unsteadiness.     Other:     Gait Training:   Total Minutes:     Time Speed (MPH) UE support Direction Incline Comments                                      Other:     Therapeutic Activity  Total Minutes:      Functional Movement  Rep  Sets  Effort    Rolling        Supine to Sit       Sit to Stand from standard chair       Sit to Stand from low, soft chair       Sit and Reach       Walk and Turn       PWR Moves:       PWR Moves:        Comments:     Big Walking:     Other:     Carryover Assignment: walking and sit><stands     HEP: exercises 1 A/B, 2 A/B/C/D, bridges    Assessment/Plan for week:  5/1/2017 - 5/5/2017  G-codes reassessed 5/2/2017  Pt demonstrates excellent amplitude of movement and effort throughout session and reports that she is able to move around more easily at home. Pt has surpassed her original goal to ambulate at least 1400' during 6 minute walk test; pt was able to ambulate 1578' today. Pt still experiences intermittent LOB's throughout session, especially when performing motions with bending and twisting components. Plan to focus on balance training, endurance, and B LE strengthening with remaining visits. Pt remains appropriate to receive skilled 1:1 skilled physical therapy to address her deficits and new goals, decrease falls risk, and improve community participation. Input new G-code for changing and maintaining  body position (30 second sit to stand).     Updated Functional Goals to be met within 8 sessions  Patient will show improved efficiency and quality of movement, improved gait and postural stability for increased safety, decreased fall risk when performing ADL's, IADL's, household &/or community ambulation as measured by:   -Cog/Motor TUG </= 12 seconds,   -30 Second Chair Stand >/= 12 stands,   -Balance Assessment: >/= 25/28 on Mini-BEST,   -Gait speed >/= 1.20 m/second and # steps in 6 meters </= 10 steps,   -Patient will ambulate >/= 1,700 feet in 6 minute with RPD</= 5 to indicate improved functional activity tolerance for participating in social events &/or household/community ambulation,  -Patient will be mod I/SBA with HEP

## 2021-06-10 NOTE — PROGRESS NOTES
Physical Therapy  Physical Therapy  Movement Disorder Treatment Note    Date: 4/11/2017   Visit #: 2/15 - MEDICARE (certification dates: 3/16/2017 to 6/9/2017)   Rx Units: 1 TE, 2 NMR, 1 TA  Total OP Minutes: 58    Pain:  No    Subjective:  I've been doing the exercises where I live. I feel weak and I'm looking forward to getting stronger.     Objective:  Therapeutic Exercise:  Total Minutes: 15  Sustained Movements (sitting)  4reps  x 1-2 sets      Exercise   Reps   Sets   Effort     1A Floor to Ceiling With Flicking   6 1 9     1B Side to Side With Flicking 6 1 10     Comments:    1A: Verbal cues for BIG hands and BIG reach forward.    1B: Verbal cues for BIG hands, and BIG step back to starting position. Pt began slipping off hi-lo mat during first 2 reps, so activity was moved to normal height chair with armrests. Pt demonstrated excellent extension of back leg but demonstrated freezing when attempting to bring back leg to starting position.     Other:   NuStep, resistance level 2, 6:00. Persistent verbal cues to keep SPM above 80. Pt reports significant fatigue with this activity.      Neuro-Reeducation/Balance:  Total Minutes: 30  Multidirectional Repetitive Movements.  8-16reps  x 1-2 sets    Step and Reach:   UE support: 2A, 2B, 2C  Chairs Nearby: 2A, 2B, 2C      Exercise   Reps   Sets   Effort     2A Forward Step and Reach 10 2 10     2B Sideward Step and Reach 10 2 10     2C Backward Step and Reach 10 1 10     Comments:   2A: Demonstrated excellent arm and stepping amplitude but experienced intermittent freezing when attempting to step backwards. Provided intermittent verbal cues to take a BIG step back.     2B: Demonstrates excellent arm and stepping amplitude. Provided verbal cues to take BIG step back to starting position d/t intermittent freezing when attempting to step back.     2C: Demonstrates excellent weight shift but had difficulty initially coordinating movement. Pt experienced no freezing  during this exercise.     Rock and Reach:  UE support: 2D, 2E  Chairs Nearby: 2D, 2E      Exercise   Reps   Sets   Effort     2D Left leg Forward/Backward 20 1 10     2D Right leg Forward/Backward 20 1 10     2E Side to Side 10 1 9     Comments:     2D: Demonstrates excellent amplitude of movement with this exercise but required visual cueing via mirroring to coordinate this exercise initially.      2E: Demonstrates decreased amplitude of movement with this exercise compared to other BIG exercises; pt reports this exercise hurts a muscle she pulled in her R lower back. Instructed pt to not perform this exercise at home.     Other:     Gait Training:   Total Minutes:     Time Speed (MPH) UE support Direction Incline Comments                                      Other:     Therapeutic Activity  Total Minutes: 13     Functional Movement  Rep  Sets  Effort    Rolling        Supine to Sit       Sit to Stand from standard chair       Sit to Stand from low, soft chair       Sit and Reach       Walk and Turn       PWR Moves:       PWR Moves:        Comments:     Big Walkin' without walker. Provided verbal cues for BIG steps and to swing arms BIG. Pt demonstrated excellent ability to respond to verbal cues and required only intermittent re-cueing.     BIG sit to stands: 2 sets of 10 with BIG arms and hands at apex of stand. Demonstrates excellent amplitude of arm and leg movement. Provided verbal cues for BIG hands throughout exercise. Pt reported significant fatigue during last 3 reps of 2nd set.    Other:     Carryover Assignment: BIG exercises except 2E (all standing exercises with use of chair).     Assessment/Plan for week:  4/10/2017 - 2017  Pt demonstrates excellent amplitude of movement, especially with LE. Pt demonstrates intermittent freezing but responds well to verbal cues to take BIG step back to starting position. Pt reports significant fatigue at the end of her exercises and with sit to stands. Plan  to progress LE strengthening, activity tolerance, and coordination of BIG exercises. Pt remains appropriate to receive 1:1 skilled physical therapy to address her deficits.     Kathya Herrera, SPT  SPT under direct supervision of PT with PT directing treatment and plan of care.  Byron Peña, PT

## 2021-06-10 NOTE — PROGRESS NOTES
"Physical Therapy  Physical Therapy  Movement Disorder Treatment Note    Date: 5/25/2017   Visit #: 14/15 - MEDICARE (certification dates: 3/16/2017 to 6/9/2017)   Rx Units: 2 TE, 2 NMR  Total OP Minutes: 55    Pain: Mild low back pain. 2-3/10   Intervention: rest prn    Subjective: Reports compliance to her HEP and daily walks. She does note that during her off times she tends to rely on her 4WW.      Objective:   Therapeutic Exercise:  Total Minutes: 25  Sustained Movements (sitting)  4reps  x 1-2 sets      Exercise   Reps   Sets   Effort     1A Floor to Ceiling With Flicking 4 1 10     1B Side to Side With Flicking 4 1 10     Comments:  1A: Good posture and voice. VC for full finger extension  1B: Good knee extension. VC for palm up and full finger extension R>L     Other:     -NuStep x 10 minutes  Workload #2-4  Ave METs: 2.8  SPM: 103  -BIG walking without AD x 800' - good posture, upright gaze and step length      Neuro-Reeducation/Balance:  Total Minutes: 30  Multidirectional Repetitive Movements.  8-16reps  x 1-2 sets    Step and Reach:   UE support: none  Chairs Nearby: 2A, 2B, 2C      Exercise   Reps   Sets   Effort     2A Forward Step and Reach 10 2 10     2B Sideward Step and Reach 10 1 10     2C Backward Step and Reach 10 2 10     Comments:   2A: Good balance and step height.  2nd set with yellow foam: pt had difficulty stepping back onto foam leading to UE support on nearby chairs or therapist.  \"My legs feel weak\"  2B: Good head rotation and step length. No LOB  2C: Intermittent cues for BIG toe lift on L. No LOB. Good posterior weight shift    Rock and Reach:  UE support: none  Chairs Nearby: 2D      Exercise   Reps   Sets   Effort     2D Left leg Forward/Backward 20 1 10     2D Right leg Forward/Backward 20 1 10     2E Side to Side        Comments:   2D: Good weight shift and posture this date. Initially demonstrated decreased coordination with arm swing and rocking, however this improved after 10 " reps. No LOB      Other:   Dynamic balance activities x 70' with close SBA:   Walking with head turns x 1 rep - very mild path deviations. Pt reported feeling unsteady with activity   Walking with speed changes x 2 reps - good change in pace   Walking with U -turns x 3 reps - no LOB  Divided attention: BIG walking and cognitive task (animals in alphabetical order) x  600' - VC for cognitive task >25% of the time.  Mild decreased in gait speed observed.       Gait Training:   Total Minutes:     Time Speed (MPH) UE support Direction Incline Comments                                      Other:     Therapeutic Activity  Total Minutes:     Functional Movement  Rep  Sets  Effort    Rolling       Supine to Sit       Sit to Stand from standard chair       Sit to Stand from low, soft chair       Sit and Reach       Walk and Turn       PWR Moves:       PWR Moves:        Comments:     Big Walking:     Other:     Carryover Assignment: walking and sit to stands    HEP: exercises 1 A/B, 2 A/B/C/D, sit to stands, bridging, counter squats    Assessment/Plan for week:  5/22/2017 - 5/26/2017   See previous note for this week's A/P.  Plan to complete reassessment next session.

## 2021-06-10 NOTE — PROGRESS NOTES
Physical Therapy  Physical Therapy  Movement Disorder Treatment Note    Date: 5/12/2017   Visit #: 10/15 - MEDICARE (certification dates: 3/16/2017 to 6/9/2017)   Rx Units: 1 TE, 2 NMR, 1 TA  Total OP Minutes: 58    Pain:  Whole body soreness from exercise, rated 4/10.     Subjective: Patient reports she has been feeling better than during her last visit. She has been walking quite a bit with her friends and has been a little sore from all the exercise. She feels she is moving fairly well today.     Objective:   Therapeutic Exercise:  Total Minutes: 18  Sustained Movements (sitting)  4reps  x 1-2 sets      Exercise   Reps   Sets   Effort     1A Floor to Ceiling With Flicking 6 1 10     1B Side to Side With Flicking 6 1 10     Comments:  1A: Demonstrates good effort and posture throughout. Verbal cues for louder counting.   1B: Verbal cues for louder counting and for big and open left hand. Pt demonstrates excellent leg extension and posture throughout.     Other:   NuStep  Resistance level 4 for 9:00  Averages: 2.6 METS  98 SPM    Neuro-Reeducation/Balance:  Total Minutes: 30  Multidirectional Repetitive Movements.  8-16reps  x 1-2 sets    Step and Reach:   UE support: none  Chairs Nearby: none      Exercise   Reps   Sets   Effort     2A Forward Step and Reach 10 2 10     2B Sideward Step and Reach 10 2 10     2C Backward Step and Reach 10 2 10     Comments:   2A: Excellent step amplitude and height. Verbal cues for louder counting, to slow movement down, and for big open hands. Excellent effort throughout.  2B: Excellent step amplitude and height. Good stability throughout. Improved counting volume compared to previous exercises this visit.   2C: Excellent effort throughout. Pt demonstrated increased tremor and 1 episode of freezing during this exercise. Tremor decreased following 300' ambulation between sets.     Rock and Reach:  UE support: none  Chairs Nearby: none      Exercise   Reps   Sets   Effort     2D  "Left leg Forward/Backward 10 2 10     2D Right leg Forward/Backward 10 2 10     2E Side to Side 10 2 10     Comments:   2D:  Demonstrates excellent effort throughout. Patient was able to resolve tremor and feelings of being \"stuck\" with this exercise. Patient experienced no episodes of freezing.     2E: Demonstrates excellent form with this exercise but demonstrated intermittent LOB's, typically during twisting to the left. Pt typically able to catch herself independently, but required min A for balance twice during this exercise.     Other:   Fast paced ambulation and stepping patterns without use of RW for 1200'   -Provided bilateral hand held assist with side stepping, backward stepping, and karaoke walking. Provided supervision during fast paced forward walking   -Instructed patient to side step, turn, walk forwards, walk backwards, and perform karaoke walking pattern upon hearing instructions to do so in order to improve her dynamic walking balance   -Instructed patient to perform fast paced walking in between the alternative walking patterns listed above   -Patient demonstrated good stability and effort throughout. Pt reports this activity makes her feel out of breath     Gait Training:   Total Minutes:     Time Speed (MPH) UE support Direction Incline Comments                                      Other:     Therapeutic Activity  Total Minutes: 10     Functional Movement  Rep  Sets  Effort    Rolling       Supine to Sit 5 1 NR    Sit to Stand from standard chair       Sit to Stand from low, soft chair       Sit and Reach       Walk and Turn       PWR Moves:       PWR Moves:        Comments: Patient demonstrates decreased amplitude and speed of movement with supine to sits. Instructed pt to sit up big and push with her arms in order to sit up more easily. Pt demonstrated improved speed and amplitude of movement following verbal cues.     Big Walking:     Other:     Carryover Assignment: walking and sit><stands "     HEP: exercises 1 A/B, 2 A/B/C/D    Assessment/Plan for week:  5/8/2017 - 5/12/2017    See previous note for assessment and plan for this week.    SPT under direct supervision of PT with PT directing treatment and plan of care.   Kathya Herrera, SPT  Yumiko Kam, PT

## 2021-06-10 NOTE — PROGRESS NOTES
Physical Therapy  Physical Therapy  Movement Disorder Treatment Note    Date: 2017   Visit #: 4/15 - MEDICARE (certification dates: 3/16/2017 to 2017)   Rx Units: 1 TE, 3 NR  Total OP Minutes: 55     Pain:  Yes  Location: bilateral knees, Ratin/10, Intervention: tylenol; rest PRN    Subjective: Performing exercises on a daily basis. Having some trouble remembering specific exercises.     Objective:  Therapeutic Exercise:  Total Minutes: 15  Sustained Movements (sitting)  4reps  x 1-2 sets      Exercise   Reps   Sets   Effort     1A Floor to Ceiling With Flicking 6 1 10     1B Side to Side With Flicking 6 1 10     Comments:    1A: Verbal cues for showed count to insure big finger flicks.  1B: Improved posterior leg extension bilaterally today.    Other:   NuStep: level 3 x 10 minutes  Average METs: 2.2   Average SPM: 92     Neuro-Reeducation/Balance:  Total Minutes: 40  Multidirectional Repetitive Movements.  8-16reps  x 1-2 sets    Step and Reach:   UE support: Chairs Nearby: 2A, 2B, 2C      Exercise   Reps   Sets   Effort     2A Forward Step and Reach 10 2 10     2B Sideward Step and Reach 10 2 10     2C Backward Step and Reach 10 2 10     Comments:   2A: Patient reports feeling lightheaded following initial set. Required seated rest break. No LOB, still able to produce good amplitude.  2B: Improved posture and overall technique. Minimal verbal cues for increased step height with each return step.   2C: Close supervision, watching self and therapist in mirror. Verbal cues for toe up and increased posterior weight shift. No LOB today.     Rock and Reach:  UE support: 2E  Chairs Nearby: 2D, 2E      Exercise   Reps   Sets   Effort     2D Left leg Forward/Backward 20 2 10     2D Right leg Forward/Backward 20 2 10     2E Side to Side 10 1 on each side 9     Comments:   2D: Verbal cues to maintain big arm swings bilaterally. No LOB, patient able to maintain big rocking throughout.  2E: Again attempted to  perform without UE support originally. Patient performed x2 bilaterally and then felt as if she needed support to continue. Verbal cues for trunk rotation, big hands and upright posture.     Other:   Obstacle course: canes (four), blue foam, and foam circles (five); over canes, single step on each colored disc, 5 squats on blue foam; x4 down and back, Verbal cues for increased step length, CGA for stability, especially when performing squats on blue foam. Additional verbal cues to shift weight posterior during squats and avoiding knees over toes.    Walking without 4WW: 464' x 1, verbal cues for change in pace, no LOB with speed changes, patient able to maintain equal step length and bilateral arm swing. Minimal verbal cues for foot clearance L>R with increased speed.    Gait Training:   Total Minutes:     Time Speed (MPH) UE support Direction Incline Comments                                      Other:     Therapeutic Activity  Total Minutes:     Functional Movement  Rep  Sets  Effort    Rolling        Supine to Sit       Sit to Stand from standard chair       Sit to Stand from low, soft chair       Sit and Reach       Walk and Turn       PWR Moves:       PWR Moves:        Comments:     Big Walking:    Other:     Carryover Assignment: walking and sit><stands    HEP: exercises 1 A/B, 2 A/B/C/D/E    Assessment/Plan for week:  4/17/2017 - 4/21/2017  See last treatment note for complete weekly summary.

## 2021-06-10 NOTE — PROGRESS NOTES
Physical Therapy  Physical Therapy  Movement Disorder Treatment Note    Date: 2017   Visit #: 3/15 - MEDICARE (certification dates: 3/16/2017 to 2017)   Rx Units: 2 TE, 2 NR  Total OP Minutes: 55    Pain:  Yes  Location: bilateral knees, Ratin/10, Intervention: tylenol; rest PRN    Subjective: Performing exercises on a daily basis. Having some trouble remembering specific exercises.     Objective:  Therapeutic Exercise:  Total Minutes: 25  Sustained Movements (sitting)  4reps  x 1-2 sets      Exercise   Reps   Sets   Effort     1A Floor to Ceiling With Flicking 4 2 10     1B Side to Side With Flicking 4 2 10     Comments:    1A: Verbal cues for big hands and increased finger flicks.   1B: Patient had difficulty with multiple freezing episodes and posterior leg extension d/t shoes on first set. Shoes taken off with for second set, modeling from therapist with maximal effort allowed patient to produce movements more freely.     Other:   NuStep: level 3 x 8 minutes  Average METs: 1.9   Average SPM: 80      Sit><stand from low chair: 10 x 1; verbal cues for maximal effort and big dive forward out of chair. Patient demonstrates good technique and no LOB posterior back into chair.    Walkin' x 2; once before exercises and once after exercises, with 4WW, verbal cues for maximal step length and foot clearance with tall posture during gait. Patient demonstrates improved step length and overall pace with walk following exercises.     Neuro-Reeducation/Balance:  Total Minutes: 30  Multidirectional Repetitive Movements.  8-16reps  x 1-2 sets    Step and Reach:   UE support: Chairs Nearby: 2A, 2B, 2C      Exercise   Reps   Sets   Effort     2A Forward Step and Reach 10 2 10     2B Sideward Step and Reach 10 2 10     2C Backward Step and Reach 10 2 10     Comments:   2A: 1 stop in exercises d/t patient comments of being stuck. Verbal cues for maximal effort producing big arms and hand bilaterally and increased  return step height.  2B: Patient reports feeling stiff and unable to move between sets, but is able to produce large amplitude movements during each set. Minimal verbal cues for in step height in each direction.   2C: Verbal cues to keep head up with posterior step and trunk flexion. 1 LOB with L posterior step. Patient able to use nearby chairs to maintain balance.     Rock and Reach:  UE support: 2E  Chairs Nearby: 2D, 2E      Exercise   Reps   Sets   Effort     2D Left leg Forward/Backward 20 2 10     2D Right leg Forward/Backward 20 2 10     2E Side to Side 10 1 on each side 9     Comments:   2D: Verbal cues to maintain big arm swings bilaterally. No LOB, patient able to maintain big rocking throughout.  2E: Attempted to perform without UE support originally. Patient had significant difficulty and multiple LOB. Then demonstrated with unilateral UE support. Verbal cues for trunk rotation and big hands with each rotation.     Other:     Gait Training:   Total Minutes:     Time Speed (MPH) UE support Direction Incline Comments                                      Other:     Therapeutic Activity  Total Minutes:     Functional Movement  Rep  Sets  Effort    Rolling        Supine to Sit       Sit to Stand from standard chair       Sit to Stand from low, soft chair       Sit and Reach       Walk and Turn       PWR Moves:       PWR Moves:        Comments:     Big Walking:    Other:     Carryover Assignment: BIG exercises except 2E (all standing exercises with use of chair).     Assessment/Plan for week:  4/17/2017 - 4/21/2017  Patient has periods of freezing during therapy sessions, but is able to work through them with frequent rest breaks, verbal cues for maximal effort and initial big movements. Patient appears to have less frequent freezing episodes following exercises in therapy session, demonstrating improved walking on the way out of therapy as compared to the walk into therapy. Plan to continue progressing  exercises as able and use extra time during sessions to address balance and stability during gait.

## 2021-06-11 NOTE — PROGRESS NOTES
"Physical Therapy  Physical Therapy  Movement Disorders Discharge Summary    Medical Diagnosis: Parkinson's Disease                                         Treatment Diagnosis: Postural instability, bradykinesia, decreased activity tolerance                                           Referring MD: Dr. Yovany Lester   Onset Date: 3/1/2017  Start of Care: 3/16/2017  Time of Evaluation: 11:00 am  Time of last PD med: 6:00 am  Time of next PD med: 12:00 pm    Date: 6/2/2017   Visit #: 15/15 - MEDICARE (certification dates: 3/16/2017 to 6/9/2017)   Rx Units: 1 TE, 2 NMR  Total OP Minutes: 50  Pain: Both knees, 4/10. Provided rest PRN throughout session.   Subjective: Pt reports feeling \"lousy\" today. She states she feels tired and her knees are quite sore.   Objective:   Therapeutic Exercise:  Total Minutes: 15  Performed 6 minute walk test, TUG, cognitive TUG, 30 second sit><stand, and 6 meter gait speed. Patient demonstrated good stability throughout these tests. Patient utilized her RW during the 6 minute walk test but did not use her RW for any other test. Provided pt with SBA to CGA throughout testing.   Transitional Movements   Initial Post Treatment Goals      Sit ? Stand  Mod I Mod I    360 degree turn (steps)      TUG with Cognitive task (sec) 14.79 11.05    TUG (sec) 10.0 8.59    Strength  Initial Post Treatment Goals   30sec Chair Stand (#)  9 10 >/= 12   Balance  Initial Post Treatment Goals   Romberg (EO/EC)    /       /       /      Sharpened Romberg (EO/EC)    /       /       /      Semi-Romberg (EO/EC)    /       /       /      Mini- BESTest   21/28 25/28 >/= 25/28   FG       Other       Motor Planning/Coordination Initial Post Treatment Goals   Four Square Step Test      (best of 2 trials)      Gait Initial Post Treatment Goals   Gait Speed in 6m (sec)  5.16    Meters/seconds 1.02 1.16 >/= 1.2   # steps in 6m 11 10 </= 10    Activity Tolerance Initial Post Treatment Goals   6 min walk test (ft)  1018  RPD = " 8/10 1386  RPD = 8/10 >/= 1400   RPD </= 4     Neuro-Reeducation/Balance:  Total Minutes: 30    Balance Assessment: Mini-BEST /28 (<)   1) Sit <> Stand: (2) normal   2) Rise to Toes: (1) moderate   3) Stand on One Leg: (1) moderate L: 16.71 sec  R: 21.49 sec   4) Compensatory Stepping Correction-Forward: (2) normal   5) Compensatory Stepping Correction-Backward: (2) normal   6) Compensatory Stepping Correction-Lateral: (2) normal L: (2) normal R: (2) normal   7) Eyes Open, Firm Surface: (2) normal   8) Eyes Closed, Foam Surface: (2) normal   9) Incline, Eyes Closed: (2) normal   10) Change in Gait Speed: (2) normal   11) Walk with Head Turns-Horizontal: (2) normal  12) Walk with Pivot Turns: (2) normal  13) Step Over Obstacles: (2) normal   14) TUG with Dual Tasks: (1) moderate  TU.59 sec  Cognitive TU.05 sec (counting down from 77 by 3's: 77, 74, 71, 68)    Functional Outcome Changes: Patient demonstrates improved stability and balance throughout all activities, particularly with ambulation. Patient also demonstrates improved aerobic capacity during ambulation and with seated exercise.     Evaluation: Goals Met? Partially Comments: Patient has met her goal for the mini BESTest. She has improved her comfortable gait speed and 6 minute walk test, but did not meet her goals for these tests. The patient did not demonstrate significant improvement in her performance of the 30 second sit to stand test. On the date of discharge evaluation, patient reports that she is having a particularly bad day. It is likely she would have performed significantly better, as suggested by her performance during previous sessions, on a day she is not feeling as impaired.     Patient seen from 3/16/2017 to 2017 for 15 treatment sessions.    Recommendation: D/C, continue HEP, and follow-up evaluation in 6 months d/t progressive nature of disease.    Kathya Herrera, PT Student   SPT under direct supervision of PT with PT  directing treatment and plan of care.  Byron Peña, PT    Physician Recommendation:  1. I certify the need for these services furnished within this plan and while under my care. I agree with the therapist's recommendation for plan of care.    2. If there is any recommendation for modification of therapy plan, please indicate below.      Physician's Signature (Printed):  _____________________________

## 2021-06-11 NOTE — PROGRESS NOTES
"Internal Medicine Office Visit  Patient Name: Gabi Manley  Patient Age: 77 y.o.  YOB: 1940  MRN: [id number]  ?  Date of Visit: 2017  Reason for Office Visit:   Chief Complaint   Patient presents with     Follow-up     Care Team:   1. Neurology, Dr. Lester at [facility]    Assessment / Plan / Medical Decision Makin. HTN (hypertension)  2. HLD (hyperlipidemia)  3. Parkinson's Disease  4. Gastroesophageal reflux disease without esophagitis  - Continue current medications  - Keep neurology follow up appointments as scheduled  - Stay active, continue PT exercises at home  - Schedule fasting lab appointment   - Try OTC arthritis creams for toe pain and wide toe box of shoes. Suggested capsaicin or Icy Hot   - Follow up in 6 months       Health Maintenance Review  Health Maintenance   Topic Date Due     FALL RISK ASSESSMENT  2017     MAMMOGRAM  2017     DXA SCAN  2018     ADVANCE DIRECTIVES DISCUSSED WITH PATIENT  2021     TD 18+ HE  2026     PNEUMOCOCCAL POLYSACCHARIDE VACCINE AGE 65 AND OVER  Completed     INFLUENZA VACCINE RULE BASED  Completed     PNEUMOCOCCAL CONJUGATE VACCINE FOR ADULTS (PCV13 OR PREVNAR)  Completed     ZOSTER VACCINE  Completed         I am having Ms. Manley maintain her cholecalciferol (vitamin D3), multivitamin therapeutic, carbidopa-levodopa, fexofenadine, escitalopram oxalate, atorvastatin, amLODIPine, and pantoprazole.     HPI:   Encounter Diagnoses   Name Primary?     HTN (hypertension) Yes     HLD (hyperlipidemia)      Parkinson's Disease      Gastroesophageal reflux disease without esophagitis         Parkinson's- dispenses her own medications in her assisted living facility. She has meals provided through her independent living facility, currently she goes to just the evening meal. She cooks her own meals otherwise. Half of the day she feels stronger but the other half of the day feels \"sluggish\". She stays very active in " her apartment and has a lot of social interactions. She is so happy she moved to a senior apartment. She has been walking more. Thriving in the assisted living facility.     Reflux- had a lot of stomach pain with discontinuation of pantoprazole but is doing well with pantoprazole 40 mg daily rather than twice daily.     Depression- has taken lexapro for this for a long time and mood has been good. Not interested in any dose changes.     HTN- blood pressure readings at home look good, she checks once per week. No SE associated with taking this medication.    Urinary incontinence- wears briefs for this. Accepting of current state.     Right foot great toe is painful after she walks long distances. There is a history of fracture of this toe.     Review of Systems: No chest pain, SOA. No lightheadedness, dizziness associated with current HTN medications    Current Scheduled Meds:  Outpatient Encounter Prescriptions as of 6/13/2017   Medication Sig Dispense Refill     amLODIPine (NORVASC) 5 MG tablet TAKE 1 TABLET DAILY 90 tablet 2     atorvastatin (LIPITOR) 20 MG tablet Take 1 tablet (20 mg total) by mouth daily. 90 tablet 2     carbidopa-levodopa (SINEMET CR)  mg per tablet Take 1 tablet by mouth 4 (four) times a day. (06:00, 12:00 , 17:00, 22:00)       cholecalciferol, vitamin D3, 1,000 unit tablet Take 1,000 Units by mouth daily.       escitalopram oxalate (LEXAPRO) 10 MG tablet Take 10 mg by mouth daily.        fexofenadine (ALLEGRA) 180 MG tablet Take 180 mg by mouth as needed.        pantoprazole (PROTONIX) 40 MG tablet Take 1 tablet (40 mg total) by mouth daily. 90 tablet 1     therapeutic multivitamin (THERAGRAN) tablet Take 1 tablet by mouth daily.       No facility-administered encounter medications on file as of 6/13/2017.      Past Medical History:   Diagnosis Date     Acid reflux      Carpal tunnel syndrome      GERD (gastroesophageal reflux disease)      Hypertension      Parkinson disease      PE  (pulmonary embolism)      Pulmonary Embolism     Created by [facility] Annotation: Dec 15 2009  9:23AM Lan Almanzar: on Coumadin      Pulmonary nodule      Stress incontinence      Urinary incontinence      Vitamin D deficiency      Past Surgical History:   Procedure Laterality Date     BREAST BIOPSY Right 2000's    Calcs     SD BIOPSY OF BREAST, INCISIONAL      Description: Biopsy Breast Open;  Recorded: 04/27/2009;  Comments: X2, benign.     SD LIGATE FALLOPIAN TUBE      Description: Tubal Ligation;  Recorded: 04/27/2009;     SD REVISE MEDIAN N/CARPAL TUNNEL SURG  5/20/2016    Procedure: LEFT MEDIAN NERVE DECOMPRESSION;  Surgeon: Terell Gaines MD;  Location: [facility];  Service: Neurosurgery     Social History   Substance Use Topics     Smoking status: Never Smoker     Smokeless tobacco: Never Used     Alcohol use No       Objective / Physical Examination:  Vitals:    06/13/17 0944   BP: 118/64   Pulse: (!) 59   Weight: 151 lb (68.5 kg)     Wt Readings from Last 3 Encounters:   06/13/17 151 lb (68.5 kg)   01/30/17 140 lb (63.5 kg)   12/12/16 138 lb 6.4 oz (62.8 kg)     Body mass index is 26.33 kg/(m^2).     General Appearance: Alert and oriented, cooperative, affect appropriate, speech clear, in no apparent distress  Neck: Supple, trachea midline. No carotid bruits   Lungs: Clear to auscultation bilaterally. Normal inspiratory and expiratory effort  Cardiovascular: Regular rate, normal S1, S2. No murmurs, rubs, or gallops  Extremities: Pulses 2+ and equal throughout. No edema  Neuro: Stopped posture. Dyskinesia of the trunk, head, and neck. Resting tremor     Orders Placed This Encounter   Procedures     Lipid Profile     Basic Metabolic Panel     ALT (SGPT)   Followup: Return in about 6 months (around 12/13/2017) for Recheck. earlier if needed.      Tracey Abdi, CNP  [facility]

## 2021-06-14 NOTE — PROGRESS NOTES
Physical Therapy  Physical Therapy  Movement Disorder Treatment Note    MEDICARE (Certification dates: 2017 - 3/2/2018)    Date: 12/15/2017   Visit #: 3/10  Rx Unit/s: 2 TE, 2 NR  Total OP Minutes: 55    Pain:  Yes  Location: everything, Ratin-5/10, Intervention: rest PRN    Subjective: Patient reports performing exercises at home.    Objective:  Therapeutic Exercise:  Total Minutes: 30  Sustained Movements (sitting)  4reps  x 1-2 sets      Exercise   Reps   Sets   Effort     1A Floor to Ceiling With Flicking   6 1 10     1B Side to Side With Flicking 6 1 10      Comments:  1A: Verbal cues for palms forward and big finger flicks.  1B: Patient had difficulty with getting stuck today during exercise, but able to push through and demonstrate good posterior leg extension and finger flicks.     Other:   NuStep: workload 3 x 8 minutes; Verbal cues to maintain SPM > 80   Average SPM: 92   Average METs: 2.7    Boxing activities: Patient in boxer's stance  Alternating jab to mitts: 30 seconds x2, no movements  Alternating jab to mitts: 30 seconds, circles L and R, x2 in each direction  Jabs with lateral steps: 30 seconds x 4; quick changes in direction during 30 second bout.  Cross: 30 seconds x2, no movements    Verbal cues to maintain weight on posterior leg and increased PWR! with each hit.    BIG walkin' x 1; patient demonstrates improved step length and foot clearance at the end of session. Minimal cues to maintain step length.     Neuro-Reeducation/Balance:  Total Minutes: 25  Multidirectional Repetitive Movements.  8-16reps  x 1-2 sets    Step and Reach:   UE support: 2A Chairs Nearby: 2 A/B      Exercise   Reps   Sets   Effort     2A Forward Step and Reach   10 2 on each side 10     2B Sideward Step and Reach 10 2 10     2C Backward Step and Reach        Comments:   2A: UE support required today d/t frequent freezing. Patient able to work through, close supervision occasional CGA for stability during  first set. Improved stability and amplitude with final three sets.  2B: Patient demonstrates improved stability again today with lateral steps. Verbal cues for big hands and palms up.   2C:    Rock and Reach:  UE support: none  Chairs Nearby: 2D      Exercise   Reps   Sets   Effort     2D Left leg Forward/Backward   20 1 10     2D Right leg Forward/Backward 20 1 10     2E Side to Side        Comments:   2D: Verbal cues to slow rocking and increase amplitude of reaching motion bilaterally. No LOB.    Other:      Gait Training:   Total Minutes:    Time Speed (MPH) UE support Direction Incline Comments                                      Other:    Therapeutic Activity  Total Minutes:     Functional Movement  Rep  Sets  Effort    Rolling        Supine to Sit       Sit to Stand from standard chair       Sit to Stand from low, soft chair       Sit and Reach       Walk and Turn       PWR Moves:       PWR Moves:        Comments:    Big Walking:    Other:    Carryover Assignment: BIG walking, BIG sit><stands    HEP: exercises 1 A/B, 2 A/B/C/D    Assessment/Plan for week:  12/11/2017 - 12/15/2017  See last treatment note for complete weekly summary. Patient continues to respond well with boxing activity. Plan to incorporate boxing into daily therapy session for remaining visits.

## 2021-06-14 NOTE — PROGRESS NOTES
Physical Therapy  Physical Therapy  Movement Disorder Treatment Note    MEDICARE (Certification dates: 2017 - 3/2/2018)    Date: 2017   Visit #: 2/10  Rx Unit/s: 1 TE, 3 NR  Total OP Minutes: 33    Pain:  Yes  Location: B knees, Ratin/10, Intervention: rest PRN    Subjective: No significant change since evaluation.     Objective:  Therapeutic Exercise:  Total Minutes: 15  Sustained Movements (sitting)  4reps  x 1-2 sets      Exercise   Reps   Sets   Effort     1A Floor to Ceiling With Flicking   6 1 10     1B Side to Side With Flicking 6 1 10      Comments: **'d exercises given as part of HEP  **1A: Verbal cues for palms forward, slowed counting to focus on big finger flicks, and tall posture.  **1B: Verbal cues for posterior leg extension, palm up, and big reach.    Other:   NuStep: workload 4 x 8 minutes; Verbal cues to maintain SPM > 80   Average SPM: 88   Average METs: 2.5    Neuro-Reeducation/Balance:  Total Minutes: 40  Multidirectional Repetitive Movements.  8-16reps  x 1-2 sets    Step and Reach:   UE support: 2C Chairs Nearby: 2 A/B/C      Exercise   Reps   Sets   Effort     2A Forward Step and Reach   12 2 9     2B Sideward Step and Reach 12 2 10     2C Backward Step and Reach 12 2 9     Comments:   **2A: Verbal cues for big hands and increased height especially with return steps. Patient had one episode of LOB reaching for chairs at the end of second set. Patient reports slight dizziness with LOB, requiring short rest break.  **2B: Patient demonstrates good exercises recall, with palms up and horizontal head turns during exercise. Verbal cues to slow exercises down appeared to help with dizziness. No LOB.   **2C: Attempted without UE support originally, patient has one episode of LOB requiring UE support from nearby chairs. Did final two sets with unilateral UE support and alternating steps.     Rock and Reach:  UE support: none  Chairs Nearby: 2D      Exercise   Reps   Sets   Effort     2D  Left leg Forward/Backward   20 1 10     2D Right leg Forward/Backward 20 1 10     2E Side to Side        Comments:   **2D: Verbal cues for increased reach and back with bilateral UE. No LOB during exercise, patient did however reduce rocking amplitude with addition of B UE support.    Other:   Sit><stand from standard chair with feet on yellow foam: 10 x 2; verbal cues for maximal effort and big dive forward with each stand. No LOB during exercise.    Boxing activities:  Sit><Stand from standard chair with L then R cross body jabs to punching gloves. Verbal cues for increased PWR! with each stand. No LOB with addition of external force.  Side-stepping L and R with ipsilateral jabs to punching gloves. Verbal cue to keep contralateral glove up next chin and increase PWR! with each jab. Patient has initial difficulty with coordination of UE and LE movements. No LOB with exercise.     BIG walkin' x 1 with 4WW; patient demonstrates increased step length, foot clearance, and speed following exercises/activities today.    Gait Training:   Total Minutes:    Time Speed (MPH) UE support Direction Incline Comments                                      Other:    Therapeutic Activity  Total Minutes:     Functional Movement  Rep  Sets  Effort    Rolling        Supine to Sit       Sit to Stand from standard chair       Sit to Stand from low, soft chair       Sit and Reach       Walk and Turn       PWR Moves:       PWR Moves:        Comments:    Big Walking:    Other:    Carryover Assignment: BIG walking, BIG sit><stands    HEP: exercises 1 A/B, 2 A/B/C/D    Assessment/Plan for week:  2017 - 12/15/2017  Patient returns to initial follow-up visit. She demonstrates good recall of all exercises and is able to increased amplitude and PWR! with simple verbal cues. Patient demonstrates difficulty with coordination of UE and LE movements and instability during standing big exercises. Patient would benefit from additional  balance and coordination activities with remaining visits. Patient enjoyed boxing during therapy session today so continue to incorporate boxing into daily treatment.

## 2021-06-14 NOTE — PROGRESS NOTES
Physical Therapy  PHYSICAL THERAPY  MOVEMENT DISORDER:  INITIAL EVALUATION    SUBJECTIVE INFORMATION    Diagnosis: Parkinson Disease  Date of diagnosis: August 2009    Date of last Neurologist visit: August 2017  Treatment Diagnosis: Bradykinesia, Postural instability  Precautions/pmh: Acid reflux, GERD, HTN, parkinson disease, PE, pulmonary nodule, stress incontinence, vitamin D deficiency, ligation of fallopian tubes and incisional breast biopsy of the right breast.  First movement disorder symptom presentation: freezing  Date: one year prior to diagonsis  Non-motor symptoms: Visual impairments, Early mild cognitive impairment, Depression, Urinary symptoms, Sleep disturbances and Fatigue    Time of Evaluation: 1100       Time of last PD med: 1100       Time of next PD med: 1600  On/Off presentation/symptoms: increased tremors  History of PD specific PT? Yes: When and where?: [facility] (5/2017)    Pain: No    Living Situation:Apartment  Caregiver Support: none, lives alone. Cleaning lady 1 x per month  Equipment: Raised toilet seated, grab bars in bathroom, shower/tub chair  Current Activity Level: daily walking down the hallways in her apartment; BIG exercises 2x/week  Chief Mobility Complaint: no endurance and/or energy  Patient's Functional Goal: improve walking and energy.    Fall history:None    Freezing: Never      OBJECTIVE INFORMATION    Cognition: comments: occasionally needs more assistance with complex directions.    Bradykinesia and Hypokinesia (Combining slowness, hesitency, decreased arm swing, small amplitude and poverty of movement in general):  Mild    Dyskinesia:No    Posture: Abnormal     Postural Stability: Posterior tug test (Response to sudden posterior displacement produced by pull on shoulders while patient is erect, with eyes open and feet slightly apart.  Patient is prepared.)  Absence of postural reponse: would fall if not caught by examiner    Transitional  Movements  Sit?Stand:   Modified Independent       Sit? Supine:   Not Tested  360 degree turn:  10 steps    TUG(Timed Up and Go): 8.68 seconds    (>13 sec:  Falls Risk)      Divided Attention   Cognitive TUG:         10.91 seconds (Task):  Counting backwards by 3's starting at 70 (70, 67, 64, 61)   Motor TUG:    seconds (Task):     Cognitive and Motor TUG:  seconds (Task):      Strength   30 Second Chair Stand:  # 11  No UE support      Age/Gender Norm:13 stands       Balance      Eyes open Eyes closed     Romberg (sec)       Semi-Romberg (sec)       Sharpened Romberg (sec)      Left LE Right LE     Single Leg Stance (sec) 14.65 13.25      Balance Assessment: Mini-BEST 20/28 (<23/28)   Balance Assessment: Mini-BEST 20/28 (<23/28)   1) Sit <> Stand: (2) normal   2) Rise to Toes: (2) normal   3) Stand on One Leg: (1) moderate L: 14.65 seconds R: 13.25 seconds   4) Compensatory Stepping Correction-Forward: (2) normal   5) Compensatory Stepping Correction-Backward: (1) moderate   6) Compensatory Stepping Correction-Lateral: (1) moderate L: (2) normal R: (1) moderate   7) Eyes Open, Firm Surface: (2) normal   8) Eyes Closed, Foam Surface: (2) normal, increased tremors and lateral sway.  9) Incline, Eyes Closed: (1) moderate, forward flexion   10) Change in Gait Speed: (2) normal   11) Walk with Head Turns-Horizontal: (1) moderate, slowed pace  12) Walk with Pivot Turns: (1) moderate, 4 steps  13) Step Over Obstacles: (1) moderate, slowed pace   14) TUG with Dual Tasks: (1) moderate  TU.68 seconds  Cognitive TUG:  10.91 seconds    Motor Planning / Coordination   Four Square Step Test Trial 1: 9.99   Trial 2: 9.20      >15 sec:  Falls Risk        9.20  seconds (best of 2 trials)    Gait   Preferred Gait Speed  6 meters in 5.93 seconds Meters/second: 1.01 Age/Gender Norm:   1.32 meters/second  # steps in 6 meters: 11  Assistive Device: none  Gait Speed Fall risk:  Med Falls Risk (0.6-1.2 m/s)  Gait Analysis: decreased  step length and foot clearance bilaterally, decreased arm swing bilaterally.    Fast Gait Speed  6 meters in 4.40 seconds Meters/second: 1.36 Age/Gender Norm:   1.71 meters/second  # steps in 6 meters: 10  Assistive Device: none  Gait Analysis: see above    Activity Tolerance   6 minute walk test:  1,372 feet using 4WW    RPD:  6/10   Age/Gender Norm: 1,527 feet    Total OP Minutes: 60      Rx Units: 1 OP evaluation

## 2021-06-14 NOTE — PROGRESS NOTES
Physical Therapy  Physical Therapy  Movement Disorder Treatment Note    MEDICARE (Certification dates: 2017 - 3/2/2018)    Date: 2017   Visit #: 5/10  Rx Unit/s: 2 TE, 2 NR  Total OP Minutes: 55    Pain:  Yes  Location: bilateral knees, Ratin/10, Intervention: rest PRN    Subjective: Patient reports performing all 7 exercises at home yesterday, which was the first time in about two-three months.    Objective:  Therapeutic Exercise:  Total Minutes: 30  Sustained Movements (sitting)  4reps  x 1-2 sets      Exercise   Reps   Sets   Effort     1A Floor to Ceiling With Flicking   6 1 10     1B Side to Side With Flicking 6 1 10      Comments:  1A: Verbal cues for palms forward and big finger flicks  1B: Patient experiencing freezing episode during exercises so we performed walking and boxing to help patient get through freezing episode. Patient able to finish set following walking and boxing.    Other:   NuStep: level 3 x 8 minutes; Verbal cues to maintain SPM > 80   Average SPM: 85   Average METs: 1.9    Boxing: x2 minutes   -30 second bouts, verbal cues for weight on back foot and avoid lunging forward. Patient had one episode of anterior LOB requiring assistance from therapist.     BIG walkin' x 2 throughout session, verbal cues for quick stops, 180 degree turns, and big steps. Walking appears to help patient work through freezing episodes.    Sit><stands: x15 throughout session verbal cues to maintain maximal effort with each stand.    Neuro-Reeducation/Balance:  Total Minutes: 25  Multidirectional Repetitive Movements.  8-16reps  x 1-2 sets    Step and Reach:   UE support: none Chairs Nearby: 2 A/B/C      Exercise   Reps   Sets   Effort     2A Forward Step and Reach   10 2 on each side 10     2B Sideward Step and Reach 10 2 10     2C Backward Step and Reach 10 1 10     Comments:   2A: UE support required today d/t frequent freezing. Patient able to work through, close supervision occasional CGA  for stability during first set. Improved stability and amplitude with final three sets.  2B: Patient demonstrates improved stability again today with lateral steps. Verbal cues for big hands and palms up.   2C: Close supervision for stability, one LOB during final rep, patient able to self correct by reaching for nearby chairs. Once started patient able to produce good form with toe up and posterior weight shift.     Rock and Reach:  UE support: none  Chairs Nearby: 2D      Exercise   Reps   Sets   Effort     2D Left leg Forward/Backward   20 2 10     2D Right leg Forward/Backward 20 2 10     2E Side to Side        Comments:   2D: Verbal cues to slow pace and focus on big rocking and maximal effort with each reach. No LOB today.    Other:  Balance; standing on green foam:    - throwing ball over head against wall; x 2 minutes, verbal cues to engage core with exercise.    - playing catch with 2.2# medicine ball, no LOB, patient demonstrates good ankle strategies.    - PNF D1/D2 chops/lifts with 2.2# medicine ball, SBA - CGA for instability, on e episode of anterior LOB requiring min A to maintain upright.    Gait Training:   Total Minutes:    Time Speed (MPH) UE support Direction Incline Comments                                      Other:    Therapeutic Activity  Total Minutes:     Functional Movement  Rep  Sets  Effort    Rolling        Supine to Sit       Sit to Stand from standard chair       Sit to Stand from low, soft chair       Sit and Reach       Walk and Turn between chairs 50' apart       PWR Moves:       PWR Moves:        Comments:  Sit><stand:    Big Walking:  Other:    Carryover Assignment: BIG walking, BIG sit><stands    HEP: exercises 1 A/B, 2 A/B/C/D    Assessment/Plan for week:  12/18/2017 - 12/22/2017  See previous therapy note for complete weekly summary.

## 2021-06-14 NOTE — PROGRESS NOTES
Physical Therapy  Physical Therapy  Movement Disorder Treatment Note    MEDICARE (Certification dates: 2017 - 3/2/2018)    Date: 2017   Visit #: 4/10  Rx Unit/s: 1 TE, 2 NR, 1 TA  Total OP Minutes: 55    Pain:  Yes  Location: bilateral legs, Ratin-3/10, Intervention: rest PRN    Subjective: Patient reports performing exercises at home. Still having difficulty on a daily basis with frequent episodes of freezing/stiffness. Patient has been noticing longer periods of improve walking during her typical day.     Objective:  Therapeutic Exercise:  Total Minutes: 15  Sustained Movements (sitting)  4reps  x 1-2 sets      Exercise   Reps   Sets   Effort     1A Floor to Ceiling With Flicking   6 1 10     1B Side to Side With Flicking 6 1 10      Comments:  1A: Verbal cues for palms forward and big finger flicks  1B: Good effort and overall technique. Minimal verbal cues for big hands with finger flicks, posterior leg extension R>L, and palm up.     Other:   NuStep: workload 4 x 8 minutes; Verbal cues to maintain SPM > 80   Average SPM: 99   Average METs: 2.3    Neuro-Reeducation/Balance:  Total Minutes: 25  Multidirectional Repetitive Movements.  8-16reps  x 1-2 sets    Step and Reach:   UE support: 2A Chairs Nearby: 2 A/B      Exercise   Reps   Sets   Effort     2A Forward Step and Reach   10 2 on each side 10     2B Sideward Step and Reach 10 2 10     2C Backward Step and Reach        Comments:   2A: UE support required today d/t frequent freezing. Patient able to work through, close supervision occasional CGA for stability during first set. Improved stability and amplitude with final three sets.  2B: Patient demonstrates improved stability again today with lateral steps. Verbal cues for big hands and palms up.   2C:    Rock and Reach:  UE support: none  Chairs Nearby: 2D      Exercise   Reps   Sets   Effort     2D Left leg Forward/Backward   20 2 10     2D Right leg Forward/Backward 20 2 10     2E Side to  Side        Comments:   2D: Patient initially moves UE and LE at different speed causing instability. Verbal cues to slow pace and increased amplitude. With cues patient demonstrates improved stability and overall technique.     Other:  Side steps: 60' x 2 in each direction, verbal cues for increased step length, SBA for stability, no LOB  Forward/backward walkin' x 3 in each direction, B HHA when walking backwards, verbal cues for slight flexion and reaching back with each step.    Gait Training:   Total Minutes:    Time Speed (MPH) UE support Direction Incline Comments                                      Other:    Therapeutic Activity  Total Minutes: 15     Functional Movement  Rep  Sets  Effort    Rolling        Supine to Sit       Sit to Stand from standard chair  10 2 10    Sit to Stand from low, soft chair 10 2 10    Sit and Reach       Walk and Turn between chairs 50' apart  8 2 10    PWR Moves:       PWR Moves:        Comments:  Sit><stand: Verbal cues for maximal PWR! with each stand. Patient demonstrates most improved technique following verbal cues to pretend like she is being shot out of [place].     Walk and Turn: good sit><stands throughout activity, verbal cues to maintain step length and heel strike with final 4 reps d/t increased fatigue.     Big Walkin-300'; Periodically during treatment/exercises when patient starts to experience freezing. Verbal cues for big steps, arm swing, and increased pace. Additional instructions during gait for quick stops and 180 degree turns. Patient able to perform 180 degree turns with 2-3 steps.     Other:    Carryover Assignment: BIG walking, BIG sit><stands    HEP: exercises 1 A/B, 2 A/B/C/D    Assessment/Plan for week:  2017 - 2017  Patient continues to have periods of increased freezing during therapy, but is able to work through them with big walking. Patient requires frequent rest breaks during therapy, but is very motivated to improve  overall mobility. Patient respond very well and enjoys boxing during therapy sessions. Possible attempt to start with boxing prior to big exercises during one therapy session to determine if she can carryover that effort leading to greater stability during exercises.

## 2021-06-14 NOTE — PROGRESS NOTES
Physical Therapy  Physical Therapy  Movement Disorders  Medicare Certification    Medical Diagnosis: Parkinson disease    Treatment Diagnosis: Bradykinesia, Postural Instability    Referring MD: Dr. Yovany Lester  Onset Date: 12/1/2017  Start of Care: 12/4/2017    Assessment: 76 y/o female returns to physical therapy for 6 month follow-up d/t progressive nature of Parkinson Disease. She ambulates with 4WW into therapy but performs much of testing without, indicating that she only uses her 4WW for community and/or longer distances. Patient indicates that she is having more tremors which effect her daily routine, she has less energy on a typical day and fatigues quicker during activity. Initial evaluation reveals that patient is below age and gender norms for B LE strength, gait speed, and activity tolerance, based on scores for 30 second sit><stand, 10 meter walk test, and 6 minute walk test, respectively. Patient also demonstrates an increased risk for falls based on score of 20/28 on Mini-BEST assessment. Patient would benefit from skilled 1:1 PT to address deficits, decrease falls risk, and optimize function due to progressive nature of disease.    Prognostic Indicators: Rehabilitation Potential Good  Impairment: Acitivity Tolerance, Balance, Bradykinesia/Hypokinesia, Postural/Gait Instability and Gait    Functional Goals to be met by 10 visits (including evaluation)  Patient will show improved efficiency and quality of movement, improved gait and postural stability for increased safety, decreased fall risk when performing ADL's, IADL's, household &/or community ambulation as measured by:   -360 degree turn </= 8 steps,   -Cog/Motor TUG </= 9.0 seconds,   -30 Second Chair Stand >/= 12 stands,   -Balance Assessment: >/= 23/28 on Mini-BEST,   -Gait speed >/= 1.2m/second and # steps in 6 meters </= 11 steps,   -Patient will ambulate >/= 1530 feet in 6 minute with RPD </= 5/10 to indicate improved functional activity  tolerance for participating in social events &/or household/community ambulation, and  -Patient will be mod I/SBA with HEP  Patient Functional Goal: Improve walking and energy  Goals were established with the patient: yes    Plan of Care  Communication with: referral Source, patient and treatment Team, Patient / Family / Instruction: Etiology of diagnosis or presented symtoms, Treatment plan/rationale, Home Exercise Program and Expected Functional Outcomes, Big/PWR Techniques, Therapeutic Exercise, Mobility / Transfer Training, Gait Training, Neuro Re-ed / Balance, Neuromuscular reeducation and Stair training    Frequency/Duration 2x/week.  Up to 10 visits (including evaluation)    MEDICARE PATIENTS:  [facility] # [id number]  Provider #   Certification Dates: from 12/4/2017 to 3/2/2018    Physician Recommendation:  1. I certify the need for these services furnished within this plan and while under my care. I agree with the therapist's recommendation for plan of care.    2. If there is any recommendation for modification of therapy plan, please indicate below.      Physician's Signature (Printed):  _____________________________

## 2021-06-15 NOTE — PROGRESS NOTES
Physical Therapy  Physical Therapy  Movement Disorders Discharge Summary    Medical Diagnosis: Parkinson disease                                             Treatment Diagnosis: Bradykinesia, Postural Instability                                          Referring MD: Dr. Yovany Lester  Onset Date: 2017  Start of Care: 2017  Time of Evaluation: 14:00  Time of last PD med: 11:00  Time of next PD med: 16:00    Date: 2018   Visit #: 10/10  Rx Unit/s: 2 TE, 2 NR  Total OP Minutes: 55     Pain:  Yes  Location: bilateral knees, Rating: 3/10, Intervention: rest PRN     Subjective: Patient reports having more down times than usual these days. She reports having more difficulty with L thumb, (old injury).      Objective:  Therapeutic Exercise:  Total Minutes: 25  Sustained Movements (sitting)  4reps  x 1-2 sets       Exercise   Reps   Sets   Effort     1A Floor to Ceiling With Flicking 6 1 10     1B Side to Side With Flicking 6 1 10       Comments:  1A: Verbal cues for slow count to increase big finger flicks.   1B: Improved posterior leg extension and posture today.      Other:   NuStep: level 3 x  6 minutes; Verbal cues to maintain SPM > 80   Average SPM: 100    Average METs: 2.4    30 second sit><stand: 12 stands  10 meter walk test: 4.62 seconds in 9 steps   Gait speed: 1.29 meters/second  6 minute walk test: 1,534 feet     Neuro-Reeducation/Balance:  Total Minutes: 30  Multidirectional Repetitive Movements.  8-16reps  x 1-2 sets     Step and Reach:   UE support: standing in front of elevated mat                                    Chairs Nearby: 2 A/B/C       Exercise   Reps   Sets   Effort     2A Forward Step and Reach 16 1      2B Sideward Step and Reach 16 1      2C Backward Step and Reach          Comments:   2A: No LOB  2B: No LOB    Other:   TU.22 seconds  Cognitive TU.87 seconds (counting backwards by 3's starting at 70 (70, 67,64, 61))  360 degree turn: 8 steps    Balance Assessment:  "Mini-BEST  (<)   1) Sit <> Stand: (2) Normal    2) Rise to Toes: (2) Normal    3) Stand on One Leg: (1) Moderate L: 15.56 R: 9.69   4) Compensatory Stepping Correction-Forward: (2) Normal    5) Compensatory Stepping Correction-Backward: (1) Moderate   6) Compensatory Stepping Correction-Lateral: (2) Normal  L: (2) Normal R: (2) Normal    7) Eyes Open, Firm Surface: (2) Normal    8) Eyes Closed, Foam Surface: (2) Normal    9) Incline, Eyes Closed: (2) Normal    10) Change in Gait Speed: (2) Normal    11) Walk with Head Turns-Horizontal: (2) Normal   12) Walk with Pivot Turns: (2) Normal   13) Step Over Obstacles: (2) Normal    14) TUG with Dual Tasks: (1) Moderate  TU.22\"  Cognitive TU.87 seconds (see above)    Transitional Movements   Initial Post Treatment Goals      Sit ? Stand  Mod I     360 degree turn (steps) 10 7 </= 8   TUG (sec) 8.68 8.22    TUG with Cognitive task (sec) 10.91 9.87 </= 9.0   TUG with Motor task (sec)      Strength  Initial Post Treatment Goals   30sec Chair Stand (#)  11 12 >/= 12   Balance  Initial Post Treatment Goals   Romberg (EO/EC)    /       /       /      Sharpened Romberg (EO/EC)    /       /       /      Semi-Romberg (EO/EC)    /       /       /      Mini- BESTest           >/=       FG       Other       Motor Planning/Coordination Initial Post Treatment Goals   [place] Step Test 9.2     (best of 2 trials)      Gait Initial Post Treatment Goals   Gait Speed in 6m (sec) 5.93\" 4.62\"    Meters/seconds 1.01 1.29 >/= 1.2   # steps in 6m 11 9 </= 11   Activity Tolerance Initial Post Treatment Goals   6 min walk test (ft)  1,372 with 4WW 1,534 >/= 1,530     Functional Outcome Changes: Patient reports that she feels more confident walking on a daily basis without 4WW, that she is walking at a faster pace and taking larger steps. She notes that she is able to get in and out of chairs easier and does not fatigue as much on a daily basis. Patient " does however report she has frequent down times throughout her day that typically last longer than normal.     Evaluation: Goals Met? Partially Comments: Patient met all goals except Cognitive TUG goal of </= 9 seconds. Patient demonstrates greater endurance, gait speed, balance, and strength according to 6 minute walk test, 10 meter walk test, MiniBEST, and 30 second sit><stand, respectively.     Patient seen from 12/4/2017 to 1/16/2018 for 10 treatment sessions.  Recommendation: D/C, with HEP and follow-up evaluation in 6 months.    Physician Recommendation:  1. I certify the need for these services furnished within this plan and while under my care. I agree with the therapist's recommendation for plan of care.    2. If there is any recommendation for modification of therapy plan, please indicate below.      Physician's Signature (Printed):  _____________________________

## 2021-06-15 NOTE — PROGRESS NOTES
Physical Therapy  Physical Therapy  Movement Disorder Treatment Note    MEDICARE (Certification dates: 2017 - 3/2/2018)    Date: 2018   Visit #: 9/10  Rx Unit/s: 1 TE, 3 NR  Total OP Minutes: 55    Pain:  Yes  Location: bilateral knees, Ratin-2/10, Intervention: rest PRN    Subjective: Patient reports having a good weekend. She has been able to perform exercises everyday but one, and was able to walk up and down the hallway in her apartment 5 times instead of 2 on daily basis.     Objective:  Therapeutic Exercise:  Total Minutes: 15  Sustained Movements (sitting)  4reps  x 1-2 sets      Exercise   Reps   Sets   Effort     1A Floor to Ceiling With Flicking   6 1 10     1B Side to Side With Flicking 6 1 10      Comments:  1A: Verbal cues for slow count to increase big finger flicks.   1B: Improved posterior leg extension and posture today.     Other:   NuStep: workload 5 x  8 minutes; Verbal cues to maintain SPM > 80   Average SPM: 104    Average METs: 2.9    Neuro-Reeducation/Balance:  Total Minutes: 40  Multidirectional Repetitive Movements.  8-16reps  x 1-2 sets    Step and Reach:   UE support: standing in front of elevated mat  Chairs Nearby: 2 A/B/C      Exercise   Reps   Sets   Effort     2A Forward Step and Reach   14 1 10     2B Sideward Step and Reach 14 1 10     2C Backward Step and Reach   10 1 10     Comments:   2A: No LOB, verbal cues for big hands  2B: No LOB, verbal cues for increased step height and palms up  2C: two episodes of freezing needing min A to maintain balance. Verbal cues for big arms and hands.    Rock and Reach:  UE support: none  Chairs Nearby: none      Exercise   Reps   Sets   Effort     2D Left leg Forward/Backward   20 1 10     2D Right leg Forward/Backward   20 1 10     2E Side to Side          Comments:   2D: Good stability and coordination today. Verbal cues for big hands and arms with reaching today.   2E:    Other:   Shadow boxing: initial stance with R LE back.  Verbal cues for lateral weight shifts, increased PWR! and pace. Patient demonstrates anterior LOB during boxing requiring min A to maintain balance, additional cues to maintain weight on posterior leg.   Cross body jab: 30 sec x 2; LOB occurred with R jab   Uppercuts: 30 sec x 2;   [place]: 30 sec x 2;    Sit><Stands on blue foam: 10 x 1; with alternating jabs holding onto 3 lb dumbbell, VCs for maximal effort with each stand, no LOB, close supervision for stability    Sharpen romberg on blue foam balance beam: 10 x 1 with each leg forward, alternating jabs holding onto 3 lb dumbbell; CGA-min A at hips for lateral stability. Less assistance needed with L LE behind. VCs for posture and PWR!    Gait Training:   Total Minutes:    Time Speed (MPH) UE support Direction Incline Comments                                      Other:    Therapeutic Activity  Total Minutes:     Functional Movement  Rep  Sets  Effort    Rolling        Supine to Sit       Sit to Stand from standard chair       Sit to Stand from low, soft chair       Sit and Reach       Walk and Turn between chairs 50' apart       PWR Moves:       PWR Moves:        Comments:    Big Walking:    Other:    Carryover Assignment: BIG walking, BIG sit><stands    HEP: exercises 1 A/B, 2 A/B/C/D    Assessment/Plan for week:  1/8/2018 - 1/12/2018  Patient has one remaining visit, plan to discharge at next session. See next note for complete weekly summary.

## 2021-06-15 NOTE — PROGRESS NOTES
"Physical Therapy  Physical Therapy  Movement Disorder Treatment Note    MEDICARE (Certification dates: 12/4/2017 - 3/2/2018)    Date: 12/26/2017   Visit #: 6/10  Rx Unit/s: 1 TE, 2 NR, 1 TA  Total OP Minutes: 55    Pain:  Yes  Location: bilateral knees, Rating: 3/10, Intervention: rest PRN    Subjective: Patient reports performing exercises over the week. \"They are going much better.\"    Objective:  Therapeutic Exercise:  Total Minutes: 15  Sustained Movements (sitting)  4reps  x 1-2 sets      Exercise   Reps   Sets   Effort     1A Floor to Ceiling With Flicking   6 1 10     1B Side to Side With Flicking 6 1 10      Comments:  1A: Good overall technique and effort today.   1B: Verbal cues for increased finger flicks and slowed counting. Patient had no freezing episodes today.     Other:   NuStep: level 4 x 10 minutes; Verbal cues to maintain SPM > 80   Average SPM: 87   Average METs: 2.2    Neuro-Reeducation/Balance:  Total Minutes: 25  Multidirectional Repetitive Movements.  8-16reps  x 1-2 sets    Step and Reach:   UE support: none Chairs Nearby: 2 A/B/C      Exercise   Reps   Sets   Effort     2A Forward Step and Reach   14 1 10     2B Sideward Step and Reach 14 1 10     2C Backward Step and Reach 14 1 10     Comments:   2A: Verbal cues for big hands with each step. No freezing episodes today, patient demonstrates big forward and backward steps.   2B: Improved stability today. Minimal verbal cues for big hands.   2C: Improved stability and technique today. Verbal cues for big hands and arm extension with each posterior step.     Rock and Reach:  UE support: none  Chairs Nearby: none      Exercise   Reps   Sets   Effort     2D Left leg Forward/Backward   20 1 10     2D Right leg Forward/Backward   20 1 10     2E Side to Side   10 1 on each side      Comments:   2D: Initial cues for coordination of UE and LE movements. Improved stability today with exercise.  2E: Initial two reps patient required assistance from " therapist to maintain upright d/t freezing. Then moved to elevated surface, pt. holding onto elevated mat with unilateral UE support while performing exercise with contralateral side. Verbal cues for increased rotation and big hand.     Other:  Balance activities: standing on green foam    - cross body reaching placing 2# weights in cupboard: x5 reaching up with both arms, verbal cues to maintain core engagements during exercise. No LOB, patient has difficulty reaching second shelf when reaching up across body to crab weights.    Gait Training:   Total Minutes:    Time Speed (MPH) UE support Direction Incline Comments                                      Other:    Therapeutic Activity  Total Minutes: 15     Functional Movement  Rep  Sets  Effort    Rolling        Supine to Sit       Sit to Stand from standard chair       Sit to Stand from low, soft chair 10 1 10    Sit and Reach       Walk and Turn between chairs 50' apart       PWR Moves:       PWR Moves:        Comments:  Sit><stand: Patient needed multiple attempts to get out of chair initially, verbal cues for increased anterior weight shift and dive forward out of chair    Big Walkin' x 1 without 4WW, instructions for quick stops, change in direction and forward/backwards walking. No LOB, patient able to maintain big steps with backwards waling today.    Other:    Carryover Assignment: BIG walking, BIG sit><stands    HEP: exercises 1 A/B, 2 A/B/C/D    Assessment/Plan for week:  2017 - 2017  Patient demonstrates continued improvement with big exercises, improving stability and demonstrating less freezing no a daily basis. Instructions to perform exercises with UE support at home during periods of freezing or decreased amplitude d/t instability. Patient has demonstrated improve activity tolerance during daily treatment sessions, requiring less rest and larger amplitude for greater periods. Plan to continue progressing big exercises while  challenging balance during functional activities and working on dual task activities.

## 2021-06-15 NOTE — PROGRESS NOTES
"Physical Therapy  Physical Therapy  Movement Disorder Treatment Note    MEDICARE (Certification dates: 12/4/2017 - 3/2/2018)    Date: 12/28/2017   Visit #: 7/10  Rx Unit/s: 1 TE, 3 NR  Total OP Minutes: 55    Pain:  Yes  Location: bilateral knees, Rating: 3/10, Intervention: rest PRN    Subjective: Patient reports that she did not perform her exercises yesterday, because it was a bad day. \"I had no energy yesterday.\"    Objective:  Therapeutic Exercise:  Total Minutes: 15  Sustained Movements (sitting)  4reps  x 1-2 sets      Exercise   Reps   Sets   Effort     1A Floor to Ceiling With Flicking   6 1 10     1B Side to Side With Flicking 6 1 10      Comments:  1A: Verbal cues to slow counting with increased focus on BIG finger flicks  1B: Verbal cues for palm up and big finger flicks.    Other:   NuStep: level 4 x  8 minutes; Verbal cues to maintain SPM > 80   Average SPM: 94    Average METs: 2.7    Neuro-Reeducation/Balance:  Total Minutes: 40  Multidirectional Repetitive Movements.  8-16reps  x 1-2 sets    Step and Reach:   UE support: none Chairs Nearby: 2 A/B/C      Exercise   Reps   Sets   Effort     2A Forward Step and Reach   10 2 10     2B Sideward Step and Reach    4 1 10     2C Backward Step and Reach        Comments:   2A: On yellow foam today. No LOB, patient began to fatigue/freezing during second set, so exercises ended after 6 reps. Verbal cues to maintain big arms and hands bilaterally.   2B: On yellow foam today. Discontinued following freezing episodes  2C:     Rock and Reach:  UE support: none  Chairs Nearby: none      Exercise   Reps   Sets   Effort     2D Left leg Forward/Backward   20 2 10     2D Right leg Forward/Backward   20 2 10     2E Side to Side          Comments:   2D: Close supervision for safety, short rest between reps for walking to assist with freezing. Verbal cues for increased arm swing and anterior/posterior weight shift with rocking motion. 2 episodes of assistance needed to " avoid LOB with freezing.  2E:    Other:  BIG walking to help patient work through freezing episodes: 300' x 3; with [employer] music on [place]; Patient demonstrates improved step length, arm swing and heel strike with walking, especially with music. Additional instructions for quick stops, walking forward/backwards, and 180 degree turns. No LOB with changes in direction.    Shadow boxing with therapist modeling technique: Verbal cues to maintain weight on back foot and increase PWR! with each hit   R LE forward: cross body jabs and uppercuts with L UE and trunk rotation; no LOB   L LE forward: cross body jabs and uppercuts with R UE and trunk rotation; no LOB   Feet shoulder width apart: cross body jabs, uppercuts. Instructions to perform lateral weight shifts with cross body jabs and mini-squat with each uppercut with increased PWR!    Gait Training:   Total Minutes:    Time Speed (MPH) UE support Direction Incline Comments                                      Other:    Therapeutic Activity  Total Minutes:     Functional Movement  Rep  Sets  Effort    Rolling        Supine to Sit       Sit to Stand from standard chair       Sit to Stand from low, soft chair       Sit and Reach       Walk and Turn between chairs 50' apart       PWR Moves:       PWR Moves:        Comments:  Sit><stand:    Big Walking:    Other:    Carryover Assignment: BIG walking, BIG sit><stands    HEP: exercises 1 A/B, 2 A/B/C/D    Assessment/Plan for week:  12/25/2017 - 12/29/2017  See last session for complete weekly summary

## 2021-06-15 NOTE — PROGRESS NOTES
"Physical Therapy  Physical Therapy  Movement Disorder Treatment Note    MEDICARE (Certification dates: 2017 - 3/2/2018)    Date: 2018   Visit #: 8/10  Rx Unit/s: 1 TE, 2 NR, 1 TA  Total OP Minutes: 55    Pain:  Yes  Location: bilateral knees, Ratin-2/10, Intervention: rest PRN    Subjective: Patient reports having a \"slow\" weekend. Not performing exercises daily. Patient does note that she did not call Dr. Lester office regarding possible DBS, but that she will call this week.     Objective:  Therapeutic Exercise:  Total Minutes: 15  Sustained Movements (sitting)  4reps  x 1-2 sets      Exercise   Reps   Sets   Effort     1A Floor to Ceiling With Flicking   6 1 10     1B Side to Side With Flicking 6 1 10      Comments:  1A: Verbal cues for slow count to increase big finger flicks.   1B: verbal cues for palm up and big finger flicks.    Other:   NuStep: workload 4 x  8 minutes; Verbal cues to maintain SPM > 80   Average SPM: 107    Average METs: 2.4    Neuro-Reeducation/Balance:  Total Minutes: 30  Multidirectional Repetitive Movements.  8-16reps  x 1-2 sets    Step and Reach:   UE support: elevated mat for 2 A/B/C Chairs Nearby: 2 A/B/C      Exercise   Reps   Sets   Effort     2A Forward Step and Reach   10 2 on each side 10     2B Sideward Step and Reach    10 2 10     2C Backward Step and Reach   10 1 on each side 10     Comments:   2A: Verbal cues for BIG arm movements and big steps. Patient had two periods of freezing during exercise that she was able to fight through with UE support.  2B: Verbal cues for palms up, big steps, and big arm movement  2C: multiple freezing episodes, patient able to work through with UE support. Verbal cues to maintain head up and big arms throughout.    Rock and Reach:  UE support: elevated mat  Chairs Nearby: none      Exercise   Reps   Sets   Effort     2D Left leg Forward/Backward   20 2 10     2D Right leg Forward/Backward   20 2 10     2E Side to Side      "     Comments:   2D: Again patient experienced episodes of freezing during exercise, but was able to work through with unilateral UE support. Verbal cues for big hand throughout arm swing and increased arm movement during exercise  2E:    Other:    Gait Training:   Total Minutes:    Time Speed (MPH) UE support Direction Incline Comments                                      Other:    Therapeutic Activity  Total Minutes: 10     Functional Movement  Rep  Sets  Effort    Rolling        Supine to Sit       Sit to Stand from standard chair 10 2 10    Sit to Stand from low, soft chair       Sit and Reach       Walk and Turn between chairs 50' apart       PWR Moves:       PWR Moves:        Comments:  Sit><stand: verbal cues for maximal effort    Big Walking: Verbal cues for increased step length and heel strike. Again patient performs better with music playing.    Other:    Carryover Assignment: BIG walking, BIG sit><stands    HEP: exercises 1 A/B, 2 A/B/C/D    Assessment/Plan for week:  1/1/2018 - 1/5/2018  Patient continues to demonstrate challenging periods throughout therapy session, which improves with addition of music. Patient educated today about importance of performing exercises on a daily basis to help reduce significance of off periods. Patient did well performing exercises with unilateral UE support. Plan to continue progressing big exercises as able and add additional dual task/high level balance activities as tolerated.

## 2021-06-16 NOTE — PROGRESS NOTES
----- Message from Ly Huerta DO sent at 9/6/2018  6:50 PM CDT -----  Please notify that all infection testing is negative.  For her painful sex, I'd suggest she monitor this for a bit since she has had so much go on recently in that area (urinary issues/surgery).  If this persists over the next few months I'd recommend trial of a topical estrogen product vaginally or PFPT.   Assessment:     1. Frontal headache  Sedimentation Rate    C-Reactive Protein(CRP)    HM1(CBC and Differential)    Comprehensive Metabolic Panel    HM1 (CBC with Diff)    CANCELED: Albumin     Results for orders placed or performed in visit on 03/22/18   Sedimentation Rate   Result Value Ref Range    Sed Rate 7 0 - 20 mm/hr   C-Reactive Protein(CRP)   Result Value Ref Range    CRP 0.5 0.0 - 0.8 mg/dL   Comprehensive Metabolic Panel   Result Value Ref Range    Sodium 141 136 - 145 mmol/L    Potassium 3.8 3.5 - 5.0 mmol/L    Chloride 102 98 - 107 mmol/L    CO2 27 22 - 31 mmol/L    Anion Gap, Calculation 12 5 - 18 mmol/L    Glucose 81 70 - 125 mg/dL    BUN 10 8 - 28 mg/dL    Creatinine 0.93 0.60 - 1.10 mg/dL    GFR MDRD Af Amer >60 >60 mL/min/1.73m2    GFR MDRD Non Af Amer 58 (L) >60 mL/min/1.73m2    Bilirubin, Total 0.8 0.0 - 1.0 mg/dL    Calcium 9.4 8.5 - 10.5 mg/dL    Protein, Total 6.5 6.0 - 8.0 g/dL    Albumin 4.1 3.5 - 5.0 g/dL    Alkaline Phosphatase 79 45 - 120 U/L    AST 26 0 - 40 U/L    ALT <9 0 - 45 U/L   HM1 (CBC with Diff)   Result Value Ref Range    WBC 6.3 4.0 - 11.0 thou/uL    RBC 4.42 3.80 - 5.40 mill/uL    Hemoglobin 13.7 12.0 - 16.0 g/dL    Hematocrit 39.7 35.0 - 47.0 %    MCV 90 80 - 100 fL    MCH 30.9 27.0 - 34.0 pg    MCHC 34.4 32.0 - 36.0 g/dL    RDW 12.0 11.0 - 14.5 %    Platelets 120 (L) 140 - 440 thou/uL    MPV 10.5 (H) 7.0 - 10.0 fL    Neutrophils % 60 50 - 70 %    Lymphocytes % 29 20 - 40 %    Monocytes % 9 2 - 10 %    Eosinophils % 2 0 - 6 %    Basophils % 1 0 - 2 %    Neutrophils Absolute 3.8 2.0 - 7.7 thou/uL    Lymphocytes Absolute 1.8 0.8 - 4.4 thou/uL    Monocytes Absolute 0.6 0.0 - 0.9 thou/uL    Eosinophils Absolute 0.1 0.0 - 0.4 thou/uL    Basophils Absolute 0.1 0.0 - 0.2 thou/uL            Plan:     -Discussed with the patient that this could be giant cell arteritis or trigeminal neuralgia.  With the labs to rule out giant cell arteritis.  Advised patient to continue with  supportive care, ibuprofen for pain or discomfort.  Will call her with results.  She may need to follow-up with her PCP for further evaluation.    Subjective:       78 y.o. female presents for evaluation right she will, tooth pain.  Patient reports that she was treated for sinus infection about 2 weeks ago and she has been experiencing symptoms for 10 days..  She continues to experience headaches and right side questionable to her jaw pain.  She denies any visual disturbances.  She reports that her headache is frontal in nature.  She denies any other symptoms.  She reports that she has been having a hard time especially chewing therefore she has been losing weight since she is unable to chew or eat solid food.  She has been eating mostly soft or liquid food.  She reports that is difficult for her to clench her jaw or down.  She reports that she is saw her neurologist who follows her for her Parkinson's disease yesterday.  She is not sure if her symptoms is because she has had some change in her medications.  Her neurologist did not address this issue.  Previously she had been told that she has trigeminal neuralgia where she was advised to take ibuprofen and also use a warm compress to the area.  She reports that she has been doing that but his symptoms has not been relieved.  She denies nausea, vomiting, diarrhea, shortness of breath, any numbness or tingling to the area.  He    The following portions of the patient's history were reviewed and updated as appropriate: allergies, current medications, past family history, past medical history, past social history, past surgical history and problem list.    Review of Systems  A 12 point comprehensive review of systems was negative except as noted.     Objective:      /80 (Patient Site: Right Arm, Patient Position: Sitting, Cuff Size: Adult Regular)  Pulse 74  Temp 97.4  F (36.3  C) (Oral)   Resp 22  Wt 148 lb 9.6 oz (67.4 kg)  SpO2 98%  BMI 25.91  kg/m2  General appearance: alert, appears stated age, cooperative, mild distress and moderate tardive dyskenisia  Head: Normocephalic, without obvious abnormality, atraumatic  Eyes: conjunctivae/corneas clear. PERRL, EOM's intact. Fundi benign.  Ears: normal TM's and external ear canals both ears  Nose: Nares normal. Septum midline. Mucosa normal. No drainage or sinus tenderness.  Throat: lips, mucosa, and tongue normal; teeth and gums normal, no inflammation of the gums noted, pain with opening and closing of the mouth to the right side.    Neck: no adenopathy  Lungs: clear to auscultation bilaterally  Heart: regular rate and rhythm, S1, S2 normal, no murmur, click, rub or gallop  Extremities: extremities normal, atraumatic, no cyanosis or edema and tardive dyskenisia.  Skin: Skin color, texture, turgor normal. No rashes or lesions , no sensitivity with palpation ti the right side of the face.    Lymph nodes: Cervical, supraclavicular, and axillary nodes normal.  Neurologic: Grossly normal     This note has been dictated using voice recognition software. Any grammatical or context distortions are unintentional and inherent to the software

## 2021-06-16 NOTE — PROGRESS NOTES
[facility] Clinic Note  Patient Name: Gabi Manley  Patient Age: 78 y.o.  YOB: 1940  MRN: [id number]  ?  Date of Visit: 3/8/2018  Reason for Office Visit:   Chief Complaint   Patient presents with     Sinusitis     was seen also on monday but pt stated she is still having pressure in her sinues.       HPI: Gabi Manley 78 y.o. female who presents to clinic for sinusitis. She was seen on Monday at [facility] for nasal congestion, facial pain x 3 days, diagnosed with viral sinusitis. She was advised supportive cares, nasal spray, washes, etc. She has been having shooting pain in jaw and right side of face, comes and goes. No nasal congestion, fevers, cough, ear pain.     She has parkinson's and is followed by a neurologist at the  of . She is on levodopa - carbidopa and is having some intense dyskinesia today.       Review of Systems: As noted in HPI     Current Scheduled Meds:  Outpatient Encounter Prescriptions as of 3/8/2018   Medication Sig Dispense Refill     amLODIPine (NORVASC) 5 MG tablet TAKE 1 TABLET DAILY 90 tablet 2     atorvastatin (LIPITOR) 20 MG tablet TAKE 1 TABLET DAILY 90 tablet 1     carbidopa-levodopa (SINEMET CR)  mg per tablet Take 1 tablet by mouth 4 (four) times a day. (06:00, 12:00 , 17:00, 22:00)       cholecalciferol, vitamin D3, 1,000 unit tablet Take 1,000 Units by mouth daily.       escitalopram oxalate (LEXAPRO) 10 MG tablet Take 10 mg by mouth daily.        fexofenadine (ALLEGRA) 180 MG tablet Take 180 mg by mouth as needed.        fluticasone (FLONASE) 50 mcg/actuation nasal spray Use 2 sprays each nostril daily. 16 g 1     pantoprazole (PROTONIX) 20 MG tablet TAKE 1 TABLET DAILY 90 tablet 1     pantoprazole (PROTONIX) 40 MG tablet Take 1 tablet (40 mg total) by mouth daily. 90 tablet 1     therapeutic multivitamin (THERAGRAN) tablet Take 1 tablet by mouth daily.       No facility-administered encounter medications on file as of 3/8/2018.        Objective /  Physical Examination:  /70 (Patient Site: Right Arm, Patient Position: Sitting, Cuff Size: Adult Regular)  Pulse 61  Temp 98.1  F (36.7  C) (Oral)   Wt 154 lb 4.8 oz (70 kg)  SpO2 97%  BMI 26.9 kg/m2  Wt Readings from Last 3 Encounters:   03/08/18 154 lb 4.8 oz (70 kg)   03/04/18 154 lb (69.9 kg)   06/13/17 151 lb (68.5 kg)     Body mass index is 26.9 kg/(m^2). (>25?)    General Appearance: Alert and oriented in no acute distress  Ears: Tympanic membrane clear with landmarks well visualized bilaterally  Eyes: Conjunctivae clear   Nose: Septum midline, nares patent, mucosa moist and without drainage  Throat: Lips and mucosa moist. Teeth in good repair, pharynx without erythema or exudate  Neck: Supple, trachea midline. No cervical adenopathy.   Lungs: Normal inspiratory and expiratory effort.  Cardiovascular: RRR  Integumentary: Warm and dry. No rash or facial lesions.   Neuro: Alert and oriented, follows commands appropriately. Involuntary dyskinesia movements in chair    Assessment / Plan / Medical Decision Making:      Encounter Diagnoses   Name Primary?     Right sided facial pain Yes     Parkinson's disease         1. Right sided facial pain  Suspect possible trigeminal neuralgia. Advised warm packs and continue with ibuprofen prn. If not improving by next week to let me know    2. Parkinson's disease  She appears to be having levodopa induced dyskinesia and would recommend talking to her neurologist about potential treatment such as keppra that may help relieve some of this    Follow up if not improving or with PCP as directed    Total time spent with patient was 15 minutes with >50% of time spent in face-to-face counseling regarding the above plan     Maximo Blanco MD  [facility]

## 2021-06-16 NOTE — PROGRESS NOTES
Called patient and left a voicemail.  Wanted to discuss negative lab results.  She need to make an appointment to follow-up with her PCP for further evaluation of her condition.

## 2021-06-18 NOTE — PROGRESS NOTES
Internal Medicine Office Visit  [facility]  Patient Name: Gabi Manley  Patient Age: 78 y.o.  YOB: 1940  MRN: [id number]    Date of Visit: 2018  Reason for Office Visit:   Chief Complaint   Patient presents with     Medication Management     Vaginal Discharge           Assessment / Plan / Medical Decision Makin. Dark brown urine  2. Cervical cancer screening  - Pelvic exam is normal. There is no vaginal discharge noted. PAP smear sent as a precaution. I question whether she may have a UTI causing discoloration of the urine. Will send UA   - Urinalysis-UC if Indicated  - Culture, Urine  - Gynecologic Cytology (PAP Smear)  - HPV High Risk DNA Cervical    3. Hyperlipemia  - atorvastatin (LIPITOR) 20 MG tablet; TAKE 1 TABLET DAILY  Dispense: 90 tablet; Refill: 3  - Lipid Profile; Future  - Lipid Profile    4. Essential hypertension  - amLODIPine (NORVASC) 5 MG tablet; TAKE 1 TABLET DAILY  Dispense: 90 tablet; Refill: 3  - Comprehensive Metabolic Panel; Future  - JIC LAV; Future  - Comprehensive Metabolic Panel  - JIC LAV    5. Parkinson's Disease  - Followed by [facility]    6. HLD (hyperlipidemia)  - recheck lipids, doing well with statin. No SE    7. Female stress incontinence  - She declines further intervention for this at the present time       Health Maintenance Review  Health Maintenance   Topic Date Due     INFLUENZA VACCINE RULE BASED (Season Ended) 2018     DXA SCAN  2018     FALL RISK ASSESSMENT  2019     ADVANCE DIRECTIVES DISCUSSED WITH PATIENT  2021     TD 18+ HE  2026     PNEUMOCOCCAL POLYSACCHARIDE VACCINE AGE 65 AND OVER  Completed     PNEUMOCOCCAL CONJUGATE VACCINE FOR ADULTS (PCV13 OR PREVNAR)  Completed     ZOSTER VACCINE  Completed         I have changed Ms. Manley's amLODIPine. I am also having her maintain her cholecalciferol (vitamin D3), multivitamin therapeutic, carbidopa-levodopa, fexofenadine,  escitalopram oxalate, fluticasone, pantoprazole, and atorvastatin.      HPI:  Gabi Manley is a 78 y.o. year old who presents to the office today for follow up of chronic medical conditions.    Parkinson's- she had to make a move to a new assisted living facility due to her prior place of living discontinuing the assisted living arrangement. She has help with cleaning, laundry, and 3 meals per day provided. She felt less stressed after making the move and her Parkinson's symptoms improved. She is going to the  of  and is considering and will likely proceed with deep brain stimulation. Her dose of Sinemet was decreased to 1.25 tablets every 2 hours which helped with daytime fatigue. Sees the neurologist again in 2 months.     Weight- she lost weight with intentional changes to her diet and regular exercise. Her weight has been stable over the past 1.5 months.     She has a new concern of a brown colored discharge which she saw over the past 3-4 days. This is a discoloration on the pad that she wears for urinary incontinence. No dysuria, hematuria. She has also been struggling with her bowels with constipation then loose stools so she initially attributed this to her bowels but the discharge is closer to the urethra or vagina so she is concerned for a vaginal discharge.      Reflux- had a lot of stomach pain with discontinuation of pantoprazole but is doing well with pantoprazole 40 mg daily. Yesterday she had heart burn but this is atypical for her.      Depression- has taken lexapro for this for a long time and mood has been good. Not interested in any dose changes.      HTN- blood pressure readings at home look good, she checks once per week. No SE associated with taking this medication.     Urinary incontinence- wears briefs daily     Right foot great toe is painful after she walks long distances. There is a history of fracture of this toe.     Review of Systems- pertinent positive in bold:  A 10-point ROS is  negative except as stated in HPI       Current Scheduled Meds:  Outpatient Encounter Prescriptions as of 5/23/2018   Medication Sig Dispense Refill     amLODIPine (NORVASC) 5 MG tablet TAKE 1 TABLET DAILY 90 tablet 3     atorvastatin (LIPITOR) 20 MG tablet TAKE 1 TABLET DAILY 90 tablet 3     carbidopa-levodopa (SINEMET CR)  mg per tablet Take 1 tablet by mouth 4 (four) times a day. (06:00, 12:00 , 17:00, 22:00).  Patient states that she is taking 1.25 tablets every 2 hours while awake       cholecalciferol, vitamin D3, 1,000 unit tablet Take 1,000 Units by mouth daily.       escitalopram oxalate (LEXAPRO) 10 MG tablet Take 10 mg by mouth daily.        fexofenadine (ALLEGRA) 180 MG tablet Take 180 mg by mouth as needed.        fluticasone (FLONASE) 50 mcg/actuation nasal spray Use 2 sprays each nostril daily. 16 g 1     pantoprazole (PROTONIX) 20 MG tablet TAKE 1 TABLET DAILY 90 tablet 1     therapeutic multivitamin (THERAGRAN) tablet Take 1 tablet by mouth daily.       [DISCONTINUED] amLODIPine (NORVASC) 5 MG tablet TAKE 1 TABLET DAILY. Due for follow up in June 90 tablet 0     [DISCONTINUED] atorvastatin (LIPITOR) 20 MG tablet TAKE 1 TABLET DAILY 90 tablet 1     [DISCONTINUED] pantoprazole (PROTONIX) 40 MG tablet Take 1 tablet (40 mg total) by mouth daily. 90 tablet 1     No facility-administered encounter medications on file as of 5/23/2018.      Past Medical History:   Diagnosis Date     Acid reflux      Carpal tunnel syndrome      GERD (gastroesophageal reflux disease)      Hypertension      Parkinson disease      PE (pulmonary embolism)      Pulmonary Embolism     Created by [facility] Annotation: Dec 15 2009  9:23Lan Beatty: on Coumadin      Pulmonary nodule      Stress incontinence      Urinary incontinence      Vitamin D deficiency      Past Surgical History:   Procedure Laterality Date     BREAST BIOPSY Right 2000's    Calcs     IA BIOPSY OF BREAST, INCISIONAL      Description: Biopsy  Breast Open;  Recorded: 04/27/2009;  Comments: X2, benign.     MI LIGATE FALLOPIAN TUBE      Description: Tubal Ligation;  Recorded: 04/27/2009;     MI REVISE MEDIAN N/CARPAL TUNNEL SURG  5/20/2016    Procedure: LEFT MEDIAN NERVE DECOMPRESSION;  Surgeon: Terell Gaines MD;  [facility];  Service: Neurosurgery     Social History   Substance Use Topics     Smoking status: Never Smoker     Smokeless tobacco: Never Used     Alcohol use No       Objective / Physical Examination:  Vitals:    05/23/18 1108   BP: 112/62   Pulse: 82   Weight: 137 lb (62.1 kg)     Wt Readings from Last 3 Encounters:   05/23/18 137 lb (62.1 kg)   03/22/18 148 lb 9.6 oz (67.4 kg)   03/08/18 154 lb 4.8 oz (70 kg)     Body mass index is 23.89 kg/(m^2).     General Appearance: Alert and oriented, cooperative, affect appropriate, speech clear, in no apparent distress  Neck: Supple, trachea midline. No carotid bruits  Lungs: Clear to auscultation bilaterally. Normal inspiratory and expiratory effort  Cardiovascular: Regular rate, normal S1, S2. No murmurs, rubs, or gallops  Abdomen: Bowel sounds active all four quadrants. Soft, non-tender  Genital: EXTERNAL GENITALIA: Normal appearing vulva without masses, tenderness or lesions. PERINEUM: normal and intact. URETHRAL MEATUS: normal VAGINA:  vagina with normal color and without discharge or lesions. CERVIX: normal appearing cervix without discharge or lesions. Non-friable. No CMT. UTERUS: uterus is normal size, shape, consistency, and non-tender. ADNEXA: no tenderness or fullness.   Extremities: Pulses 2+ and equal throughout. No edema  Neuro: dyskinesias of the head, neck, trunk. Resting tremor    Orders Placed This Encounter   Procedures     Culture, Urine     Urinalysis-UC if Indicated     Lipid Profile     Comprehensive Metabolic Panel     JIC LAV   Followup: Return in about 6 months (around 11/23/2018) for Next scheduled follow up. earlier if needed.      Tracey Abdi,  CNP

## 2021-06-19 NOTE — PROGRESS NOTES
Physical Therapy  PHYSICAL THERAPY  MOVEMENT DISORDER:  INITIAL EVALUATION    Physical Therapy  Movement Disorders  Medicare Certification    SUBJECTIVE INFORMATION    Diagnosis: Parkinson disease  Date of diagnosis: September 2009   Date of last Neurologist visit: 2 month ago  Neurologist: Dr. Farshad Webb  Treatment Diagnosis: Postural instability  Precautions/pmh: Acid reflux, GERD, HTN, parkinson disease, PE, pulmonary nodule, stress incontinence, vitamin D deficiency, ligation of fallopian tubes and incisional breast biopsy of the right breast.  First movement disorder symptom presentation: freezing  Date: one year prior to diagnosis  Non-motor symptoms: Visual impairments, Early mild cognitive impairment, Depression, Anxiety, Urinary symptoms, Sleep disturbances and Fatigue    Time of Evaluation: 1300       Time of last PD med: 12:00       Time of next PD med: 14:00  On/Off presentation/symptoms: low energy, slow movement  History of PD specific PT? Yes: When and where?: [facility] December 2017    Pain: No    Living Situation:Apartment; assisted living  Caregiver Support: 3 meals provided per day, laundry and room cleaning done on a weekly bases  Equipment: 4WW  Current Activity Level: daily walking to/from cafeteria, plays cards on a weekly basis at [facility], [employer], attending monthly support group meetings.  Chief Mobility Complaint: more frequent episodes of low energy.   Patient's Functional Goal: Improve energy. Improve walking and balance during periods of low energy.    Fall history:Rare: Date of last fall: 3-4 weeks ago. Two falls since last therpay bout. Both since moving into new apartment complex.    Freezing: Often - about once a day; most likely when patient is trying to write.      OBJECTIVE INFORMATION    Cognition: comments: patient notes some memory concerns but able to follow directions for tests and remembers specific outcome meausres during  evaluation    Bradykinesia and Hypokinesia (Combining slowness, hesitency, decreased arm swing, small amplitude and poverty of movement in general):  Mild    Dyskinesia:Yes in B UE     Posture: Abnormal, forward head, rounded shoulders    Postural Stability: Posterior tug test (Response to sudden posterior displacement produced by pull on shoulders while patient is erect, with eyes open and feet slightly apart.  Patient is prepared.)  Retropulsion but recovers unaided    Transitional Movements  Sit?Stand:   Modified Independent       Sit? Supine:   Not Tested  360 degree turn:  7 steps    TUG(Timed Up and Go): 8.34 seconds    (>13 sec:  Falls Risk)      Divided Attention   Cognitive TU.04 seconds (Task):  Counting backwards by 3's starting at 70 (70; patient unable to count backwards while walking but was able to count backward from 20 while sitting in chair before walking)   Motor TUG:    seconds (Task):     Cognitive and Motor TUG:  seconds (Task):      Strength   30 Second Chair Stand:  # 11  No UE support   Age/Gender Norm: 13       Balance      Eyes open Eyes closed     Romberg (sec) > 30 > 30     Semi-Romberg (sec) NT NT     Sharpened Romberg (sec) NT NT    Left LE Right LE     Single Leg Stance (sec) 9.31 14.59      Balance Comments: patient reports not working on balance since she started packing for her move into her new apartment    Balance Assessment: Mini-BEST 20/28 (<23/28)   1) Sit <> Stand: (2) normal   2) Rise to Toes: (1) moderate   3) Stand on One Leg: (1) moderate L: 9.31 seconds R: 14.59 seconds   4) Compensatory Stepping Correction-Forward: (2) normal   5) Compensatory Stepping Correction-Backward: (1) moderate   6) Compensatory Stepping Correction-Lateral: (1) moderate L: (1) moderate R: (1) moderate   7) Eyes Open, Firm Surface: (2) normal   8) Eyes Closed, Foam Surface: (2) normal   9) Incline, Eyes Closed: (2) normal   10) Change in Gait Speed: (2) normal   11) Walk with Head  Turns-Horizontal: (2) normal  12) Walk with Pivot Turns: (1) moderate, 4 steps  13) Step Over Obstacles: (1) moderate, slowed down to clear boxes   14) TUG with Dual Tasks: (0) severe  TU.34 seconds    Cognitive TU.04 seconds (see above, unable to count)    Motor Planning / Coordination   Four Square Step Test Trial 1: 12.81   Trial 2: 11.37      >15 sec:  Falls Risk        11.37  seconds (best of 2 trials)    Gait   Preferred Gait Speed  6 meters in 5.57 seconds Meters/second: 1.08 Age/Gender Norm:   1.32 meters/second  # steps in 6 meters: 11  Assistive Device: none  Gait Speed Fall risk:  Med Falls Risk (0.6-1.2 m/s)  Gait Analysis: equal step and stride length bilaterally, occasional drags L LE with episodes of fatigue.     Fast Gait Speed  6 meters in 3.60 seconds Meters/second: 1.67 Age/Gender Norm:   1.71 meters/second  # steps in 6 meters: 8  Assistive Device: none  Gait Analysis: increased step length with increased pace.    Activity Tolerance   6 minute walk test:  1,632 feet    RPD:  6/10   Age/Gender Norm: 1,527 feet    Total OP Minutes: 75 minutes      Rx Units: 1 OP evaluation    Assessment/Plan: 79 y/o female returns for 6 month follow evaluation due to progressive nature of disease.  Patient notes that she has decided to pursue deep brain stimulation and that her first surgery is scheduled for . Patient notes increased periods of increased fatigue and freezing, but that she attempts to walk 30-45 minutes on a daily basis. Initial evaluation reveals patient is at or slightly below age and gender norms for B LE strength, gait speed, and activity tolerance according to 30 second sit><stand, 10 meter walk test, and 6 minute walk test, respectively. Theses values have remained stable since most recent bout of therapy in 2017. Patient does however demonstrate a slightly increased risk for falls with score or 20/28 on Mini-BEST and gait speed of 1.08 meters/second and report  of two recent falls (within past 3 months). Due patients upcoming surgery and outcomes during evaluation, skilled 1:1 PT is not recommended at this time. PT would however like to see patient following programming optimization of DBS procedure to further improve patients postural stability with all activities.    Plan of Care  Evaluation only; follow-up evaluation after patient's DBS programming has been optimized.    MEDICARE PATIENTS:  [id number]  Provider #   Certification Dates: from 7/17/2018 to 7/17/2018    Physician Recommendation:  1. I certify the need for these services furnished within this plan and while under my care. I agree with the therapist's recommendation for plan of care.    2. If there is any recommendation for modification of therapy plan, please indicate below.      Physician's Signature (Printed):  _____________________________

## 2021-06-21 NOTE — PROGRESS NOTES
WALK IN CARE - VISIT NOTE    HPI    77 yo female presenting for evaluation of:    1. Dysuria  - patient had UTI in 10/1/2018 which was positive for e coli - was septic and treated inpatient  - fevers and urgency have resolved, but patient has continued to have dysuria  - patient has had 2 urine cultures since her hospital admission which have been negative  - no fevers/chills/sweats  - no urgency, no frequency increase  - continued dysuria  - patient does have an appointment scheduled for her PCP    ROS  Negative except as noted in HPI    OBJECTIVE    Vitals  Vitals:    11/24/18 1021   BP: 120/78   Pulse: 67   Resp: 20   Temp: 98.1  F (36.7  C)   SpO2: 96%       Physical Exam  General: No acute distress  CV: RRR, distal and peripheral pulses in tact  Resp: CTA bilaterally, no wheezing, no rhonchi, no rhales, no respiratory distress  Abdomen: soft, non-tender, non-distended  Back: no CVA tenderness  Extrem: no edema, no cyanosis, well-perfused    Labs  UA  Small leukocytes  Few bacteria  5-10 WBC        ASSESSMENT/PLAN      # Dysuria  - possible cystitis  - discussed starting antibiotics now - patient very reluctant  - did prescribe Bactrim DS two times a day x3 days, patient states she will pick it up and wait for culture results  - pyridium 100 mg three times a day after meals for 2 days  - follow up PCP as scheduled  - symptoms with which to go to ER discussed

## 2021-06-21 NOTE — PROGRESS NOTES
[facility] Follow Up Note    Gabi Manley   78 y.o. female    Date of Visit: 11/26/2018    Chief Complaint   Patient presents with     Dysuria     burning on urination     Subjective  This is a 78-year-old patient who is normally seen at the [facility].  Last month she had an episode of urinary sepsis requiring antibiotics.  She is still having issues with dysuria.  Most recently she was at the walk-in clinic 2 days ago and I have reviewed those notes.  They started her on an antibiotic but her urine culture was negative and they called her to discontinue the antibiotic.  They started her on Pyridium but this is not helped her symptoms.  She denies any fever or chills.  She does have urinary frequency and the burning continues.  She was unable to be seen at her own clinic and so she was referred here today.  Her son is with her and they both confirm the presence of the symptoms which so far have not responded to antibiotics.  She has otherwise been in her usual state of health.    ROS A comprehensive review of systems was performed and was otherwise negative    Medications, allergies, and problem list were reviewed and updated    Exam  General Appearance:   On examination her blood pressure is 106/60.  Weight is 131 pounds and height is 63.5 inches.  BMI is 22.84.    Heart is in a sinus rhythm with a rate of 72 and no ectopy.    Abdomen is soft with no distention.    The patient is alert and oriented x3.      Assessment/Plan  1. Dysuria  Ambulatory referral to Urology     Persistent problem with dysuria and urinary frequency.  I did discuss this with the patient and her son.  I do not see much value in redoing the urine today.  Neither do I see much value in starting another antibiotic.  She may continue or discontinue the Pyridium at her discretion as it does not seem to be helping her symptoms.  My recommendation is that we obtain a urology consult and she is agreeable to this.  We  will try to set up a consultation as soon as possible.  She will follow-up with her own practitioner.  Body Mass Index was not assessed due to Patient was in with an acute medical issue..    Misael Ramirez MD      Current Outpatient Medications on File Prior to Visit   Medication Sig     amLODIPine (NORVASC) 5 MG tablet TAKE 1 TABLET DAILY     atorvastatin (LIPITOR) 20 MG tablet TAKE 1 TABLET DAILY     carbidopa-levodopa (SINEMET CR)  mg per tablet Take 1 tablet by mouth 4 (four) times a day. (06:00, 12:00 , 17:00, 22:00).  Patient states that she is taking 1.25 tablets every 2 hours while awake     cholecalciferol, vitamin D3, 1,000 unit tablet Take 1,000 Units by mouth daily.     escitalopram oxalate (LEXAPRO) 10 MG tablet Take 1.5 tablets (15 mg total) by mouth daily.     pantoprazole (PROTONIX) 20 MG tablet Take 1 tablet (20 mg total) by mouth daily.     diclofenac sodium (VOLTAREN) 1 % Gel Apply 2 grams topically to area 4 times per day not to exceed 8 grams per day     fexofenadine (ALLEGRA) 180 MG tablet Take 180 mg by mouth as needed.      fluticasone (FLONASE) 50 mcg/actuation nasal spray Use 2 sprays each nostril daily.     therapeutic multivitamin (THERAGRAN) tablet Take 1 tablet by mouth daily.     [DISCONTINUED] phenazopyridine (PYRIDIUM) 100 MG tablet Take 1 tablet (100 mg total) by mouth 3 (three) times a day as needed for pain Take after meals.     [DISCONTINUED] sulfamethoxazole-trimethoprim (BACTRIM DS) 800-160 mg per tablet Take 1 tablet by mouth 2 (two) times a day for 3 days.     No current facility-administered medications on file prior to visit.      Allergies   Allergen Reactions     Iodinated Contrast- Oral And Iv Dye Shortness Of Breath     Erythromycin Unknown     Ioxaglate Sodium Unknown     Angioedema     Other Allergy (See Comments)      Contrast Media Ready-Box MISC, 12/15/2009.       Paroxetine Hcl Unknown     Penicillins      Facial swelling     Trazodone Unknown      Vancomycin Hives     Social History     Tobacco Use     Smoking status: Never Smoker     Smokeless tobacco: Never Used   Substance Use Topics     Alcohol use: No     Drug use: No

## 2021-06-21 NOTE — PROGRESS NOTES
Internal Medicine Office Visit  [facility]  Patient Name: Gabi Manley  Patient Age: 78 y.o.  YOB: 1940  MRN: [id number]    Date of Visit: 10/24/2018  Reason for Office Visit:   Chief Complaint   Patient presents with     Hospital Visit Follow Up           Assessment / Plan / Medical Decision Makin. Hospital discharge follow-up  2. Pyelonephritis  3. Female stress incontinence  - Records reviewed through Care[facility]where from hospitalization   - Continue to work on strengthening exercises  - Declines further urology evaluation of incontinence at this time    4. Depression  - Increase Lexapro to 15 mg daily. See either myself or psychiatrist back in the next 6-8 weeks for follow up    5. Parkinson's Disease  - Continue with follow up as scheduled through [facility]     6. HTN (hypertension)  - Stable       Health Maintenance Review  Health Maintenance   Topic Date Due     DEPRESSION FOLLOW UP  1940     DXA SCAN  2018     FALL RISK ASSESSMENT  2019     ADVANCE DIRECTIVES DISCUSSED WITH PATIENT  2021     TD 18+ HE  2026     PNEUMOCOCCAL POLYSACCHARIDE VACCINE AGE 65 AND OVER  Completed     INFLUENZA VACCINE RULE BASED  Completed     PNEUMOCOCCAL CONJUGATE VACCINE FOR ADULTS (PCV13 OR PREVNAR)  Completed     ZOSTER VACCINE  Completed         I have changed Ms. Manley's escitalopram oxalate. I am also having her maintain her cholecalciferol (vitamin D3), multivitamin therapeutic, carbidopa-levodopa, fexofenadine, fluticasone, atorvastatin, amLODIPine, diclofenac sodium, and pantoprazole.      HPI:  Gabi Manley is a 78 y.o. year old who presents to the office today for follow-up of her recent hospitalization.  She was initially seen in the emergency department at [facility] for UTI and pyelonephritis with complaints of headaches and was transferred to the [facility] where she is treated for PD with recent deep brain stimulator  placement on the left in August and right in September.   There was concern that she may have bacteremia and her primary neurosurgery team was consulted to discuss the need for a possible lumbar puncture.  Neurosurgery felt that there was low concern for bacteremia or seeding and she continued levofloxacin.  She was able to discharge to home on 10/4 with outpatient services of skilled nursing, physical therapy, occupational therapy.    Has felt exhausted since hospitalization. Still has home health nurse visits for medication assistance which has been a great help to her. Has completed PT/OT at this time. No dysuria or flank pain. She still has occasional nausea which is improving. She was seen in [facility] on 10/21, urine culture negative, CBC had normalized. She has urine incontinence at baseline with a history in-depth evaluation and treatment with urology. A stimulator was suggested at one point and she declined.     Constipation has resolved with Senakot.    Depression reviewed. She is not herself today, voice is soft and affect is somewhat flat. She admits she has been more down and depressed which is not herself as she is known for being upbeat and optimistic. Her psychiatrist recommended that she increase Lexapro a few months ago but she declined this dose change. We revisited this today. No thoughts of suicide or self harm.      PD reviewed. She has had a good response to the stimulator with resolution of dyskinesias and tremor. Her mind feels more clear.       Review of Systems- pertinent positive in bold:  Pertinent findings as noted in HPI. Denies chest pain, dyspnea      Current Scheduled Meds:  Outpatient Encounter Prescriptions as of 10/24/2018   Medication Sig Dispense Refill     amLODIPine (NORVASC) 5 MG tablet TAKE 1 TABLET DAILY 90 tablet 3     atorvastatin (LIPITOR) 20 MG tablet TAKE 1 TABLET DAILY 90 tablet 3     carbidopa-levodopa (SINEMET CR)  mg per tablet Take 1 tablet by mouth 4 (four)  times a day. (06:00, 12:00 , 17:00, 22:00).  Patient states that she is taking 1.25 tablets every 2 hours while awake       cholecalciferol, vitamin D3, 1,000 unit tablet Take 1,000 Units by mouth daily.       diclofenac sodium (VOLTAREN) 1 % Gel Apply 2 grams topically to area 4 times per day not to exceed 8 grams per day 1 Tube 0     escitalopram oxalate (LEXAPRO) 10 MG tablet Take 1.5 tablets (15 mg total) by mouth daily. 135 tablet 0     fexofenadine (ALLEGRA) 180 MG tablet Take 180 mg by mouth as needed.        fluticasone (FLONASE) 50 mcg/actuation nasal spray Use 2 sprays each nostril daily. 16 g 1     pantoprazole (PROTONIX) 20 MG tablet Take 1 tablet (20 mg total) by mouth daily. 90 tablet 3     therapeutic multivitamin (THERAGRAN) tablet Take 1 tablet by mouth daily.       [DISCONTINUED] escitalopram oxalate (LEXAPRO) 10 MG tablet Take 10 mg by mouth daily.        No facility-administered encounter medications on file as of 10/24/2018.      Past Medical History:   Diagnosis Date     Acid reflux      Carpal tunnel syndrome      GERD (gastroesophageal reflux disease)      Hypertension      Parkinson disease (H)      PE (pulmonary embolism)      Pulmonary Embolism     Created by [facility] Annotation: Dec 15 2009  9:23AM Lan Almanzar: on Coumadin      Pulmonary nodule      Stress incontinence      Urinary incontinence      Vitamin D deficiency      Past Surgical History:   Procedure Laterality Date     BREAST BIOPSY Right 2000's    Calcs     AL BIOPSY OF BREAST, INCISIONAL      Description: Biopsy Breast Open;  Recorded: 04/27/2009;  Comments: X2, benign.     AL LIGATE FALLOPIAN TUBE      Description: Tubal Ligation;  Recorded: 04/27/2009;     AL REVISE MEDIAN N/CARPAL TUNNEL SURG  5/20/2016    Procedure: LEFT MEDIAN NERVE DECOMPRESSION;  Surgeon: Terell Gaines MD;  Location: [facility];  Service: Neurosurgery     Social History   Substance Use Topics     Smoking status: Never  Smoker     Smokeless tobacco: Never Used     Alcohol use No       Objective / Physical Examination:  Vitals:    10/24/18 1056   BP: 120/60   Pulse: 74   Weight: 136 lb (61.7 kg)     Wt Readings from Last 3 Encounters:   10/24/18 136 lb (61.7 kg)   10/21/18 134 lb (60.8 kg)   10/01/18 145 lb (65.8 kg)     Body mass index is 23.71 kg/(m^2).     Result Impression   IMPRESSION:  No identifiable fluid collection or mass in or around kidneys.    I have personally reviewed the examination and initial interpretation  and I agree with the findings.    ROSANNE ARITA MD   Result Narrative   EXAMINATION: US RENAL COMPLETE, 10/2/2018 11:03 AM     COMPARISON: None.    HISTORY: Pyelonephritis, evaluate for abscess    FINDINGS:    Right kidney: Measures 9.9 cm in length. Parenchyma is abnormally  echogenic and of normal thickness without identifiable fluid  collection or mass. No hydronephrosis.    Left kidney: Measures 9.5 cm in length. Parenchyma is of normal  thickness and echogenicity. No identifiable fluid collection or mass.  No hydronephrosis.     Bladder: Unremarkable.     Study Result   CT ABDOMEN PELVIS WO ORAL WO IV CONTRAST  10/1/2018 6:03 PM     INDICATION: Abdominal pain, fevers, and diarrhea.  TECHNIQUE: Routine CT abdomen and pelvis without oral or IV contrast. Multiplanar reformation images (MPR). Dose reduction techniques were used.  COMPARISON: 01/26/2017 and additional exams dating back to 11/21/2014     FINDINGS: Images degraded due to patient's arm position.     LUNG BASES: Stable fibroatelectasis.     ABDOMEN: Nonspecific inflammatory change in right upper quadrant inseparable from portions of right kidney, gallbladder, liver and hepatic flexure. Small presumed hepatic cyst not significantly changed with noncontrast images of liver otherwise   unremarkable. No renal stones or hydronephrosis either kidney. Noncontrast images of gallbladder, spleen, adrenal glands and pancreas unremarkable for age. Diffuse  extensive atherosclerotic plaque without significant aneurysm. No adenopathy.     PELVIS: Pelvic organs unremarkable. Normal appendix.     MUSCULOSKELETAL: Stable benign-appearing lesion left iliac wing. Degenerative disease.     IMPRESSION:   CONCLUSION:  1.  Nonspecific inflammatory stranding in right upper quadrant inseparable from kidney, gallbladder, portions of liver, and hepatic flexure. Differential would include right-sided pyelonephritis, less likely cholecystitis or focal colitis. Either oral   and IV enhanced CT scan or ultrasound right upper quadrant recommended for further evaluation.           General Appearance: Alert and oriented, cooperative, affect appropriate, speech clear, in no apparent distress  Back: No CVA tenderness   Lungs: Clear to auscultation bilaterally. Normal inspiratory and expiratory effort  Cardiovascular: Regular rate, normal S1, S2. No murmurs, rubs, or gallops  Abdomen: Bowel sounds active all four quadrants. Soft, non-tender.   Extremities: No edema.   Neuro: Alert and oriented x 3. No dyskinesias  Psych: voice is soft, less eye contact. Mood is depressed     No orders of the defined types were placed in this encounter.  Followup: Return in about 8 weeks (around 12/19/2018) for Next scheduled follow up. earlier if needed.      Tracey Abdi, CNP

## 2021-06-21 NOTE — PROGRESS NOTES
Assessment:       Acute cystitis        Plan:       1. Medications: TMP/SMX  2. Maintain adequate hydration  3. Discussed signs of worsening infection and when to follow-up with PCP if no symptom improvement.  4. In process urine culture - will contact ONLY if test shows resistance to TMP/SMX.    Patient Instructions   Analysis of your urine showed signs of a urinary tract infection.     Take the prescribed antibiotics for the entire course, even if symptoms improve.  You should expect improvement to begin in 24 hours. In the meantime, continue to drink plenty of fluids.    Reasons to come back for re-evaluation:  - Develop a fever, back or flank pain, or nausea and vomiting  - If symptoms have not completely improved after 72 hours  - Recurrent symptoms within a few weeks after treatment has concluded          Subjective:       Gabi Manley is a 78 y.o. female who complains of dysuria. She has had symptoms for 1 week. Patient also complains of urinary urgency and frequency. Patient denies fevers, nausea, vomiting, hematuria, and back pain.    The following portions of the patient's history were reviewed and updated as appropriate: allergies, current medications and problem list.    Review of Systems  A 12 point comprehensive review of systems was negative except as noted.    Allergies  Allergies   Allergen Reactions     Iodinated Contrast- Oral And Iv Dye Shortness Of Breath     Erythromycin Unknown     Ioxaglate Sodium Unknown     Angioedema     Other Allergy (See Comments)      Contrast Media Ready-Box MISC, 12/15/2009.       Paroxetine Hcl Unknown     Penicillins      Facial swelling     Trazodone Unknown     Vancomycin Hives          Objective:       /74 (Patient Site: Left Arm, Patient Position: Sitting, Cuff Size: Adult Regular)   Pulse 63   Temp 97.4  F (36.3  C) (Oral)   Resp 18   Wt 136 lb 12.8 oz (62.1 kg)   SpO2 99%   BMI 23.85 kg/m    General appearance: alert, appears stated age,  cooperative, no distress and non-toxic  Back: no CVA tenderness  Lungs: clear to auscultation bilaterally  Heart: regular rate and rhythm, S1, S2 normal, no murmur, click, rub or gallop  Abdomen: mild suprapubic tenderness; otherwise soft, no masses, no organomegaly, normal bowel sounds  Skin: Skin color, texture, turgor normal. No rashes or lesions    Laboratory:     Recent Results (from the past 24 hour(s))   Urinalysis-UC if Indicated   Result Value Ref Range    Color, UA Yellow Colorless, Yellow, Straw, Light Yellow    Clarity, UA Clear Clear    Glucose, UA Negative Negative    Bilirubin, UA Negative Negative    Ketones, UA 15 mg/dL (!) Negative    Specific Gravity, UA 1.020 1.005 - 1.030    Blood, UA Negative Negative    pH, UA 6.0 5.0 - 8.0    Protein, UA Negative Negative mg/dL    Urobilinogen, UA 0.2 E.U./dL 0.2 E.U./dL, 1.0 E.U./dL    Nitrite, UA Negative Negative    Leukocytes, UA Small (!) Negative    Bacteria, UA Few (!) None Seen hpf    RBC, UA None Seen None Seen, 0-2 hpf    WBC, UA 5-10 (!) None Seen, 0-5 hpf    Squam Epithel, UA 5-10 (!) None Seen, 0-5 lpf    Mucus, UA Few (!) None Seen lpf    Hyaline Casts, UA 5-10 (!) 0-5, None Seen lpf     I personally reviewed and discussed the findings with the patient

## 2021-06-21 NOTE — PROGRESS NOTES
Assessment/Plan:   History of UTI  Weakness  Nausea  Recent hospitalization for UTI/sepsis, slowly improving but increased nausea for a few days. Has not yet had primary care follow up. We will recheck labs and urine today. Arrange close follow up with primary care. Hemodynamically stable today and initial UA not perfect but better. Will hold off on additional antibiotics pending UC result. She was comfortable with this plan.   - Urinalysis-UC if Indicated  - Culture, Urine  - HM1(CBC and Differential)  - Comprehensive Metabolic Panel  - HM1 (CBC with Diff)    Follow up with primary clinic this week  Continue blanquita drops for nausea and drink adequate water  Labs pending - we will call if abnormal otherwise follow up as planned    Addendum: CMP and CBC came back normal, very reassuring. Await UC.     Subjective:      Gabi Manley is a 78 y.o. female with parkinson's who presents for follow up from recent hospitalization. She was recently hospitalized at the [facility] for UTI/sepsis. Positive blood cultures and urine culture. Treated with iv antibiotics and then home on Cipro. Tolerated the medication well and has been slowly improving. Still very weak. Has not yet had hospital follow up with primary care and was scolded by the neuro team on Friday that performed her brain stimulation procedure that she needed to follow up so she is here in clinic today for that. She does not recall what symptoms precipitated her hospitalization but ER records show she presented with abdominal pain. She lives at home alone. Has been getting home care. Denies abdominal pain at this time. She has some urinary incontinence all the time but thinks it was worse before she went in to the hospital and was associated with urgency. No urgency now.  She has had some more nausea for the last 4 days. No diarrhea. No constipation or vomiting. Blanquita lozenges are helping her nausea. She feels weak overall but that is slowly improving. Denies  fever, no ST or URI or cough. No shortness of breath. No leg swelling. No blood in urine. Has not felt fever or chills.     Allergies   Allergen Reactions     Iodinated Contrast- Oral And Iv Dye Shortness Of Breath     Erythromycin Unknown     Ioxaglate Sodium Unknown     Angioedema     Other Allergy (See Comments)      Contrast Media Ready-Box MISC, 12/15/2009.       Paroxetine Hcl Unknown     Penicillins      Facial swelling     Trazodone Unknown     Vancomycin Hives     Current Outpatient Prescriptions on File Prior to Visit   Medication Sig Dispense Refill     amLODIPine (NORVASC) 5 MG tablet TAKE 1 TABLET DAILY 90 tablet 3     atorvastatin (LIPITOR) 20 MG tablet TAKE 1 TABLET DAILY 90 tablet 3     carbidopa-levodopa (SINEMET CR)  mg per tablet Take 1 tablet by mouth 4 (four) times a day. (06:00, 12:00 , 17:00, 22:00).  Patient states that she is taking 1.25 tablets every 2 hours while awake       cholecalciferol, vitamin D3, 1,000 unit tablet Take 1,000 Units by mouth daily.       diclofenac sodium (VOLTAREN) 1 % Gel Apply 2 grams topically to area 4 times per day not to exceed 8 grams per day 1 Tube 0     escitalopram oxalate (LEXAPRO) 10 MG tablet Take 10 mg by mouth daily.        fexofenadine (ALLEGRA) 180 MG tablet Take 180 mg by mouth as needed.        fluticasone (FLONASE) 50 mcg/actuation nasal spray Use 2 sprays each nostril daily. 16 g 1     pantoprazole (PROTONIX) 20 MG tablet Take 1 tablet (20 mg total) by mouth daily. 90 tablet 3     therapeutic multivitamin (THERAGRAN) tablet Take 1 tablet by mouth daily.       No current facility-administered medications on file prior to visit.      Patient Active Problem List   Diagnosis     Allergic Rhinitis     CXR Lungs Solitary Pulmonary Nodule (___ Cm)     Esophageal Reflux     Parkinson's Disease     HTN (hypertension)     Female Stress Incontinence     Vitamin D Deficiency     Carpal tunnel syndrome of left wrist     HLD (hyperlipidemia), 10-year  ASCVD risk 20.7% 6/2016       Objective:     /82  Pulse (!) 56  Temp 97.4  F (36.3  C) (Oral)   Resp 16  Wt 134 lb (60.8 kg)  SpO2 98%  BMI 23.36 kg/m2    Physical  General Appearance: Alert, cooperative, no distress, low energy and vague historian.  Slow speech may be part of the parkinson's. Able to stand up out of the chair without difficulty has a walker for ambulation.   Head: Normocephalic, without obvious abnormality, atraumatic  Eyes: Conjunctivae are normal.   Ears: Normal TMs and external ear canals, both ears  Nose: No significant congestion.  Throat: Throat is normal.  No exudate.  No significant lesions  Neck: No adenopathy  Lungs: Clear to auscultation bilaterally, respirations unlabored  Heart: Regular rate and rhythm  Abdomen: Soft, non-tender, no CVA tenderness with percussion  Extremities: No lower extremity edema  Skin: no rashes or lesions  Psychiatric: Patient has a normal mood and affect.        Recent Results (from the past 24 hour(s))   Urinalysis-UC if Indicated   Result Value Ref Range    Color, UA Yellow Colorless, Yellow, Straw, Light Yellow    Clarity, UA Clear Clear    Glucose, UA Negative Negative    Bilirubin, UA Negative Negative    Ketones, UA Trace (!) Negative    Specific Gravity, UA 1.015 1.005 - 1.030    Blood, UA Negative Negative    pH, UA 7.0 5.0 - 8.0    Protein, UA Negative Negative mg/dL    Urobilinogen, UA 0.2 E.U./dL 0.2 E.U./dL, 1.0 E.U./dL    Nitrite, UA Negative Negative    Leukocytes, UA Small (!) Negative    Bacteria, UA None Seen None Seen hpf    RBC, UA None Seen None Seen, 0-2 hpf    WBC, UA 5-10 (!) None Seen, 0-5 hpf    Squam Epithel, UA 0-5 None Seen, 0-5 lpf    Hyaline Casts, UA 5-10 (!) 0-5, None Seen lpf   Comprehensive Metabolic Panel   Result Value Ref Range    Sodium 139 136 - 145 mmol/L    Potassium 3.7 3.5 - 5.0 mmol/L    Chloride 100 98 - 107 mmol/L    CO2 26 22 - 31 mmol/L    Anion Gap, Calculation 13 5 - 18 mmol/L    Glucose 76 70 - 125  mg/dL    BUN 17 8 - 28 mg/dL    Creatinine 0.89 0.60 - 1.10 mg/dL    GFR MDRD Af Amer >60 >60 mL/min/1.73m2    GFR MDRD Non Af Amer >60 >60 mL/min/1.73m2    Bilirubin, Total 0.9 0.0 - 1.0 mg/dL    Calcium 9.3 8.5 - 10.5 mg/dL    Protein, Total 6.3 6.0 - 8.0 g/dL    Albumin 3.8 3.5 - 5.0 g/dL    Alkaline Phosphatase 95 45 - 120 U/L    AST 13 0 - 40 U/L    ALT <9 0 - 45 U/L   HM1 (CBC with Diff)   Result Value Ref Range    WBC 4.6 4.0 - 11.0 thou/uL    RBC 4.16 3.80 - 5.40 mill/uL    Hemoglobin 12.8 12.0 - 16.0 g/dL    Hematocrit 37.1 35.0 - 47.0 %    MCV 89 80 - 100 fL    MCH 30.7 27.0 - 34.0 pg    MCHC 34.5 32.0 - 36.0 g/dL    RDW 12.9 11.0 - 14.5 %    Platelets 150 140 - 440 thou/uL    MPV 9.5 7.0 - 10.0 fL    Neutrophils % 58 50 - 70 %    Lymphocytes % 32 20 - 40 %    Monocytes % 9 2 - 10 %    Eosinophils % 1 0 - 6 %    Basophils % 0 0 - 2 %    Neutrophils Absolute 2.7 2.0 - 7.7 thou/uL    Lymphocytes Absolute 1.5 0.8 - 4.4 thou/uL    Monocytes Absolute 0.4 0.0 - 0.9 thou/uL    Eosinophils Absolute 0.1 0.0 - 0.4 thou/uL    Basophils Absolute 0.0 0.0 - 0.2 thou/uL

## 2021-06-25 NOTE — PROGRESS NOTES
Progress Notes by Kathya Herrera, PT Student at 5/5/2017 10:07 AM     Author: Kathya Herrera PT Student Service: -- Author Type: PT Student    Filed: 5/5/2017 12:17 PM Date of Service: 5/5/2017 10:07 AM Status: Attested    : Kathya Herrera PT Student (PT Student) Cosigner: Byron Peña PT at 5/5/2017 12:38 PM    Attestation signed by Byron Peña PT at 5/5/2017 12:38 PM    SPT under direct supervision of PT with PT directing treatment and plan of care.  Byron Peña, PT/DPT                Physical Therapy  Physical Therapy  Movement Disorder Treatment Note    Date: 5/5/2017   Visit #: 8/15 - MEDICARE (certification dates: 3/16/2017 to 6/9/2017)   Rx Units: 1 TE, 1 NMR, 2 TE  Total OP Minutes: 55    Pain:  No    Subjective: Patient reports she has been able to go for a few walks and exercise consistently since her last visit. Reports she is in a bit of an off period at time of today's session.     Objective:   Therapeutic Exercise:  Total Minutes: 15  Sustained Movements (sitting)  4reps  x 1-2 sets      Exercise   Reps   Sets   Effort     1A Floor to Ceiling With Flicking 6 1 10     1B Side to Side With Flicking 6 1 10     Comments:  1A: Demonstrates excellent effort. Verbal cues for loud counting and big open hands. Requires intermittent re-cueing to maintain improvements.   1B: Verbal cues to move across big and end big. Verbal cues for loud counting. Demonstrates intermittent freezing with this activity today with decreased ability to respond to verbal cues to end big and resolve episode of freezing.     Other:     Neuro-Reeducation/Balance:  Total Minutes: 10  Multidirectional Repetitive Movements.  8-16reps  x 1-2 sets    Step and Reach:   UE support: none  Chairs Nearby:       Exercise   Reps   Sets   Effort     2A Forward Step and Reach 10 2 10     2B Sideward Step and Reach 10 2 10     2C Backward Step and Reach        Comments:   2A: Excellent step amplitude. Verbal cues for big open  hands and loud counting.   2B: Verbal cues to drive knee up for a big step and to have big open hands. Significantly improved performance following cues.   2C: Attempted this exercise, but pt had LOB requiring mod A from therapist to regain balance. She was unable to resume exercise due to significant freezing episode. Transitioned pt to Big Walking and sit to stands for remainder of session.     Rock and Reach:  UE support:  Chairs Nearby:       Exercise   Reps   Sets   Effort     2D Left leg Forward/Backward        2D Right leg Forward/Backward        2E Side to Side        Comments:   2D:    2E:     Other:     Gait Training:   Total Minutes:     Time Speed (MPH) UE support Direction Incline Comments                                      Other:     Therapeutic Activity  Total Minutes: 30     Functional Movement  Rep  Sets  Effort    Rolling        Supine to Sit       Sit to Stand from standard chair       Sit to Stand from low, soft chair       Sit and Reach       Walk and Turn       PWR Moves:       PWR Moves:        Comments:     Big Walking: Approximately 1000'. Verbal cues for big arm swing; pt was able to improve arm swing following cueing but required repeated cueing in order to maintain improvement. Demonstrates decreased step amplitude, arm swing, and pace compared to previous appointments with this therapist. Attempted to perform task for loud speaking volume (singing ABC's and twinkle twinkle little star). Pt was unable to produce loud volume while ambulating. Pt took seated rest break approximately custodial through ambulation. Provided SBA to CGA throughout.     Other:   Sit to stands from standard chair   -Performed 3x10 without use of UE's. Performed last 2 sets with 6 pound medicine ball in order to challenge LE functional strength and coordination.   -Pt demonstrated decreased pace and stability with repeated sit to stands today compared to previous visits. She required intermittent need of multiple  attempts to                 stand.   -Pt demonstrated good effort throughout despite reports of significant fatigue and frustration with her decreased performance today.    -Provided SBA throughout.    Carryover Assignment: walking and sit><stands     HEP: exercises 1 A/B, 2 A/B/C/D    Assessment/Plan for week:  5/1/2017 - 5/5/2017    See note from previous visit.

## 2021-06-25 NOTE — PROGRESS NOTES
Progress Notes by Misael Gregory at 1/30/2017 12:00 PM     Author: Misael Gregory Service: -- Author Type: Nurse Practitioner    Filed: 2/15/2017  5:49 PM Encounter Date: 1/30/2017 Status: Signed    : Misael Gregory [facility]    Date of Service: 1/30/2017  Primary Provider: YURIDIA Bowden    Patient Care Team:  YURIDIA Roman as PCP - General (Nurse Practitioner)     ______________________________________________________________________     Patient's Pharmacy:    [facility] - [place], [place] - [address] AT [address] [facility]  [address]  [facility] [address]  Phone: [phone] Fax: [phone]    [facility] [id number] [place] [address] [address] AT [facility] [address]  [address]  [facility] [address]  Phone: [phone] Fax: [phone]     Patient's Insurance:    Payor: MEDICARE / Plan: MEDICARE A AND B / Product Type: Medicare /      ______________________________________________________________________     Gabi WENDY Manley is 77 y.o. female who comes in today for:    Chief Complaint   Patient presents with   ? Hospital Visit Follow Up     ER follow up. [facility], 1/26/17, Abd pain. Change of medications. Headaches. Has been doing better since seen at ER. No more abd pain.        Patient Active Problem List   Diagnosis   ? Allergic Rhinitis   ? Pulmonary Embolism   ? CXR Lungs Solitary Pulmonary Nodule (___ Cm)   ? Esophageal Reflux   ? Parkinson's Disease   ? Hypertension   ? Female Stress Incontinence   ? Vitamin D Deficiency   ? Carpal tunnel syndrome of left wrist     Current Outpatient Prescriptions   Medication Sig   ? amLODIPine (NORVASC) 5 MG tablet TAKE 1 TABLET DAILY   ? carbidopa-levodopa (SINEMET CR)  mg per tablet Take 1 tablet by mouth 4 (four) times a day. (06:00, 12:00 , 17:00, 22:00)   ? cholecalciferol, vitamin D3, 1,000  unit tablet Take 1,000 Units by mouth daily.   ? escitalopram oxalate (LEXAPRO) 10 MG tablet Take 10 mg by mouth daily.    ? fexofenadine (ALLEGRA) 180 MG tablet Take 180 mg by mouth as needed.    ? therapeutic multivitamin (THERAGRAN) tablet Take 1 tablet by mouth daily.   ? atorvastatin (LIPITOR) 20 MG tablet Take 1 tablet (20 mg total) by mouth daily.   ? pantoprazole (PROTONIX) 20 MG tablet Take 1 tablet (20 mg total) by mouth daily.     Social History     Social History Narrative    The patient presents to the ED alone with complaint of abdominal pain 01/26/17         ______________________________________________________________________     History of present illness:  Gabi Manley is a 77 year-old female with a past medical history of hypertension, parkinson's disease, pulmonary embolism, pulmonary nodule, stress incontinence, and GERD.  She presents today for follow-up from an ED visit at [facility] on 1/26/17 for headache and periumbilical abdominal pain that progressively worsened over the course of 2 days.  She had been tapering off of Protonix, from 40 mg to 20 mg, but then started on Pepcid.  She states she awoke, about 2 days after starting the new medication, with a headache and abdominal pain that gradually worsened so she went to the ED for evaluation.  She reports that she tolerated the Protonix at the 20 mg daily dose without any problems.  CT of the Abdomen/Pelvis was done to rule out acute pathologies and this was negative.  It was noted on CT that she had moderate amount of stool throughout the colon. She was treated with Metamucil and was released home.      On review of systems, the patient denies any fever, chest pain, shortness of breath, abdominal pain, diarrhea, or acute skin reactions. Positive for symptoms as noted in the HPI.    ______________________________________________________________________      Wt Readings from Last 3 Encounters:   01/30/17 140 lb (63.5 kg)   12/12/16  "138 lb 6.4 oz (62.8 kg)   07/21/16 121 lb 12.8 oz (55.2 kg)     BP Readings from Last 3 Encounters:   01/30/17 122/86   01/26/17 155/79   12/12/16 118/78     Visit Vitals   ? /86   ? Ht 5' 3.5\" (1.613 m)   ? Wt 140 lb (63.5 kg)   ? BMI 24.41 kg/m2        Physical Exam:  General Appearance: Alert, cooperative, no distress, appears stated age  Head: Normocephalic, without obvious abnormality, atraumatic  Eyes: PERRL, conjunctiva/corneas clear  Ears: Normal TM's and external ear canals, both ears  Nose: Nares normal, septum midline,mucosa normal, no drainage  Throat: Lips, mucosa, and tongue normal; teeth and gums normal  Neck: Supple, symmetrical, trachea midline, no adenopathy;  thyroid: not enlarged, symmetric, no tenderness/mass/nodules  Lungs: Clear to auscultation bilaterally, respirations unlabored  Heart: Regular rate and rhythm, S1 and S2 normal, no murmur, rub, or gallop  Abdomen: Soft, non-tender, bowel sounds active all four quadrants  Skin: Skin color, texture, turgor normal, no rashes or lesions  Lymph nodes: Cervical, supraclavicular nodes normal  Neurologic: She is alert and oriented x 3.  Psychiatric: She has a normal mood and affect.   ______________________________________________________________________    Assessment:    1. Abdominal pain - possible rebound acid reflux, intolerance to Pepcid      ______________________________________________________________________      PHQ-2 Total Score: 0 (6/10/2016  6:56 AM)  No Data Recorded     Plan:  Patient Instructions   1. Restart Protonix 20 mg by mouth daily - sent to your Mail Order Pharmacy as requested.  2. Discontinue Pepcid      Misael Gregory, [facility]  Internal Medicine  [facility]       Return if symptoms worsen or fail to improve.        "

## 2021-06-25 NOTE — PROGRESS NOTES
Progress Notes by Byron Peña PT at 5/19/2017 10:11 AM     Author: Byron Peña PT Service: -- Author Type: Physical Therapist    Filed: 5/19/2017  2:38 PM Date of Service: 5/19/2017 10:11 AM Status: Signed    : Byron Peña PT (Physical Therapist)    Related Notes: Original Note by Kathya Herrera PT Student (PT Student) filed at 5/19/2017 12:10 PM       Physical Therapy  Physical Therapy  Movement Disorder Treatment Note    Date: 5/19/2017   Visit #: 12/15 - MEDICARE (certification dates: 3/16/2017 to 6/9/2017)   Rx Units: 2 TE, 1 NMR, 1 TA  Total OP Minutes: 60    Pain:  Bilateral leg soreness from exercise, rated 6/10.     Subjective: Patient reports feeling quite sore in her legs from her last therapy appointment. She was able to do her exercises this week and performing walking in her apartment hallway. She did not walk outside due to bad weather.      Objective:   Therapeutic Exercise:  Total Minutes: 30  Sustained Movements (sitting)  4reps  x 1-2 sets      Exercise   Reps   Sets   Effort     1A Floor to Ceiling With Flicking 6 1 10     1B Side to Side With Flicking 6 1 10     Comments:  1A: Demonstrates good amplitude of reaching and appropriate counting speed. Demonstrates improved counting volume throughout exercise without verbal cues to do so.   1B: Demonstrates excellent leg extension and reaching amplitude. Required no verbal cues for performance or counting.     Other:   NuStep  Resistance level 2-4 for 11:00  Averages: 2.8 METS  85 SPM    LE mobility exercises: verbal cues and modeling performed by therapist for proper technique  -Seated quad stretch performed 2 x 30 seconds per leg  -Seated hip flexor stretch performed 2 x 30 seconds per leg  -Seated piriformis stretch performed 2 x 30 seconds per leg    Neuro-Reeducation/Balance:  Total Minutes: 20  Multidirectional Repetitive Movements.  8-16reps  x 1-2 sets    Step and Reach:   UE support: none  Chairs Nearby: none      Exercise    Reps   Sets   Effort     2A Forward Step and Reach 10 2 9, 10     2B Sideward Step and Reach 10 2 10     2C Backward Step and Reach 10 2 10     Comments:   2A: Excellent effort and technique throughout. No episodes of freezing.   2B: Excellent effort and technique throughout.   2C: Excellent weight shifting and effort throughout.     Rock and Reach:  UE support: none  Chairs Nearby: none      Exercise   Reps   Sets   Effort     2D Left leg Forward/Backward 20 2 10     2D Right leg Forward/Backward 20 2 10     2E Side to Side 10 2 10     Comments:   2D: Verbal cues to slow movement down. Good weight shifting throughout.   2E: Demonstrates excellent effort. Experienced a few LOB's requiring min A from therapist to regain balance. Verbal cues to focus on control of movement versus speed. Pt able to improve stability following cueing.     Other:      Gait Training:   Total Minutes:     Time Speed (MPH) UE support Direction Incline Comments                                      Other:     Therapeutic Activity  Total Minutes: 10     Functional Movement  Rep  Sets  Effort    Rolling       Supine to Sit       Sit to Stand from standard chair       Sit to Stand from low, soft chair       Sit and Reach       Walk and Turn       PWR Moves:       PWR Moves:        Comments:     Big Walking: Performed 1160' with intermittent cues to increase speed in order to improve general walking speed. Excellent effort throughout. Pt reported this activity is quite fatiguing. Demonstrated difficulty recovering following bouts of fast walking.     Other:     Carryover Assignment: walking and sit><stand    HEP: exercises 1 A/B, 2 A/B/C/D, seated hip flexor, piriformis, and quad stretches.     Assessment/Plan for week:  5/15/2017 - 5/19/2017    See previous note for assessment and plan.    Kathya Herrera, SPT  SPT under direct supervision of PT with PT directing treatment and plan of care.  Byron Peña, PT/DPT

## 2021-06-25 NOTE — PROGRESS NOTES
"Progress Notes by Misael Walker PT Student at 2017 10:56 AM     Author: Misael Walker PT Student Service: -- Author Type: PT Student    Filed: 2017 12:33 PM Date of Service: 2017 10:56 AM Status: Attested    : Misael Walker PT Student (PT Student) Cosigner: Maycol Mason PT at 2017  2:19 PM    Attestation signed by Maycol Mason PT at 2017  2:19 PM    SPT under direct supervision of PT with PT directing treatment and plan of care.    Maycol Mason PT, DPT, NCS                Physical Therapy  Physical Therapy  Movement Disorder Treatment Note    Date: 2017   Visit #: 6/15 - MEDICARE (certification dates: 3/16/2017 to 2017)   Rx Units: 2 TE, 2 NR   Total OP Minutes: 55     Pain:  Yes  Location: B shoulders and knees, Ratin/10 shoulders; 5/10 knees, Intervention: Rest, therapeutic exercises      Subjective: States she usually takes a couple Tylenol before each treatment session and forgot today.  Notes some stiffness/soreness in B knees/shoulders.  Didn't do any of the exercises yesterday, \"felt like I could barely move yesterday\".  Thinks her \"body is feeling better since starting this exercise program\".       Objective: Improved dorothy and amplitude with walking towards the elevator with 4WW after the session      Therapeutic Exercise:  Total Minutes: 23  Sustained Movements (sitting)  4reps  x 1-2 sets      Exercise   Reps   Sets   Effort     1A Floor to Ceiling With Flicking 6 1 10     1B Side to Side With Flicking 6 1 10     Comments:  1A: Verbal cues for BIG hands and BIG reach forward. Demonstrates excellent LOUD counting.  Occasional freezing with hand flicks.  1B: Provided verbal cues for BIG movement back to starting position and for BIG hands.  Increased difficulty with left arm across body in initiating flicks    Other:    - Nustep x10 minutes, resistance level 1-2 (level 2 from 1:00 to 3:00), Average METS 1.9, SPM 78   - Bridges, 1 set " of 10, cueing on proper technique and to stay in pain free zone while performing exercise -- added to HEP   - Straight leg on red stability ball bridge, 1 set of 10, cueing to keep core engaged and limit pelvic rotation while elevating off mat    Handout was provided for above exercises to be included in home exercise program for improved compliance with PT intervention.      Neuro-Reeducation/Balance:  Total Minutes: 30   Multidirectional Repetitive Movements.  8-16reps  x 1-2 sets    Step and Reach:   UE support: second set of 2C Chairs Nearby: 2A, 2B, 2C      Exercise   Reps   Sets   Effort     2A Forward Step and Reach 10 2 10     2B Sideward Step and Reach 10 2 10     2C Backward Step and Reach 10 2 10     Comments:   2A: Demonstrates good step forward, 2-3 episodes of freezing, improved with second set with 0 episodes of freezing, cueing on upright posture, foot slap and improved verbal counting  2B: Good technique, performed this move the best of any throughout the session,  2C: 1 LOB episode and 2-3 freezing episodes, verbally had good output on first set.  3-4 freezing episodes with the second set with 1 LOB with 10th repetition.  Overall decrease stability in exercise compared to other Big exercises      Rock and Reach:  UE support:  Chairs Nearby: 2D, 2E      Exercise   Reps   Sets   Effort     2D Left leg Forward/Backward 20 2 10     2D Right leg Forward/Backward 20 2 10     2E Side to Side 10 1 10     Comments:   2D: Demonstrates excellent amplitude of arm swing. Noted 6/10 pain in R shoulder, decreased height of arm swings, reduced to 120 degrees of shoulder flexion and noted 0/10 pain with decreased arm swing height  2E: 50% shaping on proper positioning, frequent freezing at start and end position, verbal cues to include verbal counting, 2 LOB with self correction and use of side chairs    Other:     Gait Training:   Total Minutes:     Time Speed (MPH) UE support Direction Incline Comments                                       Other:     Therapeutic Activity  Total Minutes: 2 minutes     Functional Movement  Rep  Sets  Effort    Rolling        Supine to Sit 5 1 10    Sit to Stand from standard chair       Sit to Stand from low, soft chair       Sit and Reach       Walk and Turn       PWR Moves:       PWR Moves:        Comments:    Supine to Sit: Cueing on proper log roll technique, log roll onto R side, L knee stayed bent, swing leg onto floor while pushing up using R elbow    Big Walking:     Other:     Carryover Assignment: walking and sit><stands     HEP: exercises 1 A/B, 2 A/B/C/D, bridges    Assessment/Plan for week:  4/24/2017 - 4/28/2017  Pt demonstrates excellent effort and amplitude of movement with all exercises but still requires intermittent verbal cues to resolve episodes of freezing and minimum physical assistance to maintain her balance. Pt demonstrates improved ability to coordinate 2E despite continued need for assistance with her balance during this activity. Plan to continue emphasis on dynamic balance activities and activities with narrow base of support. Pt remains appropriate to receive skilled 1:1 physical therapy to address her deficits and decrease falls risk.      Plan: Include log rolling technique and bridging to help with bed mobility -- 4/28/2017

## 2021-06-25 NOTE — PROGRESS NOTES
Progress Notes by Kathya Herrera PT Student at 5/16/2017 10:12 AM     Author: Kathya Herrera PT Student Service: -- Author Type: PT Student    Filed: 5/16/2017 11:11 AM Date of Service: 5/16/2017 10:12 AM Status: Attested    : Kathya Herrera PT Student (PT Student) Cosigner: Byron Peña PT at 5/16/2017  1:55 PM    Attestation signed by Byron Peña PT at 5/16/2017  1:55 PM    SPT under direct supervision of PT with PT directing treatment and plan of care.  Byron Peña PT                Physical Therapy  Physical Therapy  Movement Disorder Treatment Note    Date: 5/16/2017   Visit #: 11/15 - MEDICARE (certification dates: 3/16/2017 to 6/9/2017)   Rx Units: 1 TE, 2 NMR, 1 TA  Total OP Minutes: 55    Pain:  Bilateral leg soreness from exercise, rated 4/10.     Subjective: Patient reports feeling fairly good and having been able to go for a few walks since her last appointment.     Objective:   Therapeutic Exercise:  Total Minutes: 20  Sustained Movements (sitting)  4reps  x 1-2 sets      Exercise   Reps   Sets   Effort     1A Floor to Ceiling With Flicking 6 1 10     1B Side to Side With Flicking 6 1 10     Comments:  1A: Demonstrates excellent effort and technique throughout. Verbal cues for big hands and tall posture. Improved counting volume compared to last session with this therapist. Performed 200' fast ambulation following 1A but before 1B d/t pt report of feeling stuck and needing to get moving.   1B: Verbal cues for big hands and big reach. Pt demonstrated intermittent freezing throughout, requiring repeated cues for a big reach to resolve these episodes. Excellent effort throughout.     Other:   NuStep  Resistance level 4 for 9:00  Averages: 2.5 METS  97 SPM    Neuro-Reeducation/Balance:  Total Minutes: 25  Multidirectional Repetitive Movements.  8-16reps  x 1-2 sets    Step and Reach:   UE support: none  Chairs Nearby: none      Exercise   Reps   Sets   Effort     2A Forward Step and  Reach 10 2 10     2B Sideward Step and Reach 10 2 10     2C Backward Step and Reach 10 2 10     Comments:   2A: Performed on blue [employer] foam. Demonstrated several LOB's and had to sit on mat as a result of losing her balance but was otherwise able to catch her balance independently.  2B: Improved step amplitude compared to previous exercise. Verbal cues for big steps and to look in the direction she's stepping.   2C: Good step amplitude throughout. Verbal cues for louder counting.     Rock and Reach:  UE support: none  Chairs Nearby: none      Exercise   Reps   Sets   Effort     2D Left leg Forward/Backward 20 2 10     2D Right leg Forward/Backward 20 2 10     2E Side to Side 10 2 10     Comments:   2D:  Demonstrates excellent effort and big reach throughout. Verbal cues for big hands and loud counting.   2E: Demonstrates excellent effort and improved stability and amplitude of twist compared to previous sessions with this exercise. Pt required min A for balance x3.     Other:      Gait Training:   Total Minutes:     Time Speed (MPH) UE support Direction Incline Comments                                      Other:     Therapeutic Activity  Total Minutes: 10     Functional Movement  Rep  Sets  Effort    Rolling       Supine to Sit       Sit to Stand from standard chair 10 3 10    Sit to Stand from low, soft chair       Sit and Reach       Walk and Turn       PWR Moves:       PWR Moves:        Comments:  Sit to stand performed holding 5000 g medicine ball with verbal cues to push big to get out of her chair. Demonstrates excellent effort throughout. Pt intermittently required repeated attempts to stand. She reports this exercise is very challenging to her leg strength.      Big Walking:     Other:     Carryover Assignment: walking and sit><stands     HEP: exercises 1 A/B, 2 A/B/C/D    Assessment/Plan for week:  5/15/2017 - 5/19/2017    Pt demonstrates excellent effort throughout therapy and improved ability to  resolve her episodes of freezing by responding to verbal cues for big steps and by taking brief, fast-paced walks between exercises. Pt continues to demonstrate difficulty with movements requiring significant LE and UE power, such as fast walking, supine to sits, and sit to stands. Plan to continue progressing whole body strengthening and execution of power-based movements via weighted sit to stands, lunging, etc. Pt remains appropriate to receive 1:1 skilled physical therapy.     Kathya Herrera, PT Student

## 2021-06-25 NOTE — PROGRESS NOTES
"Progress Notes by Kathya Herrera PT Student at 5/9/2017 11:11 AM     Author: Kathya Herrera PT Student Service: -- Author Type: PT Student    Filed: 5/9/2017  3:42 PM Date of Service: 5/9/2017 11:11 AM Status: Attested    : Kathya Herrera PT Student (PT Student)    Related Notes: Original Note by Kathya Herrera PT Student (PT Student) filed at 5/9/2017  1:36 PM    Cosigner: Byron Peña PT at 5/9/2017  3:55 PM    Attestation signed by Byron Peña PT at 5/9/2017  3:55 PM    SPT under direct supervision of PT with PT directing treatment and plan of care.  Byron Peña PT                  Physical Therapy  Physical Therapy  Movement Disorder Treatment Note    Date: 5/9/2017   Visit #: 9/15 - MEDICARE (certification dates: 3/16/2017 to 6/9/2017)   Rx Units: 1 TE, 2 NMR, 1 TA  Total OP Minutes: 60    Pain:  No    Subjective: Patient reports she has been feeling \"terrible\" since our last visit. She has not been able to do her exercises since her last visit. She reports having gone for a walk with her friends at [employer] (about 1 mile) without use of her RW. She states she did well. She reports significant fatigue this past week.     Objective:   Therapeutic Exercise:  Total Minutes: 12  Sustained Movements (sitting)  4reps  x 1-2 sets      Exercise   Reps   Sets   Effort     1A Floor to Ceiling With Flicking 6 1 10     1B Side to Side With Flicking 6 1 10     Comments:  1A: Demonstrates good counting volume and effort throughout. Verbal cues to slow counting and movement down.   1B: Verbal cues to move across big and end big. Demonstrated frequent freezing throughout this exercise. Pt was unable to quickly resolve her freezing despite verbal cues to take a big step.     Other:     Neuro-Reeducation/Balance:  Total Minutes: 28  Multidirectional Repetitive Movements.  8-16reps  x 1-2 sets    Step and Reach:   UE support: 2A, 2B  Chairs Nearby: 2A, 2B      Exercise   Reps   Sets   Effort     2A Forward " "Step and Reach 10 1 10     2B Sideward Step and Reach 10 1 10     2C Backward Step and Reach        Comments:   2A: Excellent step amplitude. Verbal cues for big step back. Pt experienced intermittent freezing that resolved moderately easily following verbal cues to take a big step.   2B: Verbal cues to take a big step back in order to resolve intermittent episodes of freezing. Excellent effort throughout.   2C:     Rock and Reach:  UE support: 2D  Chairs Nearby: 2D      Exercise   Reps   Sets   Effort     2D Left leg Forward/Backward 10 2 10     2D Right leg Forward/Backward 10 2 10     2E Side to Side        Comments:   2D:  Demonstrates good effort throughout. Verbal cues for big reach and to slow counting down. Patient experienced no episodes of freezing.   2E:     Other:   Stepping patterns without use of RW   -Provided bilateral hand held assist throughout   -Instructed patient to side step, turn, walk forwards, and walk backwards upon hearing instructions to do so   -Patient demonstrated good stability with this exercise and was able to avoid episodes of freezing during this activity. She states, \"this feels like dancing!\"      Gait Training:   Total Minutes:     Time Speed (MPH) UE support Direction Incline Comments                                      Other:     Therapeutic Activity  Total Minutes: 20     Functional Movement  Rep  Sets  Effort    Rolling        Supine to Sit       Sit to Stand from standard chair 10 1 10    Sit to Stand from low, soft chair       Sit and Reach       Walk and Turn       PWR Moves:       PWR Moves:        Comments: Pt demonstrates excellent effort throughout sit to stands but experienced moderate difficulty fully rising from chair, requiring multiple attempts to do so. Provided verbal cues to count loudly and power up out of her chair. Amplitude of movement and counting volume improved following cueing.     Big Walking: Patient ambulated 232' x 7. Laps around gym performed " intermittently throughout session today in order to break up periods of freezing. Following bouts of ambulation, patient demonstrates improved amplitude of movement and speaking. Pt demonstrates good step length and foot clearance that within each bout of ambulation.     Other:     Carryover Assignment: walking and sit><stands     HEP: exercises 1 A/B, 2 A/B/C/D    Assessment/Plan for week:  5/8/2017 - 5/12/2017    Patient demonstrates increased fatigue and frequency of freezing episodes during her past 2 visits compared to previous sessions. Patient is able to improve her amplitude of movement and decrease frequency of episodes of freezing if she is able to perform some big walking when she begins to freeze. Although patient demonstrates excellent effort throughout therapy, she continues to struggle with freezing and demonstrates continued difficulty avoiding forward flexed posture with all activities. Plan to work on activities incorporating repetitive movements requiring dynamic balance, such as big walking, side stepping, etc. Patient remains appropriate to receive skilled 1:1 physical therapy to address her deficits and reduce her risk of falls.

## 2021-06-25 NOTE — PROGRESS NOTES
Progress Notes by Kathya Herrera, PT Student at 4/25/2017 10:06 AM     Author: Kathya Herrera PT Student Service: -- Author Type: PT Student    Filed: 4/25/2017 12:49 PM Date of Service: 4/25/2017 10:06 AM Status: Attested    : Kathya Herrera PT Student (PT Student) Cosigner: Byron Peña PT at 4/25/2017  1:59 PM    Attestation signed by Byron Peña PT at 4/25/2017  1:59 PM    SPT under direct supervision of PT with PT directing treatment and plan of care.  Byron Peña PT                  Physical Therapy  Physical Therapy  Movement Disorder Treatment Note    Date: 4/25/2017   Visit #: 5/15 - MEDICARE (certification dates: 3/16/2017 to 6/9/2017)   Rx Units: 1 TE, 3 NMR  Total OP Minutes: 58    Pain:  No    Subjective: Performing exercises about every other day. Pt reports she's feeling great and has been going on several small walks per day.     Objective:  Therapeutic Exercise:  Total Minutes: 15  Sustained Movements (sitting)  4reps  x 1-2 sets      Exercise   Reps   Sets   Effort     1A Floor to Ceiling With Flicking 6 1 9     1B Side to Side With Flicking 6 1 10     Comments:  1A: Verbal cues for BIG hands and BIG reach forward. Demonstrates excellent LOUD counting.   1B: Demonstrates good back leg extension bilaterally. Provided verbal cues for BIG movement back to starting position and for BIG hands.     Other:     Neuro-Reeducation/Balance:  Total Minutes: 43  Multidirectional Repetitive Movements.  8-16reps  x 1-2 sets    Step and Reach:   UE support: second set of 2C Chairs Nearby: 2A, 2B, 2C      Exercise   Reps   Sets   Effort     2A Forward Step and Reach 10 2 9     2B Sideward Step and Reach 10 2 10     2C Backward Step and Reach 10 2 10     Comments:   2A: Demonstrates excellent counting volume and amplitude of forward and backward steps. Provided verbal cues for BIG hands.   2B: Provided intermittent verbal cues for BIG step back. Demonstrates excellent counting volume. Pt  experienced 1 episode of freezing which was quickly resolved via a verbal cue to take a BIG step back to the starting position.   2C: Pt reported feeling dizzy after first set, so remaining set was divided into 10 reps on each side using the hi-lo mat for UE support. Pt experienced 1 episode of freezing that was resolved with a verbal cue to take a BIG step forward. Pt demonstrates excellent stepping and forward bending amplitude. Demonstrates excellent anterior and posterior weight shift.     Rock and Reach:  UE support:  Chairs Nearby: 2D, 2E      Exercise   Reps   Sets   Effort     2D Left leg Forward/Backward 20 2 10     2D Right leg Forward/Backward 20 2 10     2E Side to Side 10 2 10     Comments:   2D: Verbal cues to slow down counting and movement. Demonstrates excellent amplitude of arm swing.   2E: Provided verbal cues to not pause for a long period of time in the end position and to perform the movement at a comfortable pace. Provided min A to regain balance intermittently. Pt able to complete exercise without UE support. Demonstrates excellent amplitude of twisting motion. Provided intermittent verbal cues to avoid letting her feet get too far apart.      Other:   Sit to stands: 2x10 with narrow base of support. Provided verbal cues to scoot to the edge of the chair and lean forward BIG to stand up. Pt demonstrated some difficulty achieving enough forward weight shift to stand at the beginning of each set but was eventually able to successfully coordinate this activity.     Walking without 4WW: 920' with verbal cues to take BIG steps, avoid scuffing feet, and to swing her arms BIG. Pt demonstrates excellent step amplitude but slow pace.     Gait Training:   Total Minutes:     Time Speed (MPH) UE support Direction Incline Comments                                      Other:     Therapeutic Activity  Total Minutes:     Functional Movement  Rep  Sets  Effort    Rolling        Supine to Sit       Sit to  Stand from standard chair       Sit to Stand from low, soft chair       Sit and Reach       Walk and Turn       PWR Moves:       PWR Moves:        Comments:     Big Walking:     Other:     Carryover Assignment: walking and sit><stands    HEP: exercises 1 A/B, 2 A/B/C/D    Assessment/Plan for week:  4/24/2017 - 4/28/2017  Pt demonstrates excellent effort and amplitude of movement with all exercises but still requires intermittent verbal cues to resolve episodes of freezing and minimum physical assistance to maintain her balance. Pt demonstrates improved ability to coordinate 2E despite continued need for assistance with her balance during this activity. Plan to continue emphasis on dynamic balance activities and activities with narrow base of support. Pt remains appropriate to receive skilled 1:1 physical therapy to address her deficits and decrease falls risk.

## 2021-06-25 NOTE — PROGRESS NOTES
Progress Notes by Maycol Mason PT at 5/23/2017 10:10 AM     Author: Maycol Mason PT Service: -- Author Type: Physical Therapist    Filed: 5/26/2017  8:17 AM Date of Service: 5/23/2017 10:10 AM Status: Signed    : Maycol Mason PT (Physical Therapist)    Related Notes: Original Note by Kathya Herrera PT Student (PT Student) filed at 5/23/2017  1:18 PM       Physical Therapy  Physical Therapy  Movement Disorder Treatment Note    Date: 5/23/2017   Visit #: 13/15 - MEDICARE (certification dates: 3/16/2017 to 6/9/2017)   Rx Units: 2 TE, 1 NMR, 1 TA  Total OP Minutes: 60    Pain:  Bilateral shoulder soreness from exercise, rated 3/10.  Intervention: rest prn     Subjective: Pt reports being able to do her exercises every day. She has not been walking as much this past weekend due to fatigue from her exercises.      Objective:   Therapeutic Exercise:  Total Minutes: 30  Sustained Movements (sitting)  4reps  x 1-2 sets      Exercise   Reps   Sets   Effort     1A Floor to Ceiling With Flicking 4 1 10     1B Side to Side With Flicking 6 1 10     Comments:  1A: Excellent technique and amplitude of movement throughout. VC to slow down counting down.   1B: Demonstrates excellent leg extension and reaching amplitude throughout.     Other:     Counter squats   -2 sets of 10   -Verbal and manual cues to shift hips backwards as she descends   -Demonstrates good effort throughout. Reports leg fatigue with this exercise    Calf raises   -Attempted 1 set of 10 but pt reported significant great toe pain with this exercise    Bridges   -2 sets of 10   -Verbal cues to dig her heels in and push her butt up in the air   -Demonstrates good amplitude of lift with this activity   -Pt reports significant fatigue with this exercise    Neuro-Reeducation/Balance:  Total Minutes: 15  Multidirectional Repetitive Movements.  8-16reps  x 1-2 sets    Step and Reach:   UE support: none  Chairs Nearby: mat nearby       Exercise   Reps   Sets   Effort     2A Forward Step and Reach 10 2 10     2B Sideward Step and Reach 10 2 10     2C Backward Step and Reach 10 2 10     Comments: Provided close supervision throughout  2A: On blue Aeromat foam. Decreased stability on foam but able to maintain balance independently. Excellent effort.   2B: On blue Aeromat foam. Excellent effort and technique throughout. Improved stability on foam compared to previous exercise.   2C: Excellent weight shifting and effort throughout. Demonstrates good stability and toe lift throughout.     Rock and Reach:  UE support: none  Chairs Nearby: mat nearby      Exercise   Reps   Sets   Effort     2D Left leg Forward/Backward 20 2 9, 10     2D Right leg Forward/Backward 20 2 10     2E Side to Side 10 2 10     Comments:   2D: Verbal cues to slow movement down. Good weight shifting and stability throughout.   2E: Demonstrates excellent effort and amplitude throughout. Experienced several minor LOB's throughout but was able to independently recover. Provided close supervision throughout.    Other:      Gait Training:   Total Minutes:     Time Speed (MPH) UE support Direction Incline Comments                                      Other:     Therapeutic Activity  Total Minutes: 15     Functional Movement  Rep  Sets  Effort    Rolling       Supine to Sit       Sit to Stand from standard chair 10 3 10    Sit to Stand from low, soft chair       Sit and Reach       Walk and Turn       PWR Moves:       PWR Moves:        Comments: Sit to stands performed with two 5 lb weights. Pt reports significant leg fatigue with this exercise. Good effort throughout.     Big Walkin' with verbal cues to bring her shoulders back and stand tall. Pt reports this is quite difficult. Demonstrates excellent arm swing and step amplitude throughout.     Other:     Carryover Assignment: walking and sit to stands    HEP: exercises 1 A/B, 2 A/B/C/D, sit to stands, bridging, counter  squats    Assessment/Plan for week:  5/22/2017 - 5/26/2017    Pt demonstrates good effort and amplitude of movement throughout therapy. She continues to demonstrate difficulty with power-based movements such as sit to stands, squats, and fast walking. Plan to continue progressing pt's functional LE strength with sit to stands, seated and supine LE strengthening exercises, and interval training during ambulation. Pt remains appropriate to receive 1:1 skilled physical therapy.     SPT under direct supervision of PT with PT directing treatment and plan of care.

## 2021-06-26 NOTE — PROGRESS NOTES
Progress Notes by Mitch Thompson DO at 3/4/2018 11:00 AM     Author: Mitch Thompson DO Service: -- Author Type: Physician    Filed: 3/5/2018 10:19 AM Encounter Date: 3/4/2018 Status: Signed    : Mitch Thompson DO (Physician)       Chief Complaint   Patient presents with   ? poss headache     x3days fever      History of Present Illness: Nursing notes reviewed. Patient has pain in her ears on/off, and discomfort across her forehead for the past 3 days.  She also has some increased nasal congestion the past 3 days. No recent fevers.  She has had recent adjustment in her medications for treatment of Parkinson's disease which she is wondering if is related to her current symptoms, which I told her likely was not.  No reported recent fevers, cough, or shortness of breath.    Review of systems: See history of present illness, otherwise negative.     Current Outpatient Prescriptions   Medication Sig Dispense Refill   ? amLODIPine (NORVASC) 5 MG tablet TAKE 1 TABLET DAILY 90 tablet 2   ? atorvastatin (LIPITOR) 20 MG tablet TAKE 1 TABLET DAILY 90 tablet 1   ? carbidopa-levodopa (SINEMET CR)  mg per tablet Take 1 tablet by mouth 4 (four) times a day. (06:00, 12:00 , 17:00, 22:00)     ? cholecalciferol, vitamin D3, 1,000 unit tablet Take 1,000 Units by mouth daily.     ? escitalopram oxalate (LEXAPRO) 10 MG tablet Take 10 mg by mouth daily.      ? fexofenadine (ALLEGRA) 180 MG tablet Take 180 mg by mouth as needed.      ? pantoprazole (PROTONIX) 20 MG tablet TAKE 1 TABLET DAILY 90 tablet 1   ? pantoprazole (PROTONIX) 40 MG tablet Take 1 tablet (40 mg total) by mouth daily. 90 tablet 1   ? therapeutic multivitamin (THERAGRAN) tablet Take 1 tablet by mouth daily.     ? fluticasone (FLONASE) 50 mcg/actuation nasal spray Use 2 sprays each nostril daily. 16 g 1     No current facility-administered medications for this visit.        Past Medical History:   Diagnosis Date   ? Acid reflux    ? Carpal tunnel syndrome     ? GERD (gastroesophageal reflux disease)    ? Hypertension    ? Parkinson disease    ? PE (pulmonary embolism)    ? Pulmonary Embolism     Created by [facility] Annotation: Dec 15 2009  9:23Lan Beatty: on Coumadin    ? Pulmonary nodule    ? Stress incontinence    ? Urinary incontinence    ? Vitamin D deficiency       Past Surgical History:   Procedure Laterality Date   ? BREAST BIOPSY Right 2000's    Calcs   ? [place] BIOPSY OF BREAST, INCISIONAL      Description: Biopsy Breast Open;  Recorded: 04/27/2009;  Comments: X2, benign.   ? [place] LIGATE FALLOPIAN TUBE      Description: Tubal Ligation;  Recorded: 04/27/2009;   ? [place] REVISE MEDIAN N/CARPAL TUNNEL SURG  5/20/2016    Procedure: LEFT MEDIAN NERVE DECOMPRESSION;  Surgeon: Terell Gaines MD;  Location: [facility] OR;  Service: Neurosurgery      Social History     Social History   ? Marital status:      Spouse name: N/A   ? Number of children: 6   ? Years of education: N/A     Occupational History   ?       Retired     Social History Main Topics   ? Smoking status: Never Smoker   ? Smokeless tobacco: Never Used   ? Alcohol use No   ? Drug use: No   ? Sexual activity: Not Asked     Other Topics Concern   ? None     Social History Narrative    The patient presents to the ED alone with complaint of abdominal pain 01/26/17           History   Smoking Status   ? Never Smoker   Smokeless Tobacco   ? Never Used      Exam:   Blood pressure 120/85, pulse 74, temperature 97  F (36.1  C), temperature source Oral, resp. rate 15, weight 154 lb (69.9 kg), SpO2 97 %, not currently breastfeeding.    EXAM:   General: Vital signs reviewed. Patient is in no acute appearing distress and very pleasant.  She is able to get up and down from exam room chair and table by herself without difficulty. Breathing is non labored appearing. Patient is alert and oriented x 3.   ENT: Ear exam shows clear TMs with no injection, left nasal  turbinates are injected and very edematous with increased clear rhinorrhea, no pharyngeal injection with increased post nasal drainage noted.  No sinus tenderness noted.  Eyes: no mattering or sclera; injection noted.  Neck: supple with no adenopathy.  Heart: Normal rate and rhythm without murmur  Lungs: Clear to auscultation with good air flow bilaterally.  Skin: warm and dry    Assessment/Plan   1. Viral sinusitis  fluticasone (FLONASE) 50 mcg/actuation nasal spray   2. Eustachian tube disorder, bilateral  fluticasone (FLONASE) 50 mcg/actuation nasal spray       Patient Instructions     I think your headache discomfort is related to viral sinusitis affecting the pressure between your nose and ears.  Use the steroid nasal spray for the next 2-4 weeks, which can help decrease the swelling in the nose.  If you are not doing better in the next 3-4 days, be seen again.    Self-Care for Sinusitis     Drinking plenty of water can help sinuses drain.   Sinusitis can often be managed with self-care. Self-care can keep sinuses moist and make you feel more comfortable. Remember to follow your doctor's instructions closely. This can make a big difference in getting your sinus problem under control.  Drink fluids  Drinking extra fluids helps thin your mucus. This lets it drain from your sinuses more easily. Have a glass of water every hour or two. A humidifier helps in much the same way. Fluids can also offset the drying effects of certain medicines. If you use a humidifier, follow the product maker's instructions on how to use it. Clean it on a regular schedule.  Use saltwater rinses  Rinses help keep your sinuses and nose moist. Mix a teaspoon of salt in 8 ounces of fresh, warm water. Use a bulb syringe to gently squirt the water into your nose a few times a day. You can also buy ready-made saline nasal sprays.  Apply hot or cold packs  Applying heat to the area surrounding your sinuses may make you feel more comfortable.  Use a hot water bottle or a hand towel dipped in hot water. Some people also find ice packs effective for relieving pain.  Medicines  Your doctor may prescribe medications to help treat your sinusitis. If you have an infection, antibiotics can help clear it up. If you are prescribed antibiotics, take all pills on schedule until they are gone, even if you feel better. Decongestants help relieve swelling. Use decongestant sprays for short periods only under the direction of your doctor. If you have allergies, your doctor may prescribe medications to help relieve them.   Date Last Reviewed: 10/1/2016    [id number] [employer]. [address] [place] [address], [place] [address]. All rights reserved. This information is not intended as a substitute for professional medical care. Always follow your healthcare professional's instructions.           Mitch Thompson,

## 2021-06-26 NOTE — PROGRESS NOTES
Progress Notes by Tracey Abdi FNP at 7/11/2018  9:55 AM     Author: Tracey Abdi FNP Service: -- Author Type: Nurse Practitioner    Filed: 7/14/2018 12:32 PM Encounter Date: 7/11/2018 Status: Signed    : Tracey Abdi FNP (Nurse Practitioner)       Assessment/Plan:        1. Pre-op exam  2. Parkinson's Disease  - May proceed with intended procedure   - Electrocardiogram Perform and Read  - Basic Metabolic Panel  - Hemoglobin    3. Foot pain  - XR Foot Right 3 or More VWS; Future  - Suspect a tendinopathy due to overuse this past weekend. Stretching, ice, rest, elevation recommended if normal x-ray     4. Dysuria  - Consider topical estrogen if she continues to have recurrence of UTIs   - Urinalysis-UC if Indicated    5. HTN (hypertension)  - Stable with current medications     6. HLD (hyperlipidemia)  - She is taking a statin for this, denies myalgias or weakness associated with treatment.         Subjective:     Scheduled Procedure: Deep Brain Stimulation lead placement, left (8/7); battery placement, left (8/21); and lead placement and connection to existing battery, right (9/18)  Surgery Location:  [facility]  Surgeon:  Dr. Murray Stearns    HPI: Gabi Manley is a 78-year-old female who presents to the office today for preoperative physical.  She has a history of Parkinson's disease and has a resting tremor as well as significant bradykinesia.  She has motor fluctuations with dyskinesias and fatigue as well which are improved with recent reduction of her Sinemet.  She has levodopa response with improvement in rigidity, tremor, bradykinesia but still has exhaustion due to rigidity and tremor that she experiences. For this reason she planned to proceed with DBS surgery.     She has a new concern of pain on the right lateral foot. Over the weekend her son was  and she was dancing for a long time. No other injury or other changes in her activity. She is limping when she walks due to the  pain. No bruising.     Reflux- had a lot of stomach pain with discontinuation of pantoprazole but is doing well with pantoprazole 40 mg daily. Yesterday she had heart burn but this is atypical for her.     She had a UTI in May and was treated with 3 days of cipro two times a day. Her dose was increased to 500 mg two times a day x 5 days because her symptoms did not resolve. She did feel better with this bigger dose. In the past few weeks she has felt like she has another UTI with frequency, dysuria, urgency. She is concerned that she has been having more      Depression- has taken lexapro for this for a long time and mood has been good. She is seeing a psychiatrist regularly, sees the same provider for the past 15 years. She was advised to increase her dose by 1/2 tablet but she was too nervous to do so. She feels that she is coping well.       HTN- blood pressure readings at home look good, she checks once per week. No SE associated with taking this medication. She did lose weight after she made some dietary changes and her blood pressure has been lower than her typically readings but she is not having any lightheadedness or dizziness.       Urinary incontinence- wears briefs daily    Current Outpatient Prescriptions   Medication Sig Dispense Refill   ? amLODIPine (NORVASC) 5 MG tablet TAKE 1 TABLET DAILY 90 tablet 3   ? atorvastatin (LIPITOR) 20 MG tablet TAKE 1 TABLET DAILY 90 tablet 3   ? carbidopa-levodopa (SINEMET CR)  mg per tablet Take 1 tablet by mouth 4 (four) times a day. (06:00, 12:00 , 17:00, 22:00).  Patient states that she is taking 1.25 tablets every 2 hours while awake     ? cholecalciferol, vitamin D3, 1,000 unit tablet Take 1,000 Units by mouth daily.     ? escitalopram oxalate (LEXAPRO) 10 MG tablet Take 10 mg by mouth daily.      ? fexofenadine (ALLEGRA) 180 MG tablet Take 180 mg by mouth as needed.      ? fluticasone (FLONASE) 50 mcg/actuation nasal spray Use 2 sprays each nostril daily.  16 g 1   ? pantoprazole (PROTONIX) 20 MG tablet TAKE 1 TABLET DAILY 90 tablet 1   ? therapeutic multivitamin (THERAGRAN) tablet Take 1 tablet by mouth daily.       No current facility-administered medications for this visit.        Allergies   Allergen Reactions   ? Iodinated Contrast- Oral And Iv Dye Shortness Of Breath   ? Erythromycin Unknown   ? Ioxaglate Sodium Unknown     Angioedema   ? Other Allergy (See Comments)      Contrast Media Ready-Box MISC, 12/15/2009.     ? Paroxetine Hcl Unknown   ? Penicillins      Facial swelling   ? Trazodone Unknown   ? Vancomycin Hives       Immunization History   Administered Date(s) Administered   ? Influenza [id number], 12/18/2009   ? Influenza high dose, seasonal 10/26/2015   ? Influenza, Seasonal, Inj PF IIV3 11/02/2009, 09/23/2010   ? Influenza, seasonal,quad inj 6-35 mos 10/17/2011, 10/04/2012   ? Pneumo Conj 13-V (2010&after) 06/10/2016   ? Pneumo Polysac 23-V 09/06/2005   ? Td,adult,historic,unspecified 09/06/2005   ? Tdap 05/17/2016   ? ZOSTER, LIVE 07/31/2008       Patient Active Problem List   Diagnosis   ? Allergic Rhinitis   ? CXR Lungs Solitary Pulmonary Nodule (___ Cm)   ? Esophageal Reflux   ? Parkinson's Disease   ? HTN (hypertension)   ? Female Stress Incontinence   ? Vitamin D Deficiency   ? Carpal tunnel syndrome of left wrist   ? HLD (hyperlipidemia), 10-year ASCVD risk 20.7% 6/2016       Past Medical History:   Diagnosis Date   ? Acid reflux    ? Carpal tunnel syndrome    ? GERD (gastroesophageal reflux disease)    ? Hypertension    ? Parkinson disease (H)    ? PE (pulmonary embolism)    ? Pulmonary Embolism     Created by [facility] Annotation: Dec 15 2009  9:23Lan Beatty: on Coumadin    ? Pulmonary nodule    ? Stress incontinence    ? Urinary incontinence    ? Vitamin D deficiency        Social History     Social History   ? Marital status:      Spouse name: N/A   ? Number of children: 6   ? Years of education: N/A      Occupational History   ?       Retired     Social History Main Topics   ? Smoking status: Never Smoker   ? Smokeless tobacco: Never Used   ? Alcohol use No   ? Drug use: No   ? Sexual activity: Not on file     Other Topics Concern   ? Not on file     Social History Narrative    The patient presents to the ED alone with complaint of abdominal pain 01/26/17           Past Surgical History:   Procedure Laterality Date   ? BREAST BIOPSY Right 2000's    Calcs   ? [place] BIOPSY OF BREAST, INCISIONAL      Description: Biopsy Breast Open;  Recorded: 04/27/2009;  Comments: X2, benign.   ? [place] LIGATE FALLOPIAN TUBE      Description: Tubal Ligation;  Recorded: 04/27/2009;   ? [place] REVISE MEDIAN N/CARPAL TUNNEL SURG  5/20/2016    Procedure: LEFT MEDIAN NERVE DECOMPRESSION;  Surgeon: Terell Gaines MD;  Location: [facility];  Service: Neurosurgery           History of Present Illness  Recent Health  Fever: no  Chills: no  Fatigue: no  Chest Pain: no  Cough: no  Dyspnea: no  Urinary Frequency: no  Nausea: no  Vomiting: no  Diarrhea: no  Abdominal Pain: no  Easy Bruising: no  Lower Extremity Swelling: no  Poor Exercise Tolerance: no    Pertinent History  Prior Anesthesia: yes  Previous Anesthesia Reaction:  no  Diabetes: no  Cardiovascular Disease: no  Pulmonary Disease: no  Renal Disease: no  GI Disease: no  Sleep Apnea: no  Thromboembolic Problems: no  Clotting Disorder: no  Bleeding Disorder: no  Transfusion Reaction: no  Impaired Immunity: no  Steroid use in the last 6 months: no  Frequent Aspirin use: no    Family history: There is no family history of abnormal reaction to anesthesia or sudden unexplained death    Review of Systems: Positives in bold  Constitutional: Fever, chills, night sweats, fainting, weight change, fatigue, seizures, dizziness, sleeping difficulties, loud snoring/pauses in breathing  Eyes: change in vision, blurred or double vision, redness/eye pain  Ears, nose, mouth,  "throat: change in hearing, ear pain, hoarseness, difficulty swallowing, sores in the mouth or throat  Respiratory: shortness of breath, cough, bloody sputum, wheezing  Cardiovascular: chest pain, palpitations   Gastrointestinal: abdominal pain, heartburn/indigestion, nausea/vomiting, change in appetite, change in bowel habits, constipation or diarrhea, rectal bleeding/dark stools, difficulty swallowing  Urinary: painful urination, frequent urination, urinary urgency/incontinence, blood in urine/dark urine, nocturia  Genital: WOMEN: vaginal discharge or odor, bleeding/pain with intercourse, pelvic pain, vulvar/vaginal itching or burning, excessive menstrual bleeding, problems with sexual function  Musculoskeletal: backache/back pain (new or increasing), weakness, joint pain/stiffness (new or increasing), muscle cramps, swelling of hands, feet, ankles, leg pain/redness  Skin: change in moles/freckles, rash, nodules  Hematologic/lymphatic: swollen lymph glands, abnormal bruising/bleeding  Endocrine: excessive thirst/urination, cold or heat intolerance  Breast: breast lump, breast pain, nipple discharge/skin changes  Neurologic/emotional: worrisome memory change, numbness/tingling, anxiety, mood swings      Objective:       Vitals:    07/11/18 0943   BP: 112/68   Pulse: 82   Weight: 139 lb (63 kg)   Height: 5' 3.5\" (1.613 m)         Physical Exam:  General Appearance: Alert, cooperative, no distress, appears stated age  Head: Normocephalic, without obvious abnormality, atraumatic  Eyes: PERRL, conjunctiva/corneas clear, EOM's intact  Ears: Normal TM's and external ear canals, both ears  Throat: Lips, mucosa, and tongue normal; teeth and gums normal  Neck: Supple, symmetrical, trachea midline, no adenopathy;  thyroid: not enlarged, symmetric, no tenderness/mass/nodules; no carotid bruit  Lungs: Clear to auscultation bilaterally, respirations unlabored  Heart: Regular rate and rhythm, S1 and S2 normal, no murmur, rub, or " gallop  Abdomen: Soft, non-tender, bowel sounds active all four quadrants,  no masses, no organomegaly  Extremities: Extremities normal, atraumatic, no cyanosis or edema  MSK: she has lateral mid-foot tenderness to palpation. There is a mild swelling noted over area of maximum tenderness. Bears weight evenly on both feet   Skin: Skin color, texture, turgor normal, no rashes or lesions  Lymph nodes: Cervical, supraclavicular nodes normal  Neurologic: Normal            Recent Results (from the past 240 hour(s))   Electrocardiogram Perform and Read   Result Value Ref Range    SYSTOLIC BLOOD PRESSURE  mmHg    DIASTOLIC BLOOD PRESSURE  mmHg    VENTRICULAR RATE 65 BPM    ATRIAL RATE 65 BPM    P-R INTERVAL 140 ms    QRS DURATION 80 ms    Q-T INTERVAL 422 ms    QTC CALCULATION (BEZET) 438 ms    P Axis 52 degrees    R AXIS 66 degrees    T AXIS 87 degrees    MUSE DIAGNOSIS       Normal sinus rhythm  Possible Anterior infarct , age undetermined  Abnormal ECG  When compared with ECG of 29-APR-2016 08:51,  Nonspecific T wave abnormality, worse in Lateral leads  Confirmed by EUGENIA TEE [facility] LOC: ([id number]) on 7/11/2018 2:45:09 PM     Basic Metabolic Panel   Result Value Ref Range    Sodium 141 136 - 145 mmol/L    Potassium 4.4 3.5 - 5.0 mmol/L    Chloride 105 98 - 107 mmol/L    CO2 26 22 - 31 mmol/L    Anion Gap, Calculation 10 5 - 18 mmol/L    Glucose 78 70 - 125 mg/dL    Calcium 9.2 8.5 - 10.5 mg/dL    BUN 13 8 - 28 mg/dL    Creatinine 0.80 0.60 - 1.10 mg/dL    GFR MDRD Af Amer >60 >60 mL/min/1.73m2    GFR MDRD Non Af Amer >60 >60 mL/min/1.73m2   Hemoglobin   Result Value Ref Range    Hemoglobin 13.6 12.0 - 16.0 g/dL   Urinalysis-UC if Indicated   Result Value Ref Range    Color, UA Yellow Colorless, Yellow, Straw, Light Yellow    Clarity, UA Clear Clear    Glucose, UA Negative Negative    Bilirubin, UA Negative Negative    Ketones, UA Trace (!) Negative    Specific Gravity, UA 1.015 1.005 - 1.030    Blood, UA Negative Negative     pH, UA 6.5 5.0 - 8.0    Protein, UA Negative Negative mg/dL    Urobilinogen, UA 1.0 E.U./dL 0.2 E.U./dL, 1.0 E.U./dL    Nitrite, UA Negative Negative    Leukocytes, UA Trace (!) Negative    Bacteria, UA Few (!) None Seen hpf    RBC, UA 0-2 None Seen, 0-2 hpf    WBC, UA 0-5 None Seen, 0-5 hpf    Squam Epithel, UA 0-5 None Seen, 0-5 lpf   Culture, Urine   Result Value Ref Range    Culture No Growth            Tracey Abdi, CNP  [facility]

## 2021-06-26 NOTE — PROGRESS NOTES
Progress Notes by Thierno Matrinez PA-C at 8/12/2018  1:00 PM     Author: Thierno Martinez PA-C Service: -- Author Type: Physician Assistant    Filed: 8/12/2018  2:16 PM Encounter Date: 8/12/2018 Status: Signed    : Thierno Martinez PA-C (Physician Assistant)       Subjective:      Patient ID: Gabi Manley is a 78 y.o. female.    Chief Complaint:    HPI  Gabi Manley is a 78 y.o. female with a diagnosis of parkinsonism who presents today complaining of right-sided wrist pain.  Patient states that she has had worsening right-sided wrist pain insidiously since Monday.  She does not have any acute precipitating event to include fall or injury.  However when she does her daily activities she is having pain in the right dominant hand.  She has had no break in the skin bleeding or ecchymoses.  No fever chills night sweats or fatigue    Past Medical History:   Diagnosis Date   ? Acid reflux    ? Carpal tunnel syndrome    ? GERD (gastroesophageal reflux disease)    ? Hypertension    ? Parkinson disease (H)    ? PE (pulmonary embolism)    ? Pulmonary Embolism     Created by [facility] Annotation: Dec 15 2009  9:23AM - Lan Altamirano: on Coumadin    ? Pulmonary nodule    ? Stress incontinence    ? Urinary incontinence    ? Vitamin D deficiency        Past Surgical History:   Procedure Laterality Date   ? BREAST BIOPSY Right 2000's    Calcs   ? [place] BIOPSY OF BREAST, INCISIONAL      Description: Biopsy Breast Open;  Recorded: 04/27/2009;  Comments: X2, benign.   ? ND LIGATE FALLOPIAN TUBE      Description: Tubal Ligation;  Recorded: 04/27/2009;   ? ND REVISE MEDIAN N/CARPAL TUNNEL SURG  5/20/2016    Procedure: LEFT MEDIAN NERVE DECOMPRESSION;  Surgeon: Terell Gaines MD;  Location: [facility] OR;  Service: Neurosurgery       Family History   Problem Relation Age of Onset   ? Adopted: Yes   ? Cancer Mother      bladder   ? Throat cancer Father        Social History   Substance Use Topics   ? Smoking status: Never  Smoker   ? Smokeless tobacco: Never Used   ? Alcohol use No       Review of Systems  As above in HPI specifically she has no pain into the fingers no difficulty with the elbow or shoulder of the affected right upper extremity.  Objective:     /80  Pulse 72  Temp 97.4  F (36.3  C) (Oral)   Resp 16  Wt 139 lb (63 kg)  SpO2 96%  BMI 24.24 kg/m2    Physical Exam  General patient is tremulous she is sitting in a wheelchair.  Musculoskeletal: Focused examination right upper extremity shows that the shoulder and elbow are nontender palpation full range of motion wrist is nontender to palpation of distal radius or ulna.  Pain along the proximal and distal row of carpals.  She has pain over the STT joint primarily and this is the maximal point of tenderness to palpation.  Basal joint is primarily uncomfortable and does have a positive carpal grind test however this is more related to the STT joint.    Imaging    Xr Wrist Right 3 Or More Vws    Result Date: 8/12/2018  EXAM DATE:         08/12/2018 [facility] X-RAY WRIST RIGHT, MINIMUM 3 VIEWS 8/12/2018 1:30 PM INDICATION: right wrist pain and swelling no injury COMPARISON: None. FINDINGS: No fracture or dislocation. There are marked osteoarthritic changes at the STT joint with joint space narrowing, eburnation and osteophytic spurring. There are mild osteoarthritic changes at the first carpometacarpal joint. There is mild narrowing of the radiocarpal joint.       I personally reviewed and discussed findings with the patient.  My own personal interpretation and radiologist will over read x-rays    Assessment:     Procedures    The primary encounter diagnosis was Arthritis of hcxjcl-ebjhscwzk-ochffmayw joint. A diagnosis of Right wrist pain was also pertinent to this visit.    Plan:     1. Arthritis of yvvlgn-objsndtuw-cyfvjzsht joint  diclofenac sodium (VOLTAREN) 1 % Gel   2. Right wrist pain  XR Wrist Right 3 or More VWS    diclofenac sodium  (VOLTAREN) 1 % Gel       The son who accompanied the patient in the office was given the contact information for [facility].  The son will make an appointment to have the patient evaluated by hand surgery at [facility] for definitive evaluation and treatment and to discuss treatment options.    Patient Instructions     Scaphotrapeziotrapezoid Joint (STT)  The scaphotrapeziotrapezoid joint is at the base of the thumb in the wrist. It is made up of three wrist bones, the scaphoid, the trapezium, and the trapezoid. The scaphoid rotates at this joint as you move the wrist. This joint can become arthritic. Treatment of arthritis in this joint can include removal of the scaphoid, removal of the trapezium and part of the trapezoid, or arthrodesis (also known as fusion).   Use of the voltaren gel.  Follow up with primary care provider if you do not get resolution with the course of treatment.  Return to walk-in care if complication or new symptoms arise in the interim.    As a result of our visit today, here are the action plans for you:    1. Medication(s) to stop: There are no discontinued medications.    2. Medication(s) to start or change:   Medications Ordered   Medications   ? diclofenac sodium (VOLTAREN) 1 % Gel     Sig: Apply 2 grams topically to area 4 times per day not to exceed 8 grams per day     Dispense:  1 Tube     Refill:  0       3. Other instructions: Yes         Osteoarthritis  Osteoarthritis (also called degenerative joint disease) happens when the cartilage in a joint becomes damaged and worn. This may be due to age, wear and tear, overuse of the joint, or other problems. Osteoarthritis can affect any joint. But it is most common in hands, knees, spine, hips, and feet. Symptoms include joint stiffness, pain, and swelling.  Home care    When a joint is more sore than usual, rest it for a day or two.    Heat can help relieve stiffness. Take a hot bath or apply a heating pad for up to 30  minutes at a time. If symptoms are worse in the morning, using heat just after awakening can help relax the muscle and soothe the joints.     Ice helps relieve pain and swelling. It is often used after activity. Use a cold pack wrapped in a thin cloth on the joint for 10 to 15 minutes at a time.     Alternating hot and cold can also help relieve pain. Try this for 20 minutes at a time, several times per day.    Exercise helps prevent the muscles and ligaments around the joint from becoming weak. It also helps maintain function in the joint.  Be as active as you can. Talk to your healthcare provider about what activity program is best for you.    Excess weight puts a lot of extra strain on weight-bearing joints of the lower back, hips, knees, feet and ankles. If you are overweight, talk to your healthcare provider about a safe and effective weight loss program.    Use anti-inflammatory medicines as prescribed for pain. This includes acetaminophen or NSAIDs such as ibuprofen or naproxen. If needed, topical or injected medicines may be recommended. Talk to your healthcare provider if these options are not enough to manage your pain.    Talk with your healthcare provider about devices that might help improve your function and reduce pain.  Follow-up care  Follow up with your healthcare provider as advised by our staff.  When to seek medical advice  Call your healthcare provider right away if any of these occur:    Redness or swelling of a painful joint    Discharge or pus from a painful joint    Fever of 100.4 F (38 C) or higher, or as directed by your healthcare provider    Worsening joint pain    Decreased ability to move the joint or bear weight on the joint  Date Last Reviewed: 3/1/2017    [id number] [employer]. [address] [place] [address], [place] [address]. All rights reserved. This information is not intended as a substitute for professional medical care. Always follow your healthcare professional's  instructions.

## 2021-06-27 NOTE — PROGRESS NOTES
Progress Notes by Thierno Martinez PA-C at 12/29/2018 12:50 PM     Author: Thierno Martinez PA-C Service: -- Author Type: Physician Assistant    Filed: 12/29/2018  5:07 PM Encounter Date: 12/29/2018 Status: Signed    : Thierno Martinez PA-C (Physician Assistant)       Subjective:      Patient ID: Gabi Manley is a 78 y.o. female.    Chief Complaint:    HPI  Gabi Manley is a 78 y.o. female with a PMH of Parkinsonism who presents today complaining of a one week history of irritative voiding symptoms to include urinary hesitancy urgency frequency and dysuria.  Patient denies gross hematuria.  Denies fever chills night sweats fatigue or other red flag symptoms to include back or flank pain and no vaginal discharge.  She has had a recent urinary tract infection that was treated with a 10-day course of Bactrim.  She was then seen by her internal medicine doctor at the [facility]. She is also been using a topical estrogen cream for comfort written by her internal medicine doctor on November 26. Referal to Urology for continued urinary symptoms was written.    At this time patient does not have delirium or mental status changes, chills night sweats fatigue.  She is accompanied by her son in the office who helps with the history.    Past Medical History:   Diagnosis Date   ? Acid reflux    ? Carpal tunnel syndrome    ? GERD (gastroesophageal reflux disease)    ? Hypertension    ? Parkinson disease (H)    ? PE (pulmonary embolism)    ? Pulmonary Embolism     Created by [facility] Annotation: Dec 15 2009  9:23AM - Lan Altamirano: on Coumadin    ? Pulmonary nodule    ? Stress incontinence    ? Urinary incontinence    ? Vitamin D deficiency        Past Surgical History:   Procedure Laterality Date   ? BREAST BIOPSY Right 2000's    Calcs   ? [place] BIOPSY OF BREAST, INCISIONAL      Description: Biopsy Breast Open;  Recorded: 04/27/2009;  Comments: X2, benign.   ? [place] LIGATE FALLOPIAN TUBE      Description: Tubal Ligation;   Recorded: 04/27/2009;   ? [place] REVISE MEDIAN N/CARPAL TUNNEL SURG  5/20/2016    Procedure: LEFT MEDIAN NERVE DECOMPRESSION;  Surgeon: Terell Gaines MD;  Location: [facility];  Service: Neurosurgery       Family History   Adopted: Yes   Problem Relation Age of Onset   ? Cancer Mother         bladder   ? Throat cancer Father        Social History     Tobacco Use   ? Smoking status: Never Smoker   ? Smokeless tobacco: Never Used   Substance Use Topics   ? Alcohol use: No   ? Drug use: No       Review of Systems  As above in HPI, otherwise balance of Review of Systems are negative.    Objective:     /80 (Patient Site: Right Arm, Patient Position: Sitting, Cuff Size: Adult Regular)   Pulse 67   Temp 97.4  F (36.3  C) (Oral)   Resp 18   Wt 130 lb (59 kg)   SpO2 99%   BMI 22.67 kg/m      Physical Exam    General: Patient is resting comfortably no acute distress is afebrile  Ambulating with help from a wheeled walker  HEENT: Head is normocephalic atraumatic   eyes are PERRL EOMI sclera anicteric   TMs are clear bilaterally  Throat is clear  No cervical lymphadenopathy present  LUNGS: Clear to auscultation bilaterally  HEART: Regular rate and rhythm  Skin: Without rash non-diaphoretic  Abdomen: It is soft nontender suprapubic area is nontender to palpation there is no induration or urinary retention no CVA tenderness to percussion.    Lab:  Recent Results (from the past 24 hour(s))   Urinalysis-UC if Indicated   Result Value Ref Range    Color, UA Yellow Colorless, Yellow, Straw, Light Yellow    Clarity, UA Clear Clear    Glucose, UA Negative Negative    Bilirubin, UA Negative Negative    Ketones, UA Negative Negative    Specific Gravity, UA 1.015 1.005 - 1.030    Blood, UA Negative Negative    pH, UA 7.0 5.0 - 8.0    Protein, UA Negative Negative mg/dL    Urobilinogen, UA 0.2 E.U./dL 0.2 E.U./dL, 1.0 E.U./dL    Nitrite, UA Negative Negative    Leukocytes, UA Trace (!) Negative    Bacteria, UA None  Seen None Seen hpf    RBC, UA 0-2 None Seen, 0-2 hpf    WBC, UA 0-5 None Seen, 0-5 hpf    Squam Epithel, UA 0-5 None Seen, 0-5 lpf       Assessment:     Procedures    1. Urinary frequency  Urinalysis-UC if Indicated    Culture, Urine    ciprofloxacin HCl (CIPRO) 500 MG tablet   2. Parkinson's Disease         Plan:     1. Urinary frequency  Urinalysis-UC if Indicated    Culture, Urine    ciprofloxacin HCl (CIPRO) 500 MG tablet       Will follow up with urology as previously scheduled by her internal medicine doctor on November 26.  Patient will start a new course of antibiotic and continue with the estrogen cream intravaginally.  Indication for return was gone over.  Patient voiced understanding and questions were answered before discharge.    Patient Instructions     Increased fluids and rest.  Discussed signs and symptoms of ascending urinary tract infection symptoms to include pyelonephritis. Instructed to turn to clinic if there are increased fever chills night sweats fatigue abdominal pain or flank pain  Antibiotic as written. Risks and benefits of medication discussed.  Indication for return to clinic.  Follow-up with urology as previously scheduled.  Return to the primary care office if you have new symptoms or complications between today's appointment and follow-up with urology.      As a result of our visit today, here are the action plans for you:    1. Medication(s) to stop: There are no discontinued medications.    2. Medication(s) to start or change:   Medications Ordered   Medications   ? ciprofloxacin HCl (CIPRO) 500 MG tablet     Sig: Take 1 tablet (500 mg total) by mouth 2 (two) times a day for 10 days.     Dispense:  20 tablet     Refill:  0       3. Other instructions: Yes      Urinary Tract Infections in Women    Urinary tract infections (UTIs) are most often caused by bacteria (germs). These bacteria enter the urinary tract. The bacteria may come from outside the body. Or they may travel from the  skin outside the rectum or vagina into the urethra. Female anatomy makes it easier for bacteria from the bowel to enter a womans urinary tract, which is the most common source of UTI. This means women develop UTIs more often than men. Pain in or around the urinary tract is a common UTI symptom. But the only way to know for sure if you have a UTI for the health care provider to test your urine. The two tests that may be done are the urinalysis and urine culture.  Types of UTIs    Cystitis: A bladder infection (cystitis) is the most common UTI in women. You may have urgent or frequent urination. You may also have pain, burning when you urinate, and bloody urine.    Urethritis: This is an inflamed urethra, which is the tube that carries urine from the bladder to outside the body. You may have lower stomach or back pain. You may also have urgent or frequent urination.    Pyelonephritis: This is a kidney infection. If not treated, it can be serious and damage your kidneys. In severe cases, you may be hospitalized. You may have a fever and lower back pain.  Medications to treat a UTI  Most UTIs are treated with antibiotics. These kill the bacteria. The length of time you need to take them depends on the type of infection. It may be as short as 3 days. If you have repeated UTIs, a low-dose antibiotic may be needed for several months. Take antibiotics exactly as directed. Dont stop taking them until all of the medication is gone. If you stop taking the antibiotic too soon, the infection may not go away, and you may develop a resistance to the antibiotic. This can make it much harder to treat.  Lifestyle changes to treat and prevent UTIs  The lifestyle changes below will help get rid of your UTI. They may also help prevent future UTIs.    Drink plenty of fluids. This includes water, juice, or other caffeine-free drinks. Fluids help flush bacteria out of your body.    Empty your bladder. Always empty your bladder when you  feel the urge to urinate. And always urinate before going to sleep. Urine that stays in your bladder can lead to infection. Try to urinate before and after sex as well.    Practice good personal hygiene. Wipe yourself from front to back after using the toilet. This helps keep bacteria from getting into the urethra.    Use condoms during sex. These help prevent UTIs caused by sexually transmitted bacteria. Also, avoid using spermicides during sex. These can increase the risk of UTIs. Choose other forms of birth control instead. For women who tend to get UTIs after sex, a low-dose of a preventive antibiotic may be used. Be sure to discuss this option with your health care provider.    Follow up with your health care provider as directed. He or she may test to make sure the infection has cleared. If necessary, additional treatment may be started.  Date Last Reviewed: 9/8/2014 2000-2016 [employer]. [address] [place] [address], [place] [address]. All rights reserved. This information is not intended as a substitute for professional medical care. Always follow your healthcare professional's instructions.

## 2021-07-03 NOTE — ADDENDUM NOTE
Addendum Note by Byron Peña PT at 12/4/2017 11:59 PM     Author: Byron Peña PT Service: -- Author Type: Physical Therapist    Filed: 12/5/2017  4:29 PM Date of Service: 12/4/2017 11:59 PM Status: Signed    : Byron Peña PT (Physical Therapist)    Encounter addended by: Byron Peña PT on: 12/5/2017  4:29 PM<BR>     Actions taken: Sign clinical note

## 2021-07-03 NOTE — ADDENDUM NOTE
Addendum Note by Maycol Mason PT at 5/23/2017 11:59 PM     Author: Maycol Mason PT Service: -- Author Type: Physical Therapist    Filed: 5/26/2017  8:17 AM Date of Service: 5/23/2017 11:59 PM Status: Signed    : Maycol Mason PT (Physical Therapist)    Encounter addended by: Maycol Mason PT on: 5/26/2017  8:17 AM<BR>     Actions taken: Remove cosign from clinical note, Sign clinical note

## 2021-07-03 NOTE — ADDENDUM NOTE
Addendum Note by Maycol Mason PT at 5/25/2017 11:59 PM     Author: Maycol Mason PT Service: -- Author Type: Physical Therapist    Filed: 5/26/2017  8:16 AM Date of Service: 5/25/2017 11:59 PM Status: Signed    : Maycol Mason PT (Physical Therapist)    Encounter addended by: Maycol Mason PT on: 5/26/2017  8:16 AM<BR>     Actions taken: Sign clinical note
